# Patient Record
Sex: FEMALE | Race: WHITE | NOT HISPANIC OR LATINO | Employment: FULL TIME | URBAN - METROPOLITAN AREA
[De-identification: names, ages, dates, MRNs, and addresses within clinical notes are randomized per-mention and may not be internally consistent; named-entity substitution may affect disease eponyms.]

---

## 2017-01-12 ENCOUNTER — OFFICE VISIT (OUTPATIENT)
Dept: INTERNAL MEDICINE | Age: 72
End: 2017-01-12

## 2017-01-12 VITALS
WEIGHT: 164 LBS | DIASTOLIC BLOOD PRESSURE: 60 MMHG | RESPIRATION RATE: 12 BRPM | SYSTOLIC BLOOD PRESSURE: 120 MMHG | HEIGHT: 65 IN | BODY MASS INDEX: 27.32 KG/M2 | HEART RATE: 72 BPM

## 2017-01-12 DIAGNOSIS — E78.2 MIXED HYPERLIPIDEMIA: Primary | ICD-10-CM

## 2017-01-12 DIAGNOSIS — N63.20 BREAST MASS, LEFT: ICD-10-CM

## 2017-01-12 DIAGNOSIS — E03.9 ACQUIRED HYPOTHYROIDISM: ICD-10-CM

## 2017-01-12 LAB
ALBUMIN SERPL-MCNC: 4.4 G/DL (ref 3.5–5.2)
ALBUMIN/GLOB SERPL: 1.6 G/DL
ALP SERPL-CCNC: 117 U/L (ref 39–117)
ALT SERPL-CCNC: 31 U/L (ref 1–33)
AST SERPL-CCNC: 26 U/L (ref 1–32)
BILIRUB SERPL-MCNC: 0.3 MG/DL (ref 0.1–1.2)
BUN SERPL-MCNC: 18 MG/DL (ref 8–23)
BUN/CREAT SERPL: 19.1 (ref 7–25)
CALCIUM SERPL-MCNC: 9.9 MG/DL (ref 8.6–10.5)
CHLORIDE SERPL-SCNC: 103 MMOL/L (ref 98–107)
CHOLEST SERPL-MCNC: 181 MG/DL (ref 0–200)
CO2 SERPL-SCNC: 25.2 MMOL/L (ref 22–29)
CREAT SERPL-MCNC: 0.94 MG/DL (ref 0.57–1)
GLOBULIN SER CALC-MCNC: 2.7 GM/DL
GLUCOSE SERPL-MCNC: 107 MG/DL (ref 65–99)
HDLC SERPL-MCNC: 39 MG/DL (ref 40–60)
LDLC SERPL CALC-MCNC: 112 MG/DL (ref 0–100)
POTASSIUM SERPL-SCNC: 4.3 MMOL/L (ref 3.5–5.2)
PROT SERPL-MCNC: 7.1 G/DL (ref 6–8.5)
SODIUM SERPL-SCNC: 142 MMOL/L (ref 136–145)
T4 FREE SERPL-MCNC: 1.28 NG/DL (ref 0.93–1.7)
TRIGL SERPL-MCNC: 152 MG/DL (ref 0–150)
TSH SERPL DL<=0.005 MIU/L-ACNC: 1.54 MIU/ML (ref 0.27–4.2)
VLDLC SERPL CALC-MCNC: 30.4 MG/DL (ref 5–40)

## 2017-01-12 PROCEDURE — 99214 OFFICE O/P EST MOD 30 MIN: CPT | Performed by: INTERNAL MEDICINE

## 2017-01-12 NOTE — PROGRESS NOTES
Marci Kolb is a 71 y.o. female who presents with   Chief Complaint   Patient presents with   • Hyperlipidemia     Checkup; lab updates   • Hypothyroidism     As above   • Breast Mass     When seeing Dr. Francisco Gonzalez in 2008 she had a mammogram showing a 6 mm area of the posterior third upper inner quadrant of the left breast that was suspicious.  She says that she never had any follow-up on this and has never had any biopsy done   .    Hyperlipidemia   This is a chronic problem. The current episode started more than 1 year ago. The problem is controlled. Exacerbating diseases include hypothyroidism. Current antihyperlipidemic treatment includes statins. The current treatment provides moderate improvement of lipids. There are no compliance problems.    Hypothyroidism   This is a chronic problem. The current episode started more than 1 year ago. The problem has been unchanged. Treatments tried: Current treatment well-tolerated.   Breast Mass   This is a chronic problem. The current episode started more than 1 year ago. Progression since onset: In 2008 a mammogram by another physician showed a 6 mm area of suspicion and the posterior third upper inner quadrant of the left breast.  She says that she has not had any follow-up on this.  Also she does not have a GYN and has not had any recent exams         The following portions of the patient's history were reviewed and updated as appropriate: allergies, current medications, past medical history and problem list.    Review of Systems   Constitutional: Negative.    HENT: Negative.    Eyes: Negative.    Respiratory: Negative.    Cardiovascular: Negative.    Genitourinary: Negative.    Musculoskeletal: Negative.    Skin: Negative.    Neurological: Negative.    Psychiatric/Behavioral: Negative.        Objective   Physical Exam   Constitutional: She is oriented to person, place, and time. She appears well-developed and well-nourished. No distress.   HENT:   Head:  Normocephalic and atraumatic.   Eyes: Conjunctivae and EOM are normal. Pupils are equal, round, and reactive to light.   Neck: Normal range of motion. Neck supple. No thyromegaly present.   Neck exam negative.  Carotid auscultation normal-no bruits heard.   Cardiovascular: Normal rate, regular rhythm, normal heart sounds and intact distal pulses.  Exam reveals no gallop and no friction rub.    No murmur heard.  Pulmonary/Chest: Effort normal and breath sounds normal. No respiratory distress. She has no wheezes. She has no rales. She exhibits no tenderness.   Neurological: She is alert and oriented to person, place, and time.   Psychiatric: She has a normal mood and affect. Her behavior is normal. Judgment and thought content normal.   Nursing note and vitals reviewed.      Assessment/Plan   Marci was seen today for hyperlipidemia, hypothyroidism and breast mass.    Diagnoses and all orders for this visit:    Mixed hyperlipidemia  -     Comprehensive Metabolic Panel  -     Lipid Panel    Acquired hypothyroidism  -     TSH+Free T4    Breast mass, left  -     Mammo Diagnostic Bilateral With CAD; Future      Plan: Labs as above.  Continue current treatment.  Follow-up checkup/lab updates day 6 months.    Reviewing her old records revealed that in 2008 she had a mammogram through her previous physician-Dr. Francisco Gonzalez.  That mammogram revealed what appeared to be a 6 mm area of suspicion in the posterior third upper inner quadrant of the left breast.  Stereotactic biopsy was suggested.  The patient says that to the best of her recollection she never received this information and has not had any biopsy done and has had no mammography follow-up.  She also has not been to a gynecologist in many years and has not had a breast exam or a Pap smear in many years.    The patient has not had a recent screening colonoscopy although she has had one in the past which caused a great deal of pain and afterwards she had  difficulty controlling her bowels and she said she would never have one again.  The patient is aware of the risks of undiagnosed colon cancer including GI bleed, bowel obstruction, bowel perforation, metastatic colon cancer and death.  The patient voices an understanding of these risks but also voices a willingness to accept those risks based on the current decision to decline a colonoscopy.

## 2017-01-12 NOTE — MR AVS SNAPSHOT
Marci Kolb   1/12/2017 7:40 AM   Office Visit    Dept Phone:  [phone]   Encounter #:  [id number]    Provider:  Ward Penaloza MD   Department:  [facility] PRIMARY [facility]ARE                Your Full Care Plan              Today's Medication Changes          These changes are accurate as of: 1/12/17  8:01 AM.  If you have any questions, ask your nurse or doctor.               Stop taking medication(s)listed here:     azithromycin 250 MG tablet   Commonly known as:  ZITHROMAX Z-VICENTE   Stopped by:  Ward Penaloza MD           HYDROcodone-acetaminophen 5-325 MG per tablet   Commonly known as:  NORCO   Stopped by:  Ward Penaloza MD           ondansetron ODT 4 MG disintegrating tablet   Commonly known as:  ZOFRAN-ODT   Stopped by:  Ward Penaloza MD           terbinafine 250 MG tablet   Commonly known as:  lamiSIL   Stopped by:  Ward Penaloza MD                      Your Updated Medication List          This list is accurate as of: 1/12/17  8:01 AM.  Always use your most recent med list.                levothyroxine 100 MCG tablet   Commonly known as:  SYNTHROID, LEVOTHROID       simvastatin 40 MG tablet   Commonly known as:  ZOCOR   TAKE ONE TABLET BY MOUTH EVERY NIGHT AT BEDTIME               We Performed the Following     Comprehensive Metabolic Panel     Lipid Panel     TSH+Free T4       You Were Diagnosed With        Codes Comments    Mixed hyperlipidemia    -  Primary ICD-10-CM: E78.2  ICD-9-CM: 272.2     Acquired hypothyroidism     ICD-10-CM: E03.9  ICD-9-CM: 244.9     Breast mass, left     ICD-10-CM: N63  ICD-9-CM: 611.72       Instructions     None    Patient Instructions History      Upcoming Appointments     Visit Type Date Time Department    OFFICE VISIT 1/12/2017  7:40 AM [facility]    OFFICE VISIT 7/12/2017  7:40 AM [facility]      [id number] Signup     [facility] [employer] allows you to send messages to your doctor, view your test results, renew  "your prescriptions, schedule appointments, and more. To sign up, go to [employer] and click on the Sign Up Now link in the New User? box. Enter your MiNeeds Activation Code exactly as it appears below along with the last four digits of your Social Security Number and your Date of Birth () to complete the sign-up process. If you do not sign up before the expiration date, you must request a new code.    MiNeeds Activation Code: [email or web address]  Expires: 2017  7:55 AM    If you have questions, you can email [email or web address] or call [phone] to talk to our MiNeeds staff. Remember, MiNeeds is NOT to be used for urgent needs. For medical emergencies, dial 911.               Other Info from Your Visit           Your Appointments     2017  7:40 AM EDT   Office Visit with Ward Penaloza MD   [facility] PRIMARY [facility]ARE (--)    [address] [place] [id number] [phone]           Arrive 15 minutes prior to appointment.              Allergies     Penicillins      Streptomycin        Reason for Visit     Hyperlipidemia Checkup; lab updates    Hypothyroidism As above    Breast Mass When seeing Dr. Francisco Gonzalez in  she had a mammogram showing a 6 mm area of the posterior third upper inner quadrant of the left breast that was suspicious.  She says that she never had any follow-up on this and has never had any biopsy done      Vital Signs     Blood Pressure Pulse Respirations Height Weight Body Mass Index    120/60 72 12 65\" (165.1 cm) 164 lb (74.4 kg) 27.29 kg/m2    Smoking Status                   Unknown If Ever Smoked           Problems and Diagnoses Noted     High cholesterol or triglycerides    Underactive thyroid    Breast mass, left            "

## 2017-01-13 ENCOUNTER — TELEPHONE (OUTPATIENT)
Dept: INTERNAL MEDICINE | Age: 72
End: 2017-01-13

## 2017-01-13 RX ORDER — SIMVASTATIN 40 MG
40 TABLET ORAL NIGHTLY
Qty: 30 TABLET | Refills: 5 | Status: SHIPPED | OUTPATIENT
Start: 2017-01-13 | End: 2017-07-25 | Stop reason: SDDI

## 2017-01-13 RX ORDER — LEVOTHYROXINE SODIUM 0.1 MG/1
100 TABLET ORAL EVERY 12 HOURS
Qty: 60 TABLET | Refills: 5 | Status: SHIPPED | OUTPATIENT
Start: 2017-01-13 | End: 2018-01-09

## 2017-01-13 NOTE — TELEPHONE ENCOUNTER
Called patient's  1-13-17 @ 2:46 to let him know that she did not ask for any refills and that I will send them to [facility] for her.

## 2017-01-19 ENCOUNTER — TELEPHONE (OUTPATIENT)
Dept: INTERNAL MEDICINE | Age: 72
End: 2017-01-19

## 2017-01-19 ENCOUNTER — HOSPITAL ENCOUNTER (OUTPATIENT)
Dept: MAMMOGRAPHY | Facility: HOSPITAL | Age: 72
Discharge: HOME | End: 2017-01-19
Admitting: INTERNAL MEDICINE

## 2017-01-19 DIAGNOSIS — N63.20 BREAST MASS, LEFT: ICD-10-CM

## 2017-01-19 PROCEDURE — G0204 DX MAMMO INCL CAD BI: HCPCS

## 2017-01-19 NOTE — TELEPHONE ENCOUNTER
----- Message from Ward Penaloza MD sent at 1/19/2017  9:16 AM EST -----  Mammogram is normal.  Follow-up mammography in one year is advised.

## 2017-05-05 ENCOUNTER — OFFICE VISIT (OUTPATIENT)
Dept: ORTHOPEDIC SURGERY | Facility: CLINIC | Age: 72
End: 2017-05-05

## 2017-05-05 VITALS — TEMPERATURE: 98.6 F | HEIGHT: 65 IN | BODY MASS INDEX: 26.96 KG/M2 | WEIGHT: 161.8 LBS

## 2017-05-05 DIAGNOSIS — M16.0 PRIMARY OSTEOARTHRITIS OF BOTH HIPS: Primary | ICD-10-CM

## 2017-05-05 PROCEDURE — 99203 OFFICE O/P NEW LOW 30 MIN: CPT | Performed by: ORTHOPAEDIC SURGERY

## 2017-05-05 RX ORDER — MELOXICAM 15 MG/1
15 TABLET ORAL DAILY
COMMUNITY
Start: 2017-04-19 | End: 2018-01-27 | Stop reason: HOSPADM

## 2017-07-25 ENCOUNTER — OFFICE VISIT (OUTPATIENT)
Dept: INTERNAL MEDICINE | Age: 72
End: 2017-07-25

## 2017-07-25 VITALS
SYSTOLIC BLOOD PRESSURE: 110 MMHG | BODY MASS INDEX: 27.31 KG/M2 | HEIGHT: 64 IN | HEART RATE: 70 BPM | DIASTOLIC BLOOD PRESSURE: 60 MMHG | RESPIRATION RATE: 12 BRPM | WEIGHT: 160 LBS

## 2017-07-25 DIAGNOSIS — R73.9 HYPERGLYCEMIA: ICD-10-CM

## 2017-07-25 DIAGNOSIS — E03.9 ACQUIRED HYPOTHYROIDISM: ICD-10-CM

## 2017-07-25 DIAGNOSIS — E78.2 MIXED HYPERLIPIDEMIA: Primary | ICD-10-CM

## 2017-07-25 LAB
ALBUMIN SERPL-MCNC: 4.3 G/DL (ref 3.5–5.2)
ALBUMIN/GLOB SERPL: 1.5 G/DL
ALP SERPL-CCNC: 85 U/L (ref 39–117)
ALT SERPL-CCNC: 30 U/L (ref 1–33)
AST SERPL-CCNC: 25 U/L (ref 1–32)
BILIRUB SERPL-MCNC: 0.3 MG/DL (ref 0.1–1.2)
BUN SERPL-MCNC: 14 MG/DL (ref 8–23)
BUN/CREAT SERPL: 14 (ref 7–25)
CALCIUM SERPL-MCNC: 10 MG/DL (ref 8.6–10.5)
CHLORIDE SERPL-SCNC: 105 MMOL/L (ref 98–107)
CHOLEST SERPL-MCNC: 161 MG/DL (ref 0–200)
CO2 SERPL-SCNC: 27.1 MMOL/L (ref 22–29)
CREAT SERPL-MCNC: 1 MG/DL (ref 0.57–1)
GLOBULIN SER CALC-MCNC: 2.8 GM/DL
GLUCOSE SERPL-MCNC: 102 MG/DL (ref 65–99)
HBA1C MFR BLD: 5.78 % (ref 4.8–5.6)
HDLC SERPL-MCNC: 32 MG/DL (ref 40–60)
LDLC SERPL CALC-MCNC: 98 MG/DL (ref 0–100)
POTASSIUM SERPL-SCNC: 4.3 MMOL/L (ref 3.5–5.2)
PROT SERPL-MCNC: 7.1 G/DL (ref 6–8.5)
SODIUM SERPL-SCNC: 144 MMOL/L (ref 136–145)
T4 FREE SERPL-MCNC: 1.25 NG/DL (ref 0.93–1.7)
TRIGL SERPL-MCNC: 157 MG/DL (ref 0–150)
TSH SERPL DL<=0.005 MIU/L-ACNC: 3.76 MIU/ML (ref 0.27–4.2)
VLDLC SERPL CALC-MCNC: 31.4 MG/DL (ref 5–40)

## 2017-07-25 PROCEDURE — 99214 OFFICE O/P EST MOD 30 MIN: CPT | Performed by: INTERNAL MEDICINE

## 2017-07-25 NOTE — PROGRESS NOTES
Marci Kolb is a 72 y.o. female who presents with   Chief Complaint   Patient presents with   • Hyperlipidemia     Checkup; lab updates.  Patient tells me she is not taking simvastatin.   • Hypothyroidism     As above   • Blood Sugar Problem     As above.  Mild elevation of the blood sugar in the past.  Currently on no medications.   • Back Pain     Unrelated reason for today's visit: Complaints of low back pain and hip pain.  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  She does see Dr. Nash (orthopedics) for this she says.   .    Hyperlipidemia   This is a chronic problem. The current episode started more than 1 year ago. The problem is controlled. Recent lipid tests were reviewed and are normal. Exacerbating diseases include diabetes and hypothyroidism. She is currently on no antihyperlipidemic treatment (She had been on simvastatin in the past but apparently elected on her own to discontinue therapy.  No specific reasons why.).   Hypothyroidism   This is a chronic problem. The current episode started more than 1 year ago. The problem has been unchanged. Associated symptoms include arthralgias. Treatments tried: Current treatment well-tolerated.   Blood Sugar Problem   This is a chronic problem. The current episode started more than 1 year ago. The problem has been unchanged. Associated symptoms include arthralgias. She has tried nothing for the symptoms.   Back Pain   This is a chronic (History as noted above.  Current treatment and management per Dr. NashEtygefc-chcmuaiomax-dz her own history.  Previous x-rays compatible with degenerative osteoarthritis of the low back and hips.) problem.        The following portions of the patient's history were reviewed and updated as appropriate: allergies, current medications, past medical history and problem list.    Review of Systems   Constitutional: Negative.    HENT: Negative.    Eyes: Negative.    Respiratory:  Negative.    Cardiovascular: Negative.    Genitourinary: Negative.    Musculoskeletal: Positive for arthralgias and back pain.   Skin: Negative.    Neurological: Negative.    Psychiatric/Behavioral: Negative.        Objective   Physical Exam   Constitutional: She is oriented to person, place, and time. She appears well-developed and well-nourished. No distress.   HENT:   Head: Normocephalic and atraumatic.   Eyes: Conjunctivae and EOM are normal. Pupils are equal, round, and reactive to light.   Neck: Normal range of motion. Neck supple. No thyromegaly present.   Neck exam negative.  Carotid auscultation normal-no bruits heard.   Cardiovascular: Normal rate, regular rhythm, normal heart sounds and intact distal pulses.  Exam reveals no gallop and no friction rub.    No murmur heard.  Pulmonary/Chest: Effort normal and breath sounds normal. No respiratory distress. She has no wheezes. She has no rales. She exhibits no tenderness.   Musculoskeletal:   Musculoskeletal history as noted above.  Musculoskeletal system not assessed on today's visit.  She has an orthopedic physician who is treating and managing her back pain in her hip pain.  We will defer all of that to him.   Neurological: She is alert and oriented to person, place, and time.   Psychiatric: She has a normal mood and affect. Her behavior is normal. Judgment and thought content normal.   Nursing note and vitals reviewed.      Assessment/Plan   Marci was seen today for hyperlipidemia, hypothyroidism, blood sugar problem and back pain.    Diagnoses and all orders for this visit:    Mixed hyperlipidemia  -     Comprehensive Metabolic Panel  -     Lipid Panel    Acquired hypothyroidism  -     Comprehensive Metabolic Panel  -     TSH+Free T4    Hyperglycemia  -     Comprehensive Metabolic Panel  -     Hemoglobin A1c      Plan: Labs as above for the issues as outlined.  Continue current treatment.  Continue off of simvastatin for now.  Tentative plans for a  six-month checkup/lab update visit.    For her complaints of chronic back pain and hip pain in her known prior past history of degenerative osteoarthritis of the low back and both hips, she is currently seeing orthopedics and will continue to do so.  She is currently being managed by orthopedics and will continue to do so.

## 2017-07-28 ENCOUNTER — TELEPHONE (OUTPATIENT)
Dept: INTERNAL MEDICINE | Age: 72
End: 2017-07-28

## 2017-07-28 NOTE — TELEPHONE ENCOUNTER
FYI: Pls update pt's med list.    Pt does take simvastatin 40mg. P/pt made a mistake by telling  incorrectly on DOS 7/25/17.    Pt can be reached #[phone].

## 2017-07-31 RX ORDER — SIMVASTATIN 40 MG
40 TABLET ORAL NIGHTLY
COMMUNITY
Start: 2017-07-31 | End: 2018-09-06

## 2017-09-06 ENCOUNTER — OFFICE VISIT (OUTPATIENT)
Dept: INTERNAL MEDICINE | Age: 72
End: 2017-09-06

## 2017-09-06 VITALS
WEIGHT: 157 LBS | RESPIRATION RATE: 12 BRPM | SYSTOLIC BLOOD PRESSURE: 120 MMHG | DIASTOLIC BLOOD PRESSURE: 68 MMHG | HEIGHT: 64 IN | BODY MASS INDEX: 26.8 KG/M2 | HEART RATE: 70 BPM

## 2017-09-06 DIAGNOSIS — N28.1 RENAL CYST, RIGHT: Primary | ICD-10-CM

## 2017-09-06 PROCEDURE — 99213 OFFICE O/P EST LOW 20 MIN: CPT | Performed by: INTERNAL MEDICINE

## 2017-09-06 RX ORDER — HYDROCODONE BITARTRATE AND ACETAMINOPHEN 7.5; 325 MG/1; MG/1
1 TABLET ORAL EVERY 6 HOURS PRN
COMMUNITY
End: 2018-01-27 | Stop reason: HOSPADM

## 2017-09-06 NOTE — PROGRESS NOTES
Marci Kolb is a 72 y.o. female who presents with   Chief Complaint   Patient presents with   • Renal cyst     Patient is currently seeing Dr. Nash (orthopedics) for back pain/spinal stenosis.  Recent MRI showing L4-5 spinal stenosis.  Also at the time of that testing an abdominal ultrasound was performed showing a 1.3 cm complicated cyst of the upper pole of the right kidney.  Patient referred back here for further evaluation.   .    History of Present Illness -history as outlined above.    The following portions of the patient's history were reviewed and updated as appropriate: allergies, current medications, past medical history and problem list.    Review of Systems   Constitutional: Negative.    HENT: Negative.    Eyes: Negative.    Respiratory: Negative.    Cardiovascular: Negative.    Genitourinary: Negative.    Musculoskeletal: Positive for back pain.   Skin: Negative.    Neurological: Negative.    Psychiatric/Behavioral: Negative.        Objective   Physical Exam   Constitutional: She is oriented to person, place, and time. She appears well-developed and well-nourished. No distress.   HENT:   Head: Normocephalic and atraumatic.   Eyes: Conjunctivae and EOM are normal. Pupils are equal, round, and reactive to light.   Neck: Normal range of motion. Neck supple. No thyromegaly present.   Neck exam negative.  Carotid auscultation normal-no bruits heard.   Cardiovascular: Normal rate, regular rhythm, normal heart sounds and intact distal pulses.  Exam reveals no gallop and no friction rub.    No murmur heard.  Pulmonary/Chest: Effort normal and breath sounds normal. No respiratory distress. She has no wheezes. She has no rales. She exhibits no tenderness.   Musculoskeletal:   Back not examined.  Problems deferred to orthopedics who is managing the issue   Neurological: She is alert and oriented to person, place, and time.   Psychiatric: She has a normal mood and affect. Her behavior is normal. Judgment  and thought content normal.   Nursing note and vitals reviewed.      Assessment/Plan   Marci was seen today for renal cyst.    Diagnoses and all orders for this visit:    Renal cyst, right  -     Ambulatory Referral to Urology      Plan: Patient will be referred to urology-Dr. Cartwright for further evaluation.  She did not want to wait the 6 months for a follow-up ultrasound but preferred to do something sooner rather than later.

## 2017-11-06 RX ORDER — SIMVASTATIN 40 MG
TABLET ORAL
Qty: 30 TABLET | Refills: 4 | Status: SHIPPED | OUTPATIENT
Start: 2017-11-06 | End: 2018-01-09 | Stop reason: SDUPTHER

## 2017-11-16 ENCOUNTER — OFFICE VISIT (OUTPATIENT)
Dept: INTERNAL MEDICINE | Age: 72
End: 2017-11-16

## 2017-11-16 VITALS
HEART RATE: 81 BPM | DIASTOLIC BLOOD PRESSURE: 82 MMHG | WEIGHT: 156.2 LBS | SYSTOLIC BLOOD PRESSURE: 122 MMHG | TEMPERATURE: 98.2 F | HEIGHT: 64 IN | BODY MASS INDEX: 26.67 KG/M2 | OXYGEN SATURATION: 95 %

## 2017-11-16 DIAGNOSIS — J06.9 VIRAL URI WITH COUGH: Primary | ICD-10-CM

## 2017-11-16 PROCEDURE — 99213 OFFICE O/P EST LOW 20 MIN: CPT | Performed by: NURSE PRACTITIONER

## 2017-11-16 RX ORDER — BENZONATATE 100 MG/1
100 CAPSULE ORAL 3 TIMES DAILY PRN
Qty: 30 CAPSULE | Refills: 0 | Status: SHIPPED | OUTPATIENT
Start: 2017-11-16 | End: 2018-01-09

## 2017-11-16 NOTE — PATIENT INSTRUCTIONS
"Upper Respiratory Infection, Adult  Most upper respiratory infections (URIs) are a viral infection of the air passages leading to the lungs. A URI affects the nose, throat, and upper air passages. The most common type of URI is nasopharyngitis and is typically referred to as \"the common cold.\"  URIs run their course and usually go away on their own. Most of the time, a URI does not require medical attention, but sometimes a bacterial infection in the upper airways can follow a viral infection. This is called a secondary infection. Sinus and middle ear infections are common types of secondary upper respiratory infections.  Bacterial pneumonia can also complicate a URI. A URI can worsen asthma and chronic obstructive pulmonary disease (COPD). Sometimes, these complications can require emergency medical care and may be life threatening.   CAUSES  Almost all URIs are caused by viruses. A virus is a type of germ and can spread from one person to another.   RISKS FACTORS  You may be at risk for a URI if:   · You smoke.    · You have chronic heart or lung disease.  · You have a weakened defense (immune) system.    · You are very young or very old.    · You have nasal allergies or asthma.  · You work in crowded or poorly ventilated areas.  · You work in health care facilities or schools.  SIGNS AND SYMPTOMS   Symptoms typically develop 2-3 days after you come in contact with a cold virus. Most viral URIs last 7-10 days. However, viral URIs from the influenza virus (flu virus) can last 14-18 days and are typically more severe. Symptoms may include:   · Runny or stuffy (congested) nose.    · Sneezing.    · Cough.    · Sore throat.    · Headache.    · Fatigue.    · Fever.    · Loss of appetite.    · Pain in your forehead, behind your eyes, and over your cheekbones (sinus pain).  · Muscle aches.    DIAGNOSIS   Your health care provider may diagnose a URI by:  · Physical exam.  · Tests to check that your symptoms are not due to " another condition such as:  ¨ Strep throat.  ¨ Sinusitis.  ¨ Pneumonia.  ¨ Asthma.  TREATMENT   A URI goes away on its own with time. It cannot be cured with medicines, but medicines may be prescribed or recommended to relieve symptoms. Medicines may help:  · Reduce your fever.  · Reduce your cough.  · Relieve nasal congestion.  HOME CARE INSTRUCTIONS   · Take medicines only as directed by your health care provider.    · Gargle warm saltwater or take cough drops to comfort your throat as directed by your health care provider.  · Use a warm mist humidifier or inhale steam from a shower to increase air moisture. This may make it easier to breathe.  · Drink enough fluid to keep your urine clear or pale yellow.    · Eat soups and other clear broths and maintain good nutrition.    · Rest as needed.    · Return to work when your temperature has returned to normal or as your health care provider advises. You may need to stay home longer to avoid infecting others. You can also use a face mask and careful hand washing to prevent spread of the virus.  · Increase the usage of your inhaler if you have asthma.    · Do not use any tobacco products, including cigarettes, chewing tobacco, or electronic cigarettes. If you need help quitting, ask your health care provider.  PREVENTION   The best way to protect yourself from getting a cold is to practice good hygiene.   · Avoid oral or hand contact with people with cold symptoms.    · Wash your hands often if contact occurs.    There is no clear evidence that vitamin C, vitamin E, echinacea, or exercise reduces the chance of developing a cold. However, it is always recommended to get plenty of rest, exercise, and practice good nutrition.   SEEK MEDICAL CARE IF:   · You are getting worse rather than better.    · Your symptoms are not controlled by medicine.    · You have chills.  · You have worsening shortness of breath.  · You have brown or red mucus.  · You have yellow or brown nasal  discharge.  · You have pain in your face, especially when you bend forward.  · You have a fever.  · You have swollen neck glands.  · You have pain while swallowing.  · You have white areas in the back of your throat.  SEEK IMMEDIATE MEDICAL CARE IF:   · You have severe or persistent:    Headache.    Ear pain.    Sinus pain.    Chest pain.  · You have chronic lung disease and any of the following:    Wheezing.    Prolonged cough.    Coughing up blood.    A change in your usual mucus.  · You have a stiff neck.  · You have changes in your:    Vision.    Hearing.    Thinking.    Mood.  MAKE SURE YOU:   · Understand these instructions.  · Will watch your condition.  · Will get help right away if you are not doing well or get worse.     This information is not intended to replace advice given to you by your health care provider. Make sure you discuss any questions you have with your health care provider.     Document Released: 06/13/2002 Document Revised: 05/03/2016 Document Reviewed: 03/25/2015  [employer] Interactive Patient Education ©2017 [employer]

## 2017-11-16 NOTE — PROGRESS NOTES
"Subjective   Marci Kolb is a 72 y.o. female.     Sore Throat    This is a new problem. Episode onset: 2 days ago. The problem has been gradually worsening. Maximum temperature: subjective fever. Associated symptoms include congestion and coughing. Pertinent negatives include no diarrhea, ear pain, shortness of breath or vomiting. Associated symptoms comments: \"dry nasal passages\" . She has had no exposure to strep or mono. 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 She has tried nothing for the symptoms.   She reports she had flu shot last week.  Patient refuses to have flu test performed today.    The following portions of the patient's history were reviewed and updated as appropriate: allergies, current medications, past family history, past medical history, past social history, past surgical history and problem list.    Review of Systems   Constitutional: Positive for chills and fatigue.   HENT: Positive for congestion, rhinorrhea and sore throat. Negative for ear pain and postnasal drip.    Respiratory: Positive for cough. Negative for shortness of breath.    Cardiovascular: Negative for chest pain.   Gastrointestinal: Negative for constipation, diarrhea, nausea and vomiting.       Objective   Physical Exam   Constitutional: She is oriented to person, place, and time. Vital signs are normal. She appears well-developed and well-nourished. She is cooperative. She does not appear ill. No distress.   HENT:   Head: Normocephalic and atraumatic.   Right Ear: Hearing, tympanic membrane, external ear and ear canal normal. No drainage or swelling. Tympanic membrane is not injected and not erythematous. No middle ear effusion.   Left Ear: Hearing, tympanic membrane, external ear and ear canal normal. No drainage or swelling. Tympanic membrane is not injected and not erythematous.  No middle ear effusion.   Nose: Nose normal.   Mouth/Throat: Uvula is midline, oropharynx is clear and moist and " mucous membranes are normal. No tonsillar exudate.   Cardiovascular: Normal rate, regular rhythm and normal heart sounds.    No murmur heard.  Pulmonary/Chest: Effort normal and breath sounds normal. She has no decreased breath sounds. She has no wheezes. She has no rhonchi. She has no rales.   Lymphadenopathy:     She has no cervical adenopathy.        Right cervical: No superficial cervical, no deep cervical and no posterior cervical adenopathy present.       Left cervical: No superficial cervical, no deep cervical and no posterior cervical adenopathy present.   Neurological: She is alert and oriented to person, place, and time.   Skin: Skin is warm, dry and intact.   Psychiatric: She has a normal mood and affect. Her speech is normal and behavior is normal. Judgment and thought content normal. Cognition and memory are normal.   Nursing note and vitals reviewed.      Assessment/Plan   Problems Addressed this Visit     None      Visit Diagnoses     Viral URI with cough    -  Primary    Relevant Medications    benzonatate (TESSALON) 100 MG capsule        1. Viral URI with cough    Discussed appropriate conservative and symptomatic treatment with patient, including use of NSAIDs or Tylenol for fever or pain, increase by mouth fluids, and rest. Discussed when to follow up for recheck, including worsening symptoms such as fever, purulent nasal drainage, worsening cough, productive cough, etc.     - benzonatate (TESSALON) 100 MG capsule; Take 1 capsule by mouth 3 (Three) Times a Day As Needed for Cough.  Dispense: 30 capsule; Refill: 0

## 2018-01-09 ENCOUNTER — APPOINTMENT (OUTPATIENT)
Dept: PREADMISSION TESTING | Facility: HOSPITAL | Age: 73
End: 2018-01-09

## 2018-01-09 ENCOUNTER — HOSPITAL ENCOUNTER (OUTPATIENT)
Dept: GENERAL RADIOLOGY | Facility: HOSPITAL | Age: 73
Discharge: HOME | End: 2018-01-09
Admitting: ORTHOPAEDIC SURGERY

## 2018-01-09 VITALS
WEIGHT: 160 LBS | TEMPERATURE: 97.8 F | BODY MASS INDEX: 27.31 KG/M2 | HEIGHT: 64 IN | SYSTOLIC BLOOD PRESSURE: 107 MMHG | HEART RATE: 67 BPM | RESPIRATION RATE: 16 BRPM | DIASTOLIC BLOOD PRESSURE: 69 MMHG | OXYGEN SATURATION: 93 %

## 2018-01-09 LAB
ANION GAP SERPL CALCULATED.3IONS-SCNC: 10.2 MMOL/L
BACTERIA UR QL AUTO: ABNORMAL /HPF
BILIRUB UR QL STRIP: NEGATIVE
BUN BLD-MCNC: 11 MG/DL (ref 8–23)
BUN/CREAT SERPL: 13.8 (ref 7–25)
CALCIUM SPEC-SCNC: 9.2 MG/DL (ref 8.6–10.5)
CHLORIDE SERPL-SCNC: 105 MMOL/L (ref 98–107)
CLARITY UR: ABNORMAL
CO2 SERPL-SCNC: 28.8 MMOL/L (ref 22–29)
COD CRY URNS QL: ABNORMAL /HPF
COLOR UR: ABNORMAL
CREAT BLD-MCNC: 0.8 MG/DL (ref 0.57–1)
DEPRECATED RDW RBC AUTO: 52 FL (ref 37–54)
ERYTHROCYTE [DISTWIDTH] IN BLOOD BY AUTOMATED COUNT: 13.7 % (ref 11.7–13)
GFR SERPL CREATININE-BSD FRML MDRD: 70 ML/MIN/1.73
GLUCOSE BLD-MCNC: 85 MG/DL (ref 65–99)
GLUCOSE UR STRIP-MCNC: NEGATIVE MG/DL
HCT VFR BLD AUTO: 42.6 % (ref 35.6–45.5)
HGB BLD-MCNC: 13.5 G/DL (ref 11.9–15.5)
HGB UR QL STRIP.AUTO: ABNORMAL
HYALINE CASTS UR QL AUTO: ABNORMAL /LPF
INR PPP: 0.99 (ref 0.9–1.1)
KETONES UR QL STRIP: NEGATIVE
LEUKOCYTE ESTERASE UR QL STRIP.AUTO: ABNORMAL
MCH RBC QN AUTO: 33 PG (ref 26.9–32)
MCHC RBC AUTO-ENTMCNC: 31.7 G/DL (ref 32.4–36.3)
MCV RBC AUTO: 104.2 FL (ref 80.5–98.2)
MUCOUS THREADS URNS QL MICRO: ABNORMAL /HPF
NITRITE UR QL STRIP: NEGATIVE
PH UR STRIP.AUTO: 5.5 [PH] (ref 5–8)
PLATELET # BLD AUTO: 279 10*3/MM3 (ref 140–500)
PMV BLD AUTO: 10.1 FL (ref 6–12)
POTASSIUM BLD-SCNC: 3.5 MMOL/L (ref 3.5–5.2)
PROT UR QL STRIP: NEGATIVE
PROTHROMBIN TIME: 12.7 SECONDS (ref 11.7–14.2)
RBC # BLD AUTO: 4.09 10*6/MM3 (ref 3.9–5.2)
RBC # UR: ABNORMAL /HPF
REF LAB TEST METHOD: ABNORMAL
SODIUM BLD-SCNC: 144 MMOL/L (ref 136–145)
SP GR UR STRIP: 1.03 (ref 1–1.03)
SQUAMOUS #/AREA URNS HPF: ABNORMAL /HPF
UROBILINOGEN UR QL STRIP: ABNORMAL
WBC NRBC COR # BLD: 6.73 10*3/MM3 (ref 4.5–10.7)
WBC UR QL AUTO: ABNORMAL /HPF

## 2018-01-09 PROCEDURE — 36415 COLL VENOUS BLD VENIPUNCTURE: CPT

## 2018-01-09 PROCEDURE — 93010 ELECTROCARDIOGRAM REPORT: CPT | Performed by: INTERNAL MEDICINE

## 2018-01-09 PROCEDURE — 85027 COMPLETE CBC AUTOMATED: CPT | Performed by: ORTHOPAEDIC SURGERY

## 2018-01-09 PROCEDURE — 81001 URINALYSIS AUTO W/SCOPE: CPT | Performed by: ORTHOPAEDIC SURGERY

## 2018-01-09 PROCEDURE — 80048 BASIC METABOLIC PNL TOTAL CA: CPT | Performed by: ORTHOPAEDIC SURGERY

## 2018-01-09 PROCEDURE — 85610 PROTHROMBIN TIME: CPT | Performed by: ORTHOPAEDIC SURGERY

## 2018-01-09 PROCEDURE — 87186 SC STD MICRODIL/AGAR DIL: CPT | Performed by: ORTHOPAEDIC SURGERY

## 2018-01-09 PROCEDURE — 71046 X-RAY EXAM CHEST 2 VIEWS: CPT

## 2018-01-09 PROCEDURE — 87086 URINE CULTURE/COLONY COUNT: CPT | Performed by: ORTHOPAEDIC SURGERY

## 2018-01-09 PROCEDURE — 93005 ELECTROCARDIOGRAM TRACING: CPT

## 2018-01-09 RX ORDER — LEVOTHYROXINE SODIUM 0.1 MG/1
100 TABLET ORAL DAILY
COMMUNITY
End: 2018-04-24

## 2018-01-09 RX ORDER — CHLORHEXIDINE GLUCONATE 500 MG/1
1 CLOTH TOPICAL EVERY 12 HOURS
COMMUNITY
End: 2018-01-27 | Stop reason: HOSPADM

## 2018-01-09 ASSESSMENT — HOOS JR
HOOS JR SCORE: 17
HOOS JR SCORE: 36.363

## 2018-01-09 NOTE — PAT
Scheduled for Left hip arthroplasty Dr. Nash.  Has never had joint replacement and is attending total joint class today.  Denies CP.  Has some chronic SOA that has occurred over years.  Per pt not worsening.    Activity is limited due to joint pain.  No hx of CAD.    Cardiac:  RRR. [place] Clear to ausc.  Resp even unlabored.  Labs acceptable with urine culture still pending.  Chest x-ray acceptable.  Prelim EKG NSR.    1600  EKG read as no significant change from prior.  1/15/18 urine culture called to Saumya Nash's office.

## 2018-01-09 NOTE — DISCHARGE INSTRUCTIONS
Take the following medications the morning of surgery with a small sip of water: NONE        General Instructions:  • Do not eat solid food after midnight the night before surgery.  • You may drink clear liquids day of surgery but must stop at least one hour before your hospital arrival time.  • It is beneficial for you to have a clear drink that contains carbohydrates the day of surgery.  We suggest a 12 to 20 ounce bottle of Gatorade or Powerade for non-diabetic patients or a 12 to 20 ounce bottle of G2 or Powerade Zero for diabetic patients.     Clear liquids are liquids you can see through.  Nothing red in color.     Plain water                               Sports drinks  Sodas                                   Gelatin (Jell-O)  Fruit juices without pulp such as white grape juice and apple juice  Popsicles that contain no fruit or yogurt  Tea or coffee (no cream or milk added)  Gatorade / Powerade  G2 / Powerade Zero    • Bring any papers given to you in the doctor’s office.  • Wear clean comfortable clothes and socks.  • Do not wear contact lenses or make-up.  Bring a case for your glasses.   • Remove all piercings.  Leave jewelry and any other valuables at home.  • The Pre-Admission Testing nurse will instruct you to bring medications if unable to obtain an accurate list in Pre-Admission Testing.            Preventing a Surgical Site Infection:  • For 2 to 3 days before surgery, avoid shaving with a razor because the razor can irritate skin and make it easier to develop an infection.  • The night prior to surgery sleep in a clean bed with clean clothing.  Do not allow pets to sleep with you.  • Shower on the morning of surgery using a fresh bar of anti-bacterial soap (such as Dial) and clean washcloth.  Dry with a clean towel and dress in clean clothing.  • Ask your surgeon if you will be receiving antibiotics prior to surgery.  • Make sure you, your family, and all healthcare providers clean their hands with  soap and water or an alcohol based hand  before caring for you or your wound.    Day of surgery: 1/24/2018. MAIN OR. ARRIVAL TIME  8AM  Upon arrival, a Pre-op nurse and Anesthesiologist will review your health history, obtain vital signs, and answer questions you may have.  The only belongings needed at this time will be your home medications and if applicable your C-PAP/BI-PAP machine.  If you are staying overnight your family can leave the rest of your belongings in the car and bring them to your room later.  A Pre-op nurse will start an IV and you may receive medication in preparation for surgery, including something to help you relax.  Your family will be able to see you in the Pre-op area.  While you are in surgery your family should notify the waiting room  if they leave the waiting room area and provide a contact phone number.    Please be aware that surgery does come with discomfort.  We want to make every effort to control your discomfort so please discuss any uncontrolled symptoms with your nurse.   Your doctor will most likely have prescribed pain medications.          If you are staying overnight following surgery, you will be transported to your hospital room following the recovery period.  [facility] has all private rooms.    If you have any questions please call Pre-Admission Testing at [phone].  Deductibles and co-payments are collected on the day of service. Please be prepared to pay the required co-pay, deductible or deposit on the day of service as defined by your plan.          2% CHLORAHEXIDINE GLUCONATE* CLOTH  Preparing or “prepping” skin before surgery can reduce the risk of infection at the surgical site. To make the process easier, [facility] has chosen disposable cloths moistened with a rinse-free, 2% Chlorhexidine Gluconate (CHG) antiseptic solution. The steps below outline the prepping process and should be carefully followed.         Use the prep cloth on the area that is circled in the diagram             Directions Night before Surgery  1) Shower using a fresh bar of anti-bacterial soap (such as Dial) and clean washcloth.  Use a clean towel to completely dry your skin.  2) Do not use any lotions, oils or creams on your skin.  3) Open the package and remove 1 cloth, wipe your skin for 30 seconds in a circular motion.  Allow to dry for 3 minutes.  4) Repeat #3 with second cloth.  5) Do not touch your eyes, ears, or mouth with the prep cloth.  6) Allow the wet prep solution to air dry.  7) Discard the prep cloth and wash your hands with soap and water.   8) Dress in clean bed clothes and sleep on fresh clean bed sheets.   9) You may experience some temporary itching after the prep.    Directions Day of Surgery  1) Repeat steps 1,2,3,4,5,6,7, and 9.   2) Dress in clean clothes before coming to the hospital.    BACTROBAN NASAL OINTMENT  There are many germs normally in your nose. Bactroban is an ointment that will help reduce these germs. Please follow these instructions for Bactroban use:        __1__The day before surgery in the morning  Date_1/23_______    __2__The day before surgery in the evening              Date_1/23_______    __3__The day of surgery in the morning    Date_1/24_______    **Squirt ½ package of Bactroban Ointment onto a cotton applicator and apply to inside of 1st nostril.  Squirt the remaining Bactroban and apply to the inside of the other nostril.

## 2018-01-12 LAB
BACTERIA SPEC AEROBE CULT: ABNORMAL

## 2018-01-15 NOTE — PROGRESS NOTES
Pre-Operative Orthopedic Assessment      Patient: Marci Kolb    YOB: 1945      Age/Gender: 72 y.o. female  Medical Record Number: [id number]  Surgical Procedure Planned: RT TOTAL HIP ARTHROPLASTY     Surgeon: Rogelio Nash MD    Date of Surgery Planned: 01/24/2018    Question Value Score    Patient Score   1. What is your age group? 50-65 years  66-75 years  >75 years =2  =1  =0 1   2. Gender? Male  Female =2  =1 1   3. How far on average can you walk? (a block is 200 meters) Two blocks or more (+\- rest)  1-2 blocks (+\- rest)  Housebound (most of time) =2  =1  =0 0   4. Which gait aid to you use? (more often than not) None  Single-Point Stick  Crutches/Frame =2  =1  =0 2   5. Do you use community  supports? (home help, meals on wheels, district nursing) None or one per week  Two or more per week =1  =0 1   6. Will you live with someone who can care for you after your operation? Yes  No =3  =0 3      Your Score (out of 12)  8     Key Destination at Discharge from acute care predicted by score   Scores < 6  -- extended inpatient rehabilitation   Scores 6-9 -- additional intervention to discharge directly home (Rehabilitation in home)   Scores > 9 -- directly home         Discharge Disposition/Planning:     Patient Response   Discussed the Predicted discharge disposition needed based on RAPT Assessment with the patient.    Yes   Patient selected discharge disposition:   home   Out Patient Rehabilitation Facility of Choice:    None selected but will decide upon location prior to admit   Home Health Services Preferred:   Non   Has the patient completed or been scheduled to complete pre-operative testing? Yes   Post-Operative Care Giver Name and Phone Number:    Katarina Morales  [phone]     Subacute Inpatient Rehabilitation:  Complete this section only if planning inpatient services at a Subacute Facility     Patient Response   Subacute Facility Preferred (Please list 2  facilities:      Requires pre-certification for inpatient rehabilitation services?         Planned source of transportation to inpatient rehabilitation facility?       If choosing inpatient services at an Acute or Subacute Facility please list a subsequent back-up plan (in case patient fails to qualify for inpatient rehabilitation). Back-up plans should include caregiver (family member or friend) for first 24-48 post- -operatively.       Home Equipment Patient Response   Does patient have a walker for home use?    Thinks daughter has one   Does patient have a 3 in 1 commode for home use?    no   Does patient have a shower chair for home use?    no   Does patient have an elevated commode seat for home use? yes   Does patient have a cane for home use?    Thinks daughter has one   Is there any other medical equipment in the home? If so,  List in comment section below no   Pre-Operative Class Attendance Patient Response   Attended or scheduled to attend the pre-operative class within 1 year of total joint replacement? yes   Not planning to attend the pre-operative class (see comments below)    attended   Patient Education  Completed   Expected time of discharge discussed yes   Encouraged to attend Pre-Operative Class    attended   Education re: Back-up plan for patients planning discharge to subacute facilities n/a   Education re: Predicted Discharge Disposition based on RAPT score    yes   Patient receptive and voiced understanding of information given    yes                                                                                                            Comments:    Address verified.  Patient resides with spouse and daughter in a single story home with laundry in basement.  Patient has 8-10 steps to enter the home, handrail present.  Spouse and daughter both work but will plan to provide care for patient.  Patient has several dogs and prefers OP PT.  Patient will locate PT center and schedule appointment  prior to admit.                                       Signature: Cheyanne Hicks RN    Date:  1/15/2018

## 2018-01-23 RX ORDER — VANCOMYCIN HYDROCHLORIDE 1 G/200ML
1000 INJECTION, SOLUTION INTRAVENOUS ONCE
Status: CANCELLED | OUTPATIENT
Start: 2018-01-24

## 2018-01-23 RX ORDER — VANCOMYCIN HYDROCHLORIDE 1 G/200ML
1000 INJECTION, SOLUTION INTRAVENOUS ONCE
Status: DISCONTINUED | OUTPATIENT
Start: 2018-01-24 | End: 2018-01-23

## 2018-01-24 ENCOUNTER — ANESTHESIA EVENT (OUTPATIENT)
Dept: PERIOP | Facility: HOSPITAL | Age: 73
End: 2018-01-24

## 2018-01-24 ENCOUNTER — APPOINTMENT (OUTPATIENT)
Dept: GENERAL RADIOLOGY | Facility: HOSPITAL | Age: 73
End: 2018-01-24
Attending: ORTHOPAEDIC SURGERY

## 2018-01-24 ENCOUNTER — ANESTHESIA (OUTPATIENT)
Dept: PERIOP | Facility: HOSPITAL | Age: 73
End: 2018-01-24

## 2018-01-24 ENCOUNTER — HOSPITAL ENCOUNTER (INPATIENT)
Facility: HOSPITAL | Age: 73
LOS: 3 days | Discharge: HOME HEALTH | End: 2018-01-27
Attending: ORTHOPAEDIC SURGERY | Admitting: ORTHOPAEDIC SURGERY

## 2018-01-24 DIAGNOSIS — Z74.09 IMPAIRED FUNCTIONAL MOBILITY AND ACTIVITY TOLERANCE: Primary | ICD-10-CM

## 2018-01-24 PROBLEM — M16.11 OSTEOARTHRITIS OF RIGHT HIP: Status: ACTIVE | Noted: 2018-01-24

## 2018-01-24 LAB
ABO GROUP BLD: NORMAL
BLD GP AB SCN SERPL QL: NEGATIVE
HCT VFR BLD AUTO: 40 % (ref 35.6–45.5)
HGB BLD-MCNC: 12.2 G/DL (ref 11.9–15.5)
RH BLD: POSITIVE

## 2018-01-24 PROCEDURE — 86850 RBC ANTIBODY SCREEN: CPT | Performed by: ORTHOPAEDIC SURGERY

## 2018-01-24 PROCEDURE — 72170 X-RAY EXAM OF PELVIS: CPT

## 2018-01-24 PROCEDURE — 86900 BLOOD TYPING SEROLOGIC ABO: CPT | Performed by: ORTHOPAEDIC SURGERY

## 2018-01-24 PROCEDURE — 25010000002 EPINEPHRINE PER 0.1 MG: Performed by: ORTHOPAEDIC SURGERY

## 2018-01-24 PROCEDURE — 0SRB0JZ REPLACEMENT OF LEFT HIP JOINT WITH SYNTHETIC SUBSTITUTE, OPEN APPROACH: ICD-10-PCS | Performed by: ORTHOPAEDIC SURGERY

## 2018-01-24 PROCEDURE — 25010000002 CLONIDINE PER 1 MG: Performed by: ORTHOPAEDIC SURGERY

## 2018-01-24 PROCEDURE — 25010000002 ONDANSETRON PER 1 MG: Performed by: NURSE ANESTHETIST, CERTIFIED REGISTERED

## 2018-01-24 PROCEDURE — 25010000002 DIPHENHYDRAMINE PER 50 MG: Performed by: NURSE ANESTHETIST, CERTIFIED REGISTERED

## 2018-01-24 PROCEDURE — 25010000002 PROPOFOL 10 MG/ML EMULSION: Performed by: NURSE ANESTHETIST, CERTIFIED REGISTERED

## 2018-01-24 PROCEDURE — 85018 HEMOGLOBIN: CPT | Performed by: ORTHOPAEDIC SURGERY

## 2018-01-24 PROCEDURE — 86901 BLOOD TYPING SEROLOGIC RH(D): CPT | Performed by: ORTHOPAEDIC SURGERY

## 2018-01-24 PROCEDURE — 85014 HEMATOCRIT: CPT | Performed by: ORTHOPAEDIC SURGERY

## 2018-01-24 PROCEDURE — 25010000002 FENTANYL CITRATE (PF) 100 MCG/2ML SOLUTION: Performed by: NURSE ANESTHETIST, CERTIFIED REGISTERED

## 2018-01-24 PROCEDURE — C1776 JOINT DEVICE (IMPLANTABLE): HCPCS | Performed by: ORTHOPAEDIC SURGERY

## 2018-01-24 PROCEDURE — 25010000002 ROPIVACAINE PER 1 MG: Performed by: ORTHOPAEDIC SURGERY

## 2018-01-24 PROCEDURE — 25010000002 MIDAZOLAM PER 1 MG: Performed by: ANESTHESIOLOGY

## 2018-01-24 PROCEDURE — 25010000002 FENTANYL CITRATE (PF) 250 MCG/5ML SOLUTION

## 2018-01-24 PROCEDURE — 25010000002 VANCOMYCIN PER 500 MG: Performed by: ORTHOPAEDIC SURGERY

## 2018-01-24 PROCEDURE — 25010000002 HYDROMORPHONE PER 4 MG: Performed by: NURSE ANESTHETIST, CERTIFIED REGISTERED

## 2018-01-24 PROCEDURE — 25010000002 DEXAMETHASONE PER 1 MG: Performed by: NURSE ANESTHETIST, CERTIFIED REGISTERED

## 2018-01-24 PROCEDURE — 25010000002 KETOROLAC TROMETHAMINE PER 15 MG: Performed by: ORTHOPAEDIC SURGERY

## 2018-01-24 DEVICE — PINNACLE HIP SOLUTIONS ALTRX POLYETHYLENE ACETABULAR LINER NEUTRAL 36MM ID 52MM OD
Type: IMPLANTABLE DEVICE | Site: HIP | Status: FUNCTIONAL
Brand: PINNACLE ALTRX

## 2018-01-24 DEVICE — PINNACLE GRIPTION ACETABULAR SHELL SECTOR 52MM OD
Type: IMPLANTABLE DEVICE | Site: HIP | Status: FUNCTIONAL
Brand: PINNACLE GRIPTION

## 2018-01-24 DEVICE — TOTL HIP GRIPTION CUP DEPUY UPCHRG: Type: IMPLANTABLE DEVICE | Site: HIP | Status: FUNCTIONAL

## 2018-01-24 DEVICE — M-SPEC METAL FEMORAL HEAD 12/14 TAPER DIAMETER 36MM -2: Type: IMPLANTABLE DEVICE | Site: HIP | Status: FUNCTIONAL

## 2018-01-24 DEVICE — TOTL HIP MOA DEPUY 9641333: Type: IMPLANTABLE DEVICE | Site: HIP | Status: FUNCTIONAL

## 2018-01-24 DEVICE — SUMMIT FEMORAL STEM 12/14 TAPER TAPER ED W/POROCOAT SIZE 4 STD 140MM
Type: IMPLANTABLE DEVICE | Site: HIP | Status: FUNCTIONAL
Brand: SUMMIT POROCOAT

## 2018-01-24 RX ORDER — BISACODYL 5 MG/1
10 TABLET, DELAYED RELEASE ORAL DAILY PRN
Status: DISCONTINUED | OUTPATIENT
Start: 2018-01-24 | End: 2018-01-27 | Stop reason: HOSPADM

## 2018-01-24 RX ORDER — EPHEDRINE SULFATE 50 MG/ML
INJECTION, SOLUTION INTRAVENOUS AS NEEDED
Status: DISCONTINUED | OUTPATIENT
Start: 2018-01-24 | End: 2018-01-24 | Stop reason: SURG

## 2018-01-24 RX ORDER — PROMETHAZINE HYDROCHLORIDE 25 MG/ML
12.5 INJECTION, SOLUTION INTRAMUSCULAR; INTRAVENOUS ONCE AS NEEDED
Status: DISCONTINUED | OUTPATIENT
Start: 2018-01-24 | End: 2018-01-24 | Stop reason: HOSPADM

## 2018-01-24 RX ORDER — VANCOMYCIN HYDROCHLORIDE 1 G/200ML
15 INJECTION, SOLUTION INTRAVENOUS ONCE
Status: DISCONTINUED | OUTPATIENT
Start: 2018-01-24 | End: 2018-01-24 | Stop reason: SDUPTHER

## 2018-01-24 RX ORDER — NALOXONE HCL 0.4 MG/ML
0.1 VIAL (ML) INJECTION
Status: DISCONTINUED | OUTPATIENT
Start: 2018-01-24 | End: 2018-01-25

## 2018-01-24 RX ORDER — POVIDONE-IODINE 10 MG/G
OINTMENT TOPICAL AS NEEDED
Status: DISCONTINUED | OUTPATIENT
Start: 2018-01-24 | End: 2018-01-24 | Stop reason: HOSPADM

## 2018-01-24 RX ORDER — PROPOFOL 10 MG/ML
VIAL (ML) INTRAVENOUS AS NEEDED
Status: DISCONTINUED | OUTPATIENT
Start: 2018-01-24 | End: 2018-01-24 | Stop reason: SURG

## 2018-01-24 RX ORDER — FENTANYL CITRATE 50 UG/ML
INJECTION, SOLUTION INTRAMUSCULAR; INTRAVENOUS AS NEEDED
Status: DISCONTINUED | OUTPATIENT
Start: 2018-01-24 | End: 2018-01-24 | Stop reason: SURG

## 2018-01-24 RX ORDER — LIDOCAINE HYDROCHLORIDE 20 MG/ML
INJECTION, SOLUTION INFILTRATION; PERINEURAL AS NEEDED
Status: DISCONTINUED | OUTPATIENT
Start: 2018-01-24 | End: 2018-01-24 | Stop reason: SURG

## 2018-01-24 RX ORDER — MIDAZOLAM HYDROCHLORIDE 1 MG/ML
2 INJECTION INTRAMUSCULAR; INTRAVENOUS
Status: DISCONTINUED | OUTPATIENT
Start: 2018-01-24 | End: 2018-01-24 | Stop reason: HOSPADM

## 2018-01-24 RX ORDER — ASPIRIN 325 MG
325 TABLET, DELAYED RELEASE (ENTERIC COATED) ORAL ONCE
COMMUNITY
End: 2018-01-27 | Stop reason: HOSPADM

## 2018-01-24 RX ORDER — LEVOTHYROXINE SODIUM 0.1 MG/1
100 TABLET ORAL
Status: DISCONTINUED | OUTPATIENT
Start: 2018-01-25 | End: 2018-01-27 | Stop reason: HOSPADM

## 2018-01-24 RX ORDER — ALBUTEROL SULFATE 2.5 MG/3ML
2.5 SOLUTION RESPIRATORY (INHALATION) ONCE AS NEEDED
Status: DISCONTINUED | OUTPATIENT
Start: 2018-01-24 | End: 2018-01-24 | Stop reason: HOSPADM

## 2018-01-24 RX ORDER — SODIUM CHLORIDE, SODIUM LACTATE, POTASSIUM CHLORIDE, CALCIUM CHLORIDE 600; 310; 30; 20 MG/100ML; MG/100ML; MG/100ML; MG/100ML
100 INJECTION, SOLUTION INTRAVENOUS CONTINUOUS
Status: DISCONTINUED | OUTPATIENT
Start: 2018-01-24 | End: 2018-01-25

## 2018-01-24 RX ORDER — EPHEDRINE SULFATE 50 MG/ML
5 INJECTION, SOLUTION INTRAVENOUS ONCE AS NEEDED
Status: DISCONTINUED | OUTPATIENT
Start: 2018-01-24 | End: 2018-01-24 | Stop reason: HOSPADM

## 2018-01-24 RX ORDER — WARFARIN SODIUM 5 MG/1
5 TABLET ORAL
Status: COMPLETED | OUTPATIENT
Start: 2018-01-24 | End: 2018-01-24

## 2018-01-24 RX ORDER — PROMETHAZINE HYDROCHLORIDE 25 MG/1
12.5 TABLET ORAL ONCE AS NEEDED
Status: DISCONTINUED | OUTPATIENT
Start: 2018-01-24 | End: 2018-01-24 | Stop reason: HOSPADM

## 2018-01-24 RX ORDER — SODIUM CHLORIDE 0.9 % (FLUSH) 0.9 %
1-10 SYRINGE (ML) INJECTION AS NEEDED
Status: DISCONTINUED | OUTPATIENT
Start: 2018-01-24 | End: 2018-01-27 | Stop reason: HOSPADM

## 2018-01-24 RX ORDER — ROCURONIUM BROMIDE 10 MG/ML
INJECTION, SOLUTION INTRAVENOUS AS NEEDED
Status: DISCONTINUED | OUTPATIENT
Start: 2018-01-24 | End: 2018-01-24 | Stop reason: SURG

## 2018-01-24 RX ORDER — FENTANYL CITRATE 50 UG/ML
50 INJECTION, SOLUTION INTRAMUSCULAR; INTRAVENOUS
Status: DISCONTINUED | OUTPATIENT
Start: 2018-01-24 | End: 2018-01-24 | Stop reason: HOSPADM

## 2018-01-24 RX ORDER — DOCUSATE SODIUM 100 MG/1
100 CAPSULE, LIQUID FILLED ORAL 2 TIMES DAILY PRN
Status: DISCONTINUED | OUTPATIENT
Start: 2018-01-24 | End: 2018-01-27 | Stop reason: HOSPADM

## 2018-01-24 RX ORDER — FAMOTIDINE 10 MG/ML
20 INJECTION, SOLUTION INTRAVENOUS ONCE
Status: COMPLETED | OUTPATIENT
Start: 2018-01-24 | End: 2018-01-24

## 2018-01-24 RX ORDER — DIPHENHYDRAMINE HYDROCHLORIDE 50 MG/ML
INJECTION INTRAMUSCULAR; INTRAVENOUS AS NEEDED
Status: DISCONTINUED | OUTPATIENT
Start: 2018-01-24 | End: 2018-01-24 | Stop reason: SURG

## 2018-01-24 RX ORDER — VANCOMYCIN HYDROCHLORIDE 1 G/200ML
1000 INJECTION, SOLUTION INTRAVENOUS EVERY 12 HOURS
Status: COMPLETED | OUTPATIENT
Start: 2018-01-24 | End: 2018-01-25

## 2018-01-24 RX ORDER — NALOXONE HCL 0.4 MG/ML
0.2 VIAL (ML) INJECTION AS NEEDED
Status: DISCONTINUED | OUTPATIENT
Start: 2018-01-24 | End: 2018-01-24 | Stop reason: HOSPADM

## 2018-01-24 RX ORDER — LABETALOL HYDROCHLORIDE 5 MG/ML
5 INJECTION, SOLUTION INTRAVENOUS
Status: DISCONTINUED | OUTPATIENT
Start: 2018-01-24 | End: 2018-01-24 | Stop reason: HOSPADM

## 2018-01-24 RX ORDER — HYDROCODONE BITARTRATE AND ACETAMINOPHEN 7.5; 325 MG/1; MG/1
1 TABLET ORAL ONCE AS NEEDED
Status: DISCONTINUED | OUTPATIENT
Start: 2018-01-24 | End: 2018-01-24 | Stop reason: HOSPADM

## 2018-01-24 RX ORDER — SODIUM CHLORIDE 0.9 % (FLUSH) 0.9 %
1-10 SYRINGE (ML) INJECTION AS NEEDED
Status: DISCONTINUED | OUTPATIENT
Start: 2018-01-24 | End: 2018-01-24 | Stop reason: HOSPADM

## 2018-01-24 RX ORDER — HYDROMORPHONE HYDROCHLORIDE 1 MG/ML
0.5 INJECTION, SOLUTION INTRAMUSCULAR; INTRAVENOUS; SUBCUTANEOUS
Status: DISCONTINUED | OUTPATIENT
Start: 2018-01-24 | End: 2018-01-25

## 2018-01-24 RX ORDER — PROMETHAZINE HYDROCHLORIDE 25 MG/1
25 TABLET ORAL ONCE AS NEEDED
Status: DISCONTINUED | OUTPATIENT
Start: 2018-01-24 | End: 2018-01-24 | Stop reason: HOSPADM

## 2018-01-24 RX ORDER — PROMETHAZINE HYDROCHLORIDE 25 MG/1
25 SUPPOSITORY RECTAL ONCE AS NEEDED
Status: DISCONTINUED | OUTPATIENT
Start: 2018-01-24 | End: 2018-01-24 | Stop reason: HOSPADM

## 2018-01-24 RX ORDER — MEPERIDINE HYDROCHLORIDE 25 MG/ML
12.5 INJECTION INTRAMUSCULAR; INTRAVENOUS; SUBCUTANEOUS
Status: DISCONTINUED | OUTPATIENT
Start: 2018-01-24 | End: 2018-01-24 | Stop reason: HOSPADM

## 2018-01-24 RX ORDER — VANCOMYCIN HYDROCHLORIDE 1 G/200ML
1000 INJECTION, SOLUTION INTRAVENOUS ONCE
Status: DISCONTINUED | OUTPATIENT
Start: 2018-01-24 | End: 2018-01-24 | Stop reason: CLARIF

## 2018-01-24 RX ORDER — PROMETHAZINE HYDROCHLORIDE 25 MG/ML
5 INJECTION, SOLUTION INTRAMUSCULAR; INTRAVENOUS 3 TIMES DAILY PRN
Status: DISCONTINUED | OUTPATIENT
Start: 2018-01-24 | End: 2018-01-24 | Stop reason: HOSPADM

## 2018-01-24 RX ORDER — OXYCODONE AND ACETAMINOPHEN 7.5; 325 MG/1; MG/1
1 TABLET ORAL ONCE AS NEEDED
Status: DISCONTINUED | OUTPATIENT
Start: 2018-01-24 | End: 2018-01-24 | Stop reason: HOSPADM

## 2018-01-24 RX ORDER — HYDROMORPHONE HCL 110MG/55ML
0.5 PATIENT CONTROLLED ANALGESIA SYRINGE INTRAVENOUS
Status: DISCONTINUED | OUTPATIENT
Start: 2018-01-24 | End: 2018-01-24 | Stop reason: HOSPADM

## 2018-01-24 RX ORDER — ONDANSETRON 2 MG/ML
INJECTION INTRAMUSCULAR; INTRAVENOUS AS NEEDED
Status: DISCONTINUED | OUTPATIENT
Start: 2018-01-24 | End: 2018-01-24 | Stop reason: SURG

## 2018-01-24 RX ORDER — ACETAMINOPHEN 325 MG/1
325 TABLET ORAL EVERY 4 HOURS PRN
Status: DISCONTINUED | OUTPATIENT
Start: 2018-01-24 | End: 2018-01-27 | Stop reason: HOSPADM

## 2018-01-24 RX ORDER — DEXAMETHASONE SODIUM PHOSPHATE 10 MG/ML
INJECTION INTRAMUSCULAR; INTRAVENOUS AS NEEDED
Status: DISCONTINUED | OUTPATIENT
Start: 2018-01-24 | End: 2018-01-24 | Stop reason: SURG

## 2018-01-24 RX ORDER — FENTANYL CITRATE 50 UG/ML
INJECTION, SOLUTION INTRAMUSCULAR; INTRAVENOUS
Status: COMPLETED
Start: 2018-01-24 | End: 2018-01-24

## 2018-01-24 RX ORDER — HYDROCODONE BITARTRATE AND ACETAMINOPHEN 10; 325 MG/1; MG/1
1 TABLET ORAL EVERY 4 HOURS PRN
Status: DISCONTINUED | OUTPATIENT
Start: 2018-01-24 | End: 2018-01-27 | Stop reason: HOSPADM

## 2018-01-24 RX ORDER — LIDOCAINE HYDROCHLORIDE 10 MG/ML
0.5 INJECTION, SOLUTION EPIDURAL; INFILTRATION; INTRACAUDAL; PERINEURAL ONCE AS NEEDED
Status: DISCONTINUED | OUTPATIENT
Start: 2018-01-24 | End: 2018-01-24 | Stop reason: HOSPADM

## 2018-01-24 RX ORDER — HYDRALAZINE HYDROCHLORIDE 20 MG/ML
5 INJECTION INTRAMUSCULAR; INTRAVENOUS
Status: DISCONTINUED | OUTPATIENT
Start: 2018-01-24 | End: 2018-01-24 | Stop reason: HOSPADM

## 2018-01-24 RX ORDER — VANCOMYCIN HYDROCHLORIDE 1 G/200ML
1000 INJECTION, SOLUTION INTRAVENOUS ONCE
Status: COMPLETED | OUTPATIENT
Start: 2018-01-24 | End: 2018-01-24

## 2018-01-24 RX ORDER — LIDOCAINE HYDROCHLORIDE 40 MG/ML
SOLUTION TOPICAL AS NEEDED
Status: DISCONTINUED | OUTPATIENT
Start: 2018-01-24 | End: 2018-01-24 | Stop reason: SURG

## 2018-01-24 RX ORDER — DIPHENHYDRAMINE HYDROCHLORIDE 50 MG/ML
12.5 INJECTION INTRAMUSCULAR; INTRAVENOUS
Status: DISCONTINUED | OUTPATIENT
Start: 2018-01-24 | End: 2018-01-24 | Stop reason: HOSPADM

## 2018-01-24 RX ORDER — FERROUS SULFATE 325(65) MG
325 TABLET ORAL
Status: DISCONTINUED | OUTPATIENT
Start: 2018-01-25 | End: 2018-01-27 | Stop reason: HOSPADM

## 2018-01-24 RX ORDER — MIDAZOLAM HYDROCHLORIDE 1 MG/ML
1 INJECTION INTRAMUSCULAR; INTRAVENOUS
Status: DISCONTINUED | OUTPATIENT
Start: 2018-01-24 | End: 2018-01-24 | Stop reason: HOSPADM

## 2018-01-24 RX ORDER — ATORVASTATIN CALCIUM 20 MG/1
20 TABLET, FILM COATED ORAL DAILY
Status: DISCONTINUED | OUTPATIENT
Start: 2018-01-24 | End: 2018-01-27 | Stop reason: HOSPADM

## 2018-01-24 RX ORDER — SODIUM CHLORIDE, SODIUM LACTATE, POTASSIUM CHLORIDE, CALCIUM CHLORIDE 600; 310; 30; 20 MG/100ML; MG/100ML; MG/100ML; MG/100ML
9 INJECTION, SOLUTION INTRAVENOUS CONTINUOUS
Status: DISCONTINUED | OUTPATIENT
Start: 2018-01-24 | End: 2018-01-25

## 2018-01-24 RX ORDER — FLUMAZENIL 0.1 MG/ML
0.2 INJECTION INTRAVENOUS AS NEEDED
Status: DISCONTINUED | OUTPATIENT
Start: 2018-01-24 | End: 2018-01-24 | Stop reason: HOSPADM

## 2018-01-24 RX ORDER — ONDANSETRON 2 MG/ML
4 INJECTION INTRAMUSCULAR; INTRAVENOUS ONCE AS NEEDED
Status: DISCONTINUED | OUTPATIENT
Start: 2018-01-24 | End: 2018-01-24 | Stop reason: HOSPADM

## 2018-01-24 RX ORDER — SENNA AND DOCUSATE SODIUM 50; 8.6 MG/1; MG/1
2 TABLET, FILM COATED ORAL NIGHTLY
Status: DISCONTINUED | OUTPATIENT
Start: 2018-01-24 | End: 2018-01-27 | Stop reason: HOSPADM

## 2018-01-24 RX ADMIN — FENTANYL CITRATE 50 MCG: 50 INJECTION, SOLUTION INTRAMUSCULAR; INTRAVENOUS at 13:41

## 2018-01-24 RX ADMIN — LIDOCAINE HYDROCHLORIDE 1 EACH: 40 SPRAY LARYNGEAL; TRANSTRACHEAL at 12:19

## 2018-01-24 RX ADMIN — FENTANYL CITRATE 50 MCG: 50 INJECTION, SOLUTION INTRAMUSCULAR; INTRAVENOUS at 14:33

## 2018-01-24 RX ADMIN — DEXAMETHASONE SODIUM PHOSPHATE 8 MG: 10 INJECTION INTRAMUSCULAR; INTRAVENOUS at 12:23

## 2018-01-24 RX ADMIN — MUPIROCIN 1 APPLICATION: 20 OINTMENT TOPICAL at 20:27

## 2018-01-24 RX ADMIN — HYDROMORPHONE HYDROCHLORIDE 0.5 MG: 2 INJECTION INTRAMUSCULAR; INTRAVENOUS; SUBCUTANEOUS at 15:00

## 2018-01-24 RX ADMIN — DOCUSATE SODIUM -SENNOSIDES 2 TABLET: 50; 8.6 TABLET, COATED ORAL at 20:27

## 2018-01-24 RX ADMIN — EPHEDRINE SULFATE 10 MG: 50 INJECTION INTRAMUSCULAR; INTRAVENOUS; SUBCUTANEOUS at 12:32

## 2018-01-24 RX ADMIN — EPHEDRINE SULFATE 10 MG: 50 INJECTION INTRAMUSCULAR; INTRAVENOUS; SUBCUTANEOUS at 12:25

## 2018-01-24 RX ADMIN — LIDOCAINE HYDROCHLORIDE 40 MG: 20 INJECTION, SOLUTION INFILTRATION; PERINEURAL at 12:16

## 2018-01-24 RX ADMIN — SODIUM CHLORIDE, POTASSIUM CHLORIDE, SODIUM LACTATE AND CALCIUM CHLORIDE 9 ML/HR: 600; 310; 30; 20 INJECTION, SOLUTION INTRAVENOUS at 10:00

## 2018-01-24 RX ADMIN — FENTANYL CITRATE 50 MCG: 50 INJECTION, SOLUTION INTRAMUSCULAR; INTRAVENOUS at 14:16

## 2018-01-24 RX ADMIN — MIDAZOLAM 2 MG: 1 INJECTION INTRAMUSCULAR; INTRAVENOUS at 10:36

## 2018-01-24 RX ADMIN — FENTANYL CITRATE 25 MCG: 50 INJECTION, SOLUTION INTRAMUSCULAR; INTRAVENOUS at 13:20

## 2018-01-24 RX ADMIN — ATORVASTATIN CALCIUM 20 MG: 20 TABLET, FILM COATED ORAL at 20:27

## 2018-01-24 RX ADMIN — FAMOTIDINE 20 MG: 10 INJECTION, SOLUTION INTRAVENOUS at 10:37

## 2018-01-24 RX ADMIN — DIPHENHYDRAMINE HYDROCHLORIDE 10 MG: 50 INJECTION INTRAMUSCULAR; INTRAVENOUS at 12:03

## 2018-01-24 RX ADMIN — FENTANYL CITRATE 25 MCG: 50 INJECTION, SOLUTION INTRAMUSCULAR; INTRAVENOUS at 13:23

## 2018-01-24 RX ADMIN — ONDANSETRON 4 MG: 2 INJECTION INTRAMUSCULAR; INTRAVENOUS at 13:16

## 2018-01-24 RX ADMIN — FENTANYL CITRATE 50 MCG: 50 INJECTION, SOLUTION INTRAMUSCULAR; INTRAVENOUS at 12:40

## 2018-01-24 RX ADMIN — WARFARIN SODIUM 5 MG: 5 TABLET ORAL at 20:27

## 2018-01-24 RX ADMIN — PROPOFOL 100 MG: 10 INJECTION, EMULSION INTRAVENOUS at 12:16

## 2018-01-24 RX ADMIN — VANCOMYCIN HYDROCHLORIDE 1000 MG: 1 INJECTION, SOLUTION INTRAVENOUS at 23:36

## 2018-01-24 RX ADMIN — SODIUM CHLORIDE, POTASSIUM CHLORIDE, SODIUM LACTATE AND CALCIUM CHLORIDE: 600; 310; 30; 20 INJECTION, SOLUTION INTRAVENOUS at 12:30

## 2018-01-24 RX ADMIN — SODIUM CHLORIDE, POTASSIUM CHLORIDE, SODIUM LACTATE AND CALCIUM CHLORIDE 100 ML/HR: 600; 310; 30; 20 INJECTION, SOLUTION INTRAVENOUS at 20:30

## 2018-01-24 RX ADMIN — HYDROMORPHONE HYDROCHLORIDE 0.5 MG: 2 INJECTION INTRAMUSCULAR; INTRAVENOUS; SUBCUTANEOUS at 16:01

## 2018-01-24 RX ADMIN — FENTANYL CITRATE 25 MCG: 50 INJECTION, SOLUTION INTRAMUSCULAR; INTRAVENOUS at 13:30

## 2018-01-24 RX ADMIN — FENTANYL CITRATE 50 MCG: 50 INJECTION, SOLUTION INTRAMUSCULAR; INTRAVENOUS at 12:50

## 2018-01-24 RX ADMIN — FENTANYL CITRATE 50 MCG: 50 INJECTION, SOLUTION INTRAMUSCULAR; INTRAVENOUS at 12:13

## 2018-01-24 RX ADMIN — VANCOMYCIN HYDROCHLORIDE 1000 MG: 1 INJECTION, SOLUTION INTRAVENOUS at 10:30

## 2018-01-24 RX ADMIN — FENTANYL CITRATE 25 MCG: 50 INJECTION, SOLUTION INTRAMUSCULAR; INTRAVENOUS at 13:26

## 2018-01-24 RX ADMIN — ROCURONIUM BROMIDE 30 MG: 10 INJECTION INTRAVENOUS at 12:16

## 2018-01-24 RX ADMIN — HYDROMORPHONE HYDROCHLORIDE 0.5 MG: 10 INJECTION INTRAMUSCULAR; INTRAVENOUS; SUBCUTANEOUS at 19:07

## 2018-01-24 RX ADMIN — FENTANYL CITRATE 50 MCG: 50 INJECTION, SOLUTION INTRAMUSCULAR; INTRAVENOUS at 13:46

## 2018-01-24 RX ADMIN — SUGAMMADEX 144 MG: 100 INJECTION, SOLUTION INTRAVENOUS at 13:14

## 2018-01-24 NOTE — ANESTHESIA PREPROCEDURE EVALUATION
Anesthesia Evaluation            Airway   Mallampati: II  TM distance: >3 FB  Neck ROM: full  no difficulty expected  Dental    (+) edentulous    Pulmonary - normal exam   (+) a smoker Former,     ROS comment: Hx of spontaneous pneumothorax 40 years ago felt due to blebs  Cardiovascular - normal exam    (+) PVD, hyperlipidemia    ROS comment: Varicose veins    Neuro/Psych  (+) headaches, psychiatric history Depression,     GI/Hepatic/Renal/Endo    (+)  hypothyroidism,     Musculoskeletal     (+) back pain, neck pain,   Abdominal  - normal exam   Substance History      OB/GYN          Other   (+) arthritis                                             Anesthesia Plan    ASA 2     general     intravenous induction   Anesthetic plan and risks discussed with patient.

## 2018-01-24 NOTE — H&P
ORTHOPEDIC  HISTORY AND PHYSICAL      Patient: Marci Kolb  Date of Admission: 1/24/2018  9:00 AM  YOB: 1945  Medical Record Number: [id number]  Attending Physician: Rogelio Nash MD  Consulting Physician: Morro Peoples PA-C    CHIEF COMPLIANT: Left hip pain    HISTORY OF PRESENT ILLINESS: Patient is a 72 y.o. year old female presents to [facility] with above complaints.   Patient reports she does have left hip pain.  Most of her pains in her left groin area.  The patient rates her pain at a 10 on a scale of 1-10.  She has had pain for years in her left hip.  This is now affecting her daily activities.  She denies any numbness tingling fever or chills.         Allergies   Allergen Reactions   • Penicillins Itching   • Streptomycin Nausea Only         Prescriptions Prior to Admission   Medication Sig Dispense Refill Last Dose   • Chlorhexidine Gluconate Cloth 2 % pads Apply 1 application topically Every 12 (Twelve) Hours. USE PREOP AS DIRECTED PM PRIOR AND AM PRIOR TO OR    1/24/2018 at 0700   • HYDROcodone-acetaminophen (NORCO) 7.5-325 MG per tablet Take 1 tablet by mouth Every 6 (Six) Hours As Needed.   1/23/2018 at 0830   • levothyroxine (SYNTHROID, LEVOTHROID) 100 MCG tablet Take 100 mcg by mouth Daily.   1/23/2018 at 0830   • Multiple Vitamins-Minerals (CENTRUM SILVER 50+WOMEN) tablet Take 1 tablet by mouth Daily.   1/10/2018   • mupirocin (BACTROBAN) 2 % ointment Apply 1 application topically 2 (Two) Times a Day. TO USE AS DIRECTED PREOP  BID DAY PRIOR TO OR AND AM PRIOR TO SURGERY    1/24/2018 at 0700   • simvastatin (ZOCOR) 40 MG tablet Take 1 tablet by mouth Every Night.   1/23/2018 at 0830   • meloxicam (MOBIC) 15 MG tablet Take 15 mg by mouth Daily. HELD. HAS'NT BEEN TAKING   More than a month at Unknown time       Past Medical History:   Diagnosis Date   • Arthritis    • Cataract     START OF   • Collapsed lung     HISTORY OF X2 MANY YEARS AGO. ?LEFT. FROM BLOWN  "BLEBS.  40 years ago.   • Constipation    • Depression     RECENT LOSS OF DGHT   • Dry skin    • Elevated cholesterol    • History of diverticulosis    • Hypothyroidism    • Lumbar spine pain    • Varicose vein of leg     SINDI     Past Surgical History:   Procedure Laterality Date   • COLONOSCOPY  ?    \"WNL\"     Social History     Occupational History   • Not on file.     Social History Main Topics   • Smoking status: Former Smoker     Packs/day: 1.00     Years: 35.00     Quit date: 2000   • Smokeless tobacco: Never Used   • Alcohol use No   • Drug use: No   • Sexual activity: Defer      Social History     Social History Narrative     Family History   Problem Relation Age of Onset   • Malig Hyperthermia Neg Hx        REVIEW OF SYSTEMS:    HEENT: Patient denies any headaches, vision changes, change in hearing, or tinnitus, Patient denies epistaxis, sinus pain, hoarseness, or dysphagia   Pulmonary: Patient denies any cough, congestion, acute change in SOA or wheezing.   Cardiovascular: Patient denies any change in chest pain, dyspnea, palpitations, weakness, intolerance of exercise, varicosities, change in murmur   Gastrointestinal:  Patient denies change in appetite, melena, change in bowel habits.   Genital/Urinary: Patient denies dysuria, change in color of urine, change in frequency of urination, pain with urgency, change in incontinence, retention.   Musculoskeletal: Patient denies complaints of acute changes in symptoms of other joints not mentioned above.   Neurological: Patient denies changes in dizziness, tremor, ataxia, or difficulty in speaking or changes in memory.   Endocrine system: Patient denies acute changes in tremors, palpitations, polyuria, polydipsia, polyphagia, diaphoresis, exophthalmos, or goiter.   Psychological: Patient denies thoughts/plans or harming self or other; denies acute changes in depression,  insomnia, night terrors, manav, disorientation.   Skin: Patient denies any bruising, " "rashes, discoloration, pruritus,or wounds not mentioned in history of present illness or chief complaint above.   Hematopoietic: Patient denies current bleeding, epistaxis, hematuria, or melena.    PHYSICAL EXAM:   Vitals:  Vitals:    01/24/18 0935   BP: 124/69   BP Location: Left arm   Patient Position: Lying   Pulse: 69   Resp: 20   Temp: 97.7 °F (36.5 °C)   TempSrc: Oral   SpO2: 94%   Weight: 72.1 kg (159 lb)   Height: 165.1 cm (65\")         General:  72 y.o. female who appears about stated age.    Alert, cooperative, in no acute distress                       Head:    Normocephalic, without obvious abnormality, atraumatic   Eyes:            Lids and lashes normal, conjunctivae and sclerae normal, no         icterus, no pallor, corneas clear, PERRLA   Ears:    Ears appear intact with no abnormalities noted   Throat:   No oral lesions, no thrush, oral mucosa moist   Neck:   No adenopathy, supple, trachea midline, no JVD   Back:     Limited exam shows no severe kyphosis present,no visible           erythema, no excessive  tenderness to palpation.    Lungs:     Respirations regular, even and unlabored.     Heart:    Normal rate, Pulses palpable   Chest Wall:    No abnormalities observed.   Abdomen:     Normal bowel sounds, no masses, no organomegaly, soft              non-tender, non-distended, no guarding, no rebound                      tenderness   Rectal:     Deferred   Pulses:   Pulses palpable and equal bilaterally   Skin:   No bleeding, bruising or rash   Lymph nodes:   No palpable adenopathy   Extremities:     Examination of the patient's left hip revealed she does have slightly decreased range of motion.  Her quad and hamstring strength 4/5 there was no masses or effusions.  Patient did have 2+ pulses in her lower extremities.    DIAGNOSTIC TEST:  No visits with results within 2 Day(s) from this visit.  Latest known visit with results is:    Appointment on 01/09/2018   Component Date Value Ref Range Status "   • WBC 01/09/2018 6.73  4.50 - 10.70 10*3/mm3 Final   • RBC 01/09/2018 4.09  3.90 - 5.20 10*6/mm3 Final   • Hemoglobin 01/09/2018 13.5  11.9 - 15.5 g/dL Final   • Hematocrit 01/09/2018 42.6  35.6 - 45.5 % Final   • MCV 01/09/2018 104.2* 80.5 - 98.2 fL Final   • MCH 01/09/2018 33.0* 26.9 - 32.0 pg Final   • MCHC 01/09/2018 31.7* 32.4 - 36.3 g/dL Final   • RDW 01/09/2018 13.7* 11.7 - 13.0 % Final   • RDW-SD 01/09/2018 52.0  37.0 - 54.0 fl Final   • MPV 01/09/2018 10.1  6.0 - 12.0 fL Final   • Platelets 01/09/2018 279  140 - 500 10*3/mm3 Final   • Glucose 01/09/2018 85  65 - 99 mg/dL Final   • BUN 01/09/2018 11  8 - 23 mg/dL Final   • Creatinine 01/09/2018 0.80  0.57 - 1.00 mg/dL Final   • Sodium 01/09/2018 144  136 - 145 mmol/L Final   • Potassium 01/09/2018 3.5  3.5 - 5.2 mmol/L Final   • Chloride 01/09/2018 105  98 - 107 mmol/L Final   • CO2 01/09/2018 28.8  22.0 - 29.0 mmol/L Final   • Calcium 01/09/2018 9.2  8.6 - 10.5 mg/dL Final   • eGFR Non African Amer 01/09/2018 70  >60 mL/min/1.73 Final   • BUN/Creatinine Ratio 01/09/2018 13.8  7.0 - 25.0 Final   • Anion Gap 01/09/2018 10.2  mmol/L Final   • Protime 01/09/2018 12.7  11.7 - 14.2 Seconds Final   • INR 01/09/2018 0.99  0.90 - 1.10 Final   • Color, UA 01/09/2018 Dark Yellow* Yellow, Straw Final   • Appearance, UA 01/09/2018 Cloudy* Clear Final   • pH, UA 01/09/2018 5.5  5.0 - 8.0 Final   • Specific Gravity, UA 01/09/2018 1.028  1.005 - 1.030 Final   • Glucose, UA 01/09/2018 Negative  Negative Final   • Ketones, UA 01/09/2018 Negative  Negative Final   • Bilirubin, UA 01/09/2018 Negative  Negative Final   • Blood, UA 01/09/2018 Small (1+)* Negative Final   • Protein, UA 01/09/2018 Negative  Negative Final   • Leuk Esterase, UA 01/09/2018 Moderate (2+)* Negative Final   • Nitrite, UA 01/09/2018 Negative  Negative Final   • Urobilinogen, UA 01/09/2018 0.2 E.U./dL  0.2 - 1.0 E.U./dL Final   • RBC, UA 01/09/2018 3-5* None Seen, 0-2 /HPF Final   • WBC, UA 01/09/2018  13-20* None Seen, 0-2 /HPF Final   • Bacteria, UA 01/09/2018 2+* None Seen /HPF Final   • Squamous Epithelial Cells, UA 01/09/2018 7-12* None Seen, 0-2 /HPF Final   • Hyaline Casts, UA 01/09/2018 None Seen  None Seen /LPF Final   • Calcium Oxalate Crystals, UA 01/09/2018 Small/1+  None Seen /HPF Final   • Mucus, UA 01/09/2018 Moderate/2+* None Seen, Trace /HPF Final   • Methodology 01/09/2018 Manual Light Microscopy   Final   • Urine Culture 01/09/2018 25,000 CFU/mL Lactobacillus species*  Final   • Urine Culture 01/09/2018 25,000 CFU/mL Streptococcus gallolyticus ssp pasteurianus*  Final       Xr Chest Pa & Lateral    Result Date: 1/9/2018  Narrative: PA AND LATERAL CHEST  CLINICAL HISTORY: Preop chest x-ray.  Compared to the previous chest x-ray dated 02/18/2016.  There is minimal pleural and parenchymal scarring at both lung apices that is unchanged. The lungs are well-expanded and free of infiltrates. There are no pleural effusions. The heart is at the upper limits of normal in size. There is mild smooth levoscoliosis.  IMPRESSIONS: No evidence of active disease within the chest.  This report was finalized on 1/9/2018 8:17 AM by Dr. Andrew Cruz MD.          ASSESSMENT:  Left hip pain was severe bone-on-bone osteoarthritis  Patient Active Problem List   Diagnosis   • Diverticulosis of intestine   • Difficult or painful urination   • Cephalalgia   • Hyperlipidemia   • Hypothyroidism   • Gonalgia   • Knee swelling   • Low back pain   • Cervical pain   • Sciatica   • Arthralgia of hip   • Hyperglycemia       PLAN:    Left total hip    Risks and benefits of surgical intervention were discussed in detail with the patient.  Risks of infection, fracture, dislocation, extremity length discrepancy, neurovascular injury, persistent pain, medical risks, anesthetic risk, need for additional surgery, deep venous thrombosis, pulmonary embolism and death.      The above diagnosis and treatment plan was discussed with the  patient and/or family.  They were educated in both non-surgical and surgical treatment options for their condition.   They were given the opportunity to ask questions and were answered to their satisfaction.  They agreed to proceed with the above treatment plan.        Morro Peoples PA-C

## 2018-01-24 NOTE — PLAN OF CARE
Problem: Patient Care Overview (Adult)  Goal: Plan of Care Review  Outcome: Ongoing (interventions implemented as appropriate)   01/24/18 9213   Coping/Psychosocial Response Interventions   Plan Of Care Reviewed With patient   Patient Care Overview   Progress improving   Outcome Evaluation   Outcome Summary/Follow up Plan VSS. Pain controlled with medications. Awake and alert. Tolerating PO. No nausea. Family updated. Ok to transfer.

## 2018-01-24 NOTE — PLAN OF CARE
Problem: Patient Care Overview (Adult)  Goal: Plan of Care Review  Outcome: Ongoing (interventions implemented as appropriate)   18 1048   Coping/Psychosocial Response Interventions   Plan Of Care Reviewed With patient   Patient Care Overview   Progress progress toward functional goals as expected     Goal: Adult Individualization and Mutuality  Outcome: Ongoing (interventions implemented as appropriate)   18 1048   Individualization   Patient Specific Preferences pt goes by Zeeshan   Mutuality/Individual Preferences   What Information Would Help Us Give You More Personalized Care? patient's daughter recently suddenly  of cardiac arrest. She was an RN about to take the ARNP exam. The care of nurses reminds the patient of her daughter and makes her very tearful and emotional.     Goal: Discharge Needs Assessment  Outcome: Ongoing (interventions implemented as appropriate)   18 1017 18 1048   Discharge Needs Assessment   Concerns To Be Addressed --  denies needs/concerns at this time   Living Environment   Transportation Available car;family or friend will provide --    Self-Care   Equipment Currently Used at Home raised toilet --        Problem: Perioperative Period (Adult)  Goal: Signs and Symptoms of Listed Potential Problems Will be Absent or Manageable (Perioperative Period)  Outcome: Ongoing (interventions implemented as appropriate)   18 1048   Perioperative Period   Problems Assessed (Perioperative Period) pain;infection;perioperative injury   Problems Present (Perioperative Period) pain

## 2018-01-24 NOTE — PROGRESS NOTES
Pharmacy to dose Vancomycin    For post-op total hip prophylaxis X 2 doses.  Will dose patient at 1gm Q12h X 2 doses.  No levels will be needed unless dose continued beyond 24 hours.  Please contact pharmacy if further dosing is needed.    Lesia MagañaD, BCPS

## 2018-01-24 NOTE — ANESTHESIA PROCEDURE NOTES
Airway  Urgency: elective    Airway not difficult    General Information and Staff    Patient location during procedure: OR  Anesthesiologist: PAUL CINTRON  CRNA: MÓNICA GRACE    Indications and Patient Condition  Indications for airway management: airway protection    Preoxygenated: yes  MILS maintained throughout  Mask difficulty assessment: 2 - vent by mask + OA or adjuvant +/- NMBA    Final Airway Details  Final airway type: endotracheal airway      Successful airway: ETT  Cuffed: yes   Successful intubation technique: direct laryngoscopy  Facilitating devices/methods: intubating stylet  Endotracheal tube insertion site: oral  Blade: Shelbi  Blade size: #3  ETT size: 7.0 mm  Cormack-Lehane Classification: grade I - full view of glottis  Placement verified by: chest auscultation and capnometry   Cuff volume (mL): 3  Measured from: lips  ETT to lips (cm): 19  Number of attempts at approach: 1    Additional Comments  Atraumatic ET Tube placement.  Teeth as pre-op. BLEBS.  -ABD sounds.  +ET CO2.  Secured to face

## 2018-01-24 NOTE — PLAN OF CARE
Problem: Perioperative Period (Adult)  Goal: Signs and Symptoms of Listed Potential Problems Will be Absent or Manageable (Perioperative Period)  Outcome: Ongoing (interventions implemented as appropriate)   01/24/18 1031   Perioperative Period   Problems Assessed (Perioperative Period) pain;hypothermia;hypoxia/hypoxemia;hemorrhage;physiologic stress response   Problems Present (Perioperative Period) none

## 2018-01-24 NOTE — ANESTHESIA POSTPROCEDURE EVALUATION
Patient: Marci SINGH Kolb    Procedure Summary     Date Anesthesia Start Anesthesia Stop Room / Location    01/24/18 1202 [id number]  [facility] /  [facility]       Procedure Diagnosis Surgeon Provider    LT  TOTAL HIP ARTHROPLASTY (Left Hip) No diagnosis on file. MD Abdias Sotelo MD          Anesthesia Type: general  Last vitals  BP   121/72 (01/24/18 1500)   Temp   36.6 °C (97.9 °F) (01/24/18 1329)   Pulse   73 (01/24/18 1430)   Resp   16 (01/24/18 1500)     SpO2   98 % (01/24/18 1430)     Post Anesthesia Care and Evaluation    Patient location during evaluation: PACU  Patient participation: complete - patient participated  Level of consciousness: awake and alert  Pain management: adequate  Airway patency: patent  Anesthetic complications: No anesthetic complications    Cardiovascular status: acceptable  Respiratory status: acceptable  Hydration status: acceptable    Comments: --------------------            01/24/18               1500     --------------------   BP:       121/72     Pulse:               Resp:       16       Temp:                SpO2:               --------------------

## 2018-01-24 NOTE — PERIOPERATIVE NURSING NOTE
orders noted from Saad's office for right total hip. 2nd set of orders received for left total hip. Both in media section. pt states changed mind which hip to do first. Dr. Nash aware of both orders, all in agreement today left total hip to be done.

## 2018-01-25 LAB
HCT VFR BLD AUTO: 34.7 % (ref 35.6–45.5)
HGB BLD-MCNC: 10.7 G/DL (ref 11.9–15.5)

## 2018-01-25 PROCEDURE — 25010000002 VANCOMYCIN PER 500 MG: Performed by: ORTHOPAEDIC SURGERY

## 2018-01-25 PROCEDURE — 85018 HEMOGLOBIN: CPT | Performed by: ORTHOPAEDIC SURGERY

## 2018-01-25 PROCEDURE — 85014 HEMATOCRIT: CPT | Performed by: ORTHOPAEDIC SURGERY

## 2018-01-25 PROCEDURE — 25810000003 SODIUM CHLORIDE 0.9 % WITH KCL 20 MEQ 20-0.9 MEQ/L-% SOLUTION: Performed by: HOSPITALIST

## 2018-01-25 PROCEDURE — 97162 PT EVAL MOD COMPLEX 30 MIN: CPT

## 2018-01-25 PROCEDURE — 97110 THERAPEUTIC EXERCISES: CPT

## 2018-01-25 PROCEDURE — 97150 GROUP THERAPEUTIC PROCEDURES: CPT

## 2018-01-25 RX ORDER — HYDROCODONE BITARTRATE AND ACETAMINOPHEN 10; 325 MG/1; MG/1
1 TABLET ORAL EVERY 4 HOURS PRN
Qty: 56 TABLET | Refills: 0 | Status: SHIPPED | OUTPATIENT
Start: 2018-01-25 | End: 2018-02-03

## 2018-01-25 RX ORDER — SODIUM CHLORIDE AND POTASSIUM CHLORIDE 150; 900 MG/100ML; MG/100ML
125 INJECTION, SOLUTION INTRAVENOUS CONTINUOUS
Status: DISCONTINUED | OUTPATIENT
Start: 2018-01-25 | End: 2018-01-26

## 2018-01-25 RX ADMIN — HYDROCODONE BITARTRATE AND ACETAMINOPHEN 1 TABLET: 10; 325 TABLET ORAL at 01:47

## 2018-01-25 RX ADMIN — MUPIROCIN 1 APPLICATION: 20 OINTMENT TOPICAL at 21:49

## 2018-01-25 RX ADMIN — FERROUS SULFATE TAB 325 MG (65 MG ELEMENTAL FE) 325 MG: 325 (65 FE) TAB at 10:05

## 2018-01-25 RX ADMIN — HYDROCODONE BITARTRATE AND ACETAMINOPHEN 1 TABLET: 10; 325 TABLET ORAL at 10:05

## 2018-01-25 RX ADMIN — HYDROCODONE BITARTRATE AND ACETAMINOPHEN 1 TABLET: 10; 325 TABLET ORAL at 16:37

## 2018-01-25 RX ADMIN — ACETAMINOPHEN 325 MG: 325 TABLET ORAL at 16:37

## 2018-01-25 RX ADMIN — MUPIROCIN 1 APPLICATION: 20 OINTMENT TOPICAL at 10:05

## 2018-01-25 RX ADMIN — POTASSIUM CHLORIDE AND SODIUM CHLORIDE 125 ML/HR: 900; 150 INJECTION, SOLUTION INTRAVENOUS at 22:30

## 2018-01-25 RX ADMIN — POTASSIUM CHLORIDE AND SODIUM CHLORIDE 125 ML/HR: 900; 150 INJECTION, SOLUTION INTRAVENOUS at 15:04

## 2018-01-25 RX ADMIN — SODIUM CHLORIDE 500 ML: 9 INJECTION, SOLUTION INTRAVENOUS at 14:25

## 2018-01-25 RX ADMIN — VANCOMYCIN HYDROCHLORIDE 1000 MG: 1 INJECTION, SOLUTION INTRAVENOUS at 10:05

## 2018-01-25 RX ADMIN — LEVOTHYROXINE SODIUM 100 MCG: 100 TABLET ORAL at 05:14

## 2018-01-25 RX ADMIN — DOCUSATE SODIUM -SENNOSIDES 2 TABLET: 50; 8.6 TABLET, COATED ORAL at 21:49

## 2018-01-25 RX ADMIN — ATORVASTATIN CALCIUM 20 MG: 20 TABLET, FILM COATED ORAL at 10:05

## 2018-01-25 RX ADMIN — HYDROMORPHONE HYDROCHLORIDE 0.5 MG: 10 INJECTION INTRAMUSCULAR; INTRAVENOUS; SUBCUTANEOUS at 05:14

## 2018-01-25 NOTE — THERAPY EVALUATION
"Acute Care - Physical Therapy Initial Evaluation  [facility]     Patient Name: Marci Kolb  : 1945  MRN: [id number]  Today's Date: 2018   Onset of Illness/Injury or Date of Surgery Date: 18  Date of Referral to PT: 18  Referring Physician: Dr Nash      Admit Date: 2018     Visit Dx:    ICD-10-CM ICD-9-CM   1. Impaired functional mobility and activity tolerance Z74.09 V49.89     Patient Active Problem List   Diagnosis   • Diverticulosis of intestine   • Difficult or painful urination   • Cephalalgia   • Hyperlipidemia   • Hypothyroidism   • Gonalgia   • Knee swelling   • Low back pain   • Cervical pain   • Sciatica   • Arthralgia of hip   • Hyperglycemia   • Osteoarthritis of right hip     Past Medical History:   Diagnosis Date   • Arthritis    • Cataract     START OF   • Collapsed lung     HISTORY OF X2 MANY YEARS AGO. ?LEFT. FROM BLOWN BLEBS.  40 years ago.   • Constipation    • Depression     RECENT LOSS OF DGHT   • Dry skin    • Elevated cholesterol    • History of diverticulosis    • Hypothyroidism    • Lumbar spine pain    • Varicose vein of leg     SINDI     Past Surgical History:   Procedure Laterality Date   • COLONOSCOPY  ?    \"WNL\"   • TOTAL HIP ARTHROPLASTY Left 2018    Procedure: LT  TOTAL HIP ARTHROPLASTY;  Surgeon: Rogelio Nash MD;  Location: [facility] OR;  Service:           PT ASSESSMENT (last 72 hours)      PT Evaluation       18 0912 18 1017    Rehab Evaluation    Document Type evaluation  -PC     Subjective Information agree to therapy;complains of;pain  -PC     Patient Effort, Rehab Treatment adequate  -PC     Patient Effort, Rehab Treatment Comment c/o pain  -PC     General Information    Patient Profile Review yes  -PC     Onset of Illness/Injury or Date of Surgery Date 18  -PC     Referring Physician Dr Nash  -PC     General Observations pt is in bed, min distress with pain, on 3L o2  -PC     Pertinent History Of Current " Problem L THR  -PC     Precautions/Limitations fall precautions;hip precautions- left  -PC     Prior Level of Function independent:;all household mobility  -PC     Equipment Currently Used at Home  raised toilet  -JV    Plans/Goals Discussed With patient  -PC     Living Environment    Lives With  spouse  -JV    Living Arrangements  house  -JV    Home Accessibility  stairs to enter home;stairs within home;no concerns   pt states doesn't have to use steps  -JV    Transportation Available  car;family or friend will provide  -JV    Clinical Impression    Date of Referral to PT 01/25/18  -     Criteria for Skilled Therapeutic Interventions Met yes;treatment indicated  -PC     Rehab Potential fair, will monitor progress closely  -PC     Vital Signs    Pre SpO2 (%) 94  -PC     O2 Delivery Pre Treatment supplemental O2   3l  -PC     O2 Delivery Intra Treatment room air  -PC     Post SpO2 (%) 95  -PC     O2 Delivery Post Treatment supplemental O2  -PC     Pain Assessment    Pain Assessment 0-10  -PC     Pain Score 10  -PC     Pain Location Hip  -PC     Pain Orientation Left  -PC     Pain Intervention(s) Medication (See MAR);Repositioned;Ambulation/increased activity  -PC     Cognitive Assessment/Intervention    Current Cognitive/Communication Assessment functional  -PC     Orientation Status oriented x 4  -PC     Follows Commands/Answers Questions able to follow single-step instructions;100% of the time;needs cueing;needs increased time  -PC     Personal Safety mild impairment;decreased insight to deficits  -PC     Personal Safety Interventions fall prevention program maintained;gait belt  -PC     ROM (Range of Motion)    General ROM Detail WFL x L hip  -PC     MMT (Manual Muscle Testing)    General MMT Assessment Detail WFL x L hip  -PC     Mobility Assessment/Training    Extremity Weight-Bearing Status left lower extremity  -PC     Left Lower Extremity Weight-Bearing weight-bearing as tolerated  -PC     Bed Mobility,  Assessment/Treatment    Bed Mob, Supine to Sit, [place] moderate assist (50% patient effort)  -PC     Transfer Assessment/Treatment    Transfers, Sit-Stand [place] moderate assist (50% patient effort);verbal cues required;nonverbal cues required (demo/gesture)  -PC     Transfers, Stand-Sit [place] moderate assist (50% patient effort);verbal cues required;nonverbal cues required (demo/gesture)  -PC     Gait Assessment/Treatment    Gait, [place] Level minimum assist (75% patient effort);verbal cues required;nonverbal cues required (demo/gesture)  -PC     Gait, Assistive Device rolling walker  -PC     Gait, Distance (Feet) 5  -PC     Gait, Gait Deviations antalgic;left:;step length decreased  -PC     Gait, Safety Issues sequencing ability decreased;step length decreased  -PC     Gait, Impairments strength decreased;pain  -PC     Gait, Comment pt not able to pick LLE up to take a step, slides it along the floor, she also was unable to put weight through LLE in order to take a step with her right leg, required constant verbal cues for gait sequencing, pt crying with pain, nsg aware  -PC     Therapy Exercises    Exercise Protocols total hip  -PC     Total Hip Exercises 10 reps;ankle pumps/circles;quad set;heel slides  -PC     Positioning and Restraints    Pre-Treatment Position in bed  -PC     Post Treatment Position chair  -PC     In Chair reclined;call light within reach;encouraged to call for assist  -PC       User Key  (r) = Recorded By, (t) = Taken By, (c) = Cosigned By    Initials Name Provider Type    PC Katie Montero PT Physical Therapist    CRISTOPHER Ochoa, RN Registered Nurse          Physical Therapy Education     Title: PT OT SLP Therapies (Active)     Topic: Physical Therapy (Active)     Point: Mobility training (Active)    Learning Progress Summary    Learner Readiness Method Response Comment Documented by Status   Patient Acceptance E,D NR  PC 01/25/18 0934 Active                Point: Home exercise program (Active)    Learning Progress Summary    Learner Readiness Method Response Comment Documented by Status   Patient Acceptance E,D NR  PC 01/25/18 0934 Active               Point: Body mechanics (Active)    Learning Progress Summary    Learner Readiness Method Response Comment Documented by Status   Patient Acceptance E,D NR   01/25/18 0934 Active               Point: Precautions (Active)    Learning Progress Summary    Learner Readiness Method Response Comment Documented by Status   Patient Acceptance E,D NR   01/25/18 0934 Active                      User Key     Initials Effective Dates Name Provider Type Discipline     12/01/15 -  Katie Montero, PT Physical Therapist PT                PT Recommendation and Plan  Anticipated Discharge Disposition: home with home health  Planned Therapy Interventions: gait training, bed mobility training, strengthening, transfer training  PT Frequency: 2 times/day  Plan of Care Review  Plan Of Care Reviewed With: patient  Outcome Summary/Follow up Plan: pt presents s/p L THR with weakness, pain, dec ROM, and impaired functional mobility, she had a difficult time this morning and required moderate assist to get out of bed and stand, she was not able to walk very well, having a difficult time advancing LLE and a difficult time WB through LLE in order to take a step with RLE. She is not safe to d/c home after this morning session and rec she stay for another session if not stay until tomorrow          IP PT Goals       01/25/18 0934          Bed Mobility PT LTG    Bed Mobility PT LTG, Date Established 01/25/18  -PC      Bed Mobility PT LTG, Time to Achieve 1 wk  -PC      Bed Mobility PT LTG, Activity Type supine to sit/sit to supine  -PC      Bed Mobility PT LTG, [place] Level contact guard assist  -PC      Transfer Training 2 PT STG    Transfer Training PT 2 STG, Date Established 01/25/18  -PC      Transfer Training PT 2 STG, Time to  Achieve 1 wk  -PC      Transfer Training PT 2 STG, Activity Type sit to stand/stand to sit  -PC      Transfer Training PT 2 STG, [place] Level contact guard assist  -PC      Gait Training PT LTG    Gait Training Goal PT LTG, Date Established 01/25/18  -PC      Gait Training Goal PT LTG, Time to Achieve 1 wk  -PC      Gait Training Goal PT LTG, [place] Level contact guard assist  -PC      Gait Training Goal PT LTG, Assist Device walker, rolling  -PC      Gait Training Goal PT LTG, Distance to Achieve 50 ft  -PC      Stair Training PT LTG    Stair Training Goal PT LTG, Date Established 01/25/18  -PC      Stair Training Goal PT LTG, Time to Achieve 1 wk  -PC      Stair Training Goal PT LTG, Number of Steps 4  -PC      Stair Training Goal PT LTG, [place] Level minimum assist (75% patient effort)  -PC      Stair Training Goal PT LTG, Assist Device 1 handrail  -PC        User Key  (r) = Recorded By, (t) = Taken By, (c) = Cosigned By    Initials Name Provider Type    PC Katie Montero PT Physical Therapist                Outcome Measures       01/25/18 0900          How much help from another person do you currently need...    Turning from your back to your side while in flat bed without using bedrails? 2  -PC      Moving from lying on back to sitting on the side of a flat bed without bedrails? 2  -PC      Moving to and from a bed to a chair (including a wheelchair)? 2  -PC      Standing up from a chair using your arms (e.g., wheelchair, bedside chair)? 2  -PC      Climbing 3-5 steps with a railing? 1  -PC      To walk in hospital room? 3  -PC      AM-PAC 6 Clicks Score 12  -PC      Functional Assessment    Outcome Measure Options AM-PAC 6 Clicks Basic Mobility (PT)  -PC        User Key  (r) = Recorded By, (t) = Taken By, (c) = Cosigned By    Initials Name Provider Type    PC Katie Montero PT Physical Therapist           Time Calculation:         PT Charges       01/25/18 0939          Time Calculation     Start Time 0908  -PC      Stop Time 0930  -PC      Time Calculation (min) 22 min  -PC      PT Received On 01/25/18  -PC      PT - Next Appointment 01/25/18  -PC      PT Goal Re-Cert Due Date 02/01/18  -PC        User Key  (r) = Recorded By, (t) = Taken By, (c) = Cosigned By    Initials Name Provider Type    PC Katie Montero, PT Physical Therapist          Therapy Charges for Today     Code Description Service Date Service Provider Modifiers Qty    82360903987 HC PT EVAL MOD COMPLEXITY 2 1/25/2018 Katie Montero, PT GP 1    57535523896 HC PT THER PROC EA 15 MIN 1/25/2018 Katie Montero, PT GP 1    86691455899 HC PT THER SUPP EA 15 MIN 1/25/2018 Katie Montero, PT GP 1          PT G-Codes  Outcome Measure Options: AM-PAC 6 Clicks Basic Mobility (PT)      Katie Montero PT  1/25/2018

## 2018-01-25 NOTE — PLAN OF CARE
Problem: Patient Care Overview (Adult)  Goal: Plan of Care Review  Outcome: Ongoing (interventions implemented as appropriate)   01/25/18 0934   Coping/Psychosocial Response Interventions   Plan Of Care Reviewed With patient   Outcome Evaluation   Outcome Summary/Follow up Plan pt presents s/p L THR with weakness, pain, dec ROM, and impaired functional mobility, she had a difficult time this morning and required moderate assist to get out of bed and stand, she was not able to walk very well, having a difficult time advancing LLE and a difficult time WB through LLE in order to take a step with RLE. She is not safe to d/c home after this morning session and rec she stay for another session if not stay until tomorrow       Problem: Inpatient Physical Therapy  Goal: Bed Mobility Goal LTG- PT  Outcome: Ongoing (interventions implemented as appropriate)   01/25/18 0934   Bed Mobility PT LTG   Bed Mobility PT LTG, Date Established 01/25/18   Bed Mobility PT LTG, Time to Achieve 1 wk   Bed Mobility PT LTG, Activity Type supine to sit/sit to supine   Bed Mobility PT LTG, [place] Level contact guard assist     Goal: Transfer Training Goal 2 STG- PT  Outcome: Ongoing (interventions implemented as appropriate)   01/25/18 0934   Transfer Training 2 PT STG   Transfer Training PT 2 STG, Date Established 01/25/18   Transfer Training PT 2 STG, Time to Achieve 1 wk   Transfer Training PT 2 STG, Activity Type sit to stand/stand to sit   Transfer Training PT 2 STG, [place] Level contact guard assist     Goal: Gait Training Goal LTG- PT  Outcome: Ongoing (interventions implemented as appropriate)   01/25/18 0934   Gait Training PT LTG   Gait Training Goal PT LTG, Date Established 01/25/18   Gait Training Goal PT LTG, Time to Achieve 1 wk   Gait Training Goal PT LTG, [place] Level contact guard assist   Gait Training Goal PT LTG, Assist Device walker, rolling   Gait Training Goal PT LTG, Distance to Achieve 50 ft     Goal:  Stair Training Goal LTG- PT  Outcome: Ongoing (interventions implemented as appropriate)   01/25/18 0934   Stair Training PT LTG   Stair Training Goal PT LTG, Date Established 01/25/18   Stair Training Goal PT LTG, Time to Achieve 1 wk   Stair Training Goal PT LTG, Number of Steps 4   Stair Training Goal PT LTG, [place] Level minimum assist (75% patient effort)   Stair Training Goal PT LTG, Assist Device 1 handrail

## 2018-01-25 NOTE — PLAN OF CARE
Problem: Patient Care Overview (Adult)  Goal: Plan of Care Review  Outcome: Ongoing (interventions implemented as appropriate)   01/24/18 1830   Coping/Psychosocial Response Interventions   Plan Of Care Reviewed With patient   Patient Care Overview   Progress improving   Outcome Evaluation   Outcome Summary/Follow up Plan Patient able to transfer from bed to bsc and chair using walker and with x1 assist. Pain elevated upon arrival to unit. Vitals are stable and voiding function is intact. Patient educated on importance of IS and pulmonary toilet postop.      Goal: Adult Individualization and Mutuality  Outcome: Ongoing (interventions implemented as appropriate)   01/24/18 1048 01/24/18 1830   Individualization   Patient Specific Preferences pt goes by Zeeshan --    Patient Specific Goals --  pain control and better mobility   Patient Specific Interventions --  offer pain meds and administer in timely manner when requested. offer assistance with oob activities and encourage ambulation .     Goal: Discharge Needs Assessment  Outcome: Outcome(s) achieved Date Met: 01/24/18 01/24/18 1017 01/24/18 1830   Discharge Needs Assessment   Discharge Facility/Level Of Care Needs --  home with home health   Living Environment   Transportation Available car;family or friend will provide --        Problem: Perioperative Period (Adult)  Goal: Signs and Symptoms of Listed Potential Problems Will be Absent or Manageable (Perioperative Period)  Outcome: Ongoing (interventions implemented as appropriate)   01/24/18 1830   Perioperative Period   Problems Assessed (Perioperative Period) all   Problems Present (Perioperative Period) pain       Problem: Hip Replacement, Total (Adult)  Goal: Signs and Symptoms of Listed Potential Problems Will be Absent or Manageable (Hip Replacement, Total)  Outcome: Ongoing (interventions implemented as appropriate)   01/24/18 1830   Hip Replacement, Total   Problems Assessed (Total Hip Replacement) all    Problems Present (Total Hip Replacement) pain;functional decline/self care deficit;situational response       Problem: Fall Risk (Adult)  Goal: Identify Related Risk Factors and Signs and Symptoms  Outcome: Outcome(s) achieved Date Met: 01/24/18 01/24/18 1830   Fall Risk   Fall Risk: Related Risk Factors slipper/uneven surfaces;environment unfamiliar;polypharmacy;culprit medication(s);gait/mobility problems;fear of falling;fatigue/slow reaction;bladder function altered;neuro disease/injury   Fall Risk: Signs and Symptoms presence of risk factors     Goal: Absence of Falls  Outcome: Ongoing (interventions implemented as appropriate)   01/24/18 1830   Fall Risk (Adult)   Absence of Falls achieves outcome

## 2018-01-25 NOTE — PROGRESS NOTES
Discharge Planning Assessment  [facility]     Patient Name: Marci Kolb  MRN: [id number]  Today's Date: 1/25/2018    Admit Date: 1/24/2018          Discharge Needs Assessment       01/25/18 1606    Living Environment    Lives With spouse    Living Arrangements house    Transportation Available car;family or friend will provide    Discharge Needs Assessment    Concerns To Be Addressed basic needs concerns    Readmission Within The Last 30 Days no previous admission in last 30 days    Anticipated Changes Related to Illness none    Equipment Currently Used at Home raised toilet    Discharge Facility/Level Of Care Needs home with home health            Discharge Plan       01/25/18 1609    Case Management/Social Work Plan    Plan [facility]    Patient/Family In Agreement With Plan yes    Additional Comments Spoke with pt, verified correct information on facesheet and explained the role of CCP. Pt would like to d/c home with [facility], referral given to Rhina with [facility] who states they are able to accept. Plan will be to d/c home with HH and family support.    Final Note    Final Note Pt being d/c'ed home with [facility], notified Rhina with [facility] of d/c orders. Pt will transport home with family.        Discharge Placement     Facility/Agency Request Status Selected? Address Phone Number Fax Number    [facility] Accepted    Yes [address] [address] [phone]        Expected Discharge Date and Time     Expected Discharge Date Expected Discharge Time    Jan 25, 2018               Demographic Summary     None            Functional Status       01/25/18 1605    Functional Status Current    Ambulation 3-->assistive equipment and person    Transferring 3-->assistive equipment and person    Toileting 3-->assistive equipment and person    Bathing 2-->assistive person    Dressing 2-->assistive person    Eating 0-->independent    Communication 0-->understands/communicates  without difficulty    Swallowing (if score 2 or more for any item, consult Rehab Services) 0-->swallows foods/liquids without difficulty    Functional Status Prior    Ambulation 0-->independent    Transferring 0-->independent    Toileting 0-->independent    Bathing 0-->independent    Dressing 0-->independent    Eating 0-->independent    Communication 0-->understands/communicates without difficulty    Swallowing 0-->swallows foods/liquids without difficulty            Psychosocial     None            Abuse/Neglect     None            Legal     None            Substance Abuse     None            Patient Forms     None          Mariajose Walker RN

## 2018-01-25 NOTE — CONSULTS
"    [facility]  [phone]    CONSULT NOTE    INTERNAL MEDICINE   [facility]     Referring Provider: Dr Nash  Reason for Consultation: medical management    Chief complaint: Hip pain    Subjective .     History of present illness:  This is a 72 year female with a history of osteoarthritis.  She's had pain in her hip for quite some time and is failed conservative measures outside the hospital.  She presented for an elective total hip arthroplasty today.  She tolerated the procedure well postoperatively her only issues or pain.  We've been consulted to assist with medical management.  She's an underlying history of hypothyroidism and hyperlipidemia as well as, chronic pain syndrome.  She's been on the same dose of her thyroid medications for quite some time denies any symptoms of hyper or hypothyroidism.  Her cholesterol medicines and been stable for quite some time she is unsure when her cholesterol was last checked.  Presently her blood pressure stable she denies any fevers or chills she was feeling fine prior to surgery.    Review of Systems  The following systems were reviewed and negative;  constitution, eyes, ENT, respiratory, cardiovascular, gastrointestinal, genitourinary, integument, hematologic / lymphatic, musculoskeletal, neurological, behavioral/psych, endocrine and allergies / immunologic    Past Medical History:   Diagnosis Date   • Arthritis    • Cataract     START OF   • Collapsed lung     HISTORY OF X2 MANY YEARS AGO. ?LEFT. FROM BLOWN BLEBS.  40 years ago.   • Constipation    • Depression     RECENT LOSS OF DGHT   • Dry skin    • Elevated cholesterol    • History of diverticulosis    • Hypothyroidism    • Lumbar spine pain    • Varicose vein of leg     SINDI     Past Surgical History:   Procedure Laterality Date   • COLONOSCOPY  ?    \"WNL\"     Family History   Problem Relation Age of Onset   • Malig Hyperthermia Neg Hx      Social History   Substance " Use Topics   • Smoking status: Former Smoker     Packs/day: 1.00     Years: 35.00     Quit date: 2000   • Smokeless tobacco: Never Used   • Alcohol use No     Prescriptions Prior to Admission   Medication Sig Dispense Refill Last Dose   • aspirin  MG tablet Take 325 mg by mouth 1 (One) Time. Morning of surgery as directed by Dr. Nash   1/24/2018 at 0700   • Chlorhexidine Gluconate Cloth 2 % pads Apply 1 application topically Every 12 (Twelve) Hours. USE PREOP AS DIRECTED PM PRIOR AND AM PRIOR TO OR    1/24/2018 at 0700   • HYDROcodone-acetaminophen (NORCO) 7.5-325 MG per tablet Take 1 tablet by mouth Every 6 (Six) Hours As Needed.   1/23/2018 at 0830   • levothyroxine (SYNTHROID, LEVOTHROID) 100 MCG tablet Take 100 mcg by mouth Daily.   1/23/2018 at 0830   • Multiple Vitamins-Minerals (CENTRUM SILVER 50+WOMEN) tablet Take 1 tablet by mouth Daily.   1/10/2018   • mupirocin (BACTROBAN) 2 % ointment Apply 1 application topically 2 (Two) Times a Day. TO USE AS DIRECTED PREOP  BID DAY PRIOR TO OR AND AM PRIOR TO SURGERY    1/24/2018 at 0700   • simvastatin (ZOCOR) 40 MG tablet Take 1 tablet by mouth Every Night.   1/23/2018 at 0830   • meloxicam (MOBIC) 15 MG tablet Take 15 mg by mouth Daily. HELD. HAS'NT BEEN TAKING   More than a month at Unknown time       atorvastatin 20 mg Oral Daily   ferrous sulfate 325 mg Oral Daily With Breakfast   levothyroxine 100 mcg Oral Q AM   mupirocin 1 application Topical BID   sennosides-docusate sodium 2 tablet Oral Nightly   vancomycin 1,000 mg Intravenous Q12H     Allergies:   Penicillins and Streptomycin    Objective     Vital Signs   Temp:  [97.3 °F (36.3 °C)-99 °F (37.2 °C)] 99 °F (37.2 °C)  Heart Rate:  [63-91] 91  Resp:  [14-20] 16  BP: (107-144)/(48-84) 109/67    Intake/Output Summary (Last 24 hours) at 01/25/18 0305  Last data filed at 01/25/18 0147   Gross per 24 hour   Intake             2320 ml   Output              850 ml   Net             1470 ml     Flowsheet  "Rows         First Filed Value    Admission Height  165.1 cm (65\") Documented at 01/24/2018 0935    Admission Weight  72.1 kg (159 lb) Documented at 01/24/2018 0935          Physical Exam:     General Appearance:    Alert, cooperative, in no acute distress   Head:    Normocephalic, without obvious abnormality, atraumatic   Eyes:            Lids and lashes normal, conjunctivae and sclerae normal, no   icterus, no pallor, corneas clear, PERRLA   Ears:    Ears appear intact with no abnormalities noted   Throat:   No oral lesions, no thrush, oral mucosa moist   Neck:   No adenopathy, supple, trachea midline, no thyromegaly, no   carotid bruit, no JVD   Back:     No kyphosis present, no scoliosis present, no skin lesions,      erythema or scars, no tenderness to percussion or                   palpation,   range of motion normal   Lungs:     Clear to auscultation,respirations regular, even and                  unlabored    Heart:    Regular rhythm and normal rate, normal S1 and S2, no            murmur, no gallop, no rub, no click   Chest Wall:    No abnormalities observed   Abdomen:     Normal bowel sounds, no masses, no organomegaly, soft        non-tender, non-distended, no guarding, no rebound                tenderness   Rectal:     Deferred   Extremities:   Moves all extremities well, no edema, no cyanosis, no             redness   Pulses:   Pulses palpable and equal bilaterally   Skin:   No bleeding, bruising or rash   Lymph nodes:   No palpable adenopathy   Neurologic:   Cranial nerves 2 - 12 grossly intact, sensation intact, DTR       present and equal bilaterally       Results Review:   I reviewed the patient's new clinical results.  I reviewed the patient's new imaging results and agree with the interpretation.  I reviewed the patient's other test results and agree with the interpretation      Results from last 7 days  Lab Units 01/24/18  1841   HEMOGLOBIN g/dL 12.2   HEMATOCRIT % 40.0     Assessment/Plan "     Active Problems:    Hyperlipidemia    Hypothyroidism    Hyperglycemia    Osteoarthritis of right hip    At this point we'll follow along with you over the course for hospitalization.  We'll continue her medications for her thyroid and cholesterol.  We'll monitor blood pressure and labs in the postoperative setting and attempt to avoid any postoperative hypotension monitor for anemia.  We'll check renal function in the morning and make further assessment at that time.      I discussed the patients findings and my recommendations with patient, family and nursing staff    Thank you very much for allowing us to participate in your patient's care.    Vickey Olvera MD  01/24/18  2100  Time: 45 mins

## 2018-01-25 NOTE — PLAN OF CARE
Problem: Patient Care Overview (Adult)  Goal: Plan of Care Review  Outcome: Ongoing (interventions implemented as appropriate)   01/25/18 0621   Coping/Psychosocial Response Interventions   Plan Of Care Reviewed With patient   Patient Care Overview   Progress improving   Outcome Evaluation   Outcome Summary/Follow up Plan VSS, up with assist of 1 to BSC, pain increased during shift, Dilaudid given x's 1, encouraged IS use, no comorbidities to currently educate on, home with HH on DC      Goal: Adult Individualization and Mutuality  Outcome: Ongoing (interventions implemented as appropriate)    Goal: Discharge Needs Assessment  Outcome: Ongoing (interventions implemented as appropriate)      Problem: Perioperative Period (Adult)  Goal: Signs and Symptoms of Listed Potential Problems Will be Absent or Manageable (Perioperative Period)  Outcome: Ongoing (interventions implemented as appropriate)      Problem: Hip Replacement, Total (Adult)  Goal: Signs and Symptoms of Listed Potential Problems Will be Absent or Manageable (Hip Replacement, Total)  Outcome: Ongoing (interventions implemented as appropriate)      Problem: Fall Risk (Adult)  Goal: Absence of Falls  Outcome: Ongoing (interventions implemented as appropriate)

## 2018-01-25 NOTE — OP NOTE
PREOPERATIVE DIAGNOSIS: Primary localized osteoarthritis left hip []    POSTOPERATIVE DIAGNOSIS: Same[]    PROCEDURE PERFORMED: Left total hip []    ANESTHESIA: Gen.  []    SURGEON:  Rogelio Nash MD    ASSISTANT SURGEON: Morro Peoples PA-C []    BLOOD LOSS: 300 cc []    SPECIMEN: None []    This will also is a 72-year-old lady with severe pain in her left hip.  She's had pain for months it's getting progressively worse and limits her walking or standing.  She's tried injections and anti-inflammatories with no relief of her discomfort.  X-rays show joint space narrowing with subchondral sclerosis.  She's brought to the hospital today for left total hip.    Patient's brought to the holding room given appropriate IV antibiotics which will be continued postop.  She's and brought back to the operating room and given a general anesthetic.  Placed in decubitus position with the left side up.  Left hip was prepped and draped in a sterile fashion and a modified Aufranc incision was mapped out and made.  Subcutaneous dissected away and the fascia split longitudinally.  Short rotators were taken down with the cautery unit.  Posterior hip capsule was teed open and the hip was dislocated posteriorly.  The neck osteotomy was performed at the appropriate level.  The head fragment was removed and the femur was retracted anteriorly.  The labrum was debrided and then the acetabulum was progressively reamed with basket reamers up to a size 51.  The 52 mm Depuy [place] cup was inserted in 40° of abduction and 20° of forward flexion.  A neutral 36 liner was positioned and the cup.  Piriformis sinus was cleaned out the Cl knife starter reamer was passed down.  Rigid reamers were used up to a size 4.  Broaches were used for the Lipscomb stem up to a size 4.  Trial reduction was carried out with a standard offset neck and we found we needed a -236 head gave appropriate leg length and stability.  Patient then had the trials removed the  ropivacaine mixture was injected into the soft tissues.  The real liner was impacted into the cup.  The stem was impacted into 20° of anteversion.  After this was done the -236 head was found to be the appropriate length and this was opened and applied.  The hip was reduced taken through a range of motion and stability was appropriate.  We then irrigated the wound with bacitracin and Betadine.  The wound was closed using 0 Vicryl in the capsule.  The fascia was closed with running #1 strata fix suture and the subcutaneous was closed with 0 and 2-0 Vicryl.  Staples were used in the skin.  Sterile dressing applied abduction pillow positioned and the general anesthetic reversed.  Blood loss was 300 cc.[]

## 2018-01-25 NOTE — DISCHARGE SUMMARY
This is a discharge summary on Marci Kolb from 1/25/18 the admitting diagnosis is primary localized osteoarthritis of the left hip.  Discharge diagnosis is same.  Procedures in the hospital left total hip.  This patient has done well postoperatively.  She's been up ambulating short distances with physical therapy.  Her pain is controlled with her oral pain medicine.  She is afebrile and her H&H this morning is stable.  She is on aspirin 325 twice a day for DVT prophylaxis.  She is full weightbearing and is to follow hip precautions at home.  Staples are be removed 2 weeks postop.

## 2018-01-25 NOTE — PROGRESS NOTES
Orthopedic Total Progress Note        Patient: Marci Kolb    Date of Admission: 1/24/2018  9:00 AM    YOB: 1945    Medical Record Number: [id number]    Attending Physician: Rogelio Nash MD      POD # 1     Status post: RT TOTAL HIP ARTHROPLASTY      Systemic or Specific Complaints: No Complaints      Allergies   Allergen Reactions   • Penicillins Itching   • Streptomycin Nausea Only         Current Medications:  Scheduled Meds:  atorvastatin 20 mg Oral Daily   ferrous sulfate 325 mg Oral Daily With Breakfast   levothyroxine 100 mcg Oral Q AM   mupirocin 1 application Topical BID   sennosides-docusate sodium 2 tablet Oral Nightly   vancomycin 1,000 mg Intravenous Q12H     Continuous Infusions:  lactated ringers 9 mL/hr Last Rate: 9 mL/hr (01/24/18 1000)   lactated ringers 100 mL/hr Last Rate: 100 mL/hr (01/24/18 2030)   lactated ringers 100 mL/hr    Pharmacy to dose vancomycin       PRN Meds:.•  acetaminophen  •  bisacodyl  •  docusate sodium  •  HYDROcodone-acetaminophen  •  HYDROmorphone **AND** naloxone  •  magnesium hydroxide  •  Pharmacy to dose vancomycin  •  sodium chloride      Physical Exam: 72 y.o. female  General Appearance:    alert and oriented                Pain Relief: Patient reports some relief       Vitals:    01/24/18 1750 01/24/18 1900 01/24/18 2300 01/25/18 0300   BP: 127/79 117/71 109/67 127/78   BP Location: Left arm Right arm Right arm Right arm   Patient Position: Lying Lying Lying Lying   Pulse: 68 80 91 104   Resp: 16 16 16 18   Temp: 97.3 °F (36.3 °C) 97.4 °F (36.3 °C) 99 °F (37.2 °C) 98.9 °F (37.2 °C)   TempSrc: Oral Oral Oral Oral   SpO2: 98% 93% 95% 93%   Weight:       Height:               Extremities:   Operative extremity neurovascular status intact. ROM intact.    Incision intact w/out signs or  symptoms of infection. No           edema, no cyanosis, no calf tenderness     Pulses:     Pulses palpable and equal bilaterally     Skin:     Skin Warm/Dry w/out  ulceration, ecchymosis, rash, or   cyanosis     Activity: Mobilizing Per P.T.       Diagnostic Tests:   Admission on 01/24/2018   Component Date Value Ref Range Status   • ABO Type 01/24/2018 O   Final   • RH type 01/24/2018 Positive   Final   • Antibody Screen 01/24/2018 Negative   Final   • Hemoglobin 01/24/2018 12.2  11.9 - 15.5 g/dL Final   • Hematocrit 01/24/2018 40.0  35.6 - 45.5 % Final   • Hemoglobin 01/25/2018 10.7* 11.9 - 15.5 g/dL Final   • Hematocrit 01/25/2018 34.7* 35.6 - 45.5 % Final       Xr Pelvis 1 Or 2 View    Result Date: 1/24/2018  Narrative: PELVIS SINGLE VIEW  HISTORY: 72-year-old female postop left hip arthroplasty  COMPARISON: None available  FINDINGS: 1. Satisfactory single image of the pelvis including left hip post arthroplasty demonstrating no evidence of a complication.  This report was finalized on 1/24/2018 2:45 PM by Dr. Cecil Seay MD.      Xr Chest Pa & Lateral    Result Date: 1/9/2018  Narrative: PA AND LATERAL CHEST  CLINICAL HISTORY: Preop chest x-ray.  Compared to the previous chest x-ray dated 02/18/2016.  There is minimal pleural and parenchymal scarring at both lung apices that is unchanged. The lungs are well-expanded and free of infiltrates. There are no pleural effusions. The heart is at the upper limits of normal in size. There is mild smooth levoscoliosis.  IMPRESSIONS: No evidence of active disease within the chest.  This report was finalized on 1/9/2018 8:17 AM by Dr. Andrew Cruz MD.          Assessment:  Patient Active Problem List   Diagnosis   • Diverticulosis of intestine   • Difficult or painful urination   • Cephalalgia   • Hyperlipidemia   • Hypothyroidism   • Gonalgia   • Knee swelling   • Low back pain   • Cervical pain   • Sciatica   • Arthralgia of hip   • Hyperglycemia   • Osteoarthritis of right hip     Post-operative Pain  Immobility    Plan:    Continue Physical Therapy, increase mobility as tolerated.  Continue SCDs, Continue DVT  prophalaxis.  Continue Pain management efforts  Continue Incisional care      Discharge Plan: today to home and home health    Date: 1/25/2018   Time: 7:12 AM    Rogelio Nash MD

## 2018-01-25 NOTE — PROGRESS NOTES
"    DAILY PROGRESS NOTE  [facility]    Patient Identification:  Name: Marci Kolb  Age: 72 y.o.  Sex: female  :  1945  MRN: [id number]         Primary Care Physician: Ward Penaloza MD    Subjective:  Interval History: c/o pain and received IV dilaudid - also feels dizzy w/ exertion - no loc/lof/cp/sob    Objective:in gym     Scheduled Meds:  atorvastatin 20 mg Oral Daily   ferrous sulfate 325 mg Oral Daily With Breakfast   levothyroxine 100 mcg Oral Q AM   mupirocin 1 application Topical BID   sennosides-docusate sodium 2 tablet Oral Nightly   sodium chloride 500 mL Intravenous Once     Continuous Infusions:  lactated ringers 9 mL/hr Last Rate: 9 mL/hr (18 1000)   Pharmacy to dose vancomycin     sodium chloride 0.9 % with KCl 20 mEq 100 mL/hr        Vital signs in last 24 hours:  Temp:  [97.3 °F (36.3 °C)-100.3 °F (37.9 °C)] 100.3 °F (37.9 °C)  Heart Rate:  [] 76  Resp:  [15-18] 16  BP: ()/(48-79) 86/51    Intake/Output:    Intake/Output Summary (Last 24 hours) at 18 1342  Last data filed at 18 0954   Gross per 24 hour   Intake             1310 ml   Output             1500 ml   Net             -190 ml       Exam:  BP (!) 86/51 (BP Location: Left arm, Patient Position: Sitting)  Pulse 76  Temp 100.3 °F (37.9 °C) (Oral)   Resp 16  Ht 165.1 cm (65\")  Wt 72.1 kg (159 lb)  SpO2 95%  BMI 26.46 kg/m2    General Appearance:    Alert, cooperative, no distress, working w/ PT - seems groggy                           Head:    Normocephalic, without obvious abnormality, atraumatic                           Eyes:    PERRL, conjunctiva/corneas clear, EOM's intact, both eyes                         Throat:   Lips, tongue, gums normal; oral mucosa pink and moist                           Neck:   Supple, symmetrical, trachea midline, no JVD                         Lungs:    Clear to auscultation bilaterally, respirations unlabored                          Heart:    " Regular rate and rhythm, S1 and S2 normal                  Abdomen:     Soft, non-tender, bowel sounds active                 Extremities:   No cyanosis or edema                  Neurologic:   CNII-XII intact, moving all extremities      Data Review:  Labs in chart were reviewed.    Assessment:  Active Hospital Problems (** Indicates Principal Problem)    Diagnosis Date Noted   • Osteoarthritis of right hip [M16.11] 01/24/2018   • Hyperglycemia [R73.9] 07/25/2017   • Hyperlipidemia [E78.5] 02/12/2016   • Hypothyroidism [E03.9] 02/12/2016      Resolved Hospital Problems    Diagnosis Date Noted Date Resolved   No resolved problems to display.       Plan:  Hypotension secondary to IV Dilaudid which was dc'd    -IVF bolus then run at 125 cc/hr until SBp>100   -would suggest holding idea of DC today    Postop acute blood loss anemia - Hgb 12-10.7 - monitor     Coumadin for DVT prophylaxis     Samy Iyer MD  1/25/2018  1:42 PM

## 2018-01-25 NOTE — THERAPY TREATMENT NOTE
Acute Care - Physical Therapy Treatment Note  [facility]     Patient Name: Marci Kolb  : 1945  MRN: [id number]  Today's Date: 2018  Onset of Illness/Injury or Date of Surgery Date: 18  Date of Referral to PT: 18  Referring Physician: Dr Nash    Admit Date: 2018    Visit Dx:    ICD-10-CM ICD-9-CM   1. Impaired functional mobility and activity tolerance Z74.09 V49.89     Patient Active Problem List   Diagnosis   • Diverticulosis of intestine   • Difficult or painful urination   • Cephalalgia   • Hyperlipidemia   • Hypothyroidism   • Gonalgia   • Knee swelling   • Low back pain   • Cervical pain   • Sciatica   • Arthralgia of hip   • Hyperglycemia   • Osteoarthritis of right hip               Adult Rehabilitation Note       18 1400          Rehab Assessment/Intervention    Discipline physical therapy assistant  -CW      Document Type therapy note (daily note)  -CW      Subjective Information agree to therapy;complains of;weakness;pain  -CW      Patient Effort, Rehab Treatment good  -CW      Precautions/Limitations fall precautions;hip precautions- left  -CW      Recorded by [CW] Johnathan Singh PTA      Vital Signs    O2 Delivery Pre Treatment room air  -CW      Recorded by [CW] Johnathan Singh PTA      Pain Assessment    Pain Assessment 0-10  -CW      Pain Score 6  -CW      Post Pain Score 6  -CW      Pain Type Surgical pain  -CW      Pain Location Hip  -CW      Pain Orientation Left  -CW      Pain Intervention(s) Repositioned;Ambulation/increased activity  -CW      Response to Interventions kev  -CW      Recorded by [CW] Johnathan Singh PTA      Cognitive Assessment/Intervention    Current Cognitive/Communication Assessment functional  -CW      Orientation Status oriented x 4  -CW      Follows Commands/Answers Questions 100% of the time  -CW      Personal Safety mild impairment;decreased awareness, need for assist;decreased awareness, need for safety  -CW       Personal Safety Interventions fall prevention program maintained;gait belt;muscle strengthening facilitated;nonskid shoes/slippers when out of bed  -CW      Recorded by [CW] Johnathan Singh PTA      Bed Mobility, Assessment/Treatment    Bed Mob, Supine to Sit, [place] not tested  -CW      Bed Mobility, Comment in chair  -CW      Recorded by [CW] Johnathan Singh PTA      Transfer Assessment/Treatment    Transfers, Sit-Stand [place] contact guard assist  -CW      Transfers, Stand-Sit [place] contact guard assist  -CW      Transfers, Sit-Stand-Sit, Assist Device rolling walker  -CW      Recorded by [CW] Johnathan Singh PTA      Gait Assessment/Treatment    Gait, [place] Level contact guard assist  -CW      Gait, Assistive Device rolling walker  -CW      Gait, Distance (Feet) 30  -CW      Gait, Gait Deviations left:;antalgic;ariane decreased;step length decreased;stride length decreased  -CW      Recorded by [CW] Johnathan Singh PTA      Therapy Exercises    Exercise Protocols total hip  -CW      Total Hip Exercises left:;15 reps;completed protocol  -CW      Recorded by [CW] Johnathan Singh PTA      Positioning and Restraints    Pre-Treatment Position sitting in chair/recliner  -CW      Post Treatment Position chair  -CW      In Chair notified nsg;reclined;call light within reach;encouraged to call for assist  -CW      Recorded by [CW] Johnathan Singh PTA        User Key  (r) = Recorded By, (t) = Taken By, (c) = Cosigned By    Initials Name Effective Dates    CW Johnathan Singh PTA 12/13/16 -                 IP PT Goals       01/25/18 0934          Bed Mobility PT LTG    Bed Mobility PT LTG, Date Established 01/25/18  -PC      Bed Mobility PT LTG, Time to Achieve 1 wk  -PC      Bed Mobility PT LTG, Activity Type supine to sit/sit to supine  -PC      Bed Mobility PT LTG, [place] Level contact guard assist  -PC      Transfer Training 2 PT STG    Transfer Training PT 2  STG, Date Established 01/25/18  -PC      Transfer Training PT 2 STG, Time to Achieve 1 wk  -PC      Transfer Training PT 2 STG, Activity Type sit to stand/stand to sit  -PC      Transfer Training PT 2 STG, [place] Level contact guard assist  -PC      Gait Training PT LTG    Gait Training Goal PT LTG, Date Established 01/25/18  -PC      Gait Training Goal PT LTG, Time to Achieve 1 wk  -PC      Gait Training Goal PT LTG, [place] Level contact guard assist  -PC      Gait Training Goal PT LTG, Assist Device walker, rolling  -PC      Gait Training Goal PT LTG, Distance to Achieve 50 ft  -PC      Stair Training PT LTG    Stair Training Goal PT LTG, Date Established 01/25/18  -PC      Stair Training Goal PT LTG, Time to Achieve 1 wk  -PC      Stair Training Goal PT LTG, Number of Steps 4  -PC      Stair Training Goal PT LTG, [place] Level minimum assist (75% patient effort)  -PC      Stair Training Goal PT LTG, Assist Device 1 handrail  -PC        User Key  (r) = Recorded By, (t) = Taken By, (c) = Cosigned By    Initials Name Provider Type    MAURICIO Montero PT Physical Therapist          Physical Therapy Education     Title: PT OT SLP Therapies (Active)     Topic: Physical Therapy (Active)     Point: Mobility training (Active)    Learning Progress Summary    Learner Readiness Method Response Comment Documented by Status   Patient Acceptance E,D NR  PC 01/25/18 0934 Active               Point: Home exercise program (Active)    Learning Progress Summary    Learner Readiness Method Response Comment Documented by Status   Patient Acceptance E,D NR  PC 01/25/18 0934 Active               Point: Body mechanics (Active)    Learning Progress Summary    Learner Readiness Method Response Comment Documented by Status   Patient Acceptance E,D NR   01/25/18 0934 Active               Point: Precautions (Active)    Learning Progress Summary    Learner Readiness Method Response Comment Documented by Status   Patient  Acceptance E,D NR  PC 01/25/18 0934 Active                      User Key     Initials Effective Dates Name Provider Type Discipline    PC 12/01/15 -  Katie Montero, PT Physical Therapist PT                    PT Recommendation and Plan  Anticipated Discharge Disposition: home with home health  Planned Therapy Interventions: gait training, bed mobility training, strengthening, transfer training  PT Frequency: 2 times/day             Outcome Measures       01/25/18 0900          How much help from another person do you currently need...    Turning from your back to your side while in flat bed without using bedrails? 2  -PC      Moving from lying on back to sitting on the side of a flat bed without bedrails? 2  -PC      Moving to and from a bed to a chair (including a wheelchair)? 2  -PC      Standing up from a chair using your arms (e.g., wheelchair, bedside chair)? 2  -PC      Climbing 3-5 steps with a railing? 1  -PC      To walk in hospital room? 3  -PC      AM-PAC 6 Clicks Score 12  -PC      Functional Assessment    Outcome Measure Options AM-PAC 6 Clicks Basic Mobility (PT)  -PC        User Key  (r) = Recorded By, (t) = Taken By, (c) = Cosigned By    Initials Name Provider Type    PC Katie Montero PT Physical Therapist           Time Calculation:         PT Charges       01/25/18 1436 01/25/18 0939       Time Calculation    Start Time 1320  -CW 0908  -PC     Stop Time 1406  -CW 0930  -PC     Time Calculation (min) 46 min  -CW 22 min  -PC     PT Received On 01/25/18  -CW 01/25/18  -PC     PT - Next Appointment 01/26/18  -CW 01/25/18  -PC     PT Goal Re-Cert Due Date  02/01/18  -PC       User Key  (r) = Recorded By, (t) = Taken By, (c) = Cosigned By    Initials Name Provider Type    PC Katie Montero, PT Physical Therapist    GURU Singh, PTA Physical Therapy Assistant          Therapy Charges for Today     Code Description Service Date Service Provider Modifiers Qty    [id number]  PT THER PROC EA  15 MIN 1/25/2018 Johnathan Singh, PTA GP 1    [id number] HC PT THER PROC GROUP 1/25/2018 Johnathan Singh, ARLEEN GP 1          PT G-Codes  Outcome Measure Options: AM-PAC 6 Clicks Basic Mobility (PT)    Johnathan Singh PTA  1/25/2018

## 2018-01-26 LAB
ANION GAP SERPL CALCULATED.3IONS-SCNC: 8.7 MMOL/L
B PERT DNA SPEC QL NAA+PROBE: NOT DETECTED
BACTERIA UR QL AUTO: ABNORMAL /HPF
BILIRUB UR QL STRIP: NEGATIVE
BUN BLD-MCNC: 12 MG/DL (ref 8–23)
BUN/CREAT SERPL: 14.3 (ref 7–25)
C PNEUM DNA NPH QL NAA+NON-PROBE: NOT DETECTED
CALCIUM SPEC-SCNC: 8.3 MG/DL (ref 8.6–10.5)
CHLORIDE SERPL-SCNC: 107 MMOL/L (ref 98–107)
CLARITY UR: ABNORMAL
CO2 SERPL-SCNC: 22.3 MMOL/L (ref 22–29)
COLOR UR: YELLOW
CREAT BLD-MCNC: 0.84 MG/DL (ref 0.57–1)
DEPRECATED RDW RBC AUTO: 52.9 FL (ref 37–54)
ERYTHROCYTE [DISTWIDTH] IN BLOOD BY AUTOMATED COUNT: 13.9 % (ref 11.7–13)
FLUAV H1 2009 PAND RNA NPH QL NAA+PROBE: NOT DETECTED
FLUAV H1 HA GENE NPH QL NAA+PROBE: NOT DETECTED
FLUAV H3 RNA NPH QL NAA+PROBE: NOT DETECTED
FLUAV SUBTYP SPEC NAA+PROBE: NOT DETECTED
FLUBV RNA ISLT QL NAA+PROBE: NOT DETECTED
GFR SERPL CREATININE-BSD FRML MDRD: 66 ML/MIN/1.73
GLUCOSE BLD-MCNC: 145 MG/DL (ref 65–99)
GLUCOSE UR STRIP-MCNC: NEGATIVE MG/DL
HADV DNA SPEC NAA+PROBE: NOT DETECTED
HCOV 229E RNA SPEC QL NAA+PROBE: NOT DETECTED
HCOV HKU1 RNA SPEC QL NAA+PROBE: NOT DETECTED
HCOV NL63 RNA SPEC QL NAA+PROBE: NOT DETECTED
HCOV OC43 RNA SPEC QL NAA+PROBE: NOT DETECTED
HCT VFR BLD AUTO: 32.4 % (ref 35.6–45.5)
HGB BLD-MCNC: 10 G/DL (ref 11.9–15.5)
HGB UR QL STRIP.AUTO: ABNORMAL
HMPV RNA NPH QL NAA+NON-PROBE: NOT DETECTED
HPIV1 RNA SPEC QL NAA+PROBE: NOT DETECTED
HPIV2 RNA SPEC QL NAA+PROBE: NOT DETECTED
HPIV3 RNA NPH QL NAA+PROBE: NOT DETECTED
HPIV4 P GENE NPH QL NAA+PROBE: NOT DETECTED
HYALINE CASTS UR QL AUTO: ABNORMAL /LPF
KETONES UR QL STRIP: NEGATIVE
LEUKOCYTE ESTERASE UR QL STRIP.AUTO: ABNORMAL
M PNEUMO IGG SER IA-ACNC: NOT DETECTED
MCH RBC QN AUTO: 32.3 PG (ref 26.9–32)
MCHC RBC AUTO-ENTMCNC: 30.9 G/DL (ref 32.4–36.3)
MCV RBC AUTO: 104.5 FL (ref 80.5–98.2)
NITRITE UR QL STRIP: NEGATIVE
PH UR STRIP.AUTO: 7 [PH] (ref 5–8)
PLATELET # BLD AUTO: 160 10*3/MM3 (ref 140–500)
PMV BLD AUTO: 9.9 FL (ref 6–12)
POTASSIUM BLD-SCNC: 4.2 MMOL/L (ref 3.5–5.2)
PROT UR QL STRIP: ABNORMAL
RBC # BLD AUTO: 3.1 10*6/MM3 (ref 3.9–5.2)
RBC # UR: ABNORMAL /HPF
REF LAB TEST METHOD: ABNORMAL
RHINOVIRUS RNA SPEC NAA+PROBE: NOT DETECTED
RSV RNA NPH QL NAA+NON-PROBE: NOT DETECTED
SODIUM BLD-SCNC: 138 MMOL/L (ref 136–145)
SP GR UR STRIP: 1.02 (ref 1–1.03)
SQUAMOUS #/AREA URNS HPF: ABNORMAL /HPF
TRANS CELLS #/AREA URNS HPF: ABNORMAL /HPF
UROBILINOGEN UR QL STRIP: ABNORMAL
WBC NRBC COR # BLD: 8.36 10*3/MM3 (ref 4.5–10.7)
WBC UR QL AUTO: ABNORMAL /HPF
YEAST URNS QL MICRO: ABNORMAL /HPF

## 2018-01-26 PROCEDURE — 87798 DETECT AGENT NOS DNA AMP: CPT | Performed by: HOSPITALIST

## 2018-01-26 PROCEDURE — 85027 COMPLETE CBC AUTOMATED: CPT | Performed by: HOSPITALIST

## 2018-01-26 PROCEDURE — 87486 CHLMYD PNEUM DNA AMP PROBE: CPT | Performed by: HOSPITALIST

## 2018-01-26 PROCEDURE — 81001 URINALYSIS AUTO W/SCOPE: CPT | Performed by: HOSPITALIST

## 2018-01-26 PROCEDURE — 80048 BASIC METABOLIC PNL TOTAL CA: CPT | Performed by: HOSPITALIST

## 2018-01-26 PROCEDURE — 87086 URINE CULTURE/COLONY COUNT: CPT | Performed by: HOSPITALIST

## 2018-01-26 PROCEDURE — 97110 THERAPEUTIC EXERCISES: CPT

## 2018-01-26 PROCEDURE — 87633 RESP VIRUS 12-25 TARGETS: CPT | Performed by: HOSPITALIST

## 2018-01-26 PROCEDURE — 87581 M.PNEUMON DNA AMP PROBE: CPT | Performed by: HOSPITALIST

## 2018-01-26 PROCEDURE — 97150 GROUP THERAPEUTIC PROCEDURES: CPT

## 2018-01-26 RX ADMIN — ACETAMINOPHEN 325 MG: 325 TABLET ORAL at 02:24

## 2018-01-26 RX ADMIN — HYDROCODONE BITARTRATE AND ACETAMINOPHEN 1 TABLET: 10; 325 TABLET ORAL at 21:49

## 2018-01-26 RX ADMIN — HYDROCODONE BITARTRATE AND ACETAMINOPHEN 1 TABLET: 10; 325 TABLET ORAL at 06:27

## 2018-01-26 RX ADMIN — LEVOTHYROXINE SODIUM 100 MCG: 100 TABLET ORAL at 06:24

## 2018-01-26 RX ADMIN — MUPIROCIN 1 APPLICATION: 20 OINTMENT TOPICAL at 08:22

## 2018-01-26 RX ADMIN — HYDROCODONE BITARTRATE AND ACETAMINOPHEN 1 TABLET: 10; 325 TABLET ORAL at 02:24

## 2018-01-26 RX ADMIN — DOCUSATE SODIUM -SENNOSIDES 2 TABLET: 50; 8.6 TABLET, COATED ORAL at 21:16

## 2018-01-26 RX ADMIN — MUPIROCIN 1 APPLICATION: 20 OINTMENT TOPICAL at 21:16

## 2018-01-26 RX ADMIN — HYDROCODONE BITARTRATE AND ACETAMINOPHEN 1 TABLET: 10; 325 TABLET ORAL at 12:57

## 2018-01-26 RX ADMIN — ACETAMINOPHEN 325 MG: 325 TABLET ORAL at 06:27

## 2018-01-26 RX ADMIN — HYDROCODONE BITARTRATE AND ACETAMINOPHEN 1 TABLET: 10; 325 TABLET ORAL at 17:39

## 2018-01-26 RX ADMIN — ATORVASTATIN CALCIUM 20 MG: 20 TABLET, FILM COATED ORAL at 08:22

## 2018-01-26 RX ADMIN — FERROUS SULFATE TAB 325 MG (65 MG ELEMENTAL FE) 325 MG: 325 (65 FE) TAB at 08:22

## 2018-01-26 NOTE — PROGRESS NOTES
" LOS: 2 days   Primary Care Physician: Ward Penaloza MD     Subjective  Feels very fatigued and tired.  She's not been out of bed yet.  Denies any shortness of breath did have some nasal congestion and cough this morning.  No previous history of COPD or asthma.  She denies any history of infection prior to hospitalization.  She denies any urinary symptoms at the time she had a lactobacillus in the urine preop.  Some flatus, no urinary complaints    Vital Signs  Body mass index is 26.46 kg/(m^2).  Temp:  [99 °F (37.2 °C)-101.3 °F (38.5 °C)] 100.2 °F (37.9 °C)  Heart Rate:  [76-92] 92  Resp:  [16-18] 18  BP: ()/(51-67) 103/62      Objective:  Vital signs: (most recent): Blood pressure 90/66, pulse 80, temperature 98.3 °F (36.8 °C), temperature source Oral, resp. rate 18, height 165.1 cm (65\"), weight 72.1 kg (159 lb), SpO2 93 %.    HEENT: (Mild oral redness)    Lungs:  Normal effort.  She is not in respiratory distress.  There are decreased breath sounds (Bilateral).  No wheezes or rhonchi.  (On deep inspiration some coughing)  Heart: Normal rate.  Regular rhythm.  S1 normal and S2 normal.    Abdomen: Abdomen is soft and distended (Mild).  Bowel sounds are normal.   There is no abdominal tenderness.     Neurological: Patient is alert and oriented to person, place and time.    Skin:  Warm and dry.            Incentive spirometry: Less than 500    Results Review:    I reviewed the patient's new clinical results.      Results from last 7 days  Lab Units 01/26/18  0341 01/25/18  0448   WBC 10*3/mm3 8.36  --    HEMOGLOBIN g/dL 10.0* 10.7*   PLATELETS 10*3/mm3 160  --        Results from last 7 days  Lab Units 01/26/18  0341   SODIUM mmol/L 138   POTASSIUM mmol/L 4.2   CHLORIDE mmol/L 107   CO2 mmol/L 22.3   BUN mg/dL 12   CREATININE mg/dL 0.84   CALCIUM mg/dL 8.3*   GLUCOSE mg/dL 145*         Hemoglobin A1C:  Lab Results   Component Value Date    HGBA1C 5.78 (H) 07/25/2017       Glucose Range:No results found for: " POCGLU    Medication Review: Yes    Physical Therapy:    Assessment/Plan     Active Hospital Problems (** Indicates Principal Problem)    Diagnosis Date Noted   • Osteoarthritis of right hip [M16.11] 01/24/2018   • Hyperglycemia [R73.9] 07/25/2017   • Hyperlipidemia [E78.5] 02/12/2016   • Hypothyroidism [E03.9] 02/12/2016      Resolved Hospital Problems    Diagnosis Date Noted Date Resolved   No resolved problems to display.       Assessment & Plan  Hypotension: This was presumed secondary to IV pain medications which were as discontinued.  She's received IV fluids and blood pressure better now but having a temperature  No leukocytosis, stop IV fluids increase activity    Postop acute blood loss anemia - Hgb decreased to 10     Fever: Most likely explanation is atelectasis but with current environment of influenza epidemic we'll check viral panel, her surgery had some lactobacillus in her urine and will recheck UA also, still requiring some oxygen which is consistent with some atelectasis.  Encouraged to use the IS more frequently  Not ready to be discharged yet    Disposition:    Marietta Navarrete MD  01/26/18  7:13 AM

## 2018-01-26 NOTE — PROGRESS NOTES
Continued Stay Note  [facility]     Patient Name: Marci Kolb  MRN: [id number]  Today's Date: 1/26/2018    Admit Date: 1/24/2018          Discharge Plan       01/26/18 7240    Case Management/Social Work Plan    Additional Comments Plan remains to d/c home with [facility], spoke with pts  who is requesting a hospital bed, referral given to Lakshmi [facility]. Hospital bed will be private pay, pt agreeable.               Discharge Codes     None        Expected Discharge Date and Time     Expected Discharge Date Expected Discharge Time    Jan 25, 2018             Mariajose Walker RN

## 2018-01-26 NOTE — PLAN OF CARE
Problem: Patient Care Overview (Adult)  Goal: Plan of Care Review  Outcome: Ongoing (interventions implemented as appropriate)   01/26/18 0014   Coping/Psychosocial Response Interventions   Plan Of Care Reviewed With patient   Patient Care Overview   Progress no change   Outcome Evaluation   Outcome Summary/Follow up Plan BP continues to run low, IV fluids running at 125 cc/hr, needs much encouragement to move, nonverbal indicators of pain not present, yet patient continues to rate her pain >7, possible DC tomorrow, but unsure related to low BP and unsafe transfers, discussed depression triggers and treatment related to history     Goal: Adult Individualization and Mutuality  Outcome: Ongoing (interventions implemented as appropriate)   01/24/18 1830 01/26/18 0014   Individualization   Patient Specific Preferences --  none stated   Patient Specific Goals --  pain control and imporved mobility   Patient Specific Interventions offer pain meds and administer in timely manner when requested. offer assistance with oob activities and encourage ambulation . --      Goal: Discharge Needs Assessment  Outcome: Ongoing (interventions implemented as appropriate)   01/26/18 0014   Discharge Needs Assessment   Concerns To Be Addressed basic needs concerns   Readmission Within The Last 30 Days no previous admission in last 30 days   Equipment Needed After Discharge walker, standard;wound care supplies;raised toilet   Discharge Facility/Level Of Care Needs home with home health   Discharge Disposition still a patient   Current Health   Anticipated Changes Related to Illness inability to care for self   Self-Care   Equipment Currently Used at Home none   Living Environment   Transportation Available car;family or friend will provide       Problem: Hip Replacement, Total (Adult)  Goal: Signs and Symptoms of Listed Potential Problems Will be Absent or Manageable (Hip Replacement, Total)  Outcome: Ongoing (interventions implemented as  appropriate)   01/26/18 0014   Hip Replacement, Total   Problems Assessed (Total Hip Replacement) all   Problems Present (Total Hip Replacement) pain;functional decline/self care deficit;situational response       Problem: Fall Risk (Adult)  Goal: Absence of Falls  Outcome: Ongoing (interventions implemented as appropriate)   01/26/18 0014   Fall Risk (Adult)   Absence of Falls achieves outcome

## 2018-01-26 NOTE — PLAN OF CARE
Problem: Patient Care Overview (Adult)  Goal: Plan of Care Review  Outcome: Ongoing (interventions implemented as appropriate)   01/25/18 0934 01/25/18 2006   Coping/Psychosocial Response Interventions   Plan Of Care Reviewed With patient --    Patient Care Overview   Progress --  progress toward functional goals is gradual   Outcome Evaluation   Outcome Summary/Follow up Plan --  Pt pod 1 left LILLI. Pt is hypotensive today, received 500mL bolus and continuous IV fluids at 125mL/hr. Pt experiencing a lot of pain with movement and requiring moderate assist to stand, and is unsafe to d/c home today. Discharge delayed due to these reasons. Will continue to monitor BP.     Goal: Adult Individualization and Mutuality  Outcome: Ongoing (interventions implemented as appropriate)    Goal: Discharge Needs Assessment  Outcome: Ongoing (interventions implemented as appropriate)      Problem: Perioperative Period (Adult)  Goal: Signs and Symptoms of Listed Potential Problems Will be Absent or Manageable (Perioperative Period)  Outcome: Ongoing (interventions implemented as appropriate)      Problem: Hip Replacement, Total (Adult)  Goal: Signs and Symptoms of Listed Potential Problems Will be Absent or Manageable (Hip Replacement, Total)  Outcome: Ongoing (interventions implemented as appropriate)      Problem: Fall Risk (Adult)  Goal: Absence of Falls  Outcome: Ongoing (interventions implemented as appropriate)

## 2018-01-26 NOTE — PROGRESS NOTES
Orthopedic Total Progress Note        Patient: Marci Klob    Date of Admission: 1/24/2018  9:00 AM    YOB: 1945    Medical Record Number: [id number]    Attending Physician: Rogelio Nash MD      POD # 2     Status post: RT TOTAL HIP ARTHROPLASTY      Systemic or Specific Complaints: Pain Control      Allergies   Allergen Reactions   • Penicillins Itching   • Streptomycin Nausea Only         Current Medications:  Scheduled Meds:  atorvastatin 20 mg Oral Daily   ferrous sulfate 325 mg Oral Daily With Breakfast   levothyroxine 100 mcg Oral Q AM   mupirocin 1 application Topical BID   sennosides-docusate sodium 2 tablet Oral Nightly     Continuous Infusions:   PRN Meds:.•  acetaminophen  •  bisacodyl  •  docusate sodium  •  HYDROcodone-acetaminophen  •  magnesium hydroxide  •  sodium chloride      Physical Exam: 72 y.o. female  General Appearance:    alert and oriented                Pain Relief: Patient reports some relief       Vitals:    01/25/18 2326 01/26/18 0300 01/26/18 0332 01/26/18 0758   BP: 97/67 103/62  90/66   BP Location: Right arm Right arm  Right arm   Patient Position: Lying Lying  Lying   Pulse: 81 92  80   Resp: 16 18 18   Temp: 100.2 °F (37.9 °C) (!) 101.3 °F (38.5 °C) 100.2 °F (37.9 °C) 98.3 °F (36.8 °C)   TempSrc: Oral Oral  Oral   SpO2: 96% 91%  93%   Weight:       Height:               Extremities:   Operative extremity neurovascular status intact. ROM intact.    Incision intact w/out signs or  symptoms of infection. No           edema, no cyanosis, no calf tenderness     Pulses:     Pulses palpable and equal bilaterally     Skin:     Skin Warm/Dry w/out ulceration, ecchymosis, rash, or   cyanosis     Activity: Mobilizing Per P.T.       Diagnostic Tests:   Admission on 01/24/2018   Component Date Value Ref Range Status   • ABO Type 01/24/2018 O   Final   • RH type 01/24/2018 Positive   Final   • Antibody Screen 01/24/2018 Negative   Final   • Hemoglobin 01/24/2018 12.2   11.9 - 15.5 g/dL Final   • Hematocrit 01/24/2018 40.0  35.6 - 45.5 % Final   • Hemoglobin 01/25/2018 10.7* 11.9 - 15.5 g/dL Final   • Hematocrit 01/25/2018 34.7* 35.6 - 45.5 % Final   • WBC 01/26/2018 8.36  4.50 - 10.70 10*3/mm3 Final   • RBC 01/26/2018 3.10* 3.90 - 5.20 10*6/mm3 Final   • Hemoglobin 01/26/2018 10.0* 11.9 - 15.5 g/dL Final   • Hematocrit 01/26/2018 32.4* 35.6 - 45.5 % Final   • MCV 01/26/2018 104.5* 80.5 - 98.2 fL Final   • MCH 01/26/2018 32.3* 26.9 - 32.0 pg Final   • MCHC 01/26/2018 30.9* 32.4 - 36.3 g/dL Final   • RDW 01/26/2018 13.9* 11.7 - 13.0 % Final   • RDW-SD 01/26/2018 52.9  37.0 - 54.0 fl Final   • MPV 01/26/2018 9.9  6.0 - 12.0 fL Final   • Platelets 01/26/2018 160  140 - 500 10*3/mm3 Final   • Glucose 01/26/2018 145* 65 - 99 mg/dL Final   • BUN 01/26/2018 12  8 - 23 mg/dL Final   • Creatinine 01/26/2018 0.84  0.57 - 1.00 mg/dL Final   • Sodium 01/26/2018 138  136 - 145 mmol/L Final   • Potassium 01/26/2018 4.2  3.5 - 5.2 mmol/L Final   • Chloride 01/26/2018 107  98 - 107 mmol/L Final   • CO2 01/26/2018 22.3  22.0 - 29.0 mmol/L Final   • Calcium 01/26/2018 8.3* 8.6 - 10.5 mg/dL Final   • eGFR Non African Amer 01/26/2018 66  >60 mL/min/1.73 Final   • BUN/Creatinine Ratio 01/26/2018 14.3  7.0 - 25.0 Final   • Anion Gap 01/26/2018 8.7  mmol/L Final       Xr Pelvis 1 Or 2 View    Result Date: 1/24/2018  Narrative: PELVIS SINGLE VIEW  HISTORY: 72-year-old female postop left hip arthroplasty  COMPARISON: None available  FINDINGS: 1. Satisfactory single image of the pelvis including left hip post arthroplasty demonstrating no evidence of a complication.  This report was finalized on 1/24/2018 2:45 PM by Dr. Cecil Seay MD.      Xr Chest Pa & Lateral    Result Date: 1/9/2018  Narrative: PA AND LATERAL CHEST  CLINICAL HISTORY: Preop chest x-ray.  Compared to the previous chest x-ray dated 02/18/2016.  There is minimal pleural and parenchymal scarring at both lung apices that is unchanged. The  lungs are well-expanded and free of infiltrates. There are no pleural effusions. The heart is at the upper limits of normal in size. There is mild smooth levoscoliosis.  IMPRESSIONS: No evidence of active disease within the chest.  This report was finalized on 1/9/2018 8:17 AM by Dr. Andrew Cruz MD.          Assessment:  Patient Active Problem List   Diagnosis   • Diverticulosis of intestine   • Difficult or painful urination   • Cephalalgia   • Hyperlipidemia   • Hypothyroidism   • Gonalgia   • Knee swelling   • Low back pain   • Cervical pain   • Sciatica   • Arthralgia of hip   • Hyperglycemia   • Osteoarthritis of right hip     Post-operative Pain  Immobility    Plan:    Continue Physical Therapy, increase mobility as tolerated.  Continue SCDs, Continue DVT prophalaxis.  Continue Pain management efforts  Continue Incisional care      Discharge Plan: tomorrow to home and home health    Date: 1/26/2018   Time: 9:18 AM    Rogelio Nash MD

## 2018-01-27 VITALS
OXYGEN SATURATION: 98 % | DIASTOLIC BLOOD PRESSURE: 66 MMHG | HEART RATE: 73 BPM | WEIGHT: 159 LBS | HEIGHT: 65 IN | SYSTOLIC BLOOD PRESSURE: 102 MMHG | RESPIRATION RATE: 18 BRPM | BODY MASS INDEX: 26.49 KG/M2 | TEMPERATURE: 98.2 F

## 2018-01-27 PROBLEM — M16.11 OSTEOARTHRITIS OF RIGHT HIP: Status: RESOLVED | Noted: 2018-01-24 | Resolved: 2018-01-27

## 2018-01-27 PROBLEM — J98.11 ATELECTASIS: Status: ACTIVE | Noted: 2018-01-27

## 2018-01-27 PROCEDURE — 97110 THERAPEUTIC EXERCISES: CPT

## 2018-01-27 PROCEDURE — 97150 GROUP THERAPEUTIC PROCEDURES: CPT

## 2018-01-27 RX ORDER — METHOCARBAMOL 500 MG/1
500 TABLET, FILM COATED ORAL EVERY 6 HOURS PRN
Status: DISCONTINUED | OUTPATIENT
Start: 2018-01-27 | End: 2018-01-27 | Stop reason: HOSPADM

## 2018-01-27 RX ORDER — METHOCARBAMOL 500 MG/1
1000 TABLET, FILM COATED ORAL EVERY 6 HOURS PRN
Status: DISCONTINUED | OUTPATIENT
Start: 2018-01-27 | End: 2018-01-27 | Stop reason: HOSPADM

## 2018-01-27 RX ORDER — METHOCARBAMOL 500 MG/1
500 TABLET, FILM COATED ORAL EVERY 6 HOURS SCHEDULED
Status: DISCONTINUED | OUTPATIENT
Start: 2018-01-27 | End: 2018-01-27

## 2018-01-27 RX ORDER — ASPIRIN 325 MG
325 TABLET ORAL 2 TIMES DAILY
Start: 2018-01-27 | End: 2018-05-16

## 2018-01-27 RX ORDER — ASPIRIN 325 MG
325 TABLET ORAL 2 TIMES DAILY
Status: DISCONTINUED | OUTPATIENT
Start: 2018-01-27 | End: 2018-01-27 | Stop reason: HOSPADM

## 2018-01-27 RX ORDER — METHOCARBAMOL 500 MG/1
1000 TABLET, FILM COATED ORAL EVERY 6 HOURS SCHEDULED
Status: DISCONTINUED | OUTPATIENT
Start: 2018-01-27 | End: 2018-01-27

## 2018-01-27 RX ADMIN — ATORVASTATIN CALCIUM 20 MG: 20 TABLET, FILM COATED ORAL at 08:29

## 2018-01-27 RX ADMIN — ASPIRIN 325 MG: 325 TABLET ORAL at 08:29

## 2018-01-27 RX ADMIN — METHOCARBAMOL 500 MG: 500 TABLET ORAL at 14:35

## 2018-01-27 RX ADMIN — FERROUS SULFATE TAB 325 MG (65 MG ELEMENTAL FE) 325 MG: 325 (65 FE) TAB at 08:29

## 2018-01-27 RX ADMIN — HYDROCODONE BITARTRATE AND ACETAMINOPHEN 1 TABLET: 10; 325 TABLET ORAL at 14:24

## 2018-01-27 RX ADMIN — HYDROCODONE BITARTRATE AND ACETAMINOPHEN 1 TABLET: 10; 325 TABLET ORAL at 06:27

## 2018-01-27 RX ADMIN — HYDROCODONE BITARTRATE AND ACETAMINOPHEN 1 TABLET: 10; 325 TABLET ORAL at 10:19

## 2018-01-27 RX ADMIN — LEVOTHYROXINE SODIUM 100 MCG: 100 TABLET ORAL at 06:27

## 2018-01-27 NOTE — PLAN OF CARE
Problem: Patient Care Overview (Adult)  Goal: Plan of Care Review  Outcome: Outcome(s) achieved Date Met: 01/27/18 01/27/18 1618   Outcome Evaluation   Outcome Summary/Follow up Plan Pt ambulation has improved, still requires direction and reminders, complained of muscle spasms in groin that was relieved with po pain meds as well as muscle relaxer, patient is resting comfortably awaiting  to pick her up for dc.       01/27/18 1618   Coping/Psychosocial Response Interventions   Plan Of Care Reviewed With patient   Patient Care Overview   Progress improving   Outcome Evaluation   Outcome Summary/Follow up Plan Pt ambulation has improved, still requires direction and reminders, complained of muscle spasms in groin that was relieved with po pain meds as well as muscle relaxer, patient is resting comfortably awaiting  to pick her up for dc.

## 2018-01-27 NOTE — PROGRESS NOTES
" LOS: 3 days   Primary Care Physician: Ward Penaloza MD     Subjective  Feels a little bit better was able to sit up today and ambulate a little with PT yesterday.  No BM yet    Vital Signs  Body mass index is 26.46 kg/(m^2).  Temp:  [98.3 °F (36.8 °C)-100.5 °F (38.1 °C)] 99 °F (37.2 °C)  Heart Rate:  [73-85] 82  Resp:  [18] 18  BP: ()/(55-72) 95/63      Objective:  Vital signs: (most recent): Blood pressure 95/63, pulse 82, temperature 99 °F (37.2 °C), temperature source Oral, resp. rate 18, height 165.1 cm (65\"), weight 72.1 kg (159 lb), SpO2 95 %.    Lungs:  Normal effort.  She is not in respiratory distress.  There are decreased breath sounds (Bilateral).  No wheezes or rhonchi.    Heart: Normal rate.  Regular rhythm.  S1 normal and S2 normal.    Abdomen: Abdomen is soft.  Bowel sounds are normal.   There is no abdominal tenderness.     Neurological: Patient is alert and oriented to person, place and time.    Skin:  Warm and dry.          Incentive spirometry: 500    Results Review:    I reviewed the patient's new clinical results.      Results from last 7 days  Lab Units 01/26/18  0341 01/25/18  0448   WBC 10*3/mm3 8.36  --    HEMOGLOBIN g/dL 10.0* 10.7*   PLATELETS 10*3/mm3 160  --        Results from last 7 days  Lab Units 01/26/18  0341   SODIUM mmol/L 138   POTASSIUM mmol/L 4.2   CHLORIDE mmol/L 107   CO2 mmol/L 22.3   BUN mg/dL 12   CREATININE mg/dL 0.84   CALCIUM mg/dL 8.3*   GLUCOSE mg/dL 145*         Hemoglobin A1C:  Lab Results   Component Value Date    HGBA1C 5.78 (H) 07/25/2017       Glucose Range:No results found for: POCGLU    Medication Review: Yes    Physical Therapy:    Assessment/Plan     Active Hospital Problems (** Indicates Principal Problem)    Diagnosis Date Noted   • Atelectasis [J98.11] 01/27/2018   • Hyperglycemia [R73.9] 07/25/2017   • Hyperlipidemia [E78.5] 02/12/2016   • Hypothyroidism [E03.9] 02/12/2016      Resolved Hospital Problems    Diagnosis Date Noted Date Resolved "   • **Osteoarthritis of right hip [M16.11] 01/24/2018 01/27/2018       Assessment & Plan  Hypotension: Resolved with blood pressure in the mid 90s     Postop acute blood loss anemia - Hgb decreased to 10 1/26/18     Atelectasis and Fever: Temperature 100.4 at 2300 yesterday.  Respiratory viral panel was negative, UA showed small amount of leukocytes with 30-50 WBCs and nitrite negative.  Urine culture is negative.  This is consistent with atelectasis.  Encouraged to continue to use IS.     Right hip arthroplasty: Orthopedics to determine when to go home.      Disposition:    Marietta Navarrete MD  01/27/18  7:25 AM

## 2018-01-27 NOTE — PLAN OF CARE
Problem: Patient Care Overview (Adult)  Goal: Plan of Care Review  Outcome: Ongoing (interventions implemented as appropriate)   01/26/18 1013   Coping/Psychosocial Response Interventions   Plan Of Care Reviewed With patient;spouse   Patient Care Overview   Progress progress toward functional goals is gradual   Outcome Evaluation   Outcome Summary/Follow up Plan slow to incr amb dist, but improved educ of prec

## 2018-01-27 NOTE — THERAPY TREATMENT NOTE
Acute Care - Physical Therapy Treatment Note  [facility]     Patient Name: Marci Kolb  : 1945  MRN: [id number]  Today's Date: 2018  Onset of Illness/Injury or Date of Surgery Date: 18  Date of Referral to PT: 18  Referring Physician: Dr Nash    Admit Date: 2018    Visit Dx:    ICD-10-CM ICD-9-CM   1. Impaired functional mobility and activity tolerance Z74.09 V49.89     Patient Active Problem List   Diagnosis   • Diverticulosis of intestine   • Difficult or painful urination   • Cephalalgia   • Hyperlipidemia   • Hypothyroidism   • Gonalgia   • Knee swelling   • Low back pain   • Cervical pain   • Sciatica   • Arthralgia of hip   • Hyperglycemia   • Atelectasis               Adult Rehabilitation Note       18 0930 18 1536 18 1007    Rehab Assessment/Intervention    Discipline physical therapist  -MA physical therapy assistant  - physical therapy assistant  -    Document Type therapy note (daily note)  -MA therapy note (daily note)  - therapy note (daily note)  -    Subjective Information agree to therapy;complains of;weakness   groin pain reported  -MA agree to therapy;complains of;weakness;fatigue;pain  - agree to therapy;complains of;weakness;fatigue;pain;dizziness  -    Patient Effort, Rehab Treatment fair  -MA      Symptoms Noted During/After Treatment fatigue;increased pain  -MA  dizziness;fatigue;increased pain  -    Precautions/Limitations fall precautions;hip precautions- left  -MA fall precautions;hip precautions- left  - fall precautions;hip precautions- left  -    Recorded by [MA] Denisa Maria, PT [JM] Gracia Feldman PTA [JM] Gracia Feldman, PTA    Pain Assessment    Pain Assessment 0-10  -MA 0-10  -JM 0-10  -JM    Pain Score 8  -MA 6  -JM 6  -JM    Post Pain Score  7  -JM 8  -JM    Pain Type Surgical pain  -MA Surgical pain  -JM Surgical pain  -JM    Pain Location Hip  -MA Hip  -JM Hip  -JM    Pain Orientation Left  -MA  Left  -JM Left  -JM    Pain Intervention(s) Medication (See MAR);Repositioned;Ambulation/increased activity;Rest  -MA Medication (See MAR);Repositioned;Cold applied  -JM Medication (See MAR);Repositioned  -JM    Response to Interventions tolerated  -MA kev  -JM unchanged, incr rt hip pn also  -JM    Recorded by [MA] Denisa Maria, PT [JM] Gracia Feldman PTA [] Gracia Feldman PTA    Cognitive Assessment/Intervention    Current Cognitive/Communication Assessment functional  -MA functional  -JM functional  -JM    Orientation Status oriented x 4  -MA oriented x 4  -JM oriented x 4  -JM    Follows Commands/Answers Questions 100% of the time;able to follow multi-step instructions  -MA      Personal Safety mild impairment  -MA      Personal Safety Interventions fall prevention program maintained;gait belt;nonskid shoes/slippers when out of bed  -MA      Recorded by [MA] Denisa Maria, PT [JM] Gracia Feldman PTA [] Gracia Feldman PTA    Mobility Assessment/Training    Extremity Weight-Bearing Status left lower extremity  -MA      Left Lower Extremity Weight-Bearing weight-bearing as tolerated  -MA      Recorded by [MA] Denisa Maria PT      Bed Mobility, Assessment/Treatment    Bed Mob, Supine to Sit, [place]   minimum assist (75% patient effort);verbal cues required  -    Bed Mob, Sit to Supine, [place]   moderate assist (50% patient effort);verbal cues required;nonverbal cues required (demo/gesture)  -    Bed Mobility, Comment [facility]  -MA  fam present for educ  -JM    Recorded by [MA] Denisa Maria, PT  [JM] Gracia Feldman PTA    Transfer Assessment/Treatment    Transfers, Sit-Stand [place] stand by assist;verbal cues required  -MA minimum assist (75% patient effort);verbal cues required;nonverbal cues required (demo/gesture)  - minimum assist (75% patient effort);verbal cues required;nonverbal cues required (demo/gesture)  -    Transfers, Stand-Sit [place] stand  by assist;verbal cues required  -MA contact guard assist  - contact guard assist  -JM    Transfers, Sit-Stand-Sit, Assist Device rolling walker  -MA rolling walker  -JM rolling walker  -JM    Transfer, Safety Issues weight-shifting ability decreased  -MA      Transfer, Impairments pain  -MA      Recorded by [MA] Denisa Maria, PT [JM] Gracia Feldman PTA [JM] Gracia Feldman PTA    Gait Assessment/Treatment    Gait, [place] Level stand by assist  -MA contact guard assist  -JM contact guard assist  -JM    Gait, Assistive Device rolling walker  -MA rolling walker  -JM rolling walker  -JM    Gait, Distance (Feet) 35  -MA 35  -JM 20  -JM    Gait, Gait Deviations antalgic;ariane decreased;step length decreased;stride length decreased  -MA antalgic;ariane decreased;forward flexed posture  -JM antalgic;ariane decreased;forward flexed posture;limb motion velocity decreased  -    Gait, Safety Issues balance decreased during turns;weight-shifting ability decreased  -MA  balance decreased during turns;sequencing ability decreased;steps too close front assistive device  -JM    Gait, Impairments strength decreased;pain  -MA decreased flexibility;strength decreased;impaired balance;pain  -JM decreased flexibility;strength decreased;impaired balance;pain  -JM    Gait, Comment  improved foot placement in wx, incr dist  -JM lowered wx trying to allow elbow ext to assist w/offloading WB to advance le's   pt reports improved w/wx lowered, dizziness limiting dist  -JM    Recorded by [MA] Denisa Maria, PT [JM] Gracia Feldman, PTA [JM] Gracia Feldman PTA    Stairs Assessment/Treatment    Number of Stairs 4  -MA 4  -JM     Stairs, Handrail Location left side (ascending)  -MA left side (ascending)  -     Stairs, [place] Level contact guard assist;verbal cues required  -MA minimum assist (75% patient effort);contact guard assist;verbal cues required  -     Stairs, Assistive Device straight cane  -MA  straight cane  -     Stairs, Technique Used step to step (ascending);step to step (descending)  -MA step to step (ascending);step to step (descending)  -     Stairs, Safety Issues weight-shifting ability decreased  -MA      Stairs, Impairments pain  -MA      Stairs, Comment Reviewed stair again with spouse present  -MA fam present for educ  - too dizzy-try in pm  -JM    Recorded by [MA] Denisa Maria, PT [JM] Gracia Feldman PTA [JM] Gracia Feldman PTA    Therapy Exercises    Exercise Protocols total hip  -MA total hip  -JM total hip  -JM    Total Hip Exercises left:;25 reps;completed protocol   assist required for hip ABD  -MA left:;completed protocol;with assist;hip abduction;25 reps  - left:;completed protocol;20 reps;with assist;hip abduction  -    Recorded by [MA] Denisa Maria, PT [JM] Gracia Feldman PTA [JM] Gracia Feldman PTA    Positioning and Restraints    Pre-Treatment Position sitting in chair/recliner  -MA sitting in chair/recliner  - sitting in chair/recliner  -    Post Treatment Position chair  -MA      In Chair notified nsg;sitting;call light within reach;encouraged to call for assist;with family/caregiver;legs elevated  -MA reclined;with family/caregiver;encouraged to call for assist;notified nsg   nsg to meet in room and check SATS, had been low pm  - reclined;call light within reach;encouraged to call for assist;with family/caregiver  -    Recorded by [MA] Denisa Maria, PT [JM] Gracia Feldman PTA [JM] Gracia Feldman PTA      01/25/18 1400          Rehab Assessment/Intervention    Discipline physical therapy assistant  -      Document Type therapy note (daily note)  -CW      Subjective Information agree to therapy;complains of;weakness;pain  -CW      Patient Effort, Rehab Treatment good  -CW      Precautions/Limitations fall precautions;hip precautions- left  -      Recorded by [CW] Johnathan Singh PTA      Vital Signs    O2 Delivery Pre  Treatment room air  -CW      Recorded by [CW] Johnathan Singh PTA      Pain Assessment    Pain Assessment 0-10  -CW      Pain Score 6  -CW      Post Pain Score 6  -CW      Pain Type Surgical pain  -CW      Pain Location Hip  -CW      Pain Orientation Left  -CW      Pain Intervention(s) Repositioned;Ambulation/increased activity  -CW      Response to Interventions kev  -CW      Recorded by [CW] Johnathan Singh PTA      Cognitive Assessment/Intervention    Current Cognitive/Communication Assessment functional  -CW      Orientation Status oriented x 4  -CW      Follows Commands/Answers Questions 100% of the time  -CW      Personal Safety mild impairment;decreased awareness, need for assist;decreased awareness, need for safety  -CW      Personal Safety Interventions fall prevention program maintained;gait belt;muscle strengthening facilitated;nonskid shoes/slippers when out of bed  -CW      Recorded by [CW] Johnathan Singh PTA      Bed Mobility, Assessment/Treatment    Bed Mob, Supine to Sit, [place] not tested  -CW      Bed Mobility, Comment in chair  -CW      Recorded by [CW] Johnathan Singh PTA      Transfer Assessment/Treatment    Transfers, Sit-Stand [place] contact guard assist  -CW      Transfers, Stand-Sit [place] contact guard assist  -CW      Transfers, Sit-Stand-Sit, Assist Device rolling walker  -CW      Recorded by [CW] Johnathan Singh PTA      Gait Assessment/Treatment    Gait, [place] Level contact guard assist  -CW      Gait, Assistive Device rolling walker  -CW      Gait, Distance (Feet) 30  -CW      Gait, Gait Deviations left:;antalgic;ariane decreased;step length decreased;stride length decreased  -CW      Recorded by [CW] Johnathan Singh PTA      Therapy Exercises    Exercise Protocols total hip  -CW      Total Hip Exercises left:;15 reps;completed protocol  -CW      Recorded by [CW] Johnathan Singh PTA      Positioning and Restraints    Pre-Treatment  Position sitting in chair/recliner  -CW      Post Treatment Position chair  -CW      In Chair notified nsg;reclined;call light within reach;encouraged to call for assist  -CW      Recorded by [CW] Johnathan Singh, PTA        User Key  (r) = Recorded By, (t) = Taken By, (c) = Cosigned By    Initials Name Effective Dates    JM Gracia Gaston, PTA 02/18/16 -     MA Denisa Maria, PT 12/13/16 -     CW Johnathan Singh, PTA 12/13/16 -                 IP PT Goals       01/25/18 0934          Bed Mobility PT LTG    Bed Mobility PT LTG, Date Established 01/25/18  -PC      Bed Mobility PT LTG, Time to Achieve 1 wk  -PC      Bed Mobility PT LTG, Activity Type supine to sit/sit to supine  -PC      Bed Mobility PT LTG, [place] Level contact guard assist  -PC      Transfer Training 2 PT STG    Transfer Training PT 2 STG, Date Established 01/25/18  -PC      Transfer Training PT 2 STG, Time to Achieve 1 wk  -PC      Transfer Training PT 2 STG, Activity Type sit to stand/stand to sit  -PC      Transfer Training PT 2 STG, [place] Level contact guard assist  -PC      Gait Training PT LTG    Gait Training Goal PT LTG, Date Established 01/25/18  -PC      Gait Training Goal PT LTG, Time to Achieve 1 wk  -PC      Gait Training Goal PT LTG, [place] Level contact guard assist  -PC      Gait Training Goal PT LTG, Assist Device walker, rolling  -PC      Gait Training Goal PT LTG, Distance to Achieve 50 ft  -PC      Stair Training PT LTG    Stair Training Goal PT LTG, Date Established 01/25/18  -PC      Stair Training Goal PT LTG, Time to Achieve 1 wk  -PC      Stair Training Goal PT LTG, Number of Steps 4  -PC      Stair Training Goal PT LTG, [place] minimum assist (75% patient effort)  -PC      Stair Training Goal PT LTG, Assist Device 1 handrail  -PC        User Key  (r) = Recorded By, (t) = Taken By, (c) = Cosigned By    Initials Name Provider Type    PC Katie Montero, PT Physical Therapist           Physical Therapy Education     Title: PT OT SLP Therapies (Done)     Topic: Physical Therapy (Done)     Point: Mobility training (Done)    Learning Progress Summary    Learner Readiness Method Response Comment Documented by Status   Patient Acceptance E VU  MA 01/27/18 1143 Done    Acceptance E,TB,D VU,NR   01/26/18 1005 Done    Acceptance E,D NR   01/25/18 0934 Active   Family Acceptance E,TB,D VU,NR   01/26/18 1005 Done               Point: Home exercise program (Done)    Learning Progress Summary    Learner Readiness Method Response Comment Documented by Status   Patient Acceptance E VU  MA 01/27/18 1143 Done    Acceptance E,TB,D VU,NR   01/26/18 1005 Done    Acceptance E,D NR   01/25/18 0934 Active   Family Acceptance E,TB,D VU,NR   01/26/18 1005 Done               Point: Body mechanics (Done)    Learning Progress Summary    Learner Readiness Method Response Comment Documented by Status   Patient Acceptance E VU  MA 01/27/18 1143 Done    Acceptance E,TB,D VU,NR   01/26/18 1005 Done    Acceptance E,D NR   01/25/18 0934 Active   Family Acceptance E,TB,D VU,NR   01/26/18 1005 Done               Point: Precautions (Done)    Learning Progress Summary    Learner Readiness Method Response Comment Documented by Status   Patient Acceptance E VU  MA 01/27/18 1143 Done    Acceptance E,TB,D VU,NR   01/26/18 1005 Done    Acceptance E,D NR   01/25/18 0934 Active   Family Acceptance E,TB,D VU,NR   01/26/18 1005 Done                      User Key     Initials Effective Dates Name Provider Type Discipline     12/01/15 -  Katie Montero, PT Physical Therapist PT     02/18/16 -  Gracia Feldman, PTA Physical Therapy Assistant PT    MA 12/13/16 -  Denisa Maria PT Physical Therapist PT                    PT Recommendation and Plan  Anticipated Discharge Disposition: home with home health  Planned Therapy Interventions: gait training, bed mobility training, strengthening, transfer training  PT  Frequency: 2 times/day  Plan of Care Review  Plan Of Care Reviewed With: patient  Progress: improving  Outcome Summary/Follow up Plan: Improved tolerance to activity through level of independence with transfers and increased ambulation distance of 35' w RW'. 8/10 pain to report-noted groin pain. Reviewed stair training again-spouse present. Patient to [facility] today with  PT to follow up.          Outcome Measures       01/27/18 1100 01/26/18 1000 01/25/18 0900    How much help from another person do you currently need...    Turning from your back to your side while in flat bed without using bedrails? 3  -MA 2  -JM 2  -PC    Moving from lying on back to sitting on the side of a flat bed without bedrails? 3  -MA 2  -JM 2  -PC    Moving to and from a bed to a chair (including a wheelchair)? 3  -MA 3  -JM 2  -PC    Standing up from a chair using your arms (e.g., wheelchair, bedside chair)? 3  -MA 3  -JM 2  -PC    Climbing 3-5 steps with a railing? 3  -MA 2  -JM 1  -PC    To walk in hospital room? 3  -MA 3  -JM 3  -PC    AM-PAC 6 Clicks Score 18  -MA 15  -JM 12  -PC    Functional Assessment    Outcome Measure Options   AM-PAC 6 Clicks Basic Mobility (PT)  -PC      User Key  (r) = Recorded By, (t) = Taken By, (c) = Cosigned By    Initials Name Provider Type    PC Katie Montero PT Physical Therapist    ROSE Feldman, PTA Physical Therapy Assistant    DOM Maria PT Physical Therapist           Time Calculation:         PT Charges       01/27/18 1139          Time Calculation    Start Time 0930  -MA      Stop Time 1015  -MA      Time Calculation (min) 45 min  -MA      PT Received On 01/27/18  -MA        User Key  (r) = Recorded By, (t) = Taken By, (c) = Cosigned By    Initials Name Provider Type    DOM Maria PT Physical Therapist          Therapy Charges for Today     Code Description Service Date Service Provider Modifiers Qty    [id number]  PT THER PROC GROUP 1/27/2018 Denisa KANG  Crow, PT GP 1    [id number]  PT THER PROC EA 15 MIN 1/27/2018 Denisa Maria, PT GP 1          PT G-Codes  Outcome Measure Options: AM-PAC 6 Clicks Basic Mobility (PT)    Denisa Maria, PT  1/27/2018

## 2018-01-27 NOTE — PROGRESS NOTES
Continued Stay Note  [facility]     Patient Name: Marci Kolb  MRN: [id number]  Today's Date: 1/27/2018    Admit Date: 1/24/2018          Discharge Plan       01/27/18 0958    Case Management/Social Work Plan    Additional Comments Received call from nurseDejah ( [phone]).  Spouse had requested hosptial bed and has not heard anything further about it.  Per [facility] note, [facility] was following and patient understood bed would be private pay.   Call placed to [facility] ( [phone]) and spoke with Kathrin.  She will reach out to patient  or patient's spouse to make arrangements for bed delivery.  Attempted to call into patient room to update, but no answer.  Nurse, Dejah, updated. Nithya Rangel RN              Discharge Codes     None        Expected Discharge Date and Time     Expected Discharge Date Expected Discharge Time    Jan 25, 2018             Nithya Rangel RN

## 2018-01-27 NOTE — THERAPY TREATMENT NOTE
Acute Care - Physical Therapy Treatment Note  [facility]     Patient Name: Marci Kolb  : 1945  MRN: [id number]  Today's Date: 2018  Onset of Illness/Injury or Date of Surgery Date: 18  Date of Referral to PT: 18  Referring Physician: Dr Nash    Admit Date: 2018    Visit Dx:    ICD-10-CM ICD-9-CM   1. Impaired functional mobility and activity tolerance Z74.09 V49.89     Patient Active Problem List   Diagnosis   • Diverticulosis of intestine   • Difficult or painful urination   • Cephalalgia   • Hyperlipidemia   • Hypothyroidism   • Gonalgia   • Knee swelling   • Low back pain   • Cervical pain   • Sciatica   • Arthralgia of hip   • Hyperglycemia   • Osteoarthritis of right hip               Adult Rehabilitation Note       18 1007 18 1400       Rehab Assessment/Intervention    Discipline physical therapy assistant  - physical therapy assistant  -     Document Type therapy note (daily note)  - therapy note (daily note)  -     Subjective Information agree to therapy;complains of;weakness;fatigue;pain;dizziness  - agree to therapy;complains of;weakness;pain  -CW     Patient Effort, Rehab Treatment  good  -CW     Symptoms Noted During/After Treatment dizziness;fatigue;increased pain  -      Precautions/Limitations fall precautions;hip precautions- left  - fall precautions;hip precautions- left  -     Recorded by [JM] Gracia Feldman PTA [CW] Johnathan Singh PTA     Vital Signs    O2 Delivery Pre Treatment  room air  -CW     Recorded by  [CW] Johnathan Singh PTA     Pain Assessment    Pain Assessment 0-10  - 0-10  -CW     Pain Score 6  - 6  -CW     Post Pain Score 8  - 6  -CW     Pain Type Surgical pain  - Surgical pain  -CW     Pain Location Hip  - Hip  -     Pain Orientation Left  - Left  -     Pain Intervention(s) Medication (See MAR);Repositioned  -JM Repositioned;Ambulation/increased activity  -CW     Response to Interventions  unchanged, incr rt hip pn also  - kev  -CW     Recorded by [JM] Gracia Feldman PTA [CW] Johnathan Singh PTA     Cognitive Assessment/Intervention    Current Cognitive/Communication Assessment functional  - functional  -CW     Orientation Status oriented x 4  -JM oriented x 4  -CW     Follows Commands/Answers Questions  100% of the time  -CW     Personal Safety  mild impairment;decreased awareness, need for assist;decreased awareness, need for safety  -CW     Personal Safety Interventions  fall prevention program maintained;gait belt;muscle strengthening facilitated;nonskid shoes/slippers when out of bed  -CW     Recorded by [JM] Gracia Feldman PTA [CW] Johnathan Singh PTA     Bed Mobility, Assessment/Treatment    Bed Mob, Supine to Sit, [place] minimum assist (75% patient effort);verbal cues required  - not tested  -     Bed Mob, Sit to Supine, [place] moderate assist (50% patient effort);verbal cues required;nonverbal cues required (demo/gesture)  -      Bed Mobility, Comment fam present for educ  - in chair  -CW     Recorded by [JM] Gracia Feldman PTA [CW] Johnathan Singh PTA     Transfer Assessment/Treatment    Transfers, Sit-Stand [place] contact guard assist  - contact guard assist  -     Transfers, Stand-Sit [place] contact guard assist  - contact guard assist  -     Transfers, Sit-Stand-Sit, Assist Device rolling walker  - rolling walker  -CW     Recorded by [JM] Gracia Feldman PTA [CW] Johnathan Singh PTA     Gait Assessment/Treatment    Gait, [place] Level contact guard assist  - contact guard assist  -     Gait, Assistive Device rolling walker  - rolling walker  -     Gait, Distance (Feet) 20  -JM 30  -CW     Gait, Gait Deviations antalgic;ariane decreased;forward flexed posture;limb motion velocity decreased  - left:;antalgic;ariane decreased;step length decreased;stride length decreased  -     Gait, Safety Issues balance  decreased during turns;sequencing ability decreased;steps too close front assistive device  -      Gait, Impairments decreased flexibility;strength decreased;impaired balance;pain  -      Gait, Comment lowered wx trying to allow elbow ext to assist w/offloading WB to advance le's   pt reports improved w/wx lowered, dizziness limiting dist  -      Recorded by [] Gracia Feldman PTA [CW] Johnathan Singh PTA     Stairs Assessment/Treatment    Stairs, Comment too dizzy-try in pm  -JM      Recorded by [ROSE] Gracia Feldman PTA      Therapy Exercises    Exercise Protocols total hip  -JM total hip  -CW     Total Hip Exercises left:;completed protocol;20 reps;with assist;hip abduction  - left:;15 reps;completed protocol  -CW     Recorded by [ROSE] Gracia Feldman PTA [CW] Johnathan Singh PTA     Positioning and Restraints    Pre-Treatment Position sitting in chair/recliner  - sitting in chair/recliner  -CW     Post Treatment Position  chair  -CW     In Chair reclined;call light within reach;encouraged to call for assist;with family/caregiver  - notified nsg;reclined;call light within reach;encouraged to call for assist  -CW     Recorded by [ROSE] Gracia Feldman PTA [CW] Johnathan Singh PTA       User Key  (r) = Recorded By, (t) = Taken By, (c) = Cosigned By    Initials Name Effective Dates    ROSE Feldman PTA 02/18/16 -     CW Johnathan Singh PTA 12/13/16 -                 IP PT Goals       01/25/18 0934          Bed Mobility PT LTG    Bed Mobility PT LTG, Date Established 01/25/18  -PC      Bed Mobility PT LTG, Time to Achieve 1 wk  -PC      Bed Mobility PT LTG, Activity Type supine to sit/sit to supine  -PC      Bed Mobility PT LTG, [place] Level contact guard assist  -PC      Transfer Training 2 PT STG    Transfer Training PT 2 STG, Date Established 01/25/18  -PC      Transfer Training PT 2 STG, Time to Achieve 1 wk  -PC      Transfer Training PT 2 STG, Activity Type sit to  stand/stand to sit  -PC      Transfer Training PT 2 STG, [place] Level contact guard assist  -PC      Gait Training PT LTG    Gait Training Goal PT LTG, Date Established 01/25/18  -PC      Gait Training Goal PT LTG, Time to Achieve 1 wk  -PC      Gait Training Goal PT LTG, [place] Level contact guard assist  -PC      Gait Training Goal PT LTG, Assist Device walker, rolling  -PC      Gait Training Goal PT LTG, Distance to Achieve 50 ft  -PC      Stair Training PT LTG    Stair Training Goal PT LTG, Date Established 01/25/18  -PC      Stair Training Goal PT LTG, Time to Achieve 1 wk  -PC      Stair Training Goal PT LTG, Number of Steps 4  -PC      Stair Training Goal PT LTG, [place] Level minimum assist (75% patient effort)  -PC      Stair Training Goal PT LTG, Assist Device 1 handrail  -PC        User Key  (r) = Recorded By, (t) = Taken By, (c) = Cosigned By    Initials Name Provider Type    MAURICIO Montero, PT Physical Therapist          Physical Therapy Education     Title: PT OT SLP Therapies (Done)     Topic: Physical Therapy (Done)     Point: Mobility training (Done)    Learning Progress Summary    Learner Readiness Method Response Comment Documented by Status   Patient Acceptance E,TB,D VU,NR   01/26/18 1005 Done    Acceptance E,D NR   01/25/18 0934 Active   Family Acceptance ETBHANNAH VU,NR   01/26/18 1005 Done               Point: Home exercise program (Done)    Learning Progress Summary    Learner Readiness Method Response Comment Documented by Status   Patient Acceptance E,TBD VU,NR   01/26/18 1005 Done    Acceptance E,D NR   01/25/18 0934 Active   Family Acceptance E,TBHANNAH VU,NR   01/26/18 1005 Done               Point: Body mechanics (Done)    Learning Progress Summary    Learner Readiness Method Response Comment Documented by Status   Patient Acceptance E,TBD VU,NR   01/26/18 1005 Done    Acceptance E,D NR   01/25/18 0934 Active   Family Acceptance E,TBHANNAH VU,NR   01/26/18  1005 Done               Point: Precautions (Done)    Learning Progress Summary    Learner Readiness Method Response Comment Documented by Status   Patient Acceptance E,TB,HANNAH BIGGS,NR   01/26/18 1005 Done    Acceptance E,D NR   01/25/18 0934 Active   Family Acceptance E,TB,D DIAN,NR   01/26/18 1005 Done                      User Key     Initials Effective Dates Name Provider Type Discipline     12/01/15 -  Katie Montero, PT Physical Therapist PT     02/18/16 -  Gracia Feldman PTA Physical Therapy Assistant PT                    PT Recommendation and Plan  Anticipated Discharge Disposition: home with home health  Planned Therapy Interventions: gait training, bed mobility training, strengthening, transfer training  PT Frequency: 2 times/day  Plan of Care Review  Plan Of Care Reviewed With: patient, spouse  Progress: progress toward functional goals is gradual  Outcome Summary/Follow up Plan: slow to incr amb dist, but improved educ of prec          Outcome Measures       01/26/18 1000 01/25/18 0900       How much help from another person do you currently need...    Turning from your back to your side while in flat bed without using bedrails? 2  - 2  -PC     Moving from lying on back to sitting on the side of a flat bed without bedrails? 2  - 2  -PC     Moving to and from a bed to a chair (including a wheelchair)? 3  - 2  -PC     Standing up from a chair using your arms (e.g., wheelchair, bedside chair)? 3  - 2  -PC     Climbing 3-5 steps with a railing? 2  - 1  -PC     To walk in hospital room? 3  - 3  -PC     AM-PAC 6 Clicks Score 15  - 12  -PC     Functional Assessment    Outcome Measure Options  AM-PAC 6 Clicks Basic Mobility (PT)  -PC       User Key  (r) = Recorded By, (t) = Taken By, (c) = Cosigned By    Initials Name Provider Type    PC Katie Montero, PT Physical Therapist    ROSE Feldman, ARLEEN Physical Therapy Assistant           Time Calculation:         PT Charges       01/26/18 7473  01/26/18 1037       Time Calculation    Start Time 1422  - 0840  -     Stop Time 1530  -ROSE 0949  -     Time Calculation (min) 68 min  -ROSE 69 min  -ROSE     PT Received On 01/26/18  -ROSE 01/26/18  -ROSE     PT - Next Appointment 01/27/18  -ROSE 01/26/18  -ROSE       User Key  (r) = Recorded By, (t) = Taken By, (c) = Cosigned By    Initials Name Provider Type    ROSE Feldman PTA Physical Therapy Assistant          Therapy Charges for Today     Code Description Service Date Service Provider Modifiers Qty    57889198497 HC PT THER PROC EA 15 MIN 1/26/2018 Gracia Feldman PTA GP 3    09342287280 HC PT THER PROC GROUP 1/26/2018 Gracia Feldman PTA GP 1    76248428654 HC PT THER PROC EA 15 MIN 1/26/2018 Gracia Feldman PTA GP 3    03423500488 HC PT THER PROC GROUP 1/26/2018 Gracia Feldman, ARLEEN GP 1          PT G-Codes  Outcome Measure Options: AM-PAC 6 Clicks Basic Mobility (PT)    Gracia Feldman PTA  1/26/2018

## 2018-01-27 NOTE — PLAN OF CARE
Problem: Hip Replacement, Total (Adult)  Goal: Signs and Symptoms of Listed Potential Problems Will be Absent or Manageable (Hip Replacement, Total)  Outcome: Outcome(s) achieved Date Met: 01/27/18      Problem: Fall Risk (Adult)  Goal: Absence of Falls  Outcome: Outcome(s) achieved Date Met: 01/27/18

## 2018-01-27 NOTE — PROGRESS NOTES
Orthopedic Progress Note    Subjective :   Patient reports she is actually doing okay today.    Objective :    Vital signs in last 24 hours:  Temp:  [98.3 °F (36.8 °C)-100.5 °F (38.1 °C)] 99 °F (37.2 °C)  Heart Rate:  [73-85] 82  Resp:  [18] 18  BP: ()/(55-72) 95/63  Vitals:    01/26/18 1559 01/26/18 1846 01/26/18 2300 01/27/18 0300   BP: 95/55 110/72 97/64 95/63   BP Location: Right arm Right arm Left arm Left arm   Patient Position: Sitting Lying Lying Lying   Pulse: 82 85 81 82   Resp: 18 18 18 18   Temp: 100.5 °F (38.1 °C) 99 °F (37.2 °C) 100.4 °F (38 °C) 99 °F (37.2 °C)   TempSrc: Oral Oral Oral Oral   SpO2: 95% 91%  95%   Weight:       Height:           PHYSICAL EXAM:  Patient is calm, in no acute distress, awake and oriented x 3.  Dressing clean, dry and intact.  No signs of infection.  Swelling in appropriate in amount.  Ecchymosis is appropriate in amount.  Homans test is negative.  Patient is neurovascularly intact distally.      LABS:    Results from last 7 days  Lab Units 01/26/18  0341   WBC 10*3/mm3 8.36   HEMOGLOBIN g/dL 10.0*   HEMATOCRIT % 32.4*   PLATELETS 10*3/mm3 160       Results from last 7 days  Lab Units 01/26/18  0341   SODIUM mmol/L 138   POTASSIUM mmol/L 4.2   CHLORIDE mmol/L 107   CO2 mmol/L 22.3   BUN mg/dL 12   CREATININE mg/dL 0.84   GLUCOSE mg/dL 145*   CALCIUM mg/dL 8.3*           ASSESSMENT:  Status post left total hip    Plan:  Continue Physical Therapy.  Continue SCDs, Continue aspirin 325 mg twice a day for DVT prophalaxis.  Dispo planning for home with home health when medically stable.    Morro Peoples PA-C    Date: 1/27/2018  Time: 7:00 AM

## 2018-01-27 NOTE — PLAN OF CARE
Problem: Patient Care Overview (Adult)  Goal: Plan of Care Review  Outcome: Ongoing (interventions implemented as appropriate)   01/27/18 0507   Coping/Psychosocial Response Interventions   Plan Of Care Reviewed With patient   Patient Care Overview   Progress improving   Outcome Evaluation   Outcome Summary/Follow up Plan POST-op day #2 total hip, dressing changed on dayshift, tape blisters noted near incision site, O2 continues at 2LNC, BP better than on previous night shift, voiding without difficulty, ambulating at increased distances, possible DC this afternoon, home with home health, discussed depression triggers and treatment related to history     Goal: Adult Individualization and Mutuality  Outcome: Ongoing (interventions implemented as appropriate)   01/24/18 1830 01/26/18 0014   Individualization   Patient Specific Preferences --  none stated   Patient Specific Goals --  pain control and imporved mobility   Patient Specific Interventions offer pain meds and administer in timely manner when requested. offer assistance with oob activities and encourage ambulation . --      Goal: Discharge Needs Assessment  Outcome: Ongoing (interventions implemented as appropriate)   01/26/18 0014   Discharge Needs Assessment   Concerns To Be Addressed basic needs concerns   Readmission Within The Last 30 Days no previous admission in last 30 days   Equipment Needed After Discharge walker, standard;wound care supplies;raised toilet   Discharge Facility/Level Of Care Needs home with home health   Discharge Disposition still a patient   Current Health   Anticipated Changes Related to Illness inability to care for self   Self-Care   Equipment Currently Used at Home none   Living Environment   Transportation Available car;family or friend will provide       Problem: Hip Replacement, Total (Adult)  Goal: Signs and Symptoms of Listed Potential Problems Will be Absent or Manageable (Hip Replacement, Total)  Outcome: Ongoing  (interventions implemented as appropriate)   01/27/18 0507   Hip Replacement, Total   Problems Assessed (Total Hip Replacement) all   Problems Present (Total Hip Replacement) pain;functional decline/self care deficit;situational response       Problem: Fall Risk (Adult)  Goal: Absence of Falls  Outcome: Ongoing (interventions implemented as appropriate)   01/27/18 0507   Fall Risk (Adult)   Absence of Falls achieves outcome

## 2018-01-27 NOTE — DISCHARGE INSTRUCTIONS
Discharge and Follow up Instructions:   P.T. To evaluate and treat for mobility, strengthening, and balance daily.  Weight Bearing: WBAT  May progress for outpatient P.T.when stable. Call for Outpatient P.T. Orders and asssure that the patient has outpatient appointment before patient is discharge.  Begin with walker/crutches and progress to a cane as tolerated.       Follow up:  In office in 6 weeks with Surgeon.  The patient will be on aspirin 325 mg twice a day.  Will have home health to see the patient for wound care and physical therapy.ACTIVITIES:  1. Exercises:  ? Complete exercise program as taught by the hospital physical therapist 2 times per day  ? Exercise program will be advanced by the physical therapist  ? Complete the ankle pump exercises at least 10 times per hour (while awake)  ? Use ice 20-30 minutes as needed for pain/ sweeling in your operative extremity for the first week post-operatively.    2. Activities of Daily Living:  ? No tub baths, hot tubs, or swimming pools for 4 weeks  ? May shower and let water run over the incision the day after the staples are removed.      II. Restrictions  ? Continue hip precautions as taught at the hospital  ? May not drive until released by physician to do so  ? Weight bearing is {Weight bearing restriction:50819}  ? First week stay inside on even terrain. May go up and down stairs one stair at a time utilizing the hand rail once cleared by physical therapy to do so.    III. Precautions:  ? No elective dental, genital-urinary, or colon procedures or surgical procedures for 12 weeks after surgery unless absolutely necessary.  ?  If dental work or surgical procedure is deemed absolutely necessary, you will need to contact your surgeon as you will need to take antibiotics 1 hour prior to any dental work (including teeth cleanings).  ? Stockings are to be placed on in the morning and removed at night until see by the physician.    IV. INCISION CARE:  ? Wash your  hands prior to dressing changes  ? Change the dressing daily with dry dressing   ? No creams or ointments to the incision  ? May remove dressing once the incision is free of drainage  ? Do not touch or pick at the incision    ? Check incision every day and notify surgeon immediately if any of the following signs or symptoms are noted:  o Increase in redness  o Increase in swelling around the incision and of the entire extremity  o Increase in pain  o Excessive drainage  o Pulling apart of the edges of the incision  o Increase in overall body temperature (greater than 100.5 degrees)  ? The inpatient rehab nurse or the home health nurse will remove your staples 2 weeks after surgery    V. Medications:   1. Anticoagulants: You will be discharged on an anticoagulant. This is a prophylactic medication that helps prevent blood clots during your post-operative period. The type and length of dosage varies based on your individual needs, procedure performed, and surgeon’s preference.  ? While taking the anticoagulant, you should avoid taking any additional aspirin, ibuprofen (Advil or Motrin), Aleve (Naprosyn) or other non-steroidal anti-inflammatory medications.   ? Notify surgeon immediately if any anabelle bleeding is noted in the urine, stool, emesis, or from the nose or the incision. Blood in the stool will often appear as black rather than red. Blood in urine may appear as pink. Blood in emesis may appear as brown/black like coffee grounds.  ? You will need to apply pressure for longer periods of time to any cuts or abrasions to stop bleeding  ? If on coumadin avoid green leafy vegetables    2. Stool Softeners: You will be at greater risk of constipation after surgery due to being less mobile and the pain medications.   ? Take stool softeners as instructed by your surgeon while on pain medications. Over the counter Colace 100 mg 1-2 capsules twice daily.   ? If stools become too loose or too frequent, please decreases the  dosage or stop the stool softener.  ? If constipation occurs despite use of stool softeners, you are to continue the stool softeners and add a laxative (Milk of Magnesia 1 ounce daily as needed)  ? Drink plenty of fluids, and eat fruits and vegetables during your recovery time    3. Pain Medications utilized after surgery are narcotics and the law requires that the following information be given to all patients that are prescribed narcotics:  ? CLASSIFICATION: Pain medications are called Opioids and are narcotics  ? LEGALITIES: It is illegal to share narcotics with others and to drive within 24 hours of taking narcotics  ? POTENTIAL SIDE EFFECTS: Potential side effects of opioids include: nausea, vomiting, itching, dizziness, drowsiness, dry mouth, constipation, and difficulty urinating.  ? POTENTIAL ADVERSE EFFECTS:   o Opioid tolerance can develop with use of pain medications and this simply means that it requires more and more of the medication to control pain; however, this is seen more in patients that use opioids for longer periods of time.  o Opioid dependence can develop with use of Opioids and this simply means that to stop the medication can cause withdrawal symptoms; however, this is seen with patients that use Opioids for longer periods of time.  o Opioid addiction can develop with use of Opioids and the incidence of this is very unlikely in patients who take the medications as ordered and stop the medications as instructed.  o Opioid overdose can be dangerous, but is unlikely when the medication is taken as ordered and stopped when ordered. It is important not to mix opioids with alcohol or with and type of sedative such as Benadryl as this can lead to over sedation and respiratory difficulty.  ? DOSAGE:   o Pain medications will need to be taken consistently for the first week to decrease pain and promote adequate pain relief and participation in physical therapy.  o After the initial surgical pain  begins to resolve, you may begin to decrease the pain medication.   o Refills will not be given by the office during evening hours, or on weekends  o To seek refills on pain medications post-operatively, you must call the office 48 hours in advance to request the refill. The office will then notify you when to  the prescription. DO NOT wait until you are out of the medication to request a refill.    V. FOLLOW-UP VISITS:  ? You will need to follow up in the office with your surgeon in 6 weeks. Please call this number [phone]  to schedule this appointment.  ? If you have any concerns or suspected complications prior to your follow up visit, please call your surgeons office. Do not wait until your appointment time if you suspect complications. These will need to be addressed in the office promptly.

## 2018-01-27 NOTE — THERAPY TREATMENT NOTE
Acute Care - Physical Therapy Treatment Note  [facility]     Patient Name: Marci Kolb  : 1945  MRN: [id number]  Today's Date: 2018  Onset of Illness/Injury or Date of Surgery Date: 18  Date of Referral to PT: 18  Referring Physician: Dr Nash    Admit Date: 2018    Visit Dx:    ICD-10-CM ICD-9-CM   1. Impaired functional mobility and activity tolerance Z74.09 V49.89     Patient Active Problem List   Diagnosis   • Diverticulosis of intestine   • Difficult or painful urination   • Cephalalgia   • Hyperlipidemia   • Hypothyroidism   • Gonalgia   • Knee swelling   • Low back pain   • Cervical pain   • Sciatica   • Arthralgia of hip   • Hyperglycemia   • Osteoarthritis of right hip               Adult Rehabilitation Note       18 1536 18 1007 18 1400    Rehab Assessment/Intervention    Discipline physical therapy assistant  - physical therapy assistant  - physical therapy assistant  -    Document Type therapy note (daily note)  - therapy note (daily note)  - therapy note (daily note)  -    Subjective Information agree to therapy;complains of;weakness;fatigue;pain  - agree to therapy;complains of;weakness;fatigue;pain;dizziness  - agree to therapy;complains of;weakness;pain  -CW    Patient Effort, Rehab Treatment   good  -CW    Symptoms Noted During/After Treatment  dizziness;fatigue;increased pain  -     Precautions/Limitations fall precautions;hip precautions- left  - fall precautions;hip precautions- left  - fall precautions;hip precautions- left  -    Recorded by [JM] Gracia Feldman PTA [JM] Gracia Feldman PTA [CW] Johnathan Singh PTA    Vital Signs    O2 Delivery Pre Treatment   room air  -CW    Recorded by   [CW] Johnathan Singh PTA    Pain Assessment    Pain Assessment 0-10  -JM 0-10  - 0-10  -CW    Pain Score 6  -JM 6  -JM 6  -CW    Post Pain Score 7  -JM 8  -JM 6  -CW    Pain Type Surgical pain  - Surgical pain  -  Surgical pain  -CW    Pain Location Hip  -JM Hip  -JM Hip  -CW    Pain Orientation Left  -JM Left  -JM Left  -CW    Pain Intervention(s) Medication (See MAR);Repositioned;Cold applied  -JM Medication (See MAR);Repositioned  -JM Repositioned;Ambulation/increased activity  -CW    Response to Interventions kev  -JM unchanged, incr rt hip pn also  -JM kev  -CW    Recorded by [JM] Gracia Feldman PTA [JM] Gracia Feldman PTA [CW] Johnathan Singh PTA    Cognitive Assessment/Intervention    Current Cognitive/Communication Assessment functional  -JM functional  -JM functional  -CW    Orientation Status oriented x 4  -JM oriented x 4  -JM oriented x 4  -CW    Follows Commands/Answers Questions   100% of the time  -CW    Personal Safety   mild impairment;decreased awareness, need for assist;decreased awareness, need for safety  -CW    Personal Safety Interventions   fall prevention program maintained;gait belt;muscle strengthening facilitated;nonskid shoes/slippers when out of bed  -CW    Recorded by [JM] Gracia Feldman PTA [JM] Gracia Feldman PTA [CW] Johnathan Singh PTA    Bed Mobility, Assessment/Treatment    Bed Mob, Supine to Sit, [place]  minimum assist (75% patient effort);verbal cues required  -JM not tested  -CW    Bed Mob, Sit to Supine, [place]  moderate assist (50% patient effort);verbal cues required;nonverbal cues required (demo/gesture)  -     Bed Mobility, Comment  fam present for educ  -JM in chair  -CW    Recorded by  [ROSE] Gracia Feldman PTA [CW] Johnathan Singh PTA    Transfer Assessment/Treatment    Transfers, Sit-Stand [place] minimum assist (75% patient effort);verbal cues required;nonverbal cues required (demo/gesture)  - minimum assist (75% patient effort);verbal cues required;nonverbal cues required (demo/gesture)  - contact guard assist  -CW    Transfers, Stand-Sit [place] contact guard assist  - contact guard assist  - contact guard assist  -CW     Transfers, Sit-Stand-Sit, Assist Device rolling walker  -JM rolling walker  -JM rolling walker  -CW    Recorded by [JM] Gracia Feldman PTA [JM] Gracia Feldman PTA [] Johnathan Singh PTA    Gait Assessment/Treatment    Gait, [place] Level contact guard assist  - contact guard assist  - contact guard assist  -CW    Gait, Assistive Device rolling walker  -JM rolling walker  -JM rolling walker  -CW    Gait, Distance (Feet) 35  -JM 20  -JM 30  -CW    Gait, Gait Deviations antalgic;ariane decreased;forward flexed posture  - antalgic;ariane decreased;forward flexed posture;limb motion velocity decreased  - left:;antalgic;ariane decreased;step length decreased;stride length decreased  -    Gait, Safety Issues  balance decreased during turns;sequencing ability decreased;steps too close front assistive device  -     Gait, Impairments decreased flexibility;strength decreased;impaired balance;pain  - decreased flexibility;strength decreased;impaired balance;pain  -     Gait, Comment improved foot placement in wx, incr dist  -JM lowered wx trying to allow elbow ext to assist w/offloading WB to advance le's   pt reports improved w/wx lowered, dizziness limiting dist  -     Recorded by [JM] Gracia Feldman PTA [JM] Gracia Feldman PTA [CW] Johnathan Singh PTA    Stairs Assessment/Treatment    Number of Stairs 4  -      Stairs, Handrail Location left side (ascending)  -      Stairs, [place] Level minimum assist (75% patient effort);contact guard assist;verbal cues required  -      Stairs, Assistive Device straight cane  -      Stairs, Technique Used step to step (ascending);step to step (descending)  -      Stairs, Comment fam present for educ  - too dizzy-try in pm  -JM     Recorded by [JM] Gracia Feldman PTA [JM] Gracia Feldman PTA     Therapy Exercises    Exercise Protocols total hip  -JM total hip  -JM total hip  -CW    Total Hip Exercises left:;completed  protocol;with assist;hip abduction;25 reps  - left:;completed protocol;20 reps;with assist;hip abduction  - left:;15 reps;completed protocol  -CW    Recorded by [JM] Gracia Feldman PTA [JM] Gracia Feldman PTA [CW] Johnathan Singh PTA    Positioning and Restraints    Pre-Treatment Position sitting in chair/recliner  -JM sitting in chair/recliner  -JM sitting in chair/recliner  -CW    Post Treatment Position   chair  -CW    In Chair reclined;with family/caregiver;encouraged to call for assist;notified nsg   nsg to meet in room and check SATS, had been low pm  - reclined;call light within reach;encouraged to call for assist;with family/caregiver  - notified nsg;reclined;call light within reach;encouraged to call for assist  -CW    Recorded by [ROSE] Gracia Feldman PTA [JM] Gracia Feldman PTA [CW] Johnathan Singh PTA      User Key  (r) = Recorded By, (t) = Taken By, (c) = Cosigned By    Initials Name Effective Dates     Gracia Feldman PTA 02/18/16 -     CW Johnathan Singh PTA 12/13/16 -                 IP PT Goals       01/25/18 0934          Bed Mobility PT LTG    Bed Mobility PT LTG, Date Established 01/25/18  -PC      Bed Mobility PT LTG, Time to Achieve 1 wk  -PC      Bed Mobility PT LTG, Activity Type supine to sit/sit to supine  -PC      Bed Mobility PT LTG, [place] Level contact guard assist  -PC      Transfer Training 2 PT STG    Transfer Training PT 2 STG, Date Established 01/25/18  -PC      Transfer Training PT 2 STG, Time to Achieve 1 wk  -PC      Transfer Training PT 2 STG, Activity Type sit to stand/stand to sit  -PC      Transfer Training PT 2 STG, [place] Level contact guard assist  -PC      Gait Training PT LTG    Gait Training Goal PT LTG, Date Established 01/25/18  -PC      Gait Training Goal PT LTG, Time to Achieve 1 wk  -PC      Gait Training Goal PT LTG, [place] Level contact guard assist  -PC      Gait Training Goal PT LTG, Assist Device walker, rolling   -PC      Gait Training Goal PT LTG, Distance to Achieve 50 ft  -PC      Stair Training PT LTG    Stair Training Goal PT LTG, Date Established 01/25/18  -PC      Stair Training Goal PT LTG, Time to Achieve 1 wk  -PC      Stair Training Goal PT LTG, Number of Steps 4  -PC      Stair Training Goal PT LTG, [place] Level minimum assist (75% patient effort)  -PC      Stair Training Goal PT LTG, Assist Device 1 handrail  -PC        User Key  (r) = Recorded By, (t) = Taken By, (c) = Cosigned By    Initials Name Provider Type    MAURICIO Montero, PT Physical Therapist          Physical Therapy Education     Title: PT OT SLP Therapies (Done)     Topic: Physical Therapy (Done)     Point: Mobility training (Done)    Learning Progress Summary    Learner Readiness Method Response Comment Documented by Status   Patient Acceptance E,TB,D VU,NR   01/26/18 1005 Done    Acceptance E,D NR  PC 01/25/18 0934 Active   Family Acceptance E,TB,D VU,NR   01/26/18 1005 Done               Point: Home exercise program (Done)    Learning Progress Summary    Learner Readiness Method Response Comment Documented by Status   Patient Acceptance E,TB,D VU,NR   01/26/18 1005 Done    Acceptance E,D NR  PC 01/25/18 0934 Active   Family Acceptance E,TB,D VU,NR   01/26/18 1005 Done               Point: Body mechanics (Done)    Learning Progress Summary    Learner Readiness Method Response Comment Documented by Status   Patient Acceptance E,TB,D VU,NR   01/26/18 1005 Done    Acceptance E,D NR  PC 01/25/18 0934 Active   Family Acceptance E,TB,D VU,NR   01/26/18 1005 Done               Point: Precautions (Done)    Learning Progress Summary    Learner Readiness Method Response Comment Documented by Status   Patient Acceptance E,TB,D VU,NR   01/26/18 1005 Done    Acceptance E,D NR  PC 01/25/18 0934 Active   Family Acceptance E,TB,D VU,NR   01/26/18 1005 Done                      User Key     Initials Effective Dates Name Provider Type  Discipline    PC 12/01/15 -  Katie Montero, PT Physical Therapist PT     02/18/16 -  Gracia Feldman PTA Physical Therapy Assistant PT                    PT Recommendation and Plan  Anticipated Discharge Disposition: home with home health  Planned Therapy Interventions: gait training, bed mobility training, strengthening, transfer training  PT Frequency: 2 times/day  Plan of Care Review  Plan Of Care Reviewed With: patient, spouse  Progress: progress toward functional goals is gradual  Outcome Summary/Follow up Plan: slow to incr amb dist, but improved educ of prec          Outcome Measures       01/26/18 1000 01/25/18 0900       How much help from another person do you currently need...    Turning from your back to your side while in flat bed without using bedrails? 2  - 2  -PC     Moving from lying on back to sitting on the side of a flat bed without bedrails? 2  - 2  -PC     Moving to and from a bed to a chair (including a wheelchair)? 3  - 2  -PC     Standing up from a chair using your arms (e.g., wheelchair, bedside chair)? 3  - 2  -PC     Climbing 3-5 steps with a railing? 2  - 1  -PC     To walk in hospital room? 3  - 3  -PC     AM-PAC 6 Clicks Score 15  - 12  -PC     Functional Assessment    Outcome Measure Options  AM-PAC 6 Clicks Basic Mobility (PT)  -PC       User Key  (r) = Recorded By, (t) = Taken By, (c) = Cosigned By    Initials Name Provider Type    PC Katie Montero, PT Physical Therapist    ROSE Feldman, ARLEEN Physical Therapy Assistant           Time Calculation:         PT Charges       01/26/18 1639 01/26/18 1037       Time Calculation    Start Time 1422  - 0840  -     Stop Time 1530  - 0949  -     Time Calculation (min) 68 min  - 69 min  -     PT Received On 01/26/18  -ROSE 01/26/18  -ROSE     PT - Next Appointment 01/27/18  - 01/26/18  -       User Key  (r) = Recorded By, (t) = Taken By, (c) = Cosigned By    Initials Name Provider Type    ROSE Feldman  PTA Physical Therapy Assistant          Therapy Charges for Today     Code Description Service Date Service Provider Modifiers Qty    94956755225 HC PT THER PROC EA 15 MIN 1/26/2018 Gracia Feldman, PTA GP 3    95027821612 HC PT THER PROC GROUP 1/26/2018 Gracia Feldman, PTA GP 1    45696114719 HC PT THER PROC EA 15 MIN 1/26/2018 Gracia Feldman, ARLEEN GP 3    74811080123 HC PT THER PROC GROUP 1/26/2018 Gracia Feldman, PTA GP 1          PT G-Codes  Outcome Measure Options: AM-PAC 6 Clicks Basic Mobility (PT)    Gracia Feldman PTA  1/26/2018

## 2018-01-27 NOTE — PLAN OF CARE
Problem: Patient Care Overview (Adult)  Goal: Plan of Care Review  Outcome: Ongoing (interventions implemented as appropriate)   01/26/18 1854   Coping/Psychosocial Response Interventions   Plan Of Care Reviewed With patient   Patient Care Overview   Progress no change   Outcome Evaluation   Outcome Summary/Follow up Plan Patient is ambulating with x1 assist and walker, needs much encouragement to move and ambulate more. Patient now ambulating to BRP. Patient's blood pressure has stablized, no longer on IVF. Patient had temp max of 100.5, still requiring O2. IS encouraged, patient only able to reach 750ml. Flu swab negative. Serous drainage from left hip noted when dressing changed. Possible d/c tomorrow pending medical. Educated on importance of IS and increased mobility r/t atelectasis.      Goal: Adult Individualization and Mutuality  Outcome: Ongoing (interventions implemented as appropriate)   01/24/18 1830 01/26/18 0014   Individualization   Patient Specific Goals --  pain control and imporved mobility   Patient Specific Interventions offer pain meds and administer in timely manner when requested. offer assistance with oob activities and encourage ambulation . --      Goal: Discharge Needs Assessment  Outcome: Ongoing (interventions implemented as appropriate)   01/26/18 0014   Discharge Needs Assessment   Discharge Facility/Level Of Care Needs home with home health   Living Environment   Transportation Available car;family or friend will provide       Problem: Hip Replacement, Total (Adult)  Goal: Signs and Symptoms of Listed Potential Problems Will be Absent or Manageable (Hip Replacement, Total)  Outcome: Ongoing (interventions implemented as appropriate)   01/26/18 1854   Hip Replacement, Total   Problems Assessed (Total Hip Replacement) all   Problems Present (Total Hip Replacement) pain;functional decline/self care deficit;situational response       Problem: Fall Risk (Adult)  Goal: Absence of  Falls  Outcome: Ongoing (interventions implemented as appropriate)   01/26/18 2561   Fall Risk (Adult)   Absence of Falls achieves outcome

## 2018-01-27 NOTE — PLAN OF CARE
Problem: Patient Care Overview (Adult)  Goal: Plan of Care Review   01/27/18 1142   Coping/Psychosocial Response Interventions   Plan Of Care Reviewed With patient   Patient Care Overview   Progress improving   Outcome Evaluation   Outcome Summary/Follow up Plan Improved tolerance to activity through level of independence with transfers and increased ambulation distance of 35' w RW'. 8/10 pain to report-noted groin pain. Reviewed stair training again-spouse present. Patient to [place] home today with  PT to follow up.

## 2018-01-27 NOTE — DISCHARGE SUMMARY
Orthopedic Discharge Summary      Patient: Marci Kolb      YOB: 1945    Medical Record Number: [id number]    Attending Physician: Rogelio Nash MD  Consulting Physician(s):   Date of Admission: 1/24/2018  9:00 AM  Date of Discharge:       Patient Active Problem List   Diagnosis   • Diverticulosis of intestine   • Difficult or painful urination   • Cephalalgia   • Hyperlipidemia   • Hypothyroidism   • Gonalgia   • Knee swelling   • Low back pain   • Cervical pain   • Sciatica   • Arthralgia of hip   • Hyperglycemia   • Osteoarthritis of right hip     Status Post: RT TOTAL HIP ARTHROPLASTY      Allergies   Allergen Reactions   • Penicillins Itching   • Streptomycin Nausea Only       Current Medications:   Marci Kolb   Home Medication Instructions ARELY:[id number]    Printed on:01/27/18 0707   Medication Information                      HYDROcodone-acetaminophen (NORCO)  MG per tablet  Take 1 tablet by mouth Every 4 (Four) Hours As Needed for Moderate Pain  for up to 9 days.             levothyroxine (SYNTHROID, LEVOTHROID) 100 MCG tablet  Take 100 mcg by mouth Daily.             Multiple Vitamins-Minerals (CENTRUM SILVER 50+WOMEN) tablet  Take 1 tablet by mouth Daily.             simvastatin (ZOCOR) 40 MG tablet  Take 1 tablet by mouth Every Night.                   Vitals:    01/26/18 1559 01/26/18 1846 01/26/18 2300 01/27/18 0300   BP: 95/55 110/72 97/64 95/63   BP Location: Right arm Right arm Left arm Left arm   Patient Position: Sitting Lying Lying Lying   Pulse: 82 85 81 82   Resp: 18 18 18 18   Temp: 100.5 °F (38.1 °C) 99 °F (37.2 °C) 100.4 °F (38 °C) 99 °F (37.2 °C)   TempSrc: Oral Oral Oral Oral   SpO2: 95% 91%  95%   Weight:       Height:             Hospital Course:  72 y.o. female [facility] [place] [facility] services of Rogelio Nash MD with Osteoarthritis of right hip [M16.11] on 1/24/2018 and underwent RT TOTAL HIP ARTHROPLASTY  Per Rogelio aNsh MD.  Antibiotic and VTE prophylaxis were per SCIP protocols. Post-operatively the patient transferred to the post-operative floor where the patient underwent mobilization therapy that included active as well as passive ROM exercises. Pain medications were titrated to achieve appropriate pain management to allow for participation in mobilization exercises. Vital signs are now stable. The incision is intact without signs or symptoms of infection. Operative extremity neurovascular status remains intact.   Appropriate education re: incision care, activity levels, medications, and follow up visits was completed and all questions were answered. The patient is now deemed stable for discharge to Home.      DIAGNOSTIC TESTS:     Admission on 01/24/2018   Component Date Value Ref Range Status   • ABO Type 01/24/2018 O   Final   • RH type 01/24/2018 Positive   Final   • Antibody Screen 01/24/2018 Negative   Final   • Hemoglobin 01/24/2018 12.2  11.9 - 15.5 g/dL Final   • Hematocrit 01/24/2018 40.0  35.6 - 45.5 % Final   • Hemoglobin 01/25/2018 10.7* 11.9 - 15.5 g/dL Final   • Hematocrit 01/25/2018 34.7* 35.6 - 45.5 % Final   • WBC 01/26/2018 8.36  4.50 - 10.70 10*3/mm3 Final   • RBC 01/26/2018 3.10* 3.90 - 5.20 10*6/mm3 Final   • Hemoglobin 01/26/2018 10.0* 11.9 - 15.5 g/dL Final   • Hematocrit 01/26/2018 32.4* 35.6 - 45.5 % Final   • MCV 01/26/2018 104.5* 80.5 - 98.2 fL Final   • MCH 01/26/2018 32.3* 26.9 - 32.0 pg Final   • MCHC 01/26/2018 30.9* 32.4 - 36.3 g/dL Final   • RDW 01/26/2018 13.9* 11.7 - 13.0 % Final   • RDW-SD 01/26/2018 52.9  37.0 - 54.0 fl Final   • MPV 01/26/2018 9.9  6.0 - 12.0 fL Final   • Platelets 01/26/2018 160  140 - 500 10*3/mm3 Final   • Glucose 01/26/2018 145* 65 - 99 mg/dL Final   • BUN 01/26/2018 12  8 - 23 mg/dL Final   • Creatinine 01/26/2018 0.84  0.57 - 1.00 mg/dL Final   • Sodium 01/26/2018 138  136 - 145 mmol/L Final   • Potassium 01/26/2018 4.2  3.5 - 5.2 mmol/L Final   • Chloride 01/26/2018 107   98 - 107 mmol/L Final   • CO2 01/26/2018 22.3  22.0 - 29.0 mmol/L Final   • Calcium 01/26/2018 8.3* 8.6 - 10.5 mg/dL Final   • eGFR Non African Amer 01/26/2018 66  >60 mL/min/1.73 Final   • BUN/Creatinine Ratio 01/26/2018 14.3  7.0 - 25.0 Final   • Anion Gap 01/26/2018 8.7  mmol/L Final   • ADENOVIRUS, PCR 01/26/2018 Not Detected  Not Detected Final   • Coronavirus 229E 01/26/2018 Not Detected  Not Detected Final   • Coronavirus HKU1 01/26/2018 Not Detected  Not Detected Final   • Coronavirus NL63 01/26/2018 Not Detected  Not Detected Final   • Coronavirus OC43 01/26/2018 Not Detected  Not Detected Final   • Human Metapneumovirus 01/26/2018 Not Detected  Not Detected Final   • Human Rhinovirus/Enterovirus 01/26/2018 Not Detected  Not Detected Final   • Influenza B PCR 01/26/2018 Not Detected  Not Detected Final   • Parainfluenza Virus 1 01/26/2018 Not Detected  Not Detected Final   • Parainfluenza Virus 2 01/26/2018 Not Detected  Not Detected Final   • Parainfluenza Virus 3 01/26/2018 Not Detected  Not Detected Final   • Parainfluenza Virus 4 01/26/2018 Not Detected  Not Detected Final   • Bordetella pertussis pcr 01/26/2018 Not Detected  Not Detected Final   • Influenza A H1N1 2009 PCR 01/26/2018 Not Detected  Not Detected Final   • Chlamydophila pneumoniae PCR 01/26/2018 Not Detected  Not Detected Final   • Mycoplasma pneumo by PCR 01/26/2018 Not Detected  Not Detected Final   • Influenza A PCR 01/26/2018 Not Detected  Not Detected Final   • Influenza A H3 01/26/2018 Not Detected  Not Detected Final   • Influenza A H1 01/26/2018 Not Detected  Not Detected Final   • RSV, PCR 01/26/2018 Not Detected  Not Detected Final   • Urine Culture 01/26/2018 No growth at 24 hours   Preliminary   • Color, UA 01/26/2018 Yellow  Yellow, Straw Final   • Appearance, UA 01/26/2018 Cloudy* Clear Final   • pH, UA 01/26/2018 7.0  5.0 - 8.0 Final   • Specific Gravity, UA 01/26/2018 1.017  1.005 - 1.030 Final   • Glucose, UA 01/26/2018  Negative  Negative Final   • Ketones, UA 01/26/2018 Negative  Negative Final   • Bilirubin, UA 01/26/2018 Negative  Negative Final   • Blood, UA 01/26/2018 Trace* Negative Final   • Protein, UA 01/26/2018 Trace* Negative Final   • Leuk Esterase, UA 01/26/2018 Small (1+)* Negative Final   • Nitrite, UA 01/26/2018 Negative  Negative Final   • Urobilinogen, UA 01/26/2018 1.0 E.U./dL  0.2 - 1.0 E.U./dL Final   • RBC, UA 01/26/2018 6-12* None Seen, 0-2 /HPF Final   • WBC, UA 01/26/2018 31-50* None Seen, 0-2 /HPF Final   • Bacteria, UA 01/26/2018 None Seen  None Seen /HPF Final   • Squamous Epithelial Cells, UA 01/26/2018 7-12* None Seen, 0-2 /HPF Final   • Transitional Epithelial Cells, UA 01/26/2018 0-2  0 - 2 /HPF Final   • Yeast, UA 01/26/2018 Small/1+ Budding Yeast w/Hyphae  None Seen /HPF Final   • Hyaline Casts, UA 01/26/2018 0-2  None Seen /LPF Final   • Methodology 01/26/2018 Manual Light Microscopy   Final         Discharge and Follow up Instructions:   P.T. To evaluate and treat for mobility, strengthening, and balance daily.  Weight Bearing: WBAT  May progress for outpatient P.T.when stable. Call for Outpatient P.T. Orders and asssure that the patient has outpatient appointment before patient is discharge.  Begin with walker/crutches and progress to a cane as tolerated.      Follow up:  In office in 6 weeks with Surgeon.  The patient will be on aspirin 325 mg twice a day.  Will have home health to see the patient for wound care and physical therapy.      Date: 1/27/2018    Morro Peoples PA-C

## 2018-01-28 LAB — BACTERIA SPEC AEROBE CULT: NO GROWTH

## 2018-04-06 RX ORDER — LEVOTHYROXINE SODIUM 0.1 MG/1
TABLET ORAL
Qty: 60 TABLET | Refills: 4 | Status: SHIPPED | OUTPATIENT
Start: 2018-04-06 | End: 2019-04-11 | Stop reason: SDUPTHER

## 2018-04-23 ENCOUNTER — TELEPHONE (OUTPATIENT)
Dept: INTERNAL MEDICINE | Age: 73
End: 2018-04-23

## 2018-04-23 NOTE — TELEPHONE ENCOUNTER
Since I am booked up today I could not see her anyway therefore if her problem is acute and severe enough to be seen today than an ER visit is advised.  Otherwise I could probably see her tomorrow if she chooses.

## 2018-04-23 NOTE — TELEPHONE ENCOUNTER
Total hip replacement in January  And last week her back started hurting. She thought it was from the physical therapy but now when she urinates she is in so much pain it brings her to tears. Does the patient need to go to ER or have an office visit?

## 2018-04-24 ENCOUNTER — OFFICE VISIT (OUTPATIENT)
Dept: INTERNAL MEDICINE | Age: 73
End: 2018-04-24

## 2018-04-24 VITALS
HEIGHT: 65 IN | WEIGHT: 158 LBS | SYSTOLIC BLOOD PRESSURE: 110 MMHG | TEMPERATURE: 98.1 F | OXYGEN SATURATION: 95 % | RESPIRATION RATE: 12 BRPM | DIASTOLIC BLOOD PRESSURE: 60 MMHG | BODY MASS INDEX: 26.33 KG/M2 | HEART RATE: 80 BPM

## 2018-04-24 DIAGNOSIS — M54.50 ACUTE RIGHT-SIDED LOW BACK PAIN WITHOUT SCIATICA: Primary | ICD-10-CM

## 2018-04-24 DIAGNOSIS — R73.9 HYPERGLYCEMIA: ICD-10-CM

## 2018-04-24 DIAGNOSIS — R30.0 DYSURIA: ICD-10-CM

## 2018-04-24 DIAGNOSIS — E78.2 MIXED HYPERLIPIDEMIA: ICD-10-CM

## 2018-04-24 DIAGNOSIS — E03.9 ACQUIRED HYPOTHYROIDISM: ICD-10-CM

## 2018-04-24 PROCEDURE — 99214 OFFICE O/P EST MOD 30 MIN: CPT | Performed by: INTERNAL MEDICINE

## 2018-04-24 RX ORDER — SULFAMETHOXAZOLE AND TRIMETHOPRIM 800; 160 MG/1; MG/1
1 TABLET ORAL 2 TIMES DAILY
Qty: 14 TABLET | Refills: 0 | Status: SHIPPED | OUTPATIENT
Start: 2018-04-24 | End: 2018-04-28

## 2018-04-24 RX ORDER — HYDROCODONE BITARTRATE AND ACETAMINOPHEN 10; 325 MG/1; MG/1
TABLET ORAL
COMMUNITY
Start: 2018-02-19 | End: 2018-04-24

## 2018-04-24 RX ORDER — CYCLOBENZAPRINE HCL 10 MG
TABLET ORAL
COMMUNITY
Start: 2018-01-29 | End: 2018-04-24

## 2018-04-24 RX ORDER — MELOXICAM 15 MG/1
TABLET ORAL
COMMUNITY
Start: 2018-04-19 | End: 2018-09-06

## 2018-04-24 NOTE — PROGRESS NOTES
Marci Kolb is a 73 y.o. female who presents with   Chief Complaint   Patient presents with   • Back Pain   • Hyperlipidemia   • Hypothyroidism   • Blood Sugar Problem   .    73-year-old female presents with main complaints of right low back pain and painful urination at the end of her urinary stream.  She has had no fever or chills.  She has had nocturia.  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  She denies any radicular pain in her right leg.  While she was here she needs lab updates since none have been done since July 2015.  She is one hour postprandial but would like to do her labs anyway so as not to have to come back at a later time.      Back Pain   This is a new problem. The current episode started 1 to 4 weeks ago. The problem occurs constantly. The problem has been waxing and waning since onset. The pain is present in the lumbar spine. The pain is mild. The symptoms are aggravated by bending. Associated symptoms include dysuria and pelvic pain. Pertinent negatives include no fever, leg pain, numbness, paresis, paresthesias, tingling or weakness. She has tried NSAIDs for the symptoms. The treatment provided mild relief.   Hyperlipidemia   This is a chronic problem. Exacerbating diseases include diabetes and hypothyroidism. Pertinent negatives include no leg pain. Current antihyperlipidemic treatment includes statins. The current treatment provides moderate improvement of lipids. There are no compliance problems.    Hypothyroidism   This is a chronic problem. The current episode started more than 1 year ago. The problem has been unchanged. Pertinent negatives include no fever, numbness or weakness.   Blood Sugar Problem   This is a chronic problem. The current episode started more than 1 month ago. The problem has been unchanged. Pertinent negatives include no fever, numbness or weakness. She has tried nothing for the symptoms.        The following  portions of the patient's history were reviewed and updated as appropriate: allergies, current medications, past medical history and problem list.    Review of Systems   Constitutional: Negative.  Negative for fever.   HENT: Negative.    Eyes: Negative.    Respiratory: Negative.    Cardiovascular: Negative.    Genitourinary: Positive for dysuria, frequency, pelvic pain and urgency.   Musculoskeletal: Positive for back pain.   Skin: Negative.    Neurological: Negative.  Negative for tingling, weakness, numbness and paresthesias.   Psychiatric/Behavioral: Negative.        Objective   Physical Exam   Constitutional: She is oriented to person, place, and time. She appears well-developed and well-nourished. No distress.   HENT:   Head: Normocephalic and atraumatic.   Eyes: Conjunctivae and EOM are normal. Pupils are equal, round, and reactive to light.   Neck: Normal range of motion. Neck supple. No thyromegaly present.   Neck exam negative.  Carotid auscultation normal-no bruits heard.   Cardiovascular: Normal rate, regular rhythm, normal heart sounds and intact distal pulses.  Exam reveals no gallop and no friction rub.    No murmur heard.  Pulmonary/Chest: Effort normal and breath sounds normal. No respiratory distress. She has no wheezes. She has no rales. She exhibits no tenderness.   Musculoskeletal:   She is palpably tender in the right sacroiliac area but there is no loss of sensation or motor function in either of her lower extremities.  Distal pulsations appear intact.  Weight bearing and ambulation appear normal.   Neurological: She is alert and oriented to person, place, and time.   Psychiatric: She has a normal mood and affect. Her behavior is normal. Judgment and thought content normal.   Nursing note and vitals reviewed.      Assessment/Plan   Marci was seen today for back pain, hyperlipidemia, hypothyroidism and blood sugar problem.    Diagnoses and all orders for this visit:    Acute right-sided low back  pain without sciatica  -     Urinalysis With / Culture If Indicated - Urine, Clean Catch    Dysuria  -     sulfamethoxazole-trimethoprim (BACTRIM DS,SEPTRA DS) 800-160 MG per tablet; Take 1 tablet by mouth 2 (Two) Times a Day.  -     Urinalysis With / Culture If Indicated - Urine, Clean Catch    Mixed hyperlipidemia  -     Comprehensive Metabolic Panel  -     Lipid Panel    Acquired hypothyroidism  -     Comprehensive Metabolic Panel  -     TSH+Free T4    Hyperglycemia  -     Comprehensive Metabolic Panel  -     Hemoglobin A1c      Plan: Empirically we'll start Bactrim DS twice a day for her urinary symptoms pending results of urinalysis and culture if needed.    Nonfasting labs as above.  Continue current treatment as prescribed with meloxicam 15 mg daily for her back and her hip discomfort.  X-rays of her low back done in October 2015 showing L4-5 degenerative joint disease.  I suspect that the physical therapy she has been going through possibly could have exacerbated this problem.  She will also use cold compresses to her lumbosacral area.    Assuming today's labs are acceptable we will tentatively plan on a six-month checkup/lab update visit.  Continue all other medications as prescribed.

## 2018-04-27 LAB
ALBUMIN SERPL-MCNC: 4.3 G/DL (ref 3.5–5.2)
ALBUMIN/GLOB SERPL: 1.4 G/DL
ALP SERPL-CCNC: 101 U/L (ref 39–117)
ALT SERPL-CCNC: 21 U/L (ref 1–33)
APPEARANCE UR: ABNORMAL
AST SERPL-CCNC: 24 U/L (ref 1–32)
BACTERIA #/AREA URNS HPF: ABNORMAL /HPF
BACTERIA UR CULT: ABNORMAL
BACTERIA UR CULT: ABNORMAL
BILIRUB SERPL-MCNC: 0.3 MG/DL (ref 0.1–1.2)
BILIRUB UR QL STRIP: NEGATIVE
BUN SERPL-MCNC: 21 MG/DL (ref 8–23)
BUN/CREAT SERPL: 23.6 (ref 7–25)
CALCIUM SERPL-MCNC: 9.5 MG/DL (ref 8.6–10.5)
CHLORIDE SERPL-SCNC: 105 MMOL/L (ref 98–107)
CHOLEST SERPL-MCNC: 177 MG/DL (ref 0–200)
CO2 SERPL-SCNC: 25.3 MMOL/L (ref 22–29)
COLOR UR: YELLOW
CREAT SERPL-MCNC: 0.89 MG/DL (ref 0.57–1)
EPI CELLS #/AREA URNS HPF: ABNORMAL /HPF
GFR SERPLBLD CREATININE-BSD FMLA CKD-EPI: 62 ML/MIN/1.73
GFR SERPLBLD CREATININE-BSD FMLA CKD-EPI: 75 ML/MIN/1.73
GLOBULIN SER CALC-MCNC: 3 GM/DL
GLUCOSE SERPL-MCNC: 115 MG/DL (ref 65–99)
GLUCOSE UR QL: NEGATIVE
HBA1C MFR BLD: 6.06 % (ref 4.8–5.6)
HDLC SERPL-MCNC: 40 MG/DL (ref 40–60)
HGB UR QL STRIP: ABNORMAL
KETONES UR QL STRIP: NEGATIVE
LDLC SERPL CALC-MCNC: 100 MG/DL (ref 0–100)
LEUKOCYTE ESTERASE UR QL STRIP: ABNORMAL
MICRO URNS: ABNORMAL
MUCOUS THREADS URNS QL MICRO: PRESENT /HPF
NITRITE UR QL STRIP: POSITIVE
OTHER ANTIBIOTIC SUSC ISLT: ABNORMAL
PH UR STRIP: 6 [PH] (ref 5–7.5)
POTASSIUM SERPL-SCNC: 4.1 MMOL/L (ref 3.5–5.2)
PROT SERPL-MCNC: 7.3 G/DL (ref 6–8.5)
PROT UR QL STRIP: ABNORMAL
RBC #/AREA URNS HPF: ABNORMAL /HPF
SODIUM SERPL-SCNC: 144 MMOL/L (ref 136–145)
SP GR UR: 1.02 (ref 1–1.03)
T4 FREE SERPL-MCNC: 0.95 NG/DL (ref 0.93–1.7)
TRIGL SERPL-MCNC: 187 MG/DL (ref 0–150)
TSH SERPL DL<=0.005 MIU/L-ACNC: 10.33 MIU/ML (ref 0.27–4.2)
URINALYSIS REFLEX: ABNORMAL
UROBILINOGEN UR STRIP-MCNC: 0.2 MG/DL (ref 0.2–1)
VLDLC SERPL CALC-MCNC: 37.4 MG/DL (ref 5–40)
WBC #/AREA URNS HPF: >30 /HPF

## 2018-04-28 DIAGNOSIS — N30.90 CYSTITIS: Primary | ICD-10-CM

## 2018-04-28 RX ORDER — NITROFURANTOIN MACROCRYSTALS 100 MG/1
100 CAPSULE ORAL 2 TIMES DAILY
Qty: 14 CAPSULE | Refills: 0 | Status: SHIPPED | OUTPATIENT
Start: 2018-04-28 | End: 2018-05-16

## 2018-04-30 RX ORDER — NITROFURANTOIN 25; 75 MG/1; MG/1
100 CAPSULE ORAL EVERY 12 HOURS SCHEDULED
Qty: 14 CAPSULE | Refills: 0 | Status: SHIPPED | OUTPATIENT
Start: 2018-04-30 | End: 2018-05-16

## 2018-04-30 NOTE — PROGRESS NOTES
Here are  the results of your recent laboratory evaluation. All results are acceptable with the exception of the bladder infection.The original anabiotic that was prescribed is not appropriate for the organism that is growing.Please switch to Nitrofurantoin  as we discussed earlier today and let us know if that is not helpful.

## 2018-05-16 ENCOUNTER — OFFICE VISIT (OUTPATIENT)
Dept: INTERNAL MEDICINE | Age: 73
End: 2018-05-16

## 2018-05-16 VITALS
TEMPERATURE: 97.9 F | WEIGHT: 154 LBS | DIASTOLIC BLOOD PRESSURE: 60 MMHG | SYSTOLIC BLOOD PRESSURE: 120 MMHG | OXYGEN SATURATION: 98 % | RESPIRATION RATE: 12 BRPM | BODY MASS INDEX: 25.66 KG/M2 | HEIGHT: 65 IN | HEART RATE: 74 BPM

## 2018-05-16 DIAGNOSIS — R10.11 RIGHT UPPER QUADRANT ABDOMINAL PAIN: Primary | ICD-10-CM

## 2018-05-16 PROCEDURE — 99214 OFFICE O/P EST MOD 30 MIN: CPT | Performed by: INTERNAL MEDICINE

## 2018-05-16 NOTE — PROGRESS NOTES
"  Marci Kolb is a 73 y.o. female who presents with   Chief Complaint   Patient presents with   • Abdominal Pain   .    73-year-old female has been on [employer] weight loss program since around April 20 but independently of that she says she has been \"sick since [place]\".  She has been having upper abdominal/right upper quadrant discomfort since then and even though she has an appetite she says whatever she eats makes her feel bloated.      Abdominal Pain   This is a new problem. The current episode started 1 to 4 weeks ago. The onset quality is gradual. The problem occurs constantly. The problem has been unchanged. The pain is located in the RUQ. The pain is at a severity of 4/10. The pain is moderate. The quality of the pain is aching. The abdominal pain does not radiate. Pertinent negatives include no diarrhea, nausea or vomiting. Exacerbated by: Eating. The pain is relieved by nothing.        The following portions of the patient's history were reviewed and updated as appropriate: allergies, current medications, past medical history and problem list.    Review of Systems   Constitutional: Negative.    HENT: Negative.    Eyes: Negative.    Respiratory: Negative.    Cardiovascular: Negative.    Gastrointestinal: Positive for abdominal pain. Negative for diarrhea, nausea and vomiting.   Genitourinary: Negative.    Musculoskeletal: Negative.    Skin: Negative.    Neurological: Negative.    Psychiatric/Behavioral: Negative.        Objective   Physical Exam   Constitutional: She is oriented to person, place, and time. She appears well-developed and well-nourished. No distress.   HENT:   Head: Normocephalic and atraumatic.   Eyes: Conjunctivae and EOM are normal. Pupils are equal, round, and reactive to light.   Neck: Normal range of motion. Neck supple. No thyromegaly present.   Neck exam negative.  Carotid auscultation normal-no bruits heard.   Cardiovascular: Normal rate, regular rhythm, normal heart sounds and " intact distal pulses.  Exam reveals no gallop and no friction rub.    No murmur heard.  Pulmonary/Chest: Effort normal and breath sounds normal. No respiratory distress. She has no wheezes. She has no rales. She exhibits no tenderness.   Abdominal: She exhibits distension. Bowel sounds are decreased. There is no hepatosplenomegaly. There is tenderness in the right upper quadrant. There is no rigidity, no rebound and no guarding.       In the right upper quadrant as outlined by the red [place] the patient appears to have maximum tenderness.  I do not feel any hepatosplenomegaly.  Her abdomen is somewhat distended but it is not rigid.  Bowel sounds are hypoactive.  On today's visit she does not seem to have any clinical symptoms that would be classified as an emergency.   Neurological: She is alert and oriented to person, place, and time.   Psychiatric: She has a normal mood and affect. Her behavior is normal. Judgment and thought content normal.   Nursing note and vitals reviewed.      Assessment/Plan   Marci was seen today for abdominal pain.    Diagnoses and all orders for this visit:    Right upper quadrant abdominal pain  -     CT Abdomen Pelvis With Contrast; Future      Plan: She has been advised clear liquid diet until we can get her CT scan of her abdomen and pelvis performed.  Patient advised an ER visit if needed if symptoms intensify before her CT scan can be done.

## 2018-05-19 ENCOUNTER — HOSPITAL ENCOUNTER (OUTPATIENT)
Dept: CT IMAGING | Facility: HOSPITAL | Age: 73
Discharge: HOME | End: 2018-05-19
Admitting: INTERNAL MEDICINE

## 2018-05-19 DIAGNOSIS — R10.11 RIGHT UPPER QUADRANT ABDOMINAL PAIN: ICD-10-CM

## 2018-05-19 PROCEDURE — 82565 ASSAY OF CREATININE: CPT

## 2018-05-19 PROCEDURE — 25010000002 IOPAMIDOL 61 % SOLUTION: Performed by: INTERNAL MEDICINE

## 2018-05-19 PROCEDURE — 74177 CT ABD & PELVIS W/CONTRAST: CPT

## 2018-05-19 RX ADMIN — IOPAMIDOL 85 ML: 612 INJECTION, SOLUTION INTRAVENOUS at 08:27

## 2018-05-20 LAB — CREAT BLDA-MCNC: 0.7 MG/DL (ref 0.6–1.3)

## 2018-05-21 ENCOUNTER — TELEPHONE (OUTPATIENT)
Dept: INTERNAL MEDICINE | Age: 73
End: 2018-05-21

## 2018-05-21 RX ORDER — SULFAMETHOXAZOLE AND TRIMETHOPRIM 800; 160 MG/1; MG/1
1 TABLET ORAL 2 TIMES DAILY
Qty: 20 TABLET | Refills: 0 | Status: SHIPPED | OUTPATIENT
Start: 2018-05-21 | End: 2018-06-04 | Stop reason: HOSPADM

## 2018-05-21 NOTE — TELEPHONE ENCOUNTER
Pt stated her  tried to relay the message regarding her CT of abd/pelvis results but he couldn't understand it all. Pt asking to please call her at # [phone] and she'll be waiting to answer.  Thanks SP

## 2018-06-01 ENCOUNTER — TELEPHONE (OUTPATIENT)
Dept: INTERNAL MEDICINE | Age: 73
End: 2018-06-01

## 2018-06-01 NOTE — TELEPHONE ENCOUNTER
She was seen on May 16, 2018 with abdominal pain.  A CT scan of the abdomen and pelvis showed pyelonephritis (kidney infection) involving the right kidney.  An antibiotic was prescribed and she is correct in that when she has finished taking the prescribed antibiotic I will need to be seeing her again.

## 2018-06-01 NOTE — TELEPHONE ENCOUNTER
Pt states she recd an antibiotic for a kidney inf on 5/21/18 from her [facility] from a prev scan that was performed on her stomach at [facility] on approx 5/19/18.    I do not see the scan or documentation pt is speaking of; however pt states she was told by  when she completes the antibiotic to call & make an appt ASAP to be rechecked.    Pls advise.    Pt can be reached #[phone].

## 2018-06-04 ENCOUNTER — OFFICE VISIT (OUTPATIENT)
Dept: INTERNAL MEDICINE | Age: 73
End: 2018-06-04

## 2018-06-04 VITALS
WEIGHT: 149 LBS | HEIGHT: 65 IN | RESPIRATION RATE: 13 BRPM | DIASTOLIC BLOOD PRESSURE: 60 MMHG | HEART RATE: 70 BPM | OXYGEN SATURATION: 98 % | SYSTOLIC BLOOD PRESSURE: 120 MMHG | BODY MASS INDEX: 24.83 KG/M2 | TEMPERATURE: 97 F

## 2018-06-04 DIAGNOSIS — N12 PYELONEPHRITIS: Primary | ICD-10-CM

## 2018-06-04 PROCEDURE — 99214 OFFICE O/P EST MOD 30 MIN: CPT | Performed by: INTERNAL MEDICINE

## 2018-06-04 RX ORDER — DICLOFENAC SODIUM 75 MG/1
TABLET, DELAYED RELEASE ORAL
COMMUNITY
Start: 2018-06-01 | End: 2018-09-06

## 2018-06-04 NOTE — PROGRESS NOTES
"  Marci Kolb is a 73 y.o. female who presents with   Chief Complaint   Patient presents with   • Follow-up right pyelonephritis   .    73-year-old female was seen on May 19, 2018 with right upper quadrant and right lower quadrant abdominal pain.  A CT scan of the abdomen showed what appeared to be pyelonephritis of the right kidney-lower pole.  She was treated with Bactrim DS for 10 days and ran out of that medication on Thursday, May 31, 2018.  She presents today for a follow-up visit and says that she feels \"90% better\" than on her last visit.  She is still \"a little tender\" in the right lower quadrant of her abdomen but her appetite is good, she has had no fever or chills and generally she feels much better she says.         The following portions of the patient's history were reviewed and updated as appropriate: allergies, current medications, past medical history and problem list.    Review of Systems   Constitutional: Negative.    HENT: Negative.    Eyes: Negative.    Respiratory: Negative.    Cardiovascular: Negative.    Genitourinary: Negative.    Musculoskeletal: Negative.    Skin: Negative.    Neurological: Negative.    Psychiatric/Behavioral: Negative.        Objective   Physical Exam   Constitutional: She is oriented to person, place, and time. She appears well-developed and well-nourished. No distress.   HENT:   Head: Normocephalic and atraumatic.   Eyes: Conjunctivae and EOM are normal. Pupils are equal, round, and reactive to light.   Neck: Normal range of motion. Neck supple. No thyromegaly present.   Neck exam negative.  Carotid auscultation normal-no bruits heard.   Cardiovascular: Normal rate, regular rhythm, normal heart sounds and intact distal pulses.  Exam reveals no gallop and no friction rub.    No murmur heard.  Pulmonary/Chest: Effort normal and breath sounds normal. No respiratory distress. She has no wheezes. She has no rales. She exhibits no tenderness.   Abdominal: Soft. Bowel " sounds are normal. She exhibits no distension and no mass. There is tenderness. There is no rebound and no guarding.   Mild palpable tenderness in the right lower quadrant of the abdomen is noted but there is no distention, guarding, or referred pain.  Bowel sounds appear normal in all 4 quadrants.   Neurological: She is alert and oriented to person, place, and time.   Psychiatric: She has a normal mood and affect. Her behavior is normal. Judgment and thought content normal.   Nursing note and vitals reviewed.      Assessment/Plan   Marci was seen today for follow-up right pyelonephritis.    Diagnoses and all orders for this visit:    Pyelonephritis  -     Urinalysis With / Culture If Indicated - Urine, Clean Catch      Plan: Follow-up urinalysis with culture if indicated.  Oral liquids to tolerance.  Assuming the urinalysis is negative, follow-up for today's issue will be as needed.    We will plan on her six-month checkup/lab update visit follow-up for sometime in October.

## 2018-06-06 RX ORDER — SIMVASTATIN 40 MG
TABLET ORAL
Qty: 30 TABLET | Refills: 3 | Status: SHIPPED | OUTPATIENT
Start: 2018-06-06 | End: 2018-10-28 | Stop reason: SDUPTHER

## 2018-06-12 ENCOUNTER — TELEPHONE (OUTPATIENT)
Dept: INTERNAL MEDICINE | Age: 73
End: 2018-06-12

## 2018-06-12 LAB
APPEARANCE UR: CLEAR
BACTERIA #/AREA URNS HPF: ABNORMAL /HPF
BACTERIA UR CULT: ABNORMAL
BILIRUB UR QL STRIP: NEGATIVE
COLOR UR: YELLOW
EPI CELLS #/AREA URNS HPF: >10 /HPF
GLUCOSE UR QL: NEGATIVE
HGB UR QL STRIP: ABNORMAL
KETONES UR QL STRIP: NEGATIVE
LEUKOCYTE ESTERASE UR QL STRIP: ABNORMAL
MICRO URNS: ABNORMAL
MUCOUS THREADS URNS QL MICRO: PRESENT /HPF
NITRITE UR QL STRIP: NEGATIVE
PH UR STRIP: 5.5 [PH] (ref 5–7.5)
PROT UR QL STRIP: NEGATIVE
RBC #/AREA URNS HPF: ABNORMAL /HPF
REQUEST PROBLEM: NORMAL
SP GR UR: 1.01 (ref 1–1.03)
URINALYSIS REFLEX: ABNORMAL
UROBILINOGEN UR STRIP-MCNC: 0.2 MG/DL (ref 0.2–1)
WBC #/AREA URNS HPF: ABNORMAL /HPF

## 2018-06-12 NOTE — TELEPHONE ENCOUNTER
Pt is requesting the urine results from the urine test that was performed on Monday, 6/4/18.    Pls advise.    Pt can be reached #[phone].

## 2018-06-12 NOTE — TELEPHONE ENCOUNTER
Spoke with Alana oconnell she called up to the lab and was told they are back logged and will send us report as soon as they have it. Informed pt per Dr Penaloza to take advil,tylenol and Azo until we get report back.

## 2018-06-13 ENCOUNTER — TELEPHONE (OUTPATIENT)
Dept: INTERNAL MEDICINE | Age: 73
End: 2018-06-13

## 2018-06-13 RX ORDER — SULFAMETHOXAZOLE AND TRIMETHOPRIM 800; 160 MG/1; MG/1
1 TABLET ORAL 2 TIMES DAILY
Qty: 20 TABLET | Refills: 0 | Status: SHIPPED | OUTPATIENT
Start: 2018-06-13 | End: 2018-09-06

## 2018-06-13 NOTE — TELEPHONE ENCOUNTER
"----- Message from Ward Penaloza MD sent at 6/12/2018  6:27 PM EDT -----  Call the patient with this report.    Urinalysis shows presence of mucous and \"few\" bacteria.  Culture of urine ordered but \" Not performed--deterioration occurred during handling\" according to [facility]. If any burning, frequency of urination will need refill of Bactrim DS # 20- take one BID. Send Rx in if still symptomatic and see me when Rx runs out for followup visit.  "

## 2018-07-12 ENCOUNTER — TELEPHONE (OUTPATIENT)
Dept: INTERNAL MEDICINE | Age: 73
End: 2018-07-12

## 2018-07-12 NOTE — TELEPHONE ENCOUNTER
----- Message from Dio Ojeda MD sent at 7/12/2018 12:19 PM EDT -----  Regarding: FW: Prescription Question  Contact: [phone]  I would suggest she drop off a urine today for urinalysis and culture.  JS  ----- Message -----  From: Lacey Nixon MA  Sent: 7/12/2018  12:09 PM  To: Dio Ojeda MD  Subject: FW: Prescription Question                            ----- Message -----  From: Marci Kolb  Sent: 7/12/2018  11:41 AM  To: [id number]  Subject: Prescription Question                            I finished the medicine for my kidneys. It has been 1 1/2 weeks urine is so strong it is gualding me. Do I need to come in or do I need another prescription?

## 2018-07-13 DIAGNOSIS — N39.0 URINARY TRACT INFECTION WITHOUT HEMATURIA, SITE UNSPECIFIED: Primary | ICD-10-CM

## 2018-07-18 LAB
APPEARANCE UR: ABNORMAL
BACTERIA #/AREA URNS HPF: ABNORMAL /HPF
BACTERIA UR CULT: ABNORMAL
BACTERIA UR CULT: ABNORMAL
BILIRUB UR QL STRIP: NEGATIVE
COLOR UR: YELLOW
CRYSTALS URNS MICRO: ABNORMAL
EPI CELLS #/AREA URNS HPF: >10 /HPF
GLUCOSE UR QL: NEGATIVE
HGB UR QL STRIP: ABNORMAL
KETONES UR QL STRIP: NEGATIVE
LEUKOCYTE ESTERASE UR QL STRIP: ABNORMAL
MICRO URNS: ABNORMAL
MUCOUS THREADS URNS QL MICRO: PRESENT /HPF
NITRITE UR QL STRIP: NEGATIVE
PH UR STRIP: 5.5 [PH] (ref 5–7.5)
PROT UR QL STRIP: NEGATIVE
RBC #/AREA URNS HPF: ABNORMAL /HPF
SP GR UR: 1.02 (ref 1–1.03)
UNIDENT CRYS URNS QL MICRO: PRESENT /LPF
URINALYSIS REFLEX: ABNORMAL
UROBILINOGEN UR STRIP-MCNC: 0.2 MG/DL (ref 0.2–1)
WBC #/AREA URNS HPF: >30 /HPF

## 2018-07-19 NOTE — PROGRESS NOTES
Per Dr Penaloza    Urinalysis shows presence of yeast but no evidence for a urinary infection.Plan: Labs as above.Today's exam unremarkable for any new problems or events.  Continue all current treatment as prescribed.  A follow-up checkup/lab update visit is advised for October as we discussed.

## 2018-09-06 ENCOUNTER — OFFICE VISIT (OUTPATIENT)
Dept: INTERNAL MEDICINE | Age: 73
End: 2018-09-06

## 2018-09-06 ENCOUNTER — HOSPITAL ENCOUNTER (OUTPATIENT)
Dept: GENERAL RADIOLOGY | Facility: HOSPITAL | Age: 73
Discharge: HOME | End: 2018-09-06
Admitting: NURSE PRACTITIONER

## 2018-09-06 VITALS
HEART RATE: 79 BPM | WEIGHT: 143.6 LBS | HEIGHT: 65 IN | SYSTOLIC BLOOD PRESSURE: 98 MMHG | OXYGEN SATURATION: 95 % | TEMPERATURE: 98.2 F | DIASTOLIC BLOOD PRESSURE: 62 MMHG | BODY MASS INDEX: 23.93 KG/M2

## 2018-09-06 DIAGNOSIS — J06.9 VIRAL URI: Primary | ICD-10-CM

## 2018-09-06 DIAGNOSIS — J02.9 PHARYNGITIS, UNSPECIFIED ETIOLOGY: ICD-10-CM

## 2018-09-06 DIAGNOSIS — R05.9 COUGH: ICD-10-CM

## 2018-09-06 PROCEDURE — 99213 OFFICE O/P EST LOW 20 MIN: CPT | Performed by: NURSE PRACTITIONER

## 2018-09-06 PROCEDURE — 71046 X-RAY EXAM CHEST 2 VIEWS: CPT

## 2018-09-06 RX ORDER — BROMPHENIRAMINE MALEATE, PSEUDOEPHEDRINE HYDROCHLORIDE, AND DEXTROMETHORPHAN HYDROBROMIDE 2; 30; 10 MG/5ML; MG/5ML; MG/5ML
5 SYRUP ORAL 4 TIMES DAILY PRN
Qty: 118 ML | Refills: 0 | Status: SHIPPED | OUTPATIENT
Start: 2018-09-06 | End: 2018-10-08

## 2018-09-06 NOTE — PROGRESS NOTES
Subjective   Marci Kolb is a 73 y.o. female.     URI    This is a new problem. The current episode started yesterday. The problem has been gradually worsening. There has been no fever. Associated symptoms include chest pain (with deep cough), congestion, coughing, headaches, joint pain, rhinorrhea, a sore throat and swollen glands. Pertinent negatives include no diarrhea, ear pain, nausea, vomiting or wheezing. Associated symptoms comments: Chills. She has tried nothing for the symptoms.    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     The following portions of the patient's history were reviewed and updated as appropriate: allergies, current medications, past family history, past medical history, past social history, past surgical history and problem list.    Review of Systems   Constitutional: Positive for chills and fatigue. Negative for fever.   HENT: Positive for congestion, rhinorrhea and sore throat. Negative for ear pain.    Respiratory: Positive for cough and shortness of breath (with deep cough). Negative for wheezing.    Cardiovascular: Positive for chest pain (with deep cough).   Gastrointestinal: Negative for diarrhea, nausea and vomiting.   Musculoskeletal: Positive for joint pain.       Objective   Physical Exam   Constitutional: She is oriented to person, place, and time. Vital signs are normal. She appears well-developed and well-nourished. She is active and cooperative. She does not appear ill. No distress.   HENT:   Head: Normocephalic and atraumatic.   Right Ear: Hearing, tympanic membrane, external ear and ear canal normal.   Left Ear: Hearing, tympanic membrane, external ear and ear canal normal.   Nose: Nose normal. Right sinus exhibits no maxillary sinus tenderness and no frontal sinus tenderness. Left sinus exhibits no maxillary sinus tenderness and no frontal sinus tenderness.   Mouth/Throat: Uvula is midline, oropharynx is clear and moist and  mucous membranes are normal. No tonsillar exudate.   Cardiovascular: Normal rate, regular rhythm and normal heart sounds.    No murmur heard.  Pulmonary/Chest: Effort normal and breath sounds normal. No respiratory distress. She has no wheezes. She has no rales.   Lymphadenopathy:        Head (right side): No submental, no submandibular, no tonsillar, no preauricular, no posterior auricular and no occipital adenopathy present.        Head (left side): No submental, no submandibular, no tonsillar, no preauricular, no posterior auricular and no occipital adenopathy present.     She has no cervical adenopathy.   Neurological: She is alert and oriented to person, place, and time.   Skin: Skin is warm, dry and intact.   Psychiatric: She has a normal mood and affect. Her speech is normal and behavior is normal. Thought content normal.   Nursing note and vitals reviewed.        Assessment/Plan   Problems Addressed this Visit     None      Visit Diagnoses     Viral URI    -  Primary    Relevant Medications    brompheniramine-pseudoephedrine-DM 30-2-10 MG/5ML syrup    Pharyngitis, unspecified etiology        Relevant Medications    brompheniramine-pseudoephedrine-DM 30-2-10 MG/5ML syrup    Cough        Relevant Orders    XR Chest PA & Lateral        1. Viral URI  Discussed with patient likely viral URI, could also consider concomitant allergy symptoms as well.  We'll obtain chest x-ray today related to patient's complaint of chest pain and shortness of breath with deep coughing considering her previous history of pneumothorax. Will treat with Bromfed as needed for cough or congestion, continue ibuprofen as needed for pharyngitis.  Follow-up if no better or worsening symptoms in one week.    - brompheniramine-pseudoephedrine-DM 30-2-10 MG/5ML syrup; Take 5 mL by mouth 4 (Four) Times a Day As Needed for Cough or Allergies.  Dispense: 118 mL; Refill: 0    2. Pharyngitis, unspecified etiology    -  brompheniramine-pseudoephedrine-DM 30-2-10 MG/5ML syrup; Take 5 mL by mouth 4 (Four) Times a Day As Needed for Cough or Allergies.  Dispense: 118 mL; Refill: 0    3. Cough    - XR Chest PA & Lateral

## 2018-09-06 NOTE — PATIENT INSTRUCTIONS
"Upper Respiratory Infection, Adult    Most upper respiratory infections (URIs) are a viral infection of the air passages leading to the lungs. A URI affects the nose, throat, and upper air passages. The most common type of URI is nasopharyngitis and is typically referred to as \"the common cold.\"  URIs run their course and usually go away on their own. Most of the time, a URI does not require medical attention, but sometimes a bacterial infection in the upper airways can follow a viral infection. This is called a secondary infection. Sinus and middle ear infections are common types of secondary upper respiratory infections.  Bacterial pneumonia can also complicate a URI. A URI can worsen asthma and chronic obstructive pulmonary disease (COPD). Sometimes, these complications can require emergency medical care and may be life threatening.  What are the causes?  Almost all URIs are caused by viruses. A virus is a type of germ and can spread from one person to another.  What increases the risk?  You may be at risk for a URI if:  · You smoke.  · You have chronic heart or lung disease.  · You have a weakened defense (immune) system.  · You are very young or very old.  · You have nasal allergies or asthma.  · You work in crowded or poorly ventilated areas.  · You work in health care facilities or schools.    What are the signs or symptoms?  Symptoms typically develop 2-3 days after you come in contact with a cold virus. Most viral URIs last 7-10 days. However, viral URIs from the influenza virus (flu virus) can last 14-18 days and are typically more severe. Symptoms may include:  · Runny or stuffy (congested) nose.  · Sneezing.  · Cough.  · Sore throat.  · Headache.  · Fatigue.  · Fever.  · Loss of appetite.  · Pain in your forehead, behind your eyes, and over your cheekbones (sinus pain).  · Muscle aches.    How is this diagnosed?  Your health care provider may diagnose a URI by:  · Physical exam.  · Tests to check that " your symptoms are not due to another condition such as:  ? Strep throat.  ? Sinusitis.  ? Pneumonia.  ? Asthma.    How is this treated?  A URI goes away on its own with time. It cannot be cured with medicines, but medicines may be prescribed or recommended to relieve symptoms. Medicines may help:  · Reduce your fever.  · Reduce your cough.  · Relieve nasal congestion.    Follow these instructions at home:  · Take medicines only as directed by your health care provider.  · Gargle warm saltwater or take cough drops to comfort your throat as directed by your health care provider.  · Use a warm mist humidifier or inhale steam from a shower to increase air moisture. This may make it easier to breathe.  · Drink enough fluid to keep your urine clear or pale yellow.  · Eat soups and other clear broths and maintain good nutrition.  · Rest as needed.  · Return to work when your temperature has returned to normal or as your health care provider advises. You may need to stay home longer to avoid infecting others. You can also use a face mask and careful hand washing to prevent spread of the virus.  · Increase the usage of your inhaler if you have asthma.  · Do not use any tobacco products, including cigarettes, chewing tobacco, or electronic cigarettes. If you need help quitting, ask your health care provider.  How is this prevented?  The best way to protect yourself from getting a cold is to practice good hygiene.  · Avoid oral or hand contact with people with cold symptoms.  · Wash your hands often if contact occurs.    There is no clear evidence that vitamin C, vitamin E, echinacea, or exercise reduces the chance of developing a cold. However, it is always recommended to get plenty of rest, exercise, and practice good nutrition.  Contact a health care provider if:  · You are getting worse rather than better.  · Your symptoms are not controlled by medicine.  · You have chills.  · You have worsening shortness of breath.  · You  have brown or red mucus.  · You have yellow or brown nasal discharge.  · You have pain in your face, especially when you bend forward.  · You have a fever.  · You have swollen neck glands.  · You have pain while swallowing.  · You have white areas in the back of your throat.  Get help right away if:  · You have severe or persistent:  ? Headache.  ? Ear pain.  ? Sinus pain.  ? Chest pain.  · You have chronic lung disease and any of the following:  ? Wheezing.  ? Prolonged cough.  ? Coughing up blood.  ? A change in your usual mucus.  · You have a stiff neck.  · You have changes in your:  ? Vision.  ? Hearing.  ? Thinking.  ? Mood.  This information is not intended to replace advice given to you by your health care provider. Make sure you discuss any questions you have with your health care provider.  Document Released: 06/13/2002 Document Revised: 08/20/2017 Document Reviewed: 03/25/2015  [employer] Patient Education © 2018 [employer]

## 2018-10-08 ENCOUNTER — OFFICE VISIT (OUTPATIENT)
Dept: INTERNAL MEDICINE | Age: 73
End: 2018-10-08

## 2018-10-08 VITALS
RESPIRATION RATE: 12 BRPM | WEIGHT: 144 LBS | BODY MASS INDEX: 23.99 KG/M2 | DIASTOLIC BLOOD PRESSURE: 60 MMHG | TEMPERATURE: 97.1 F | HEART RATE: 75 BPM | SYSTOLIC BLOOD PRESSURE: 110 MMHG | OXYGEN SATURATION: 99 % | HEIGHT: 65 IN

## 2018-10-08 DIAGNOSIS — E78.2 MIXED HYPERLIPIDEMIA: Primary | ICD-10-CM

## 2018-10-08 DIAGNOSIS — R73.9 HYPERGLYCEMIA: ICD-10-CM

## 2018-10-08 DIAGNOSIS — Z03.89 ENCOUNTER FOR OBSERVATION FOR OTHER SUSPECTED DISEASES AND CONDITIONS RULED OUT: ICD-10-CM

## 2018-10-08 DIAGNOSIS — Z00.00 HEALTHCARE MAINTENANCE: ICD-10-CM

## 2018-10-08 DIAGNOSIS — E03.9 ACQUIRED HYPOTHYROIDISM: ICD-10-CM

## 2018-10-08 LAB
ALBUMIN SERPL-MCNC: 4.4 G/DL (ref 3.5–5.2)
ALBUMIN/GLOB SERPL: 1.7 G/DL
ALP SERPL-CCNC: 87 U/L (ref 39–117)
ALT SERPL-CCNC: 16 U/L (ref 1–33)
AST SERPL-CCNC: 20 U/L (ref 1–32)
BILIRUB SERPL-MCNC: 0.4 MG/DL (ref 0.1–1.2)
BUN SERPL-MCNC: 17 MG/DL (ref 8–23)
BUN/CREAT SERPL: 18.9 (ref 7–25)
CALCIUM SERPL-MCNC: 9.9 MG/DL (ref 8.6–10.5)
CHLORIDE SERPL-SCNC: 107 MMOL/L (ref 98–107)
CHOLEST SERPL-MCNC: 132 MG/DL (ref 0–200)
CO2 SERPL-SCNC: 28.4 MMOL/L (ref 22–29)
CREAT SERPL-MCNC: 0.9 MG/DL (ref 0.57–1)
GLOBULIN SER CALC-MCNC: 2.6 GM/DL
GLUCOSE SERPL-MCNC: 94 MG/DL (ref 65–99)
HBA1C MFR BLD: 5.4 % (ref 4.8–5.6)
HDLC SERPL-MCNC: 42 MG/DL (ref 40–60)
LDLC SERPL CALC-MCNC: 75 MG/DL (ref 0–100)
POTASSIUM SERPL-SCNC: 4.4 MMOL/L (ref 3.5–5.2)
PROT SERPL-MCNC: 7 G/DL (ref 6–8.5)
SODIUM SERPL-SCNC: 145 MMOL/L (ref 136–145)
T4 FREE SERPL-MCNC: 1.3 NG/DL (ref 0.93–1.7)
TRIGL SERPL-MCNC: 73 MG/DL (ref 0–150)
TSH SERPL DL<=0.005 MIU/L-ACNC: 1.17 MIU/ML (ref 0.27–4.2)
VLDLC SERPL CALC-MCNC: 14.6 MG/DL (ref 5–40)

## 2018-10-08 PROCEDURE — 99214 OFFICE O/P EST MOD 30 MIN: CPT | Performed by: INTERNAL MEDICINE

## 2018-10-08 NOTE — PROGRESS NOTES
Marci Kolb is a 73 y.o. female who presents with   Chief Complaint   Patient presents with   • Hypertension   • Hyperlipidemia   • Hypothyroidism   • Blood Sugar Problem   • Mammography follow-up.  Asymptomatic   .    73-year-old female.  Six-month checkup/lab update visit.  No complaints or problems.      Hypertension   This is a chronic problem. The current episode started more than 1 year ago. The problem has been resolved since onset. The problem is controlled. Current antihypertension treatment includes nothing.   Hyperlipidemia   This is a chronic problem. The current episode started more than 1 year ago. The problem is controlled. Recent lipid tests were reviewed and are normal. Exacerbating diseases include diabetes and hypothyroidism. Current antihyperlipidemic treatment includes statins. The current treatment provides moderate improvement of lipids. There are no compliance problems.    Hypothyroidism   This is a chronic problem. The current episode started more than 1 year ago. The problem has been waxing and waning. Treatments tried: Current levothyroxin doses well-tolerated.   Blood Sugar Problem   This is a chronic problem. The current episode started more than 1 year ago. The problem has been waxing and waning. She has tried nothing for the symptoms.        The following portions of the patient's history were reviewed and updated as appropriate: allergies, current medications, past medical history and problem list.    Review of Systems   Constitutional: Negative.    HENT: Negative.    Eyes: Negative.    Respiratory: Negative.    Cardiovascular: Negative.    Genitourinary: Negative.    Musculoskeletal: Negative.    Skin: Negative.    Neurological: Negative.    Psychiatric/Behavioral: Negative.        Objective   Physical Exam   Constitutional: She is oriented to person, place, and time. She appears well-developed and well-nourished. No distress.   HENT:   Head: Normocephalic and atraumatic.  "  Eyes: Pupils are equal, round, and reactive to light. Conjunctivae and EOM are normal.   Neck: Normal range of motion. Neck supple. No thyromegaly present.   Neck exam negative.  Carotid auscultation normal-no bruits heard.   Cardiovascular: Normal rate, regular rhythm, normal heart sounds and intact distal pulses.  Exam reveals no gallop and no friction rub.    No murmur heard.  Pulmonary/Chest: Effort normal and breath sounds normal. No respiratory distress. She has no wheezes. She has no rales. She exhibits no tenderness.   Neurological: She is alert and oriented to person, place, and time.   Psychiatric: She has a normal mood and affect. Her behavior is normal. Judgment and thought content normal.   Nursing note and vitals reviewed.      Assessment/Plan   Marci was seen today for hypertension, hyperlipidemia, hypothyroidism, blood sugar problem and mammography follow-up.  asymptomatic.    Diagnoses and all orders for this visit:    Mixed hyperlipidemia  -     Comprehensive Metabolic Panel  -     Lipid Panel    Acquired hypothyroidism  -     Comprehensive Metabolic Panel  -     TSH+Free T4    Hyperglycemia  -     Comprehensive Metabolic Panel  -     Hemoglobin A1c    Healthcare maintenance  -     Mammo Diagnostic Bilateral With CAD; Future    Encounter for observation for other suspected diseases and conditions ruled out   -     Mammo Diagnostic Bilateral With CAD; Future      Plan: Labs as above.Today's exam unremarkable for any new problems or events.  Continue all current treatment as prescribed.  A follow-up checkup/lab update visit is advised for 6 months assuming today's labs are all acceptable.    The patient has not had a recent screening colonoscopy.  The patient refuses to be referred to gastroenterology and to get a colonoscopy scheduled for the near future.  She said she had a \"bad result\" with the one she had in 1993 and has vowed to \"never get one again\".  A COLOGUARD test was also declined.  The " patient is aware of the risks of undiagnosed colon cancer including GI bleed, bowel obstruction, bowel perforation, metastatic colon cancer and death.  The patient voices an understanding of these risks but also voices a willingness to accept those risks based on the current decision to decline a colonoscopy.

## 2018-10-16 ENCOUNTER — HOSPITAL ENCOUNTER (OUTPATIENT)
Dept: MAMMOGRAPHY | Facility: HOSPITAL | Age: 73
Discharge: HOME | End: 2018-10-16
Admitting: INTERNAL MEDICINE

## 2018-10-16 DIAGNOSIS — Z03.89 ENCOUNTER FOR OBSERVATION FOR OTHER SUSPECTED DISEASES AND CONDITIONS RULED OUT: ICD-10-CM

## 2018-10-16 DIAGNOSIS — Z00.00 HEALTHCARE MAINTENANCE: ICD-10-CM

## 2018-10-16 PROCEDURE — 77066 DX MAMMO INCL CAD BI: CPT

## 2018-10-16 NOTE — PROGRESS NOTES
Mammogram is normal.  No evidence of any cancer or any other problems.  Follow-up mammography in one year is advised.

## 2018-10-29 RX ORDER — SIMVASTATIN 40 MG
TABLET ORAL
Qty: 30 TABLET | Refills: 3 | Status: SHIPPED | OUTPATIENT
Start: 2018-10-29 | End: 2019-04-11 | Stop reason: SDUPTHER

## 2018-11-20 ENCOUNTER — HOSPITAL ENCOUNTER (OUTPATIENT)
Dept: GENERAL RADIOLOGY | Facility: HOSPITAL | Age: 73
Discharge: HOME | End: 2018-11-20
Admitting: INTERNAL MEDICINE

## 2018-11-20 ENCOUNTER — OFFICE VISIT (OUTPATIENT)
Dept: INTERNAL MEDICINE | Age: 73
End: 2018-11-20

## 2018-11-20 VITALS
HEART RATE: 80 BPM | RESPIRATION RATE: 12 BRPM | DIASTOLIC BLOOD PRESSURE: 60 MMHG | WEIGHT: 139.4 LBS | BODY MASS INDEX: 23.22 KG/M2 | TEMPERATURE: 97.6 F | SYSTOLIC BLOOD PRESSURE: 120 MMHG | OXYGEN SATURATION: 94 % | HEIGHT: 65 IN

## 2018-11-20 DIAGNOSIS — J40 BRONCHITIS: Primary | ICD-10-CM

## 2018-11-20 PROCEDURE — 99214 OFFICE O/P EST MOD 30 MIN: CPT | Performed by: INTERNAL MEDICINE

## 2018-11-20 PROCEDURE — 71046 X-RAY EXAM CHEST 2 VIEWS: CPT

## 2018-11-20 RX ORDER — LEVOFLOXACIN 500 MG/1
500 TABLET, FILM COATED ORAL DAILY
Qty: 10 TABLET | Refills: 0 | Status: SHIPPED | OUTPATIENT
Start: 2018-11-20 | End: 2019-05-15

## 2018-11-20 NOTE — PROGRESS NOTES
Marci Kolb is a 73 y.o. female who presents with   Chief Complaint   Patient presents with   • Bronchitis     Persisting cough, weakness, off and on temperature, yellow phlegm production all for the past week.  She denies any shortness of breath or wheezing.  Oxygen saturation 94% on room air.   .    73-year-old female.  History as outlined above.      Bronchitis   This is a new problem. The current episode started in the past 7 days. The problem occurs constantly. The problem has been unchanged. Associated symptoms include chills, congestion, coughing, fatigue and a fever. Pertinent negatives include no diaphoresis or sore throat. Nothing aggravates the symptoms. She has tried nothing for the symptoms.        The following portions of the patient's history were reviewed and updated as appropriate: allergies, current medications, past medical history and problem list.    Review of Systems   Constitutional: Positive for chills, fatigue and fever. Negative for diaphoresis.   HENT: Positive for congestion. Negative for postnasal drip, rhinorrhea, sinus pressure, sinus pain and sore throat.    Eyes: Negative.    Respiratory: Positive for cough. Negative for shortness of breath and wheezing.    Cardiovascular: Negative.    Genitourinary: Negative.    Musculoskeletal: Negative.    Skin: Negative.    Neurological: Negative.    Psychiatric/Behavioral: Negative.        Objective   Physical Exam   Constitutional: She is oriented to person, place, and time. She appears well-developed and well-nourished. No distress.   HENT:   Head: Normocephalic and atraumatic.   Right Ear: External ear normal.   Left Ear: External ear normal.   Nose: Nose normal.   Mouth/Throat: Oropharynx is clear and moist.   Eyes: Conjunctivae and EOM are normal. Pupils are equal, round, and reactive to light.   Neck: Normal range of motion. Neck supple. No thyromegaly present.   Neck exam negative.  Carotid auscultation normal-no bruits heard.    Cardiovascular: Normal rate, regular rhythm, normal heart sounds and intact distal pulses. Exam reveals no gallop and no friction rub.   No murmur heard.  Pulmonary/Chest: Effort normal. No respiratory distress. She has no wheezes. She has rales. She exhibits no tenderness.   Bibasilar rales/rhonchi heard.  Findings appear latter at the right base than the left base.   Neurological: She is alert and oriented to person, place, and time.   Psychiatric: She has a normal mood and affect. Her behavior is normal. Judgment and thought content normal.   Nursing note and vitals reviewed.      Assessment/Plan   Marci was seen today for bronchitis.    Diagnoses and all orders for this visit:    Bronchitis  -     XR Chest PA & Lateral    Other orders  -     levoFLOXacin (LEVAQUIN) 500 MG tablet; Take 1 tablet by mouth Daily.      Plan: Chest x-ray; Levaquin as above.  Ten-day follow-up advised if pneumonia present.  PRN follow-up advised if no pneumonia.  OTC Delsym for cough as directed on the label.

## 2019-04-09 ENCOUNTER — OFFICE VISIT (OUTPATIENT)
Dept: INTERNAL MEDICINE | Age: 74
End: 2019-04-09

## 2019-04-09 VITALS
TEMPERATURE: 97.3 F | SYSTOLIC BLOOD PRESSURE: 118 MMHG | WEIGHT: 147 LBS | DIASTOLIC BLOOD PRESSURE: 60 MMHG | HEART RATE: 68 BPM | OXYGEN SATURATION: 98 % | RESPIRATION RATE: 13 BRPM | HEIGHT: 65 IN | BODY MASS INDEX: 24.49 KG/M2

## 2019-04-09 DIAGNOSIS — R73.9 HYPERGLYCEMIA: ICD-10-CM

## 2019-04-09 DIAGNOSIS — E78.2 MIXED HYPERLIPIDEMIA: Primary | ICD-10-CM

## 2019-04-09 DIAGNOSIS — E03.9 ACQUIRED HYPOTHYROIDISM: ICD-10-CM

## 2019-04-09 LAB
ALBUMIN SERPL-MCNC: 4.3 G/DL (ref 3.5–5.2)
ALBUMIN/GLOB SERPL: 1.7 G/DL
ALP SERPL-CCNC: 95 U/L (ref 39–117)
ALT SERPL-CCNC: 15 U/L (ref 1–33)
AST SERPL-CCNC: 13 U/L (ref 1–32)
BILIRUB SERPL-MCNC: 0.2 MG/DL (ref 0.2–1.2)
BUN SERPL-MCNC: 13 MG/DL (ref 8–23)
BUN/CREAT SERPL: 16 (ref 7–25)
CALCIUM SERPL-MCNC: 9.4 MG/DL (ref 8.6–10.5)
CHLORIDE SERPL-SCNC: 105 MMOL/L (ref 98–107)
CHOLEST SERPL-MCNC: 131 MG/DL (ref 0–200)
CO2 SERPL-SCNC: 27.7 MMOL/L (ref 22–29)
CREAT SERPL-MCNC: 0.81 MG/DL (ref 0.57–1)
GLOBULIN SER CALC-MCNC: 2.6 GM/DL
GLUCOSE SERPL-MCNC: 133 MG/DL (ref 65–99)
HBA1C MFR BLD: 5.81 % (ref 4.8–5.6)
HDLC SERPL-MCNC: 44 MG/DL (ref 40–60)
LDLC SERPL CALC-MCNC: 66 MG/DL (ref 0–100)
POTASSIUM SERPL-SCNC: 3.8 MMOL/L (ref 3.5–5.2)
PROT SERPL-MCNC: 6.9 G/DL (ref 6–8.5)
SODIUM SERPL-SCNC: 144 MMOL/L (ref 136–145)
T4 FREE SERPL-MCNC: 1.12 NG/DL (ref 0.93–1.7)
TRIGL SERPL-MCNC: 105 MG/DL (ref 0–150)
TSH SERPL DL<=0.005 MIU/L-ACNC: 3.21 MIU/ML (ref 0.27–4.2)
VLDLC SERPL CALC-MCNC: 21 MG/DL (ref 5–40)

## 2019-04-09 PROCEDURE — 99214 OFFICE O/P EST MOD 30 MIN: CPT | Performed by: INTERNAL MEDICINE

## 2019-04-09 NOTE — PROGRESS NOTES
"  Marci Kolb is a 74 y.o. female who presents with   Chief Complaint   Patient presents with   • Hyperlipidemia   • Hypothyroidism   • Blood Sugar Problem   .    74-year-old female presents for her 6-month checkup/lab update visit.  Her only 2 complaints on today's visit are as follows:    #1 she is having some pain in her right hip and right leg.  She has been diagnosed by her orthopedist as having \"bursitis/sciatica.  She has had left total hip arthroplasty she says.  She is currently getting physical therapy through a local [facility] facility.      #2  She has been having \"uncontrollable urinating\".  She is currently [facility] and is currently wearing a pessary she says but anticipates the need for a total hysterectomy in the near future.      Hyperlipidemia   This is a chronic problem. The current episode started more than 1 year ago. The problem is controlled. Exacerbating diseases include diabetes and hypothyroidism. Current antihyperlipidemic treatment includes statins. There are no compliance problems.    Hypothyroidism   This is a chronic problem. The current episode started more than 1 year ago. The problem has been unchanged. Associated symptoms include arthralgias. Treatments tried: Current levothyroxine is well-tolerated.   Blood Sugar Problem   This is a chronic problem. The current episode started more than 1 year ago. The problem has been waxing and waning. Associated symptoms include arthralgias. She has tried nothing for the symptoms.        The following portions of the patient's history were reviewed and updated as appropriate: allergies, current medications, past medical history and problem list.    Review of Systems   Constitutional: Negative.    HENT: Negative.    Eyes: Negative.    Respiratory: Negative.    Cardiovascular: Negative.    Genitourinary: Negative.         Urinary problems as outlined above   Musculoskeletal: Positive for arthralgias.        Right " hip/right leg sciatic symptoms as outlined above   Skin: Negative.    Neurological: Negative.    Psychiatric/Behavioral: Negative.        Objective   Physical Exam   Constitutional: She is oriented to person, place, and time. She appears well-developed and well-nourished. No distress.   HENT:   Head: Normocephalic and atraumatic.   Eyes: Conjunctivae and EOM are normal. Pupils are equal, round, and reactive to light.   Neck: Normal range of motion. Neck supple. No thyromegaly present.   Neck exam negative.  Carotid auscultation normal-no bruits heard.   Cardiovascular: Normal rate, regular rhythm, normal heart sounds and intact distal pulses. Exam reveals no gallop and no friction rub.   No murmur heard.  Pulmonary/Chest: Effort normal and breath sounds normal. No respiratory distress. She has no wheezes. She has no rales. She exhibits no tenderness.   Neurological: She is alert and oriented to person, place, and time.   Psychiatric: She has a normal mood and affect. Her behavior is normal. Judgment and thought content normal.   Nursing note and vitals reviewed.      Assessment/Plan   Marci was seen today for hyperlipidemia, hypothyroidism and blood sugar problem.    Diagnoses and all orders for this visit:    Mixed hyperlipidemia  -     Comprehensive Metabolic Panel  -     Lipid Panel    Acquired hypothyroidism  -     Comprehensive Metabolic Panel  -     TSH+Free T4    Hyperglycemia  -     Comprehensive Metabolic Panel  -     Hemoglobin A1c        Plan: Labs as above for the issues as outlined.  From my standpoint, continue all medications as currently prescribed.  Tentatively we will plan on a 6-month checkup/lab update visit assuming today's labs are acceptable.    Continue GYN care for problems as outlined above.    Continue orthopedic care for problems as outlined above

## 2019-04-10 NOTE — PROGRESS NOTES
"Mild elevation of the blood sugar is noted at 133 with a slight elevation of the hemoglobin A1c at 5.8.  These levels are compatible with a diagnosis of \"prediabetes\" as defined by the American diabetes Association.  Certainly reductions of sweets, carbohydrates, fried foods and foods high in fat content would certainly be advised at this point but prescription medication would not be necessary quite yet.  All of the labs are within ranges of acceptability.  For now, continue all treatment as prescribed.  A follow-up checkup/lab update visit in 6 months is advised as discussed."

## 2019-04-11 RX ORDER — LEVOTHYROXINE SODIUM 0.1 MG/1
TABLET ORAL
Qty: 60 TABLET | Refills: 3 | Status: SHIPPED | OUTPATIENT
Start: 2019-04-11 | End: 2019-07-29

## 2019-04-11 RX ORDER — SIMVASTATIN 40 MG
TABLET ORAL
Qty: 30 TABLET | Refills: 2 | Status: SHIPPED | OUTPATIENT
Start: 2019-04-11 | End: 2019-07-29

## 2019-05-15 ENCOUNTER — OFFICE VISIT (OUTPATIENT)
Dept: INTERNAL MEDICINE | Age: 74
End: 2019-05-15

## 2019-05-15 VITALS
DIASTOLIC BLOOD PRESSURE: 62 MMHG | SYSTOLIC BLOOD PRESSURE: 110 MMHG | HEIGHT: 65 IN | BODY MASS INDEX: 24.12 KG/M2 | TEMPERATURE: 97.6 F | HEART RATE: 61 BPM | WEIGHT: 144.8 LBS | OXYGEN SATURATION: 99 %

## 2019-05-15 DIAGNOSIS — N39.0 URINARY TRACT INFECTION WITHOUT HEMATURIA, SITE UNSPECIFIED: Primary | ICD-10-CM

## 2019-05-15 LAB
BILIRUB BLD-MCNC: NEGATIVE MG/DL
CLARITY, POC: ABNORMAL
COLOR UR: ABNORMAL
GLUCOSE UR STRIP-MCNC: NEGATIVE MG/DL
KETONES UR QL: NEGATIVE
LEUKOCYTE EST, POC: ABNORMAL
NITRITE UR-MCNC: NEGATIVE MG/ML
PH UR: 5.5 [PH] (ref 5–8)
PROT UR STRIP-MCNC: NEGATIVE MG/DL
RBC # UR STRIP: ABNORMAL /UL
SP GR UR: 1.03 (ref 1–1.03)
UROBILINOGEN UR QL: NORMAL

## 2019-05-15 PROCEDURE — 81003 URINALYSIS AUTO W/O SCOPE: CPT | Performed by: NURSE PRACTITIONER

## 2019-05-15 PROCEDURE — 99212 OFFICE O/P EST SF 10 MIN: CPT | Performed by: NURSE PRACTITIONER

## 2019-05-15 RX ORDER — NITROFURANTOIN 25; 75 MG/1; MG/1
100 CAPSULE ORAL 2 TIMES DAILY
Qty: 10 CAPSULE | Refills: 0 | Status: SHIPPED | OUTPATIENT
Start: 2019-05-15 | End: 2019-05-20

## 2019-07-24 ENCOUNTER — TRANSCRIBE ORDERS (OUTPATIENT)
Dept: ADMINISTRATIVE | Facility: HOSPITAL | Age: 74
End: 2019-07-24

## 2019-07-24 DIAGNOSIS — M48.061 SPINAL STENOSIS, LUMBAR REGION, WITHOUT NEUROGENIC CLAUDICATION: Primary | ICD-10-CM

## 2019-07-28 NOTE — PROGRESS NOTES
"Marci Kolb / 74 y.o. / female  Encounter Date: 05/15/2019    ASSESSMENT & PLAN:    Problem List Items Addressed This Visit     None      Visit Diagnoses     Urinary tract infection without hematuria, site unspecified    -  Primary    Relevant Medications    nitrofurantoin, macrocrystal-monohydrate, (MACROBID) 100 MG capsule    Other Relevant Orders    POC Urinalysis Dipstick, Automated (Completed)        Orders Placed This Encounter   Procedures   • POC Urinalysis Dipstick, Automated     New Medications Ordered This Visit   Medications   • nitrofurantoin, macrocrystal-monohydrate, (MACROBID) 100 MG capsule     Sig: Take 1 capsule by mouth 2 (Two) Times a Day for 5 days.     Dispense:  10 capsule     Refill:  0       Summary/Discussion:  1. Start Macrobid today. Take full course of antibiotic as prescribed. Discussed with patient to monitor for worsening or new symptoms, such as fever, severe flank pain, vomiting and to notify office immediately should these symptoms arise.    Return if symptoms worsen or fail to improve, for Next scheduled follow up.  ________________________________________________________________    VITALS:    Visit Vitals  /62   Pulse 61   Temp 97.6 °F (36.4 °C) (Temporal)   Ht 165.1 cm (65\")   Wt 65.7 kg (144 lb 12.8 oz)   SpO2 99%   BMI 24.10 kg/m²       BP Readings from Last 3 Encounters:   05/15/19 110/62   04/09/19 118/60   11/20/18 120/60     Wt Readings from Last 3 Encounters:   05/15/19 65.7 kg (144 lb 12.8 oz)   04/09/19 66.7 kg (147 lb)   11/20/18 63.2 kg (139 lb 6.4 oz)      Body mass index is 24.1 kg/m².    CC: Main reason(s) for today's visit: Urinary Tract Infection      HPI    Patient is a 74 y.o. female who is here for complaint of bladder infection x 7 days ago when she felt it coming on. She is a new patient to me.  She complains of dysuria and frequency. She is scheduled to have hysterectomy within the next couple months with her OB/GYN ([facility]) and states she is " "patient was not responsive" under a lot of stress regarding this plan.       Patient Care Team:  Ward Penaloza MD as PCP - General  Ward Penaloza MD as PCP - Family Medicine  Ward Penaloza MD as PCP - Claims Attributed  ____________________________________________________________________    REVIEW OF SYSTEMS    Review of Systems   Constitutional: Negative for chills and fever.   Respiratory: Negative.    Cardiovascular: Negative.    Genitourinary: Positive for dyspareunia, frequency and urgency. Negative for decreased urine volume, difficulty urinating, flank pain, hematuria and pelvic pain.   Musculoskeletal: Negative.    Neurological: Negative.    Psychiatric/Behavioral: The patient is nervous/anxious.        PHYSICAL EXAMINATION    Physical Exam   Constitutional: She is oriented to person, place, and time.   Pulmonary/Chest: Effort normal.   Genitourinary: No vaginal discharge found.   Neurological: She is alert and oriented to person, place, and time.   Skin: Skin is warm and dry.   Psychiatric: She has a normal mood and affect.   Vitals reviewed.    REVIEWED DATA:    Labs:   Lab Results   Component Value Date     04/09/2019    K 3.8 04/09/2019    AST 13 04/09/2019    ALT 15 04/09/2019    BUN 13 04/09/2019    CREATININE 0.81 04/09/2019    CREATININE 0.90 10/08/2018    CREATININE 0.70 05/19/2018    EGFRIFNONA 69 04/09/2019    EGFRIFAFRI 83 04/09/2019       Lab Results   Component Value Date    GLUCOSE 145 (H) 01/26/2018    GLUCOSE 85 01/09/2018    GLUCOSE 113 (H) 10/09/2015    HGBA1C 5.81 (H) 04/09/2019    HGBA1C 5.40 10/08/2018    HGBA1C 6.06 (H) 04/24/2018       Lab Results   Component Value Date    LDL 66 04/09/2019    LDL 75 10/08/2018     04/24/2018    HDL 44 04/09/2019    TRIG 105 04/09/2019       Lab Results   Component Value Date    TSH 3.210 04/09/2019    FREET4 1.12 04/09/2019          Lab Results   Component Value Date    WBC 8.36 01/26/2018    HGB 10.0 (L) 01/26/2018    HGB 10.7 (L) 01/25/2018    HGB 12.2  01/24/2018     01/26/2018         Imaging:      Medical Tests:      Summary of old records / correspondence / consultant report:      Request outside records:   ______________________________________________________________________    ALLERGIES  Allergies   Allergen Reactions   • Streptomycin Nausea Only   • Penicillins Itching        MEDICATIONS  Current Outpatient Medications on File Prior to Visit   Medication Sig   • levothyroxine (SYNTHROID, LEVOTHROID) 100 MCG tablet TAKE ONE TABLET BY MOUTH EVERY 12 HOURS   • Multiple Vitamins-Minerals (CENTRUM SILVER 50+WOMEN) tablet Take 1 tablet by mouth Daily.   • simvastatin (ZOCOR) 40 MG tablet TAKE ONE TABLET BY MOUTH ONCE NIGHTLY   • [DISCONTINUED] levoFLOXacin (LEVAQUIN) 500 MG tablet Take 1 tablet by mouth Daily.     Current Facility-Administered Medications on File Prior to Visit   Medication   • mupirocin (BACTROBAN) 2 % nasal ointment       PFSH:     The following portions of the patient's history were reviewed and updated as appropriate: Allergies / Current Medications / Past Medical History / Surgical History / Social History / Family History    PROBLEM LIST   Patient Active Problem List   Diagnosis   • Diverticulosis of intestine   • Difficult or painful urination   • Cephalalgia   • Hyperlipidemia   • Hypothyroidism   • Gonalgia   • Knee swelling   • Low back pain   • Cervical pain   • Sciatica   • Arthralgia of hip   • Hyperglycemia   • Atelectasis   • Cystitis       PAST MEDICAL HISTORY  Past Medical History:   Diagnosis Date   • Arthritis    • Cataract     START OF   • Collapsed lung     HISTORY OF X2 MANY YEARS AGO. ?LEFT. FROM BLOWN BLEBS.  40 years ago.   • Constipation    • Depression     RECENT LOSS OF DGHT   • Dry skin    • Elevated cholesterol    • History of diverticulosis    • Hypothyroidism    • Lumbar spine pain    • Varicose vein of leg     SINDI       SURGICAL HISTORY  Past Surgical History:   Procedure Laterality Date   • COLONOSCOPY   "    \"WNL\"   • JOINT REPLACEMENT      Left hip arthroplasty   • TOTAL HIP ARTHROPLASTY Left 2018    Procedure: LT  TOTAL HIP ARTHROPLASTY;  Surgeon: Rogelio Nash MD;  Location: [facility];  Service:        SOCIAL HISTORY  Social History     Socioeconomic History   • Marital status:      Spouse name: Not on file   • Number of children: Not on file   • Years of education: Not on file   • Highest education level: Not on file   Tobacco Use   • Smoking status: Former Smoker     Packs/day: 1.00     Years: 35.00     Pack years: 35.00     Last attempt to quit: 2000     Years since quittin.3   • Smokeless tobacco: Never Used   Substance and Sexual Activity   • Alcohol use: No   • Drug use: No   • Sexual activity: Defer       FAMILY HISTORY  Family History   Problem Relation Age of Onset   • Breast cancer Maternal Aunt    • Malig Hyperthermia Neg Hx        "

## 2019-07-29 ENCOUNTER — APPOINTMENT (OUTPATIENT)
Dept: PREADMISSION TESTING | Facility: HOSPITAL | Age: 74
End: 2019-07-29

## 2019-07-29 VITALS
WEIGHT: 150.8 LBS | DIASTOLIC BLOOD PRESSURE: 80 MMHG | HEIGHT: 65 IN | HEART RATE: 64 BPM | TEMPERATURE: 97.9 F | OXYGEN SATURATION: 97 % | BODY MASS INDEX: 25.12 KG/M2 | RESPIRATION RATE: 16 BRPM | SYSTOLIC BLOOD PRESSURE: 147 MMHG

## 2019-07-29 LAB
ANION GAP SERPL CALCULATED.3IONS-SCNC: 12.4 MMOL/L (ref 5–15)
BASOPHILS # BLD AUTO: 0.06 10*3/MM3 (ref 0–0.2)
BASOPHILS NFR BLD AUTO: 1 % (ref 0–1.5)
BUN BLD-MCNC: 13 MG/DL (ref 8–23)
BUN/CREAT SERPL: 16.3 (ref 7–25)
CALCIUM SPEC-SCNC: 9.4 MG/DL (ref 8.6–10.5)
CHLORIDE SERPL-SCNC: 103 MMOL/L (ref 98–107)
CO2 SERPL-SCNC: 24.6 MMOL/L (ref 22–29)
CREAT BLD-MCNC: 0.8 MG/DL (ref 0.57–1)
DEPRECATED RDW RBC AUTO: 49.1 FL (ref 37–54)
EOSINOPHIL # BLD AUTO: 0.1 10*3/MM3 (ref 0–0.4)
EOSINOPHIL NFR BLD AUTO: 1.6 % (ref 0.3–6.2)
ERYTHROCYTE [DISTWIDTH] IN BLOOD BY AUTOMATED COUNT: 13.2 % (ref 12.3–15.4)
GFR SERPL CREATININE-BSD FRML MDRD: 70 ML/MIN/1.73
GLUCOSE BLD-MCNC: 112 MG/DL (ref 65–99)
HCT VFR BLD AUTO: 39.8 % (ref 34–46.6)
HGB BLD-MCNC: 12.7 G/DL (ref 12–15.9)
IMM GRANULOCYTES # BLD AUTO: 0.02 10*3/MM3 (ref 0–0.05)
IMM GRANULOCYTES NFR BLD AUTO: 0.3 % (ref 0–0.5)
LYMPHOCYTES # BLD AUTO: 1.88 10*3/MM3 (ref 0.7–3.1)
LYMPHOCYTES NFR BLD AUTO: 30.3 % (ref 19.6–45.3)
MCH RBC QN AUTO: 32.2 PG (ref 26.6–33)
MCHC RBC AUTO-ENTMCNC: 31.9 G/DL (ref 31.5–35.7)
MCV RBC AUTO: 100.8 FL (ref 79–97)
MONOCYTES # BLD AUTO: 0.4 10*3/MM3 (ref 0.1–0.9)
MONOCYTES NFR BLD AUTO: 6.5 % (ref 5–12)
NEUTROPHILS # BLD AUTO: 3.74 10*3/MM3 (ref 1.7–7)
NEUTROPHILS NFR BLD AUTO: 60.3 % (ref 42.7–76)
NRBC BLD AUTO-RTO: 0 /100 WBC (ref 0–0.2)
PLATELET # BLD AUTO: 218 10*3/MM3 (ref 140–450)
PMV BLD AUTO: 9.4 FL (ref 6–12)
POTASSIUM BLD-SCNC: 3.4 MMOL/L (ref 3.5–5.2)
RBC # BLD AUTO: 3.95 10*6/MM3 (ref 3.77–5.28)
SODIUM BLD-SCNC: 140 MMOL/L (ref 136–145)
WBC NRBC COR # BLD: 6.2 10*3/MM3 (ref 3.4–10.8)

## 2019-07-29 PROCEDURE — 93005 ELECTROCARDIOGRAM TRACING: CPT

## 2019-07-29 PROCEDURE — 85025 COMPLETE CBC W/AUTO DIFF WBC: CPT | Performed by: OBSTETRICS & GYNECOLOGY

## 2019-07-29 PROCEDURE — 93010 ELECTROCARDIOGRAM REPORT: CPT | Performed by: INTERNAL MEDICINE

## 2019-07-29 PROCEDURE — 36415 COLL VENOUS BLD VENIPUNCTURE: CPT

## 2019-07-29 PROCEDURE — 80048 BASIC METABOLIC PNL TOTAL CA: CPT | Performed by: OBSTETRICS & GYNECOLOGY

## 2019-07-29 RX ORDER — SIMVASTATIN 40 MG
40 TABLET ORAL DAILY
COMMUNITY
End: 2019-08-16 | Stop reason: SDUPTHER

## 2019-07-29 RX ORDER — LEVOTHYROXINE SODIUM 0.1 MG/1
100 TABLET ORAL DAILY
COMMUNITY
End: 2019-12-10 | Stop reason: SDUPTHER

## 2019-07-29 RX ORDER — MELOXICAM 15 MG/1
15 TABLET ORAL DAILY
COMMUNITY
Start: 2019-07-24 | End: 2019-08-07 | Stop reason: HOSPADM

## 2019-07-29 RX ORDER — METHOCARBAMOL 500 MG/1
500 TABLET, FILM COATED ORAL EVERY 4 HOURS PRN
COMMUNITY
Start: 2019-07-24 | End: 2019-12-30

## 2019-07-29 NOTE — DISCHARGE INSTRUCTIONS
Take the following medications the morning of surgery with a small sip of water:  NONE    Arrive to hospital on your day of surgery at 9:30 AM.        General Instructions:  • Do not eat solid food after midnight the night before surgery.  • You may drink clear liquids day of surgery but must stop at least one hour before your hospital arrival time.  • It is beneficial for you to have a clear drink that contains carbohydrates the day of surgery.  We suggest a 12 to 20 ounce bottle of Gatorade or Powerade for non-diabetic patients or a 12 to 20 ounce bottle of G2 or Powerade Zero for diabetic patients. (Pediatric patients, are not advised to drink a 12 to 20 ounce carbohydrate drink)    Clear liquids are liquids you can see through.  Nothing red in color.     Plain water                               Sports drinks  Sodas                                   Gelatin (Jell-O)  Fruit juices without pulp such as white grape juice and apple juice  Popsicles that contain no fruit or yogurt  Tea or coffee (no cream or milk added)  Gatorade / Powerade  G2 / Powerade Zero    • Infants may have breast milk up to four hours before surgery.  • Infants drinking formula may drink formula up to six hours before surgery.   • Patients who avoid smoking, chewing tobacco and alcohol for 4 weeks prior to surgery have a reduced risk of post-operative complications.  Quit smoking as many days before surgery as you can.  • Do not smoke, use chewing tobacco or drink alcohol the day of surgery.   • If applicable bring your C-PAP/ BI-PAP machine.  • Bring any papers given to you in the doctor’s office.  • Wear clean comfortable clothes and socks.  • Do not wear contact lenses, false eyelashes or make-up.  Bring a case for your glasses.   • Bring crutches or walker if applicable.  • Remove all piercings.  Leave jewelry and any other valuables at home.  • Hair extensions with metal clips must be removed prior to surgery.  • The Pre-Admission  Testing nurse will instruct you to bring medications if unable to obtain an accurate list in Pre-Admission Testing.        If you were given a blood bank ID arm band remember to bring it with you the day of surgery.    Preventing a Surgical Site Infection:  • For 2 to 3 days before surgery, avoid shaving with a razor because the razor can irritate skin and make it easier to develop an infection.    • Any areas of open skin can increase the risk of a post-operative wound infection by allowing bacteria to enter and travel throughout the body.  Notify your surgeon if you have any skin wounds / rashes even if it is not near the expected surgical site.  The area will need assessed to determine if surgery should be delayed until it is healed.  • The night prior to surgery sleep in a clean bed with clean clothing.  Do not allow pets to sleep with you.  • Shower on the morning of surgery using a fresh bar of anti-bacterial soap (such as Dial) and clean washcloth.  Dry with a clean towel and dress in clean clothing.  • Ask your surgeon if you will be receiving antibiotics prior to surgery.  • Make sure you, your family, and all healthcare providers clean their hands with soap and water or an alcohol based hand  before caring for you or your wound.    Day of surgery:  Upon arrival, a Pre-op nurse and Anesthesiologist will review your health history, obtain vital signs, and answer questions you may have.  The only belongings needed at this time will be a list of your home medications and if applicable your C-PAP/BI-PAP machine.  If you are staying overnight your family can leave the rest of your belongings in the car and bring them to your room later.  A Pre-op nurse will start an IV and you may receive medication in preparation for surgery, including something to help you relax.  Your family will be able to see you in the Pre-op area.  While you are in surgery your family should notify the waiting room   if they leave the waiting room area and provide a contact phone number.    Please be aware that surgery does come with discomfort.  We want to make every effort to control your discomfort so please discuss any uncontrolled symptoms with your nurse.   Your doctor will most likely have prescribed pain medications.      If you are going home after surgery you will receive individualized written care instructions before being discharged.  A responsible adult must drive you to and from the hospital on the day of your surgery and stay with you for 24 hours.    If you are staying overnight following surgery, you will be transported to your hospital room following the recovery period.  [facility] has all private rooms.    You have received a list of surgical assistants for your reference.  If you have any questions please call Pre-Admission Testing at [phone].  Deductibles and co-payments are collected on the day of service. Please be prepared to pay the required co-pay, deductible or deposit on the day of service as defined by your plan.

## 2019-08-06 ENCOUNTER — HOSPITAL ENCOUNTER (OUTPATIENT)
Facility: HOSPITAL | Age: 74
Discharge: HOME | End: 2019-08-07
Attending: OBSTETRICS & GYNECOLOGY | Admitting: OBSTETRICS & GYNECOLOGY

## 2019-08-06 ENCOUNTER — ANESTHESIA (OUTPATIENT)
Dept: PERIOP | Facility: HOSPITAL | Age: 74
End: 2019-08-06

## 2019-08-06 ENCOUNTER — ANESTHESIA EVENT (OUTPATIENT)
Dept: PERIOP | Facility: HOSPITAL | Age: 74
End: 2019-08-06

## 2019-08-06 DIAGNOSIS — N81.4 UTERINE PROLAPSE: ICD-10-CM

## 2019-08-06 PROBLEM — Z90.710 STATUS POST HYSTERECTOMY: Status: ACTIVE | Noted: 2019-08-06

## 2019-08-06 PROCEDURE — C1771 REP DEV, URINARY, W/SLING: HCPCS | Performed by: OBSTETRICS & GYNECOLOGY

## 2019-08-06 PROCEDURE — 25010000002 KETOROLAC TROMETHAMINE PER 15 MG: Performed by: ANESTHESIOLOGY

## 2019-08-06 PROCEDURE — 25010000002 MORPHINE PER 10 MG: Performed by: ANESTHESIOLOGY

## 2019-08-06 PROCEDURE — 25010000002 NEOSTIGMINE PER 0.5 MG: Performed by: ANESTHESIOLOGY

## 2019-08-06 PROCEDURE — 25010000002 ONDANSETRON PER 1 MG: Performed by: ANESTHESIOLOGY

## 2019-08-06 PROCEDURE — 25010000002 PROPOFOL 10 MG/ML EMULSION: Performed by: ANESTHESIOLOGY

## 2019-08-06 PROCEDURE — 88305 TISSUE EXAM BY PATHOLOGIST: CPT | Performed by: OBSTETRICS & GYNECOLOGY

## 2019-08-06 PROCEDURE — 25010000002 GENTAMICIN PER 80 MG: Performed by: OBSTETRICS & GYNECOLOGY

## 2019-08-06 PROCEDURE — 63710000001 PROMETHAZINE PER 12.5 MG: Performed by: OBSTETRICS & GYNECOLOGY

## 2019-08-06 PROCEDURE — 25010000002 DEXAMETHASONE PER 1 MG: Performed by: ANESTHESIOLOGY

## 2019-08-06 DEVICE — CONTINENCE SYSTEM
Type: IMPLANTABLE DEVICE | Site: VAGINA | Status: FUNCTIONAL
Brand: GYNECARE TVT EXACT

## 2019-08-06 RX ORDER — NALOXONE HCL 0.4 MG/ML
0.4 VIAL (ML) INJECTION AS NEEDED
Status: DISCONTINUED | OUTPATIENT
Start: 2019-08-06 | End: 2019-08-06 | Stop reason: HOSPADM

## 2019-08-06 RX ORDER — GLYCOPYRROLATE 0.2 MG/ML
INJECTION INTRAMUSCULAR; INTRAVENOUS AS NEEDED
Status: DISCONTINUED | OUTPATIENT
Start: 2019-08-06 | End: 2019-08-06 | Stop reason: SURG

## 2019-08-06 RX ORDER — ONDANSETRON 2 MG/ML
4 INJECTION INTRAMUSCULAR; INTRAVENOUS ONCE AS NEEDED
Status: COMPLETED | OUTPATIENT
Start: 2019-08-06 | End: 2019-08-06

## 2019-08-06 RX ORDER — MIDAZOLAM HYDROCHLORIDE 1 MG/ML
1 INJECTION INTRAMUSCULAR; INTRAVENOUS
Status: DISCONTINUED | OUTPATIENT
Start: 2019-08-06 | End: 2019-08-06 | Stop reason: HOSPADM

## 2019-08-06 RX ORDER — BUPIVACAINE HYDROCHLORIDE AND EPINEPHRINE 5; 5 MG/ML; UG/ML
INJECTION, SOLUTION PERINEURAL AS NEEDED
Status: DISCONTINUED | OUTPATIENT
Start: 2019-08-06 | End: 2019-08-06 | Stop reason: HOSPADM

## 2019-08-06 RX ORDER — SODIUM CHLORIDE 9 MG/ML
INJECTION, SOLUTION INTRAVENOUS AS NEEDED
Status: DISCONTINUED | OUTPATIENT
Start: 2019-08-06 | End: 2019-08-06 | Stop reason: HOSPADM

## 2019-08-06 RX ORDER — NALOXONE HCL 0.4 MG/ML
0.1 VIAL (ML) INJECTION
Status: DISCONTINUED | OUTPATIENT
Start: 2019-08-06 | End: 2019-08-07 | Stop reason: HOSPADM

## 2019-08-06 RX ORDER — IBUPROFEN 600 MG/1
600 TABLET ORAL EVERY 6 HOURS PRN
Status: DISCONTINUED | OUTPATIENT
Start: 2019-08-06 | End: 2019-08-07 | Stop reason: HOSPADM

## 2019-08-06 RX ORDER — FENTANYL CITRATE 50 UG/ML
25 INJECTION, SOLUTION INTRAMUSCULAR; INTRAVENOUS
Status: DISCONTINUED | OUTPATIENT
Start: 2019-08-06 | End: 2019-08-06 | Stop reason: HOSPADM

## 2019-08-06 RX ORDER — MEPERIDINE HYDROCHLORIDE 25 MG/ML
12.5 INJECTION INTRAMUSCULAR; INTRAVENOUS; SUBCUTANEOUS ONCE
Status: DISCONTINUED | OUTPATIENT
Start: 2019-08-06 | End: 2019-08-06 | Stop reason: HOSPADM

## 2019-08-06 RX ORDER — MORPHINE SULFATE 2 MG/ML
2 INJECTION, SOLUTION INTRAMUSCULAR; INTRAVENOUS
Status: DISCONTINUED | OUTPATIENT
Start: 2019-08-06 | End: 2019-08-06 | Stop reason: HOSPADM

## 2019-08-06 RX ORDER — PROMETHAZINE HYDROCHLORIDE 25 MG/1
12.5 SUPPOSITORY RECTAL EVERY 6 HOURS
Status: DISCONTINUED | OUTPATIENT
Start: 2019-08-06 | End: 2019-08-07 | Stop reason: HOSPADM

## 2019-08-06 RX ORDER — SODIUM CHLORIDE 0.9 % (FLUSH) 0.9 %
1-10 SYRINGE (ML) INJECTION AS NEEDED
Status: DISCONTINUED | OUTPATIENT
Start: 2019-08-06 | End: 2019-08-06 | Stop reason: HOSPADM

## 2019-08-06 RX ORDER — HYDROMORPHONE HYDROCHLORIDE 1 MG/ML
0.5 INJECTION, SOLUTION INTRAMUSCULAR; INTRAVENOUS; SUBCUTANEOUS
Status: DISCONTINUED | OUTPATIENT
Start: 2019-08-06 | End: 2019-08-07 | Stop reason: HOSPADM

## 2019-08-06 RX ORDER — PROMETHAZINE HYDROCHLORIDE 12.5 MG/1
12.5 TABLET ORAL EVERY 6 HOURS
Status: DISCONTINUED | OUTPATIENT
Start: 2019-08-06 | End: 2019-08-07 | Stop reason: HOSPADM

## 2019-08-06 RX ORDER — MIDAZOLAM HYDROCHLORIDE 1 MG/ML
2 INJECTION INTRAMUSCULAR; INTRAVENOUS
Status: DISCONTINUED | OUTPATIENT
Start: 2019-08-06 | End: 2019-08-06 | Stop reason: HOSPADM

## 2019-08-06 RX ORDER — MORPHINE SULFATE 1 MG/ML
INJECTION, SOLUTION EPIDURAL; INTRATHECAL; INTRAVENOUS AS NEEDED
Status: DISCONTINUED | OUTPATIENT
Start: 2019-08-06 | End: 2019-08-06 | Stop reason: SURG

## 2019-08-06 RX ORDER — PROMETHAZINE HYDROCHLORIDE 25 MG/ML
6.25 INJECTION, SOLUTION INTRAMUSCULAR; INTRAVENOUS EVERY 6 HOURS
Status: DISCONTINUED | OUTPATIENT
Start: 2019-08-06 | End: 2019-08-07 | Stop reason: HOSPADM

## 2019-08-06 RX ORDER — DOCUSATE SODIUM 100 MG/1
100 CAPSULE, LIQUID FILLED ORAL 2 TIMES DAILY PRN
Status: DISCONTINUED | OUTPATIENT
Start: 2019-08-06 | End: 2019-08-07 | Stop reason: HOSPADM

## 2019-08-06 RX ORDER — EPHEDRINE SULFATE 50 MG/ML
5 INJECTION, SOLUTION INTRAVENOUS ONCE AS NEEDED
Status: DISCONTINUED | OUTPATIENT
Start: 2019-08-06 | End: 2019-08-06 | Stop reason: HOSPADM

## 2019-08-06 RX ORDER — OXYCODONE HYDROCHLORIDE AND ACETAMINOPHEN 5; 325 MG/1; MG/1
1 TABLET ORAL EVERY 4 HOURS PRN
Status: DISCONTINUED | OUTPATIENT
Start: 2019-08-06 | End: 2019-08-07 | Stop reason: HOSPADM

## 2019-08-06 RX ORDER — KETOROLAC TROMETHAMINE 30 MG/ML
INJECTION, SOLUTION INTRAMUSCULAR; INTRAVENOUS AS NEEDED
Status: DISCONTINUED | OUTPATIENT
Start: 2019-08-06 | End: 2019-08-06 | Stop reason: SURG

## 2019-08-06 RX ORDER — PHENAZOPYRIDINE HYDROCHLORIDE 200 MG/1
200 TABLET, FILM COATED ORAL ONCE
Status: COMPLETED | OUTPATIENT
Start: 2019-08-06 | End: 2019-08-06

## 2019-08-06 RX ORDER — SODIUM CHLORIDE, SODIUM LACTATE, POTASSIUM CHLORIDE, CALCIUM CHLORIDE 600; 310; 30; 20 MG/100ML; MG/100ML; MG/100ML; MG/100ML
9 INJECTION, SOLUTION INTRAVENOUS CONTINUOUS
Status: DISCONTINUED | OUTPATIENT
Start: 2019-08-06 | End: 2019-08-06 | Stop reason: HOSPADM

## 2019-08-06 RX ORDER — SODIUM CHLORIDE, SODIUM LACTATE, POTASSIUM CHLORIDE, CALCIUM CHLORIDE 600; 310; 30; 20 MG/100ML; MG/100ML; MG/100ML; MG/100ML
100 INJECTION, SOLUTION INTRAVENOUS CONTINUOUS
Status: DISCONTINUED | OUTPATIENT
Start: 2019-08-06 | End: 2019-08-07 | Stop reason: HOSPADM

## 2019-08-06 RX ORDER — LIDOCAINE HYDROCHLORIDE 10 MG/ML
0.5 INJECTION, SOLUTION EPIDURAL; INFILTRATION; INTRACAUDAL; PERINEURAL ONCE AS NEEDED
Status: DISCONTINUED | OUTPATIENT
Start: 2019-08-06 | End: 2019-08-06 | Stop reason: HOSPADM

## 2019-08-06 RX ORDER — CLINDAMYCIN PHOSPHATE 600 MG/50ML
600 INJECTION INTRAVENOUS ONCE
Status: COMPLETED | OUTPATIENT
Start: 2019-08-06 | End: 2019-08-06

## 2019-08-06 RX ORDER — LABETALOL HYDROCHLORIDE 5 MG/ML
5 INJECTION, SOLUTION INTRAVENOUS
Status: DISCONTINUED | OUTPATIENT
Start: 2019-08-06 | End: 2019-08-06 | Stop reason: HOSPADM

## 2019-08-06 RX ORDER — ONDANSETRON 2 MG/ML
INJECTION INTRAMUSCULAR; INTRAVENOUS AS NEEDED
Status: DISCONTINUED | OUTPATIENT
Start: 2019-08-06 | End: 2019-08-06 | Stop reason: SURG

## 2019-08-06 RX ORDER — LEVOTHYROXINE SODIUM 0.1 MG/1
100 TABLET ORAL
Status: DISCONTINUED | OUTPATIENT
Start: 2019-08-07 | End: 2019-08-07 | Stop reason: HOSPADM

## 2019-08-06 RX ORDER — PROPOFOL 10 MG/ML
VIAL (ML) INTRAVENOUS AS NEEDED
Status: DISCONTINUED | OUTPATIENT
Start: 2019-08-06 | End: 2019-08-06 | Stop reason: SURG

## 2019-08-06 RX ORDER — FAMOTIDINE 10 MG/ML
20 INJECTION, SOLUTION INTRAVENOUS ONCE
Status: COMPLETED | OUTPATIENT
Start: 2019-08-06 | End: 2019-08-06

## 2019-08-06 RX ORDER — PROMETHAZINE HYDROCHLORIDE 25 MG/ML
12.5 INJECTION, SOLUTION INTRAMUSCULAR; INTRAVENOUS EVERY 6 HOURS
Status: DISCONTINUED | OUTPATIENT
Start: 2019-08-06 | End: 2019-08-07 | Stop reason: HOSPADM

## 2019-08-06 RX ORDER — ROCURONIUM BROMIDE 10 MG/ML
INJECTION, SOLUTION INTRAVENOUS AS NEEDED
Status: DISCONTINUED | OUTPATIENT
Start: 2019-08-06 | End: 2019-08-06 | Stop reason: SURG

## 2019-08-06 RX ORDER — DEXAMETHASONE SODIUM PHOSPHATE 10 MG/ML
INJECTION INTRAMUSCULAR; INTRAVENOUS AS NEEDED
Status: DISCONTINUED | OUTPATIENT
Start: 2019-08-06 | End: 2019-08-06 | Stop reason: SURG

## 2019-08-06 RX ADMIN — ROCURONIUM BROMIDE 40 MG: 10 INJECTION INTRAVENOUS at 12:01

## 2019-08-06 RX ADMIN — IBUPROFEN 600 MG: 600 TABLET ORAL at 19:53

## 2019-08-06 RX ADMIN — ALFENTANIL HYDROCHLORIDE 500 MCG: 500 INJECTION INTRAVENOUS at 12:15

## 2019-08-06 RX ADMIN — MORPHINE SULFATE 2 MG: 1 INJECTION EPIDURAL; INTRATHECAL; INTRAVENOUS at 12:46

## 2019-08-06 RX ADMIN — ONDANSETRON HYDROCHLORIDE 4 MG: 2 SOLUTION INTRAMUSCULAR; INTRAVENOUS at 15:45

## 2019-08-06 RX ADMIN — GLYCOPYRROLATE 0.4 MG: 0.2 INJECTION INTRAMUSCULAR; INTRAVENOUS at 14:19

## 2019-08-06 RX ADMIN — DEXAMETHASONE SODIUM PHOSPHATE 8 MG: 10 INJECTION INTRAMUSCULAR; INTRAVENOUS at 12:15

## 2019-08-06 RX ADMIN — OXYCODONE HYDROCHLORIDE AND ACETAMINOPHEN 1 TABLET: 5; 325 TABLET ORAL at 22:45

## 2019-08-06 RX ADMIN — CLINDAMYCIN PHOSPHATE 600 MG: 12 INJECTION, SOLUTION INTRAVENOUS at 12:09

## 2019-08-06 RX ADMIN — NEOSTIGMINE METHYLSULFATE 4 MG: 1 INJECTION INTRAMUSCULAR; INTRAVENOUS; SUBCUTANEOUS at 14:19

## 2019-08-06 RX ADMIN — SODIUM CHLORIDE, POTASSIUM CHLORIDE, SODIUM LACTATE AND CALCIUM CHLORIDE: 600; 310; 30; 20 INJECTION, SOLUTION INTRAVENOUS at 13:17

## 2019-08-06 RX ADMIN — KETOROLAC TROMETHAMINE 30 MG: 30 INJECTION, SOLUTION INTRAMUSCULAR; INTRAVENOUS at 13:38

## 2019-08-06 RX ADMIN — MORPHINE SULFATE 2 MG: 1 INJECTION EPIDURAL; INTRATHECAL; INTRAVENOUS at 14:40

## 2019-08-06 RX ADMIN — SODIUM CHLORIDE, POTASSIUM CHLORIDE, SODIUM LACTATE AND CALCIUM CHLORIDE 100 ML/HR: 600; 310; 30; 20 INJECTION, SOLUTION INTRAVENOUS at 17:14

## 2019-08-06 RX ADMIN — MORPHINE SULFATE 1 MG: 1 INJECTION EPIDURAL; INTRATHECAL; INTRAVENOUS at 13:20

## 2019-08-06 RX ADMIN — PROMETHAZINE HYDROCHLORIDE 12.5 MG: 12.5 TABLET ORAL at 22:45

## 2019-08-06 RX ADMIN — FAMOTIDINE 20 MG: 10 INJECTION INTRAVENOUS at 11:09

## 2019-08-06 RX ADMIN — SODIUM CHLORIDE, POTASSIUM CHLORIDE, SODIUM LACTATE AND CALCIUM CHLORIDE 9 ML/HR: 600; 310; 30; 20 INJECTION, SOLUTION INTRAVENOUS at 09:35

## 2019-08-06 RX ADMIN — MORPHINE SULFATE 1 MG: 1 INJECTION EPIDURAL; INTRATHECAL; INTRAVENOUS at 13:46

## 2019-08-06 RX ADMIN — ALFENTANIL HYDROCHLORIDE 500 MCG: 500 INJECTION INTRAVENOUS at 11:57

## 2019-08-06 RX ADMIN — PHENAZOPYRIDINE 200 MG: 200 TABLET ORAL at 11:15

## 2019-08-06 RX ADMIN — PROPOFOL 100 MG: 10 INJECTION, EMULSION INTRAVENOUS at 11:45

## 2019-08-06 RX ADMIN — ONDANSETRON 4 MG: 2 INJECTION INTRAMUSCULAR; INTRAVENOUS at 13:20

## 2019-08-06 RX ADMIN — DOCUSATE SODIUM 100 MG: 100 CAPSULE, LIQUID FILLED ORAL at 19:53

## 2019-08-06 RX ADMIN — GENTAMICIN SULFATE 90 MG: 40 INJECTION, SOLUTION INTRAMUSCULAR; INTRAVENOUS at 11:24

## 2019-08-06 NOTE — ANESTHESIA POSTPROCEDURE EVALUATION
Patient: Marci SINGH Kolb    Procedure Summary     Date:  08/06/19 Room / Location:  [facility] OR [address] [place] [address] MAIN OR    Anesthesia Start:  1155 Anesthesia Stop:  1451    Procedures:       TOTAL LAPAROSCOPIC HYSTERECTOMY BILATERAL SALPINGO OOPHORECTOMY TENSION FREE VAGINAL TAPING (N/A Abdomen)      POSSIBLE ANTERIOR AND POSTERIOR VAGINAL REPAIR UTEROSACRAL SUSPENSION (N/A Vagina) Diagnosis:      Surgeon:  Kristine Baker MD Provider:  Annamaria Judge MD    Anesthesia Type:  general ASA Status:  3          Anesthesia Type: general  Last vitals  BP   147/85 (08/06/19 1600)   Temp   36.6 °C (97.9 °F) (08/06/19 1448)   Pulse   57 (08/06/19 1600)   Resp   16 (08/06/19 1600)     SpO2   96 % (08/06/19 1600)     Post Anesthesia Care and Evaluation    Patient location during evaluation: PACU  Patient participation: complete - patient participated  Level of consciousness: awake and alert  Pain management: adequate  Airway patency: patent  Anesthetic complications: No anesthetic complications    Cardiovascular status: acceptable  Respiratory status: acceptable  Hydration status: acceptable    Comments: --------------------            08/06/19               1600     --------------------   BP:       147/85     Pulse:      57       Resp:       16       Temp:                SpO2:      96%      --------------------

## 2019-08-06 NOTE — PLAN OF CARE
Problem: Patient Care Overview  Goal: Plan of Care Review  Outcome: Ongoing (interventions implemented as appropriate)   08/06/19 8345   Coping/Psychosocial   Plan of Care Reviewed With patient;spouse   Plan of Care Review   Progress improving   OTHER   Outcome Summary Umbilical site draining upon arrival from PACU, covered with 2x2 gauze, no further drainage. Vaginal packing in place. Reynoso catheter draining orange urine, to be removed in AM. C/o chronic back pain, no incisional pain, resting comfortably between care. C/o nausea when awakened, but received Zofran in PACU. VSS. IVF infusing.      Goal: Individualization and Mutuality  Outcome: Ongoing (interventions implemented as appropriate)    Goal: Discharge Needs Assessment  Outcome: Ongoing (interventions implemented as appropriate)    Goal: Interprofessional Rounds/Family Conf  Outcome: Ongoing (interventions implemented as appropriate)      Problem: Hysterectomy (Adult)  Goal: Signs and Symptoms of Listed Potential Problems Will be Absent, Minimized or Managed (Hysterectomy)  Outcome: Ongoing (interventions implemented as appropriate)    Goal: Anesthesia/Sedation Recovery  Outcome: Outcome(s) achieved Date Met: 08/06/19

## 2019-08-06 NOTE — OP NOTE
GYN OPERATIVE NOTE     Date of surgery:8/6/2019   Patient ID: Marci Kolb    YOB: 1945   MRN: [id number]      Pre-op Dx:    1. pelvic organ prolapse  2. KARL     Post-op Dx:  same     Procedure:   1) Total laparoscopic hysterectomy  2) Bilateral salpingo-oophorectomy  3) uterosacral ligament suspension  4) Cystoscopy  5) Anterior repair  6) Posterior repair  7) TVT - exact     Surgeon: Dr. Baker  Asst: Ellie Ware CSA  Fellow: Dr. Kirsten Hayward  Anes: GETA  Antibiotics: Ancef    IVF: 1000cc  EBL: 100cc  UOP: 250cc  Findings: small, mobile and anteverted. Normal fallopian tubes and ovaries. Significant diverticulosis throughout sigmoid colon. On cystoscopy bladder intact and ureters patent bilaterally, no evidence of sling material in bladder.    Specimen: Uterus, Cervix, bilateral fallopian tubes and ovaries  Complications: none  Status: Stable to PACU     PROCEDURE IN DETAIL     Patient was taken to the operating room where anesthesia was induced without difficulty.  Patient was then placed in dorsal lithotomy positioning using tmi stirrups. Prepped and draped in the normal fashion. The hernández was placed at the beginning of the case without any difficulty. A hulka manipulator was placed without difficulty for uterine manipulation.      Attention was then turned to the abomen. An incision was made at the base of the umbilicus and the Veress needle was used for entry. Correct positioning was verified by the pressure noted on the system. The abdomen was insufflated to a pressure of 15mmHg. A 5mm trocar was placed through the umbilicus. Next, under direct visualization a 5mm port was placed in the right lower quadrant and a 10mm port was placed in the left lower quadrant under direct visualization. A survey of the pelvis revealed findings as above. Both ureters were observed prior to starting the case.      Attention was turned to the cul de sac where the uterosacral ligaments were identified and  noted to be well away from the ureters bilaterally. Hemo-clips were placed on the uterosacral ligaments to map their course.    Attention was turned to the right side where the IP was transected with Harmonic scalpel.  A similar technique was used to desiccate and transect the round ligament which was carried down to the anterior leaf of the broad ligament and subsequently was then  intimate with the scarring from the bladder to the anterior portion of the uterus. These adhesions were carefully taken down using the harmonic scalpel and the bladder flap was developed. The posterior leaf of the broad ligament was taken down to the level of the uterine vessels. The uterine vessels were skeletonized and coagulated and transected using the harmonic scalpel. These vessels were carefully lateralized and hemostasis was noted.        The procedure was repeated on the left side exactly as it was performed on the right side. Attention was then turned to the bladder flap. Dissection of the bladder was performed until visualization of the pubocervical fascia was clearly noted. At this time we placed the sponge stick anterior to the cervix to delineate the level of the vagina and the anterior colpotomy was performed. The sponge stick was then placed in the posterior cul-de-sac and the posterior culdotomy was made. The colpotomy was then completed by connecting both the anterior and posterior incisions.      The specimen was then removed from the pelvis and sent for permanent pathology.    The vaginal cuff was closed with 0 vicryl suture x4 noting to incorporate the demarcated uterosacral ligaments with each stitch. Next a single suture of 0-Gortex was placed on the anterior pubocervical fascia, through the posterior fascia and the uterosacral ligament on the right. This was repeated on the left. At this point we confirmed that we maintained hemostasis from above. The umbilical port and left lower quadrant port were closed using  the Rafael Musa device under direct visualization. All laparoscopic instruments were removed from the field and counts were correct x 2.     Cystoscopy was performed. The hernádnez catheter was removed and cystoscope was inserted. Complete evaluation of the bladder mucosa was performed. On inspection the bladder mucosa was noted to be intact. Both ureteral orifices were identified. Prompt excretion of orange-tinged urine from both ureteral orifices was noted. Cystoscope was withdrawn and Hernández catheter was replaced.     Attention was then turned to the posterior area for posterior repair with rectal plication. Allis clamps were placed on the posterior vagina. Hydrodissection was used in the posterior vaginal midline.  Incision was made with the scalpel in the perineal body in a gwen-like fashion and the scalpel was then used to incise the posterior vaginal midline to perform submucosal dissection up toward the vaginal cuff. Allis clamps were placed on the vaginal mucosa. Sharp and blunt dissection was used between the underlying fascia and the vaginal mucosa. Rectal plication was performed with one finger in the rectum to ensure that the suture did not penetrate this structure. The overlying fascia was re-approximated using interrupted sutures. Excess vaginal tissue was then trimmed and the vaginal mucosa was closed using a running vicryl suture. We then reapproximated the perineal body using with 3-0 vicryl. The perineal skin was then reapproximated using a 3-0 Vicryl in a running subcuticular fashion.     Next, attention was turned to the TVT. The anterior vaginal mucosa beneath the midurethra was infiltrated with 0.5% Marcaine with epinephrine. A vertical midline incision was made beneath the midurethra. Careful submucosal dissection was performed to identify the endopelvic fascia. TVT trocar was then placed to the right of the urethra, into the space of Retzius, across the back of the pubis to exit through  the skin, approximately 2.5 cm to the right of midline. Identical process was performed to the left of the urethra without difficulty. Both trocars were left in place. Reynoso catheter was removed and cystoscope was inserted. Complete evaluation of the bladder mucosa was performed. On inspection there were no lacerations, dimpling, tears, bleeding or trocar perforation of the mucosa or muscular layers bilaterally. Both ureteral orifices were identified. Prompt excretion of orange-tinged urine from both ureteral orifices was noted. Cystoscope was withdrawn. Both trocars were probed through the abdominal skin to place the vaginal tape beneath the midurethra. Careful tensioning of the tape was performed using Phelps scissors placed vertically beneath the urethra to allow proper tensioning. Sheaths were removed from both mesh tapes which were then trimmed to the level of the skin. Next the vaginal mucosa was trimmed then closed using a running 2-0 vicryl suture. Reynoso catheter was then reinserted    Next, attention was turned to the anterior repair.  The existing vertical midline incision was made beneath the midurethra and extended toward the cervix. Next Allis clamps were placed on the vaginal mucosa.  Sharp and blunt dissection was used between the underlying fascia and the vaginal mucosa. Plication was performed taking care to ensure that the suture did not penetrate the urethra or vaginal mucosa. The overlying fascia was re-approximated using interrupted sutures. The excess vaginal mucosa was trimmed then closed using a running 2-0 vicryl suture. The vaginal mucosa was closed using 2-0 vicryl suture.    Hemostasis was observed and vaginal packing placed.    All sponge and needle counts were correct x 2     Dr. Baker was scrubbed and present throughout the entire procedure     Kirsten Hayward MD-[facility] Fellow

## 2019-08-06 NOTE — INTERVAL H&P NOTE
Interval H&P    I have reviewed the H&P from 7/29/2019 and there are no interval changes noted.  Pt. scheduled for TLH, BSO, USLS, AP repair, altis..  Place SCDs, IV, and administer gent and clinda  30-60 min prior to scheduled procedure.  Consent form reviewed, signed, and placed in chart.  Proceed to OR.     Kirsten Hayward MD- [facility] Fellow  [phone]

## 2019-08-06 NOTE — ANESTHESIA PREPROCEDURE EVALUATION
Anesthesia Evaluation     Patient summary reviewed and Nursing notes reviewed   NPO Solid Status: > 8 hours  NPO Liquid Status: > 2 hours           Airway   Mallampati: II  TM distance: >3 FB  Neck ROM: full  no difficulty expected  Dental - normal exam     Pulmonary - normal exam   (+) a smoker Former,   Cardiovascular - normal exam    (+) PVD, hyperlipidemia,       Neuro/Psych  (+) headaches, numbness, psychiatric history Depression,     GI/Hepatic/Renal/Endo    (+)   hypothyroidism,     Musculoskeletal     (+) neck pain,   Abdominal  - normal exam   Substance History      OB/GYN          Other   (+) arthritis                     Anesthesia Plan    ASA 3     general     intravenous induction   Anesthetic plan, all risks, benefits, and alternatives have been provided, discussed and informed consent has been obtained with: patient and spouse/significant other.

## 2019-08-07 VITALS
RESPIRATION RATE: 18 BRPM | OXYGEN SATURATION: 94 % | BODY MASS INDEX: 24.74 KG/M2 | WEIGHT: 148.5 LBS | SYSTOLIC BLOOD PRESSURE: 95 MMHG | TEMPERATURE: 98.8 F | DIASTOLIC BLOOD PRESSURE: 58 MMHG | HEART RATE: 69 BPM | HEIGHT: 65 IN

## 2019-08-07 PROBLEM — N30.90 CYSTITIS: Status: RESOLVED | Noted: 2018-04-28 | Resolved: 2019-08-07

## 2019-08-07 PROBLEM — J98.11 ATELECTASIS: Status: RESOLVED | Noted: 2018-01-27 | Resolved: 2019-08-07

## 2019-08-07 LAB
ANION GAP SERPL CALCULATED.3IONS-SCNC: 12.1 MMOL/L (ref 5–15)
BUN BLD-MCNC: 11 MG/DL (ref 8–23)
BUN/CREAT SERPL: 14.3 (ref 7–25)
CALCIUM SPEC-SCNC: 9.1 MG/DL (ref 8.6–10.5)
CHLORIDE SERPL-SCNC: 106 MMOL/L (ref 98–107)
CO2 SERPL-SCNC: 22.9 MMOL/L (ref 22–29)
CREAT BLD-MCNC: 0.77 MG/DL (ref 0.57–1)
CYTO UR: NORMAL
DEPRECATED RDW RBC AUTO: 49.1 FL (ref 37–54)
ERYTHROCYTE [DISTWIDTH] IN BLOOD BY AUTOMATED COUNT: 13.1 % (ref 12.3–15.4)
GFR SERPL CREATININE-BSD FRML MDRD: 73 ML/MIN/1.73
GLUCOSE BLD-MCNC: 116 MG/DL (ref 65–99)
HCT VFR BLD AUTO: 38.3 % (ref 34–46.6)
HGB BLD-MCNC: 12 G/DL (ref 12–15.9)
LAB AP CASE REPORT: NORMAL
MCH RBC QN AUTO: 31.7 PG (ref 26.6–33)
MCHC RBC AUTO-ENTMCNC: 31.3 G/DL (ref 31.5–35.7)
MCV RBC AUTO: 101.3 FL (ref 79–97)
PATH REPORT.FINAL DX SPEC: NORMAL
PATH REPORT.GROSS SPEC: NORMAL
PLATELET # BLD AUTO: 231 10*3/MM3 (ref 140–450)
PMV BLD AUTO: 10.2 FL (ref 6–12)
POTASSIUM BLD-SCNC: 4.4 MMOL/L (ref 3.5–5.2)
RBC # BLD AUTO: 3.78 10*6/MM3 (ref 3.77–5.28)
SODIUM BLD-SCNC: 141 MMOL/L (ref 136–145)
WBC NRBC COR # BLD: 11.56 10*3/MM3 (ref 3.4–10.8)

## 2019-08-07 PROCEDURE — 80048 BASIC METABOLIC PNL TOTAL CA: CPT | Performed by: OBSTETRICS & GYNECOLOGY

## 2019-08-07 PROCEDURE — 85027 COMPLETE CBC AUTOMATED: CPT | Performed by: OBSTETRICS & GYNECOLOGY

## 2019-08-07 RX ORDER — NITROFURANTOIN 25; 75 MG/1; MG/1
100 CAPSULE ORAL 2 TIMES DAILY
Qty: 6 CAPSULE | Refills: 0 | Status: SHIPPED | OUTPATIENT
Start: 2019-08-07 | End: 2019-08-10

## 2019-08-07 RX ORDER — DOCUSATE SODIUM 100 MG/1
100 CAPSULE, LIQUID FILLED ORAL DAILY PRN
Qty: 60 CAPSULE | Refills: 0 | Status: SHIPPED | OUTPATIENT
Start: 2019-08-07 | End: 2019-12-30

## 2019-08-07 RX ORDER — ACETAMINOPHEN 500 MG
1000 TABLET ORAL EVERY 6 HOURS PRN
Qty: 60 TABLET | Refills: 0 | Status: SHIPPED | OUTPATIENT
Start: 2019-08-07 | End: 2019-12-30

## 2019-08-07 RX ORDER — OXYCODONE HYDROCHLORIDE 5 MG/1
5 TABLET ORAL EVERY 6 HOURS PRN
Qty: 20 TABLET | Refills: 0 | Status: SHIPPED | OUTPATIENT
Start: 2019-08-07 | End: 2019-12-30

## 2019-08-07 RX ORDER — IBUPROFEN 600 MG/1
600 TABLET ORAL EVERY 6 HOURS PRN
Qty: 60 TABLET | Refills: 1 | Status: SHIPPED | OUTPATIENT
Start: 2019-08-07 | End: 2019-12-30

## 2019-08-07 RX ADMIN — SODIUM CHLORIDE, POTASSIUM CHLORIDE, SODIUM LACTATE AND CALCIUM CHLORIDE 100 ML/HR: 600; 310; 30; 20 INJECTION, SOLUTION INTRAVENOUS at 03:22

## 2019-08-07 RX ADMIN — OXYCODONE HYDROCHLORIDE AND ACETAMINOPHEN 1 TABLET: 5; 325 TABLET ORAL at 14:49

## 2019-08-07 RX ADMIN — IBUPROFEN 600 MG: 600 TABLET ORAL at 07:38

## 2019-08-07 RX ADMIN — LEVOTHYROXINE SODIUM 100 MCG: 100 TABLET ORAL at 07:38

## 2019-08-07 RX ADMIN — OXYCODONE HYDROCHLORIDE AND ACETAMINOPHEN 1 TABLET: 5; 325 TABLET ORAL at 08:41

## 2019-08-07 NOTE — PROGRESS NOTES
Discharge Planning Assessment  [facility]     Patient Name: Marci Kolb  MRN: [id number]  Today's Date: 8/7/2019    Admit Date: 8/6/2019      Discharge Plan     Row Name 08/07/19 1041       Plan    Plan Comments  Discharge order. No discharge needs identified.    Final Discharge Disposition Code  01 - home or self-care          Expected Discharge Date and Time     Expected Discharge Date Expected Discharge Time    Aug 7, 2019      Inge Arevalo RN

## 2019-08-07 NOTE — PROGRESS NOTES
"GYN Postop Progress Note   ID: Marci Kolb is a 74 y.o.  now POD#1 s/p TLH, BSO, USLS, A/P repait, TVT with exact.      S: Doing well this AM. Pain well controlled on PO meds. Tolerating regular diet without N/V. Denies CP, SOB, fevers and chills. Has ambulated and passed flatus. Reynoso catheter removed this AM and she has not yet voided.     O:  Vitals range:  Temp:  [96.9 °F (36.1 °C)-98 °F (36.7 °C)] 98 °F (36.7 °C)  Heart Rate:  [49-65] 56  Resp:  [12-18] 18  BP: (100-158)/(59-87) 106/60     Current Vitals:  /60 (BP Location: Right arm, Patient Position: Lying)   Pulse 56   Temp 98 °F (36.7 °C) (Oral)   Resp 18   Ht 165.1 cm (65\")   Wt 67.4 kg (148 lb 8 oz)   SpO2 96%   BMI 24.71 kg/m²      Scheduled Meds:  levothyroxine 100 mcg Oral Q AM   promethazine 6.25 mg Intravenous Q6H   Or      promethazine 12.5 mg Intramuscular Q6H   Or      promethazine 12.5 mg Rectal Q6H   Or      promethazine 12.5 mg Oral Q6H     Continuous Infusions:  lactated ringers 100 mL/hr    lactated ringers 100 mL/hr Last Rate: 100 mL/hr (08/07/19 0322)     PRN Meds:.•  docusate sodium  •  HYDROmorphone **AND** naloxone  •  ibuprofen  •  oxyCODONE-acetaminophen    Exam:  Gen: Well appearing. NAD  Lungs: even, unlabored respirations  Abd: Soft, non-tender, non-distended. Appropriately tender to palpation. Small incisions are c/d/i  Pelvic: No vaginal bleeding. Vaginal packing in   Extremities: no c/c/e with SCDs in place     Labs:  CBC wnl and BMP pending     A/P:  Recovering well postoperatively  Meeting all postoperative milestones, however awaiting voiding trial and BMP.   Plan for discharge home to self care today.      Kirsten Hayward MD  8/7/2019  7:23 AM             "

## 2019-08-07 NOTE — DISCHARGE SUMMARY
"Inpatient Discharge Summary     BRIEF OVERVIEW  Admitting Provider: Kristine Baker MD  Discharge Provider: Kristine Baker MD     Admission Date:8/6/2019  Discharge Date: 8/7/2019      Primary Discharge Diagnosis  Pelvic organ prolapse  KARL     Secondary Discharge Diagnosis  Same     Discharge Disposition  Home to self care  Code Status at Discharge: Full code     Active Issues Requiring Follow-up  None     Outpatient Follow-Up  F/u with Dr. Kristine Baker in 2 weeks postop     Test Results Pending at Discharge  CBC stable, BMP and void trial pending    DETAILS OF HOSPITAL STAY     Presenting Problem/History of Present Illness  Patient Active Problem List   Diagnosis   • Diverticulosis of intestine   • Cephalalgia   • Hyperlipidemia   • Hypothyroidism   • Knee swelling   • Low back pain   • Cervical pain   • Sciatica   • Arthralgia of hip   • Hyperglycemia   • Status post hysterectomy       Hospital Course  Patient was transferred to her room from PACU in stable condition. Her pain was well controlled on PO medications. She returned quickly to ambulating and was able to tolerate a regular diet without N/V. She has passed flatus. Her VSS remained stable and WNL. Reynoso catheter was removed and void trial pending.   She was deemed stable for discharge home to self care on POD#1 as she is meeting all postoperative milestones with no evidence of hemodynamic instability or infection.       Operative Procedures Performed  Procedure(s): TLH, BSO, USLS, A/P repait, TVT with exact.     Treatments: surgery: as above  Consults: none  Procedures: none  Pertinent Test Results: CBC stable and BMP pending     Physical Exam at Discharge  /60 (BP Location: Right arm, Patient Position: Lying)   Pulse 56   Temp 98 °F (36.7 °C) (Oral)   Resp 18   Ht 165.1 cm (65\")   Wt 67.4 kg (148 lb 8 oz)   SpO2 96%   BMI 24.71 kg/m²    Gen: well appearing. NAD  Lungs: even, unlabored respirations  Abd: soft, non-tender, non-distended. No " rebound or guarding.  Incision: C/D/I  Pelvic: no vaginal bleeding. Vaginal packing in   Extremities: WWP    Discharge Condition: good        Your medication list      START taking these medications      Instructions Last Dose Given Next Dose Due   acetaminophen 500 MG tablet  Commonly known as:  TYLENOL      Take 2 tablets by mouth Every 6 (Six) Hours As Needed for Moderate Pain .       docusate sodium 100 MG capsule  Commonly known as:  COLACE      Take 1 capsule by mouth Daily As Needed for Constipation.       ibuprofen 600 MG tablet  Commonly known as:  ADVIL,MOTRIN      Take 1 tablet by mouth Every 6 (Six) Hours As Needed for Moderate Pain .       nitrofurantoin (macrocrystal-monohydrate) 100 MG capsule  Commonly known as:  MACROBID      Take 1 capsule by mouth 2 (Two) Times a Day for 3 days.       oxyCODONE 5 MG immediate release tablet  Commonly known as:  ROXICODONE      Take 1 tablet by mouth Every 6 (Six) Hours As Needed for Severe Pain .          CONTINUE taking these medications      Instructions Last Dose Given Next Dose Due   BIOFREEZE EX      Apply 1 application topically As Needed.       levothyroxine 100 MCG tablet  Commonly known as:  SYNTHROID, LEVOTHROID      Take 100 mcg by mouth Daily. PT STATES WAS PRESCRIBED BID, BUT TOLD DR MENDOSA SHE ONLY TAKES DAILY       methocarbamol 500 MG tablet  Commonly known as:  ROBAXIN      Take 500 mg by mouth Every 4 (Four) Hours As Needed for Muscle Spasms.       NON FORMULARY      Take 1 tablet by mouth Daily. TRANQUILLIS       NON FORMULARY      Take 1 tablet by mouth 2 (Two) Times a Day With Meals. VIVISCAL       NON FORMULARY      Take 1 tablet by mouth Daily. VIENEASE       NON FORMULARY      Take 1 tablet by mouth Daily. SECENEX       simvastatin 40 MG tablet  Commonly known as:  ZOCOR      Take 40 mg by mouth Daily.          STOP taking these medications    meloxicam 15 MG tablet  Commonly known as:  MOBIC              Where to Get Your Medications       You can get these medications from any pharmacy    Bring a paper prescription for each of these medications  · acetaminophen 500 MG tablet  · docusate sodium 100 MG capsule  · ibuprofen 600 MG tablet  · nitrofurantoin (macrocrystal-monohydrate) 100 MG capsule  · oxyCODONE 5 MG immediate release tablet         Kirsten Hayward MD  8/7/2019  7:26 AM

## 2019-08-07 NOTE — PLAN OF CARE
Problem: Patient Care Overview  Goal: Plan of Care Review  Outcome: Ongoing (interventions implemented as appropriate)   08/07/19 0332   Coping/Psychosocial   Plan of Care Reviewed With patient   Plan of Care Review   Progress improving   OTHER   Outcome Summary VSS. Pain well controlled.No active bleeding at the umbilical area lap site. Vaginal packing in place with minimal bleeding. Reynoso catheter  draining to adequate clear yellow urine. Ambulated in the hallway. Regular diet tolerated. Slept on and off.     Goal: Individualization and Mutuality  Outcome: Ongoing (interventions implemented as appropriate)    Goal: Discharge Needs Assessment  Outcome: Ongoing (interventions implemented as appropriate)    Goal: Interprofessional Rounds/Family Conf  Outcome: Ongoing (interventions implemented as appropriate)      Problem: Hysterectomy (Adult)  Goal: Signs and Symptoms of Listed Potential Problems Will be Absent, Minimized or Managed (Hysterectomy)  Outcome: Ongoing (interventions implemented as appropriate)      Problem: Pain, Chronic (Adult)  Goal: Identify Related Risk Factors and Signs and Symptoms  Outcome: Ongoing (interventions implemented as appropriate)    Goal: Acceptable Pain/Comfort Level and Functional Ability  Outcome: Ongoing (interventions implemented as appropriate)

## 2019-08-19 RX ORDER — SIMVASTATIN 40 MG
TABLET ORAL
Qty: 30 TABLET | Refills: 1 | Status: SHIPPED | OUTPATIENT
Start: 2019-08-19 | End: 2019-10-28 | Stop reason: SDUPTHER

## 2019-10-28 RX ORDER — SIMVASTATIN 40 MG
TABLET ORAL
Qty: 30 TABLET | Refills: 0 | Status: SHIPPED | OUTPATIENT
Start: 2019-10-28 | End: 2019-12-10 | Stop reason: SDUPTHER

## 2019-12-02 RX ORDER — SIMVASTATIN 40 MG
TABLET ORAL
Qty: 30 TABLET | Refills: 0 | OUTPATIENT
Start: 2019-12-02

## 2019-12-10 ENCOUNTER — TELEPHONE (OUTPATIENT)
Dept: INTERNAL MEDICINE | Age: 74
End: 2019-12-10

## 2019-12-10 RX ORDER — LEVOTHYROXINE SODIUM 0.1 MG/1
100 TABLET ORAL DAILY
Qty: 30 TABLET | Refills: 0 | Status: SHIPPED | OUTPATIENT
Start: 2019-12-10 | End: 2019-12-30 | Stop reason: SDUPTHER

## 2019-12-10 RX ORDER — SIMVASTATIN 40 MG
40 TABLET ORAL NIGHTLY
Qty: 30 TABLET | Refills: 0 | Status: SHIPPED | OUTPATIENT
Start: 2019-12-10 | End: 2019-12-30 | Stop reason: SDUPTHER

## 2019-12-10 NOTE — TELEPHONE ENCOUNTER
Pt called stating she needs refills on simvastatin 40mg,levothyroxine sent to [phone] and if for some reason it gets denied she wants someone to call her tell her why.    Pls advise,    PT#[phone]

## 2019-12-10 NOTE — TELEPHONE ENCOUNTER
LOV r/t med 4/9/19  NOV  12/30/19    Pt advised she will only get #30 until she is seen in office for compliance. Pt verbalized understanding. MM

## 2019-12-30 ENCOUNTER — TELEPHONE (OUTPATIENT)
Dept: INTERNAL MEDICINE | Age: 74
End: 2019-12-30

## 2019-12-30 ENCOUNTER — OFFICE VISIT (OUTPATIENT)
Dept: INTERNAL MEDICINE | Age: 74
End: 2019-12-30

## 2019-12-30 VITALS
TEMPERATURE: 97.8 F | HEART RATE: 63 BPM | HEIGHT: 65 IN | BODY MASS INDEX: 25.83 KG/M2 | OXYGEN SATURATION: 98 % | WEIGHT: 155 LBS

## 2019-12-30 DIAGNOSIS — E03.9 ACQUIRED HYPOTHYROIDISM: ICD-10-CM

## 2019-12-30 DIAGNOSIS — R73.9 HYPERGLYCEMIA: ICD-10-CM

## 2019-12-30 DIAGNOSIS — Z12.31 ENCOUNTER FOR SCREENING MAMMOGRAM FOR MALIGNANT NEOPLASM OF BREAST: ICD-10-CM

## 2019-12-30 DIAGNOSIS — E78.2 MIXED HYPERLIPIDEMIA: Primary | ICD-10-CM

## 2019-12-30 LAB
ALBUMIN SERPL-MCNC: 4.4 G/DL (ref 3.5–5.2)
ALBUMIN/GLOB SERPL: 1.6 G/DL
ALP SERPL-CCNC: 83 U/L (ref 39–117)
ALT SERPL-CCNC: 24 U/L (ref 1–33)
AST SERPL-CCNC: 27 U/L (ref 1–32)
BILIRUB SERPL-MCNC: 0.5 MG/DL (ref 0.2–1.2)
BUN SERPL-MCNC: 11 MG/DL (ref 8–23)
BUN/CREAT SERPL: 11.7 (ref 7–25)
CALCIUM SERPL-MCNC: 9.8 MG/DL (ref 8.6–10.5)
CHLORIDE SERPL-SCNC: 101 MMOL/L (ref 98–107)
CHOLEST SERPL-MCNC: 137 MG/DL (ref 0–200)
CO2 SERPL-SCNC: 25.9 MMOL/L (ref 22–29)
CREAT SERPL-MCNC: 0.94 MG/DL (ref 0.57–1)
GLOBULIN SER CALC-MCNC: 2.8 GM/DL
GLUCOSE SERPL-MCNC: 100 MG/DL (ref 65–99)
HBA1C MFR BLD: 6 % (ref 4.8–5.6)
HDLC SERPL-MCNC: 48 MG/DL (ref 40–60)
LDLC SERPL CALC-MCNC: 72 MG/DL (ref 0–100)
POTASSIUM SERPL-SCNC: 3.9 MMOL/L (ref 3.5–5.2)
PROT SERPL-MCNC: 7.2 G/DL (ref 6–8.5)
SODIUM SERPL-SCNC: 140 MMOL/L (ref 136–145)
T4 FREE SERPL-MCNC: 1.18 NG/DL (ref 0.93–1.7)
TRIGL SERPL-MCNC: 86 MG/DL (ref 0–150)
TSH SERPL DL<=0.005 MIU/L-ACNC: 4.45 UIU/ML (ref 0.27–4.2)
VLDLC SERPL CALC-MCNC: 17.2 MG/DL

## 2019-12-30 PROCEDURE — 99214 OFFICE O/P EST MOD 30 MIN: CPT | Performed by: INTERNAL MEDICINE

## 2019-12-30 RX ORDER — SIMVASTATIN 40 MG
40 TABLET ORAL NIGHTLY
Qty: 90 TABLET | Refills: 1 | Status: SHIPPED | OUTPATIENT
Start: 2019-12-30 | End: 2020-10-27

## 2019-12-30 RX ORDER — LEVOTHYROXINE SODIUM 0.1 MG/1
100 TABLET ORAL DAILY
Qty: 90 TABLET | Refills: 1 | Status: SHIPPED | OUTPATIENT
Start: 2019-12-30 | End: 2020-07-27

## 2019-12-30 NOTE — PROGRESS NOTES
"Marci Kolb is a 74 y.o. female who presents with   Chief Complaint   Patient presents with   • Hyperlipidemia     Simvastatin 40 mg   • Hypothyroidism     Levothyroxine 100 mcg   • Blood Sugar Problem     Mild elevation in the past.  No prescription medication.   • Patient requests follow-up screening mammogram     Last mammogram October 2018.  Prior history of breast biopsies-benign.  No current problems or symptoms according to the patient.   .    74-year-old female.  6-month checkup/lab update visit.  No problems or complaints on today's visit.    Hyperlipidemia   This is a chronic problem. The current episode started more than 1 year ago. The problem is controlled. Recent lipid tests were reviewed and are normal. Exacerbating diseases include diabetes and hypothyroidism. Current antihyperlipidemic treatment includes statins. The current treatment provides moderate improvement of lipids. There are no compliance problems.    Hypothyroidism   This is a chronic problem. The current episode started more than 1 year ago. The problem has been unchanged. Treatments tried: As above.  Tolerated.  Efficacious.   Blood Sugar Problem   This is a chronic problem. The current episode started more than 1 year ago. The problem has been waxing and waning. She has tried nothing for the symptoms.        Pulse 63   Temp 97.8 °F (36.6 °C)   Ht 165.1 cm (65\")   Wt 70.3 kg (155 lb)   SpO2 98%   BMI 25.79 kg/m²     The following portions of the patient's history were reviewed and updated as appropriate: allergies, current medications, past medical history and problem list.    Review of Systems   Constitutional: Negative.    HENT: Negative.    Eyes: Negative.    Respiratory: Negative.    Cardiovascular: Negative.    Genitourinary: Negative.    Musculoskeletal: Negative.    Skin: Negative.    Neurological: Negative.    Psychiatric/Behavioral: Negative.        Objective   Physical Exam   Constitutional: She is oriented to " person, place, and time. She appears well-developed and well-nourished. No distress.   HENT:   Head: Normocephalic and atraumatic.   Eyes: Pupils are equal, round, and reactive to light. Conjunctivae and EOM are normal.   Neck: Normal range of motion. Neck supple. No thyromegaly present.   Neck exam negative.  Carotid auscultation normal-no bruits heard.   Cardiovascular: Normal rate, regular rhythm, normal heart sounds and intact distal pulses. Exam reveals no gallop and no friction rub.   No murmur heard.  Pulmonary/Chest: Effort normal and breath sounds normal. No respiratory distress. She has no wheezes. She has no rales. She exhibits no tenderness.   Neurological: She is alert and oriented to person, place, and time.   Psychiatric: She has a normal mood and affect. Her behavior is normal. Judgment and thought content normal.   Nursing note and vitals reviewed.      Assessment/Plan   Marci was seen today for hyperlipidemia, hypothyroidism, blood sugar problem and patient requests follow-up screening mammogram.    Diagnoses and all orders for this visit:    Mixed hyperlipidemia  -     simvastatin (ZOCOR) 40 MG tablet; Take 1 tablet by mouth Every Night.  -     Comprehensive Metabolic Panel  -     Lipid Panel    Acquired hypothyroidism  -     levothyroxine (SYNTHROID, LEVOTHROID) 100 MCG tablet; Take 1 tablet by mouth Daily. PT STATES WAS PRESCRIBED BID, BUT TOLD DR MENDOSA SHE ONLY TAKES DAILY  -     Comprehensive Metabolic Panel  -     TSH+Free T4    Hyperglycemia  -     Comprehensive Metabolic Panel  -     Hemoglobin A1c    Encounter for screening mammogram for malignant neoplasm of breast   -     Mammo Screening Bilateral With CAD; Future        Plan: Labs as above for the issues as outlined.  Continue current treatment.  Follow-up checkup/lab update visit-6 months.    Flu shot advised-declined.     Medicare wellness visit advised-declined.    The patient has not had a recent screening colonoscopy.  The patient  refuses to be referred to gastroenterology and to get a colonoscopy scheduled for the near future.  A COLOGUARD test was also declined as was the newer blood test- Epi pro Colon Septin 9 gene blood test.  The patient is aware of the risks of undiagnosed colon cancer including GI bleed, bowel obstruction, bowel perforation, metastatic colon cancer and death.  The patient voices an understanding of these risks but also voices a willingness to accept those risks based on the current decision to decline a colonoscopy.

## 2019-12-30 NOTE — TELEPHONE ENCOUNTER
Court w/[facility] Scheduling looked back in pt's chart & noticed that pt's past mammograms have all been diagnostic, not screening mammograms.    Pls advise if  wants to change order to a diagnostic mammogram.    Court can be reached #[phone].

## 2019-12-31 ENCOUNTER — TRANSCRIBE ORDERS (OUTPATIENT)
Dept: ADMINISTRATIVE | Facility: HOSPITAL | Age: 74
End: 2019-12-31

## 2019-12-31 DIAGNOSIS — Z12.31 SCREENING MAMMOGRAM, ENCOUNTER FOR: Primary | ICD-10-CM

## 2020-02-10 ENCOUNTER — APPOINTMENT (OUTPATIENT)
Dept: MAMMOGRAPHY | Facility: HOSPITAL | Age: 75
End: 2020-02-10

## 2020-03-06 ENCOUNTER — TELEPHONE (OUTPATIENT)
Dept: INTERNAL MEDICINE | Age: 75
End: 2020-03-06

## 2020-03-06 NOTE — TELEPHONE ENCOUNTER
Loan was contacted and advised that most recent imagining results was from 2018. Loan stated that she would be seen r/t hip pain. CT of Abd/Pelvis was then faxed to fax number provided after confirmation to send was obtained. GARETH

## 2020-03-06 NOTE — TELEPHONE ENCOUNTER
KAYLIE WITH [facility] IN [facility] CALLED REQUESTING PTS LAST MRI/X-RAY IMAGING RESULTS FAXED OVER TO THEM, HOWEVER PT WAS UNSURE OF IF IT WAS AN MRI OR X-RAY THAT WAS COMPLETED. FAX # [phone] CALL BACK [phone]. PLEASE ADVISE.

## 2020-03-18 ENCOUNTER — HOSPITAL ENCOUNTER (OUTPATIENT)
Dept: MAMMOGRAPHY | Facility: HOSPITAL | Age: 75
Discharge: HOME | End: 2020-03-18
Admitting: INTERNAL MEDICINE

## 2020-03-18 DIAGNOSIS — Z12.31 SCREENING MAMMOGRAM, ENCOUNTER FOR: ICD-10-CM

## 2020-03-18 PROCEDURE — 77067 SCR MAMMO BI INCL CAD: CPT

## 2020-03-18 PROCEDURE — 77063 BREAST TOMOSYNTHESIS BI: CPT

## 2020-04-02 ENCOUNTER — APPOINTMENT (OUTPATIENT)
Dept: LAB | Facility: HOSPITAL | Age: 75
End: 2020-04-02

## 2020-04-02 ENCOUNTER — OFFICE VISIT (OUTPATIENT)
Dept: INTERNAL MEDICINE | Age: 75
End: 2020-04-02

## 2020-04-02 VITALS
HEIGHT: 65 IN | TEMPERATURE: 97.5 F | BODY MASS INDEX: 26.26 KG/M2 | RESPIRATION RATE: 13 BRPM | OXYGEN SATURATION: 98 % | SYSTOLIC BLOOD PRESSURE: 120 MMHG | WEIGHT: 157.6 LBS | HEART RATE: 67 BPM | DIASTOLIC BLOOD PRESSURE: 78 MMHG

## 2020-04-02 DIAGNOSIS — N30.90 CYSTITIS: Primary | ICD-10-CM

## 2020-04-02 LAB
BACTERIA UR QL AUTO: ABNORMAL /HPF
BILIRUB UR QL STRIP: NEGATIVE
CLARITY UR: CLEAR
COLOR UR: YELLOW
GLUCOSE UR STRIP-MCNC: NEGATIVE MG/DL
HGB UR QL STRIP.AUTO: ABNORMAL
HYALINE CASTS UR QL AUTO: ABNORMAL /LPF
KETONES UR QL STRIP: NEGATIVE
LEUKOCYTE ESTERASE UR QL STRIP.AUTO: ABNORMAL
NITRITE UR QL STRIP: NEGATIVE
PH UR STRIP.AUTO: <=5 [PH] (ref 5–8)
PROT UR QL STRIP: NEGATIVE
RBC # UR: ABNORMAL /HPF
REF LAB TEST METHOD: ABNORMAL
SP GR UR STRIP: 1.02 (ref 1–1.03)
SQUAMOUS #/AREA URNS HPF: ABNORMAL /HPF
UROBILINOGEN UR QL STRIP: ABNORMAL
WBC UR QL AUTO: ABNORMAL /HPF

## 2020-04-02 PROCEDURE — 87086 URINE CULTURE/COLONY COUNT: CPT | Performed by: INTERNAL MEDICINE

## 2020-04-02 PROCEDURE — 81001 URINALYSIS AUTO W/SCOPE: CPT | Performed by: INTERNAL MEDICINE

## 2020-04-02 PROCEDURE — 99213 OFFICE O/P EST LOW 20 MIN: CPT | Performed by: INTERNAL MEDICINE

## 2020-04-02 RX ORDER — SULFAMETHOXAZOLE AND TRIMETHOPRIM 800; 160 MG/1; MG/1
1 TABLET ORAL 2 TIMES DAILY
Qty: 20 TABLET | Refills: 0 | OUTPATIENT
Start: 2020-04-02 | End: 2020-06-12

## 2020-04-02 NOTE — PROGRESS NOTES
Preliminary report on urinalysis shows the presence of mucus and microscopic blood.  Culture report pending.  Continue treatment as prescribed pending final culture report

## 2020-04-02 NOTE — PROGRESS NOTES
"  Marci Kolb is a 75 y.o. female who presents with   Chief Complaint   Patient presents with   • \"Aching after urination\"     7 days-no fever, chills, visible blood or burning with urination   .    75-year-old female presents with above symptoms and suspicion for possible UTI.       /78   Pulse 67   Temp 97.5 °F (36.4 °C)   Resp 13   Ht 165.1 cm (65\")   Wt 71.5 kg (157 lb 9.6 oz)   SpO2 98%   BMI 26.23 kg/m²     The following portions of the patient's history were reviewed and updated as appropriate: allergies, current medications and problem list.    Review of Systems   Constitutional: Negative.    Respiratory: Negative.    Cardiovascular: Negative.    Genitourinary:        History as noted above   Neurological: Negative.    Psychiatric/Behavioral: Negative.        Objective   Physical Exam   Constitutional: She is oriented to person, place, and time. She appears well-developed and well-nourished.   HENT:   Head: Normocephalic and atraumatic.   Eyes: Pupils are equal, round, and reactive to light. EOM are normal.   Cardiovascular: Normal rate.   Pulmonary/Chest: Effort normal and breath sounds normal.   Neurological: She is alert and oriented to person, place, and time.   Psychiatric: She has a normal mood and affect. Her behavior is normal. Judgment and thought content normal.   Nursing note and vitals reviewed.      Assessment/Plan   Marci was seen today for \"aching after urination\".    Diagnoses and all orders for this visit:    Cystitis  -     sulfamethoxazole-trimethoprim (BACTRIM DS,SEPTRA DS) 800-160 MG per tablet; Take 1 tablet by mouth 2 (Two) Times a Day.  -     Urinalysis With Culture If Indicated -      Plan: Urinalysis and culture if indicated.  Empiric therapy with Bactrim DS twice daily pending culture report.         "

## 2020-04-03 LAB — BACTERIA SPEC AEROBE CULT: NORMAL

## 2020-04-03 NOTE — PROGRESS NOTES
Urine culture shows the presence of a small amount of bacteria so there appears to be a low-grade urinary tract infection present.  I would continue current antibiotic as prescribed until it is all gone.  Also would encourage drinking clear liquids as much as possible.  Cranberry juice if you like it may also be of some benefit as well.  Bladder irritants such as coffee (and any other caffeine-containing beverages) and alcohol all should be avoided as well.  If problems persist a urology referral may be necessary for further evaluation.

## 2020-06-08 ENCOUNTER — OFFICE VISIT (OUTPATIENT)
Dept: INTERNAL MEDICINE | Age: 75
End: 2020-06-08

## 2020-06-08 ENCOUNTER — HOSPITAL ENCOUNTER (OUTPATIENT)
Dept: GENERAL RADIOLOGY | Facility: HOSPITAL | Age: 75
Discharge: HOME | End: 2020-06-08
Admitting: INTERNAL MEDICINE

## 2020-06-08 VITALS
OXYGEN SATURATION: 98 % | TEMPERATURE: 98.3 F | WEIGHT: 160 LBS | HEIGHT: 65 IN | HEART RATE: 93 BPM | BODY MASS INDEX: 26.66 KG/M2 | DIASTOLIC BLOOD PRESSURE: 78 MMHG | SYSTOLIC BLOOD PRESSURE: 140 MMHG | RESPIRATION RATE: 13 BRPM

## 2020-06-08 DIAGNOSIS — R14.0 ABDOMINAL BLOATING: ICD-10-CM

## 2020-06-08 DIAGNOSIS — M54.2 CERVICALGIA: Primary | ICD-10-CM

## 2020-06-08 PROCEDURE — 99214 OFFICE O/P EST MOD 30 MIN: CPT | Performed by: INTERNAL MEDICINE

## 2020-06-08 PROCEDURE — 72040 X-RAY EXAM NECK SPINE 2-3 VW: CPT

## 2020-06-08 RX ORDER — TRAMADOL HYDROCHLORIDE 50 MG/1
50 TABLET ORAL EVERY 6 HOURS PRN
Qty: 20 TABLET | Refills: 0 | Status: SHIPPED | OUTPATIENT
Start: 2020-06-08 | End: 2020-12-28

## 2020-06-08 NOTE — PROGRESS NOTES
"Marci Kolb is a 75 y.o. female who presents with   Chief Complaint   Patient presents with   • Neck pain/spasms     Off/on-2 weeks.  No radicular pain into the left arm   • Bloated     Constipation/gas/hyperactive bowel sounds   .    75-year-old female complaining of neck pain and abdominal bloating.       /78   Pulse 93   Temp 98.3 °F (36.8 °C)   Resp 13   Ht 165.1 cm (65\")   Wt 72.6 kg (160 lb)   SpO2 98%   BMI 26.63 kg/m²     The following portions of the patient's history were reviewed and updated as appropriate: allergies, current medications, past medical history and problem list.    Review of Systems   Constitutional: Negative.    HENT: Negative.    Eyes: Negative.    Respiratory: Negative.    Cardiovascular: Negative.    Genitourinary: Negative.    Musculoskeletal: Positive for arthralgias, myalgias, neck pain and neck stiffness.   Skin: Negative.    Neurological: Negative.  Negative for numbness.   Psychiatric/Behavioral: Negative.        Objective   Physical Exam   Constitutional: She is oriented to person, place, and time. She appears well-developed and well-nourished. No distress.   HENT:   Head: Normocephalic and atraumatic.   Eyes: Pupils are equal, round, and reactive to light. Conjunctivae and EOM are normal.   Neck: Normal range of motion. No thyromegaly present.   Neck exam shows tightness and tenseness of the left sternomastoid muscle.  Carotid auscultation normal-no bruits heard.   Cardiovascular: Normal rate, regular rhythm, normal heart sounds and intact distal pulses. Exam reveals no gallop and no friction rub.   No murmur heard.  Pulmonary/Chest: Effort normal and breath sounds normal. No respiratory distress. She has no wheezes. She has no rales. She exhibits no tenderness.   Neurological: She is alert and oriented to person, place, and time.   Psychiatric: She has a normal mood and affect. Her behavior is normal. Judgment and thought content normal.   Nursing note and " vitals reviewed.      Assessment/Plan   Marci was seen today for neck pain/spasms and bloated.    Diagnoses and all orders for this visit:    Cervicalgia  -     XR Spine Cervical 2 or 3 View  -     traMADol (ULTRAM) 50 MG tablet; Take 1 tablet by mouth Every 6 (Six) Hours As Needed for Moderate Pain .    Abdominal bloating        Plan: X-rays of the neck; tramadol for pain as above.  Cold compresses locally.  Follow-up as needed.    Try OTC MiraLAX for bloating/constipation.  CT scan of the abdomen May 2018 showing extensive diverticulosis

## 2020-06-12 ENCOUNTER — TELEPHONE (OUTPATIENT)
Dept: INTERNAL MEDICINE | Age: 75
End: 2020-06-12

## 2020-06-12 ENCOUNTER — HOSPITAL ENCOUNTER (EMERGENCY)
Facility: HOSPITAL | Age: 75
Discharge: HOME | End: 2020-06-12
Attending: EMERGENCY MEDICINE | Admitting: EMERGENCY MEDICINE

## 2020-06-12 ENCOUNTER — APPOINTMENT (OUTPATIENT)
Dept: CARDIOLOGY | Facility: HOSPITAL | Age: 75
End: 2020-06-12

## 2020-06-12 VITALS
SYSTOLIC BLOOD PRESSURE: 132 MMHG | RESPIRATION RATE: 16 BRPM | HEART RATE: 60 BPM | DIASTOLIC BLOOD PRESSURE: 70 MMHG | TEMPERATURE: 99.3 F | OXYGEN SATURATION: 96 %

## 2020-06-12 DIAGNOSIS — L03.116 LEFT LEG CELLULITIS: Primary | ICD-10-CM

## 2020-06-12 LAB
ALBUMIN SERPL-MCNC: 3.8 G/DL (ref 3.5–5.2)
ALBUMIN/GLOB SERPL: 1.3 G/DL
ALP SERPL-CCNC: 89 U/L (ref 39–117)
ALT SERPL W P-5'-P-CCNC: 15 U/L (ref 1–33)
ANION GAP SERPL CALCULATED.3IONS-SCNC: 8 MMOL/L (ref 5–15)
AST SERPL-CCNC: 20 U/L (ref 1–32)
BASOPHILS # BLD AUTO: 0.07 10*3/MM3 (ref 0–0.2)
BASOPHILS NFR BLD AUTO: 1 % (ref 0–1.5)
BH CV LOWER VASCULAR LEFT COMMON FEMORAL AUGMENT: NORMAL
BH CV LOWER VASCULAR LEFT COMMON FEMORAL COMPETENT: NORMAL
BH CV LOWER VASCULAR LEFT COMMON FEMORAL COMPRESS: NORMAL
BH CV LOWER VASCULAR LEFT COMMON FEMORAL PHASIC: NORMAL
BH CV LOWER VASCULAR LEFT COMMON FEMORAL SPONT: NORMAL
BH CV LOWER VASCULAR LEFT DISTAL FEMORAL COMPRESS: NORMAL
BH CV LOWER VASCULAR LEFT GASTRONEMIUS COMPRESS: NORMAL
BH CV LOWER VASCULAR LEFT GREATER SAPH AK COMPRESS: NORMAL
BH CV LOWER VASCULAR LEFT GREATER SAPH BK COMPRESS: NORMAL
BH CV LOWER VASCULAR LEFT MID FEMORAL AUGMENT: NORMAL
BH CV LOWER VASCULAR LEFT MID FEMORAL COMPETENT: NORMAL
BH CV LOWER VASCULAR LEFT MID FEMORAL COMPRESS: NORMAL
BH CV LOWER VASCULAR LEFT MID FEMORAL PHASIC: NORMAL
BH CV LOWER VASCULAR LEFT MID FEMORAL SPONT: NORMAL
BH CV LOWER VASCULAR LEFT PERONEAL COMPRESS: NORMAL
BH CV LOWER VASCULAR LEFT POPLITEAL AUGMENT: NORMAL
BH CV LOWER VASCULAR LEFT POPLITEAL COMPETENT: NORMAL
BH CV LOWER VASCULAR LEFT POPLITEAL COMPRESS: NORMAL
BH CV LOWER VASCULAR LEFT POPLITEAL PHASIC: NORMAL
BH CV LOWER VASCULAR LEFT POPLITEAL SPONT: NORMAL
BH CV LOWER VASCULAR LEFT POSTERIOR TIBIAL COMPRESS: NORMAL
BH CV LOWER VASCULAR LEFT PROFUNDA FEMORAL COMPRESS: NORMAL
BH CV LOWER VASCULAR LEFT PROXIMAL FEMORAL COMPRESS: NORMAL
BH CV LOWER VASCULAR LEFT SAPHENOFEMORAL JUNCTION COMPRESS: NORMAL
BH CV LOWER VASCULAR RIGHT COMMON FEMORAL AUGMENT: NORMAL
BH CV LOWER VASCULAR RIGHT COMMON FEMORAL COMPETENT: NORMAL
BH CV LOWER VASCULAR RIGHT COMMON FEMORAL COMPRESS: NORMAL
BH CV LOWER VASCULAR RIGHT COMMON FEMORAL PHASIC: NORMAL
BH CV LOWER VASCULAR RIGHT COMMON FEMORAL SPONT: NORMAL
BILIRUB SERPL-MCNC: 0.4 MG/DL (ref 0.2–1.2)
BUN BLD-MCNC: 14 MG/DL (ref 8–23)
BUN/CREAT SERPL: 20.9 (ref 7–25)
CALCIUM SPEC-SCNC: 9.5 MG/DL (ref 8.6–10.5)
CHLORIDE SERPL-SCNC: 107 MMOL/L (ref 98–107)
CO2 SERPL-SCNC: 25 MMOL/L (ref 22–29)
CREAT BLD-MCNC: 0.67 MG/DL (ref 0.57–1)
DEPRECATED RDW RBC AUTO: 43.4 FL (ref 37–54)
EOSINOPHIL # BLD AUTO: 0.17 10*3/MM3 (ref 0–0.4)
EOSINOPHIL NFR BLD AUTO: 2.4 % (ref 0.3–6.2)
ERYTHROCYTE [DISTWIDTH] IN BLOOD BY AUTOMATED COUNT: 12.6 % (ref 12.3–15.4)
GFR SERPL CREATININE-BSD FRML MDRD: 85 ML/MIN/1.73
GLOBULIN UR ELPH-MCNC: 2.9 GM/DL
GLUCOSE BLD-MCNC: 87 MG/DL (ref 65–99)
HCT VFR BLD AUTO: 40.1 % (ref 34–46.6)
HGB BLD-MCNC: 13.6 G/DL (ref 12–15.9)
IMM GRANULOCYTES # BLD AUTO: 0.03 10*3/MM3 (ref 0–0.05)
IMM GRANULOCYTES NFR BLD AUTO: 0.4 % (ref 0–0.5)
LYMPHOCYTES # BLD AUTO: 2.76 10*3/MM3 (ref 0.7–3.1)
LYMPHOCYTES NFR BLD AUTO: 38.3 % (ref 19.6–45.3)
MCH RBC QN AUTO: 32.2 PG (ref 26.6–33)
MCHC RBC AUTO-ENTMCNC: 33.9 G/DL (ref 31.5–35.7)
MCV RBC AUTO: 95 FL (ref 79–97)
MONOCYTES # BLD AUTO: 0.65 10*3/MM3 (ref 0.1–0.9)
MONOCYTES NFR BLD AUTO: 9 % (ref 5–12)
NEUTROPHILS # BLD AUTO: 3.53 10*3/MM3 (ref 1.7–7)
NEUTROPHILS NFR BLD AUTO: 48.9 % (ref 42.7–76)
NRBC BLD AUTO-RTO: 0 /100 WBC (ref 0–0.2)
NT-PROBNP SERPL-MCNC: 47.5 PG/ML (ref 5–1800)
PLATELET # BLD AUTO: 257 10*3/MM3 (ref 140–450)
PMV BLD AUTO: 10.2 FL (ref 6–12)
POTASSIUM BLD-SCNC: 3.9 MMOL/L (ref 3.5–5.2)
PROT SERPL-MCNC: 6.7 G/DL (ref 6–8.5)
RBC # BLD AUTO: 4.22 10*6/MM3 (ref 3.77–5.28)
SODIUM BLD-SCNC: 140 MMOL/L (ref 136–145)
WBC NRBC COR # BLD: 7.21 10*3/MM3 (ref 3.4–10.8)

## 2020-06-12 PROCEDURE — 93971 EXTREMITY STUDY: CPT

## 2020-06-12 PROCEDURE — 83880 ASSAY OF NATRIURETIC PEPTIDE: CPT | Performed by: EMERGENCY MEDICINE

## 2020-06-12 PROCEDURE — 85025 COMPLETE CBC W/AUTO DIFF WBC: CPT | Performed by: EMERGENCY MEDICINE

## 2020-06-12 PROCEDURE — 80053 COMPREHEN METABOLIC PANEL: CPT | Performed by: EMERGENCY MEDICINE

## 2020-06-12 PROCEDURE — 99283 EMERGENCY DEPT VISIT LOW MDM: CPT

## 2020-06-12 RX ORDER — CEPHALEXIN 500 MG/1
500 CAPSULE ORAL 3 TIMES DAILY
Qty: 21 CAPSULE | Refills: 0 | Status: SHIPPED | OUTPATIENT
Start: 2020-06-12 | End: 2020-07-09

## 2020-06-12 NOTE — ED NOTES
Pt complains of L foot swelling that started four days ago. Pt denies any fall or injury at this time. Pt ambulated independently into triage, is A&XO4, and in a mask at this time. Pt denies CP/SOA or pain in the leg, states it is only painful in the ankle and swollen from there down.      Teresa Asencio, RN  06/12/20 1814

## 2020-06-12 NOTE — ED PROVIDER NOTES
EMERGENCY DEPARTMENT ENCOUNTER    Room Number:  25/25  PCP: Ward Penaloza MD  Historian: Patient  History Limited By: Nothing      HPI  Chief Complaint: Left leg swelling  Context: Marci Kolb is a 75 y.o. female who presents to the ED c/o left leg swelling.  Patient states she has had no trauma to her leg.  Dates she has noted some mild swelling for a couple weeks that is gotten worse over the last 4 to 5 days.  Patient has noted some mild redness.  Patient has never had a history of blood clots.  No chest pain or shortness of breath.  No fevers.  No vomiting or diarrhea.  Patient called her doctor and was sent in for evaluation      Location: Left lower extremity  Radiation: None  Character: Tightness  Duration: 2 weeks  Severity: Moderate  Progression: Worsening  Aggravating Factors: Walking  Alleviating Factors: Remaining still        MEDICAL RECORD REVIEW    No prior history of DVT in epic.  Patient has had multiple visits for low back pain          PAST MEDICAL HISTORY  Active Ambulatory Problems     Diagnosis Date Noted   • Diverticulosis of intestine 02/12/2016   • Cephalalgia 02/12/2016   • Hyperlipidemia 02/12/2016   • Hypothyroidism 02/12/2016   • Knee swelling 02/12/2016   • Low back pain 02/12/2016   • Cervical pain 02/12/2016   • Sciatica 02/12/2016   • Arthralgia of hip 02/12/2016   • Hyperglycemia 07/25/2017   • Status post hysterectomy 08/06/2019     Resolved Ambulatory Problems     Diagnosis Date Noted   • Difficult or painful urination 02/12/2016   • Gonalgia 02/12/2016   • Osteoarthritis of right hip 01/24/2018   • Atelectasis 01/27/2018   • Cystitis 04/28/2018     Past Medical History:   Diagnosis Date   • Arthritis    • Cataract    • Collapsed lung    • Constipation    • Degenerative disc disease, lumbar    • Depression    • Dry skin    • Elevated cholesterol    • Female cystocele    • History of diverticulosis    • History of migraine    • Lumbar spine pain    • Presence of pessary  "2019   • Right leg pain    • Ringing in right ear    • Urinary incontinence    • Uterine prolapse    • Varicose vein of leg          PAST SURGICAL HISTORY  Past Surgical History:   Procedure Laterality Date   • ANTERIOR AND POSTERIOR VAGINAL REPAIR N/A 2019    Procedure: POSSIBLE ANTERIOR AND POSTERIOR VAGINAL REPAIR UTEROSACRAL SUSPENSION;  Surgeon: Kristine Baker MD;  Location: [facility];  Service: Gynecology   • COLONOSCOPY      \"WNL\"   • JOINT REPLACEMENT      Left hip arthroplasty   • TOTAL HIP ARTHROPLASTY Left 2018    Procedure: LT  TOTAL HIP ARTHROPLASTY;  Surgeon: Rogelio Nash MD;  Location: [facility];  Service:    • TOTAL LAPAROSCOPIC HYSTERECTOMY UTEROSACRAL SUSPENSION WITH TRANSVAGINAL TAPING N/A 2019    Procedure: TOTAL LAPAROSCOPIC HYSTERECTOMY BILATERAL SALPINGO OOPHORECTOMY TENSION FREE VAGINAL TAPING;  Surgeon: Kristine Baker MD;  Location: [facility];  Service: Gynecology         FAMILY HISTORY  Family History   Problem Relation Age of Onset   • Breast cancer Maternal Aunt    • Malig Hyperthermia Neg Hx          SOCIAL HISTORY  Social History     Socioeconomic History   • Marital status:      Spouse name: Not on file   • Number of children: Not on file   • Years of education: Not on file   • Highest education level: Not on file   Tobacco Use   • Smoking status: Former Smoker     Packs/day: 1.00     Years: 35.00     Pack years: 35.00     Types: Cigarettes     Last attempt to quit: 2000     Years since quittin.4   • Smokeless tobacco: Never Used   Substance and Sexual Activity   • Alcohol use: No   • Drug use: No   • Sexual activity: Defer         ALLERGIES  Streptomycin and Penicillins        REVIEW OF SYSTEMS  Review of Systems   Constitutional: Negative for fever.   HENT: Negative for sore throat.    Eyes: Negative.    Respiratory: Negative for cough and shortness of breath.    Cardiovascular: Negative for chest pain.   Gastrointestinal: " Negative for abdominal pain, diarrhea and vomiting.   Genitourinary: Negative for dysuria.   Musculoskeletal: Positive for joint swelling. Negative for neck pain.   Skin: Positive for color change. Negative for rash.   Allergic/Immunologic: Negative.    Neurological: Negative for weakness, numbness and headaches.   Hematological: Negative.    Psychiatric/Behavioral: Negative.    All other systems reviewed and are negative.           PHYSICAL EXAM  ED Triage Vitals   Temp Heart Rate Resp BP SpO2   06/12/20 1347 06/12/20 1347 06/12/20 1347 06/12/20 1357 06/12/20 1347   99.3 °F (37.4 °C) 91 18 128/72 95 %      Temp src Heart Rate Source Patient Position BP Location FiO2 (%)   06/12/20 1347 06/12/20 1347 06/12/20 1357 06/12/20 1357 --   Tympanic Monitor Lying Right arm        Physical Exam   Constitutional: She is oriented to person, place, and time. No distress.   HENT:   Head: Normocephalic and atraumatic.   Eyes: Pupils are equal, round, and reactive to light. EOM are normal.   Neck: Normal range of motion. Neck supple.   Cardiovascular: Normal rate, regular rhythm and normal heart sounds.   Pulmonary/Chest: Effort normal and breath sounds normal. No respiratory distress.   Abdominal: Soft. There is no tenderness. There is no rebound and no guarding.   Musculoskeletal: Normal range of motion. She exhibits edema.   Mild tenderness to left calf   Neurological: She is alert and oriented to person, place, and time. She has normal sensation and normal strength.   Skin: Skin is warm and dry. No rash noted. There is erythema.   Mild erythema from mid calf down.   Psychiatric: Mood and affect normal.   Nursing note and vitals reviewed.          LAB RESULTS  Recent Results (from the past 24 hour(s))   Comprehensive Metabolic Panel    Collection Time: 06/12/20  2:33 PM   Result Value Ref Range    Glucose 87 65 - 99 mg/dL    BUN 14 8 - 23 mg/dL    Creatinine 0.67 0.57 - 1.00 mg/dL    Sodium 140 136 - 145 mmol/L    Potassium 3.9  3.5 - 5.2 mmol/L    Chloride 107 98 - 107 mmol/L    CO2 25.0 22.0 - 29.0 mmol/L    Calcium 9.5 8.6 - 10.5 mg/dL    Total Protein 6.7 6.0 - 8.5 g/dL    Albumin 3.80 3.50 - 5.20 g/dL    ALT (SGPT) 15 1 - 33 U/L    AST (SGOT) 20 1 - 32 U/L    Alkaline Phosphatase 89 39 - 117 U/L    Total Bilirubin 0.4 0.2 - 1.2 mg/dL    eGFR Non African Amer 85 >60 mL/min/1.73    Globulin 2.9 gm/dL    A/G Ratio 1.3 g/dL    BUN/Creatinine Ratio 20.9 7.0 - 25.0    Anion Gap 8.0 5.0 - 15.0 mmol/L   BNP    Collection Time: 06/12/20  2:33 PM   Result Value Ref Range    proBNP 47.5 5.0-1,800.0 pg/mL   CBC Auto Differential    Collection Time: 06/12/20  2:33 PM   Result Value Ref Range    WBC 7.21 3.40 - 10.80 10*3/mm3    RBC 4.22 3.77 - 5.28 10*6/mm3    Hemoglobin 13.6 12.0 - 15.9 g/dL    Hematocrit 40.1 34.0 - 46.6 %    MCV 95.0 79.0 - 97.0 fL    MCH 32.2 26.6 - 33.0 pg    MCHC 33.9 31.5 - 35.7 g/dL    RDW 12.6 12.3 - 15.4 %    RDW-SD 43.4 37.0 - 54.0 fl    MPV 10.2 6.0 - 12.0 fL    Platelets 257 140 - 450 10*3/mm3    Neutrophil % 48.9 42.7 - 76.0 %    Lymphocyte % 38.3 19.6 - 45.3 %    Monocyte % 9.0 5.0 - 12.0 %    Eosinophil % 2.4 0.3 - 6.2 %    Basophil % 1.0 0.0 - 1.5 %    Immature Grans % 0.4 0.0 - 0.5 %    Neutrophils, Absolute 3.53 1.70 - 7.00 10*3/mm3    Lymphocytes, Absolute 2.76 0.70 - 3.10 10*3/mm3    Monocytes, Absolute 0.65 0.10 - 0.90 10*3/mm3    Eosinophils, Absolute 0.17 0.00 - 0.40 10*3/mm3    Basophils, Absolute 0.07 0.00 - 0.20 10*3/mm3    Immature Grans, Absolute 0.03 0.00 - 0.05 10*3/mm3    nRBC 0.0 0.0 - 0.2 /100 WBC   Duplex Venous Lower Extremity - Left    Collection Time: 06/12/20  4:18 PM   Result Value Ref Range    Right Common Femoral Spont Y     Right Common Femoral Phasic Y     Right Common Femoral Augment Y     Right Common Femoral Competent Y     Right Common Femoral Compress C     Left Common Femoral Spont Y     Left Common Femoral Phasic Y     Left Common Femoral Augment Y     Left Common Femoral Competent  Y     Left Common Femoral Compress C     Left Saphenofemoral Junction Compress C     Left Profunda Femoral Compress C     Left Proximal Femoral Compress C     Left Mid Femoral Spont Y     Left Mid Femoral Phasic Y     Left Mid Femoral Augment Y     Left Mid Femoral Competent Y     Left Mid Femoral Compress C     Left Distal Femoral Compress C     Left Popliteal Spont Y     Left Popliteal Phasic Y     Left Popliteal Augment Y     Left Popliteal Competent Y     Left Popliteal Compress C     Left Posterior Tibial Compress C     Left Peroneal Compress C     Left GastronemiusSoleal Compress C     Left Greater Saph AK Compress C     Left Greater Saph BK Compress C        Ordered the above labs and reviewed the results.        RADIOLOGY  No orders to display        Ordered the above noted radiological studies. Reviewed by me in PACS.          PROCEDURES  Procedures        MEDICATIONS GIVEN IN ER  Medications - No data to display          PROGRESS AND CONSULTS  ED Course as of Jun 12 1742 Fri Jun 12, 2020   1701 17:01  Patient does have some mild redness and swelling of the left foot.  No evidence of DVT on ultrasound.  Patient denies any trauma.  Blood work is normal.  Will be given antibiotics to cover for cellulitis.  Patient's BNP is normal.  We will follow-up with primary doctor.    [SL]      ED Course User Index  [SL] Jose Alejandro Garibay MD           MEDICAL DECISION MAKING      MDM  Number of Diagnoses or Management Options  Left leg cellulitis:      Amount and/or Complexity of Data Reviewed  Clinical lab tests: reviewed and ordered (Normal WBC)  Tests in the radiology section of CPT®: ordered and reviewed (Doppler negative)               DIAGNOSIS  Final diagnoses:   Left leg cellulitis           DISPOSITION  admit        Latest Documented Vital Signs:  As of 17:42  BP- 132/70 HR- 60 Temp- 99.3 °F (37.4 °C) (Tympanic) O2 sat- 96%        -                 Jose Alejandro Garibay MD  06/12/20 5838

## 2020-06-12 NOTE — TELEPHONE ENCOUNTER
Patient is calling for an appointment.  There are no appointments available.  Patient states her left foot has been swelling for a couple weeks, but today it is swelling up up into her leg now and is really red, and hot to touch.  Transferred to .    Patient call back [phone]

## 2020-06-26 DIAGNOSIS — R60.9 PERIPHERAL EDEMA: Primary | ICD-10-CM

## 2020-06-26 RX ORDER — FUROSEMIDE 20 MG/1
20 TABLET ORAL DAILY
Qty: 30 TABLET | Refills: 0 | Status: SHIPPED | OUTPATIENT
Start: 2020-06-26 | End: 2020-07-29 | Stop reason: SDUPTHER

## 2020-06-26 NOTE — TELEPHONE ENCOUNTER
"With a description of her \"left foot swelling and progressing up into the leg with redness and warmth to touch\" I would agree with the advice to be seen at the ER to rule out the possibility of a blood clot or a skin infection (cellulitis), or both  "

## 2020-06-26 NOTE — TELEPHONE ENCOUNTER
Patient called to report that her foot is still swelling; has been keeping it propped up over her heart; patient has an appt on 7/9; please call to advise [phone]

## 2020-07-09 ENCOUNTER — OFFICE VISIT (OUTPATIENT)
Dept: INTERNAL MEDICINE | Age: 75
End: 2020-07-09

## 2020-07-09 VITALS
DIASTOLIC BLOOD PRESSURE: 60 MMHG | OXYGEN SATURATION: 97 % | HEIGHT: 65 IN | SYSTOLIC BLOOD PRESSURE: 110 MMHG | BODY MASS INDEX: 26.12 KG/M2 | TEMPERATURE: 98.3 F | RESPIRATION RATE: 13 BRPM | WEIGHT: 156.8 LBS | HEART RATE: 91 BPM

## 2020-07-09 DIAGNOSIS — E03.9 ACQUIRED HYPOTHYROIDISM: Primary | ICD-10-CM

## 2020-07-09 DIAGNOSIS — R22.42 LOCALIZED SWELLING OF LEFT LOWER LEG: ICD-10-CM

## 2020-07-09 DIAGNOSIS — N30.90 CYSTITIS: ICD-10-CM

## 2020-07-09 DIAGNOSIS — Z00.00 HEALTHCARE MAINTENANCE: ICD-10-CM

## 2020-07-09 DIAGNOSIS — E78.2 MIXED HYPERLIPIDEMIA: ICD-10-CM

## 2020-07-09 DIAGNOSIS — R73.9 HYPERGLYCEMIA: ICD-10-CM

## 2020-07-09 PROCEDURE — 99215 OFFICE O/P EST HI 40 MIN: CPT | Performed by: INTERNAL MEDICINE

## 2020-07-09 RX ORDER — SULFAMETHOXAZOLE AND TRIMETHOPRIM 800; 160 MG/1; MG/1
TABLET ORAL
Qty: 20 TABLET | Refills: 0 | Status: SHIPPED | OUTPATIENT
Start: 2020-07-09 | End: 2020-12-28

## 2020-07-09 NOTE — PROGRESS NOTES
"  Marci Kolb is a 75 y.o. female who presents with   Chief Complaint   Patient presents with   • Hypothyroidism     Levothyroxine 100 mcg daily   • Hyperlipidemia     Simvastatin 40 mg daily   • Blood Sugar Problem     Mild elevations in the past.  No treatment   • Urinary Tract Infection     \"Aching\" with urination-4 days.  No fever or chills   • Left lower leg swelling     Was seen June 12 at [facility].  Doppler negative for DVT   .    75-year-old female.  6-month checkup/lab update visit 2 other complaints-left lower leg swelling and \"aching\" with urination for the past 4 days.  With regard to her left lower extremity swelling she was seen at [facility] on June 12 and a Doppler did not show any evidence of DVT.  She was thought to have \"cellulitis\" and was treated with cephalexin and Lasix 20 mg daily.  She says on today's visit that her symptoms are not much improved.  She says that when she gets out of bed in the morning the swelling is \"almost gone\" but as she gets up and about and as the day wears on the swelling increases and she has a \"stinging sensation\" in her left lower extremity.  With regards to her urinary symptoms she said for the past 4 days that when she urinates she has an aching sensation and toward the end of her urinary stream there is a sensation that \"takes my breath away\".  She denies fever, chills, or visible blood in the urine.    Hypothyroidism   This is a chronic problem. The current episode started more than 1 year ago. The problem has been unchanged. Pertinent negatives include no chills or fever. Treatments tried: Levothyroxine 100 mcg daily.   Hyperlipidemia   This is a chronic problem. The current episode started more than 1 year ago. The problem is controlled. Recent lipid tests were reviewed and are normal. Exacerbating diseases include diabetes and hypothyroidism. Current antihyperlipidemic treatment includes statins. The current treatment provides moderate improvement of " "lipids. There are no compliance problems.    Blood Sugar Problem   This is a chronic problem. The current episode started more than 1 year ago. The problem has been waxing and waning. Pertinent negatives include no chills or fever. She has tried nothing for the symptoms.   Urinary Tract Infection    This is a new problem. The current episode started in the past 7 days. The problem occurs every urination. The problem has been unchanged. The quality of the pain is described as aching. There has been no fever. Pertinent negatives include no chills, discharge, flank pain, frequency, hematuria or urgency. She has tried nothing for the symptoms.        /60   Pulse 91   Temp 98.3 °F (36.8 °C)   Resp 13   Ht 165.1 cm (65\")   Wt 71.1 kg (156 lb 12.8 oz)   SpO2 97%   BMI 26.09 kg/m²     The following portions of the patient's history were reviewed and updated as appropriate: allergies, current medications, past medical history and problem list.    Review of Systems   Constitutional: Negative.  Negative for chills and fever.   HENT: Negative.    Eyes: Negative.    Respiratory: Negative.    Cardiovascular: Negative.    Genitourinary: Negative.  Negative for flank pain, frequency, hematuria and urgency.   Musculoskeletal: Negative.    Skin: Negative.    Neurological: Negative.    Psychiatric/Behavioral: Negative.        Objective   Physical Exam   Constitutional: She is oriented to person, place, and time. She appears well-developed and well-nourished. No distress.   HENT:   Head: Normocephalic and atraumatic.   Eyes: Pupils are equal, round, and reactive to light. Conjunctivae and EOM are normal.   Neck: Normal range of motion. Neck supple. No thyromegaly present.   Neck exam negative.  Carotid auscultation normal-no bruits heard.   Cardiovascular: Normal rate, regular rhythm, normal heart sounds and intact distal pulses. Exam reveals no gallop and no friction rub.   No murmur heard.  Pulmonary/Chest: Effort normal " and breath sounds normal. No respiratory distress. She has no wheezes. She has no rales. She exhibits no tenderness.   Musculoskeletal:   The patient does have mild redness and swelling of her left lower extremity from about the mid lower leg down to the ankles and dorsum of the left foot.  Peripheral pulsations appear palpably normal.  The edema is approximately trace-1+ in quantity.  The visible appearance is that of peripheral vascular insufficiency.  She does not have any palpable calf tenderness.  The history of the swelling as described above also suggest the possibility of peripheral venous/vascular insufficiency.   Neurological: She is alert and oriented to person, place, and time.   Psychiatric: She has a normal mood and affect. Her behavior is normal. Judgment and thought content normal.   Nursing note and vitals reviewed.      Assessment/Plan   Marci was seen today for hypothyroidism, hyperlipidemia, blood sugar problem, urinary tract infection and left lower leg swelling.    Diagnoses and all orders for this visit:    Acquired hypothyroidism  -     Comprehensive Metabolic Panel  -     TSH+Free T4    Mixed hyperlipidemia  -     Comprehensive Metabolic Panel  -     Lipid Panel    Hyperglycemia  -     Comprehensive Metabolic Panel  -     Hemoglobin A1c    Cystitis  -     Urinalysis With Culture If Indicated -  -     sulfamethoxazole-trimethoprim (BACTRIM DS,SEPTRA DS) 800-160 MG per tablet; Take 1 every 12 hours until all are gone for urinary tract infection    Healthcare maintenance  -     Hepatitis C Antibody    Localized swelling of left lower leg      Plan: Labs as above for the physical issues as outlined.  Assuming today's labs are acceptable we will see her in 6 months for a general checkup/lab update visit.    Swelling of her left lower extremity: She was advised leg elevation with support stockings during the day when she is up and about.  I see no need for any further antibiotics at this point.   She will continue Lasix 20 mg daily as needed.    With regard to her cystitis symptoms: We will get a urinalysis for culture if indicated.  We will empirically place her on Bactrim DS pending the result of the urine report.    Total amount of times spent with this patient  was a scheduled 15-minute office visit that evolved into a 40-minute visit.  We discussed each of her problems separately and individually and discussed treatment and advice was given as outlined above.  Better than 50% of the office visit time was spent in direct face-to-face consultation with the patient regarding each of her physical issues as outlined.    Follow-up for today's issues with if any sooner than 6 months will be PRN

## 2020-07-11 LAB
ALBUMIN SERPL-MCNC: 4.3 G/DL (ref 3.5–5.2)
ALBUMIN/GLOB SERPL: 1.4 G/DL
ALP SERPL-CCNC: 114 U/L (ref 39–117)
ALT SERPL-CCNC: 18 U/L (ref 1–33)
APPEARANCE UR: ABNORMAL
AST SERPL-CCNC: 19 U/L (ref 1–32)
BACTERIA #/AREA URNS HPF: ABNORMAL /HPF
BACTERIA UR CULT: ABNORMAL
BACTERIA UR CULT: ABNORMAL
BILIRUB SERPL-MCNC: 0.4 MG/DL (ref 0–1.2)
BILIRUB UR QL STRIP: NEGATIVE
BUN SERPL-MCNC: 11 MG/DL (ref 8–23)
BUN/CREAT SERPL: 11.7 (ref 7–25)
CALCIUM SERPL-MCNC: 10.4 MG/DL (ref 8.6–10.5)
CHLORIDE SERPL-SCNC: 104 MMOL/L (ref 98–107)
CHOLEST SERPL-MCNC: 174 MG/DL (ref 0–200)
CO2 SERPL-SCNC: 27 MMOL/L (ref 22–29)
COLOR UR: YELLOW
CREAT SERPL-MCNC: 0.94 MG/DL (ref 0.57–1)
CRYSTALS URNS MICRO: ABNORMAL
EPI CELLS #/AREA URNS HPF: ABNORMAL /HPF (ref 0–10)
GLOBULIN SER CALC-MCNC: 3 GM/DL
GLUCOSE SERPL-MCNC: 97 MG/DL (ref 65–99)
GLUCOSE UR QL: NEGATIVE
HBA1C MFR BLD: 6.13 % (ref 4.8–5.6)
HCV AB S/CO SERPL IA: <0.1 S/CO RATIO (ref 0–0.9)
HDLC SERPL-MCNC: 38 MG/DL (ref 40–60)
HGB UR QL STRIP: ABNORMAL
KETONES UR QL STRIP: NEGATIVE
LDLC SERPL CALC-MCNC: 101 MG/DL (ref 0–100)
LEUKOCYTE ESTERASE UR QL STRIP: ABNORMAL
MICRO URNS: ABNORMAL
MUCOUS THREADS URNS QL MICRO: PRESENT /HPF
NITRITE UR QL STRIP: NEGATIVE
OTHER ANTIBIOTIC SUSC ISLT: ABNORMAL
PH UR STRIP: 6 [PH] (ref 5–7.5)
POTASSIUM SERPL-SCNC: 4 MMOL/L (ref 3.5–5.2)
PROT SERPL-MCNC: 7.3 G/DL (ref 6–8.5)
PROT UR QL STRIP: ABNORMAL
RBC #/AREA URNS HPF: ABNORMAL /HPF (ref 0–2)
SODIUM SERPL-SCNC: 141 MMOL/L (ref 136–145)
SP GR UR: 1.02 (ref 1–1.03)
T4 FREE SERPL-MCNC: 1.27 NG/DL (ref 0.93–1.7)
TRIGL SERPL-MCNC: 176 MG/DL (ref 0–150)
TSH SERPL DL<=0.005 MIU/L-ACNC: 0.2 UIU/ML (ref 0.27–4.2)
UNIDENT CRYS URNS QL MICRO: PRESENT /LPF
URINALYSIS REFLEX: ABNORMAL
UROBILINOGEN UR STRIP-MCNC: 0.2 MG/DL (ref 0.2–1)
VLDLC SERPL CALC-MCNC: 35.2 MG/DL (ref 5–40)
WBC #/AREA URNS HPF: >30 /HPF (ref 0–5)

## 2020-07-27 DIAGNOSIS — E03.9 ACQUIRED HYPOTHYROIDISM: ICD-10-CM

## 2020-07-27 RX ORDER — LEVOTHYROXINE SODIUM 0.1 MG/1
TABLET ORAL
Qty: 90 TABLET | Refills: 1 | Status: SHIPPED | OUTPATIENT
Start: 2020-07-27 | End: 2021-03-16 | Stop reason: SDUPTHER

## 2020-07-29 DIAGNOSIS — R60.9 PERIPHERAL EDEMA: ICD-10-CM

## 2020-07-29 RX ORDER — FUROSEMIDE 20 MG/1
20 TABLET ORAL DAILY
Qty: 30 TABLET | Refills: 3 | Status: SHIPPED | OUTPATIENT
Start: 2020-07-29 | End: 2021-04-28 | Stop reason: SDUPTHER

## 2020-10-27 DIAGNOSIS — E78.2 MIXED HYPERLIPIDEMIA: ICD-10-CM

## 2020-10-27 RX ORDER — SIMVASTATIN 40 MG
TABLET ORAL
Qty: 90 TABLET | Refills: 3 | Status: SHIPPED | OUTPATIENT
Start: 2020-10-27 | End: 2021-12-14 | Stop reason: SDUPTHER

## 2020-12-28 ENCOUNTER — OFFICE VISIT (OUTPATIENT)
Dept: INTERNAL MEDICINE | Age: 75
End: 2020-12-28

## 2020-12-28 VITALS
SYSTOLIC BLOOD PRESSURE: 114 MMHG | WEIGHT: 154.4 LBS | DIASTOLIC BLOOD PRESSURE: 68 MMHG | HEIGHT: 65 IN | OXYGEN SATURATION: 98 % | BODY MASS INDEX: 25.72 KG/M2 | TEMPERATURE: 96.6 F | HEART RATE: 70 BPM

## 2020-12-28 DIAGNOSIS — E03.9 ACQUIRED HYPOTHYROIDISM: Primary | ICD-10-CM

## 2020-12-28 DIAGNOSIS — E78.2 MIXED HYPERLIPIDEMIA: ICD-10-CM

## 2020-12-28 DIAGNOSIS — Z13.820 SCREENING FOR OSTEOPOROSIS: ICD-10-CM

## 2020-12-28 DIAGNOSIS — R93.7 ABNORMAL FINDINGS ON DIAGNOSTIC IMAGING OF OTHER PARTS OF MUSCULOSKELETAL SYSTEM: ICD-10-CM

## 2020-12-28 DIAGNOSIS — R73.03 PREDIABETES: ICD-10-CM

## 2020-12-28 LAB
ALBUMIN SERPL-MCNC: 4 G/DL (ref 3.5–5.2)
ALBUMIN/GLOB SERPL: 1.4 G/DL
ALP SERPL-CCNC: 111 U/L (ref 39–117)
ALT SERPL-CCNC: 26 U/L (ref 1–33)
AST SERPL-CCNC: 33 U/L (ref 1–32)
BILIRUB SERPL-MCNC: 0.7 MG/DL (ref 0–1.2)
BUN SERPL-MCNC: 9 MG/DL (ref 8–23)
BUN/CREAT SERPL: 9.9 (ref 7–25)
CALCIUM SERPL-MCNC: 9.8 MG/DL (ref 8.6–10.5)
CHLORIDE SERPL-SCNC: 106 MMOL/L (ref 98–107)
CHOLEST SERPL-MCNC: 147 MG/DL (ref 0–200)
CHOLEST/HDLC SERPL: 4.08 {RATIO}
CO2 SERPL-SCNC: 26.6 MMOL/L (ref 22–29)
CREAT SERPL-MCNC: 0.91 MG/DL (ref 0.57–1)
GLOBULIN SER CALC-MCNC: 2.8 GM/DL
GLUCOSE SERPL-MCNC: 91 MG/DL (ref 65–99)
HBA1C MFR BLD: 5.9 % (ref 4.8–5.6)
HDLC SERPL-MCNC: 36 MG/DL (ref 40–60)
LDLC SERPL CALC-MCNC: 85 MG/DL (ref 0–100)
POTASSIUM SERPL-SCNC: 3.9 MMOL/L (ref 3.5–5.2)
PROT SERPL-MCNC: 6.8 G/DL (ref 6–8.5)
SODIUM SERPL-SCNC: 141 MMOL/L (ref 136–145)
T4 FREE SERPL-MCNC: 1.15 NG/DL (ref 0.93–1.7)
TRIGL SERPL-MCNC: 150 MG/DL (ref 0–150)
TSH SERPL DL<=0.005 MIU/L-ACNC: 1.58 UIU/ML (ref 0.27–4.2)
VLDLC SERPL CALC-MCNC: 26 MG/DL (ref 5–40)

## 2020-12-28 PROCEDURE — 99214 OFFICE O/P EST MOD 30 MIN: CPT | Performed by: NURSE PRACTITIONER

## 2020-12-28 NOTE — PROGRESS NOTES
"[facility] [place] [facility]  JAYNE Banegas Kolb / 75 y.o. / female  12/28/2020      VITAL SIGNS     Visit Vitals  /68   Pulse 70   Temp 96.6 °F (35.9 °C) (Temporal)   Ht 165.1 cm (65\")   Wt 70 kg (154 lb 6.4 oz)   SpO2 98%   BMI 25.69 kg/m²       BP Readings from Last 3 Encounters:   12/28/20 114/68   07/09/20 110/60   06/12/20 132/70     Wt Readings from Last 3 Encounters:   12/28/20 70 kg (154 lb 6.4 oz)   07/09/20 71.1 kg (156 lb 12.8 oz)   06/08/20 72.6 kg (160 lb)     Body mass index is 25.69 kg/m².      MEDICATIONS     Current Outpatient Medications   Medication Sig Dispense Refill   • furosemide (Lasix) 20 MG tablet Take 1 tablet by mouth Daily. 30 tablet 3   • levothyroxine (SYNTHROID, LEVOTHROID) 100 MCG tablet TAKE ONE TABLET BY MOUTH DAILY 90 tablet 1   • simvastatin (ZOCOR) 40 MG tablet TAKE ONE TABLET BY MOUTH ONCE NIGHTLY 90 tablet 3     No current facility-administered medications for this visit.        CHIEF COMPLAINT     Establish Care      ASSESSMENT & PLAN     Problem List Items Addressed This Visit        Cardiovascular and Mediastinum    Hyperlipidemia    Relevant Medications    simvastatin (ZOCOR) 40 MG tablet    Other Relevant Orders    Comprehensive Metabolic Panel    Lipid Panel With / Chol / HDL Ratio    TSH+Free T4       Endocrine    Hypothyroidism - Primary    Overview     Acquired hypothyroidism. Takes levothyroxine 100mcg. Stable.          Relevant Medications    levothyroxine (SYNTHROID, LEVOTHROID) 100 MCG tablet    Other Relevant Orders    Comprehensive Metabolic Panel    Hemoglobin A1c    Lipid Panel With / Chol / HDL Ratio    TSH+Free T4      Other Visit Diagnoses     Prediabetes        Relevant Orders    Hemoglobin A1c    Screening for osteoporosis        Relevant Orders    DEXA Bone Density Axial    Abnormal findings on diagnostic imaging of other parts of musculoskeletal system         Relevant Orders    DEXA Bone Density Axial        Orders Placed This " Encounter   Procedures   • DEXA Bone Density Axial   • Comprehensive Metabolic Panel   • Hemoglobin A1c   • Lipid Panel With / Chol / HDL Ratio   • TSH+Free T4     No orders of the defined types were placed in this encounter.      Summary/Discussion:  • Continue current regimen for lower leg swelling, HLD, HTN, and hypothyroidism at this time.   • Fasting labs obtained today. Consider initiation of metformin if patient still in prediabetic range. Consider decreasing lasix dependent upon kidney function.   • Follow up in 4 months for medicare wellness exam along with chronic medical follow-up.     Next Appointment with me: Visit date not found    Return in about 4 months (around 4/28/2021) for Medicare Wellness, Next scheduled follow up.    ______________________________________________________________________    HISTORY OF PRESENT ILLNESS     Patient presents to establish care with new provider. Previous patient of Dr. Penaloza. PCP previously managed acquired hypothyroidism, HLD, elevated fasting glucose, and left lower leg swelling. She currently works nights a week at [employer].     Hypothyroidism   Acquired hypothyroidism. Takes levothyroxine 100mcg daily. Denies any heat or cold intolerance, diarrhea or constipation, heart palps, fatigue, or brittle hair or nails. Last TSH in July 0.205.     HLD   Current treatment with simvastatin 40mg nightly. Denies any associated myalgias with statin. Compliant on medication. Last lipid panel in July indicates well controlled total and LDL cholesterol with decreased HDL and minimally elevated triglycerides.     Elevated Fasting Glucose  Elevated fasting blood glucose in the past. Last HA1c 6.13. Patient has been consistently in prediabetic range. She is not treated with metformin at this time. Denies any polydipsia or polyuria.     Left lower leg swelling   Seen on June 12, 2020 at [facility] ER, negative doppler. Started in lasix 20mg that she is currently taking daily.  Denies any leg swelling or pain at this time. Last GFR 58 in July with normal creatinine and potassium.     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 Nash orthopedic surgery. She states she has attempted injections and physical therapy in the past and tolerates discomfort. She would not like any further treatment at this time. She also sees otolaryngology for ringing in her ears., Derm intermittently monitors skin lesions. She also sees [facility] with history of pelvic order prolapse and placement of pessary. Patient does not seem to have record of DEXA scan and would like ordered today.     Patient Care Team:  Lizzy Rivera MD as PCP - General (Dermatology)  Ward Penaloza MD as PCP - Family Medicine  Abdias Nash MD (Orthopedic Surgery)  Jatinder Dominguez MD as Consulting Physician (Otolaryngology)      REVIEW OF SYSTEMS     Review of Systems   Constitutional: Negative.    HENT: Negative.    Respiratory: Negative.    Cardiovascular: Negative.    Gastrointestinal: Negative.    Endocrine: Negative.    Genitourinary: Negative.    Musculoskeletal: Positive for arthralgias. Negative for joint swelling.   Neurological: Negative.    Psychiatric/Behavioral: Negative.      PHYSICAL EXAMINATION     Physical Exam  Constitutional:       Appearance: Normal appearance.   Eyes:      Pupils: Pupils are equal, round, and reactive to light.   Cardiovascular:      Rate and Rhythm: Normal rate and regular rhythm.      Pulses: Normal pulses.      Heart sounds: Normal heart sounds.   Pulmonary:      Effort: Pulmonary effort is normal.      Breath sounds: Normal breath sounds.   Musculoskeletal:      Right lower leg: No edema.      Left lower leg: No edema.   Skin:     General: Skin is warm and dry.   Neurological:      Mental Status: She is alert and oriented to person, place, and time.    Psychiatric:         Thought Content: Thought content normal.         Judgment: Judgment normal.       REVIEWED DATA     Labs:     Lab Results   Component Value Date     07/09/2020    K 4.0 07/09/2020    CALCIUM 10.4 07/09/2020    AST 19 07/09/2020    ALT 18 07/09/2020    BUN 11 07/09/2020    CREATININE 0.94 07/09/2020    CREATININE 0.67 06/12/2020    CREATININE 0.94 12/30/2019    EGFRIFNONA 58 (L) 07/09/2020    EGFRIFAFRI 70 07/09/2020       Lab Results   Component Value Date    HGBA1C 6.13 (H) 07/09/2020    HGBA1C 6.00 (H) 12/30/2019    HGBA1C 5.81 (H) 04/09/2019    GLU 97 07/09/2020     (H) 12/30/2019     (H) 04/09/2019       Lab Results   Component Value Date     (H) 07/09/2020    LDL 72 12/30/2019    LDL 66 04/09/2019    HDL 38 (L) 07/09/2020    HDL 48 12/30/2019    HDL 44 04/09/2019    TRIG 176 (H) 07/09/2020    TRIG 86 12/30/2019    TRIG 105 04/09/2019       Lab Results   Component Value Date    TSH 0.205 (L) 07/09/2020    FREET4 1.27 07/09/2020       Lab Results   Component Value Date    WBC 7.21 06/12/2020    HGB 13.6 06/12/2020    HGB 12.0 08/07/2019    HGB 12.7 07/29/2019     06/12/2020       Lab Results   Component Value Date    PROTEIN Trace 07/09/2020    GLUCOSEU Negative 07/09/2020    BLOODU 1+ (A) 07/09/2020    NITRITEU Negative 07/09/2020    LEUKOCYTESUR 2+ (A) 07/09/2020       Imaging:         Medical Tests:         Summary of old records / correspondence / consultant report:         Request outside records:         ALLERGIES  Allergies   Allergen Reactions   • Streptomycin Nausea Only   • Penicillins Itching        PFSH:     The following portions of the patient's history were reviewed and updated as appropriate: Allergies / Current Medications / Past Medical History / Surgical History / Social History / Family History    PROBLEM LIST   Patient Active Problem List   Diagnosis   • Diverticulosis of intestine   • Cephalalgia   • Hyperlipidemia   • Hypothyroidism  "  • Knee swelling   • Low back pain   • Cervical pain   • Sciatica   • Arthralgia of hip   • Hyperglycemia   • Status post hysterectomy       PAST MEDICAL HISTORY  Past Medical History:   Diagnosis Date   • Arthritis    • Cataract    • Collapsed lung     HISTORY OF X 2 MANY YEARS AGO. ?LEFT. FROM BLOWN BLEBS.  40 years ago.   • Constipation    • Degenerative disc disease, lumbar    • Depression     RECENT LOSS OF DGHT   • Dry skin    • Elevated cholesterol    • Female cystocele    • History of diverticulosis    • History of migraine    • Hypothyroidism    • Lumbar spine pain     REGAN. FOR MRI ON 7/29/19   • Presence of pessary 07/2019   • Right leg pain     \"RIGHT FOOT TINGLES\"   • Ringing in right ear    • Urinary incontinence    • Uterine prolapse    • Varicose vein of leg     SINDI       SURGICAL HISTORY  Past Surgical History:   Procedure Laterality Date   • ANTERIOR AND POSTERIOR VAGINAL REPAIR N/A 8/6/2019    Procedure: POSSIBLE ANTERIOR AND POSTERIOR VAGINAL REPAIR UTEROSACRAL SUSPENSION;  Surgeon: Kristine Baker MD;  Location: [facility];  Service: Gynecology   • COLONOSCOPY  1993    \"WNL\"   • JOINT REPLACEMENT      Left hip arthroplasty   • TOTAL HIP ARTHROPLASTY Left 1/24/2018    Procedure: LT  TOTAL HIP ARTHROPLASTY;  Surgeon: Rogelio Nash MD;  Location: [facility];  Service:    • TOTAL LAPAROSCOPIC HYSTERECTOMY UTEROSACRAL SUSPENSION WITH TRANSVAGINAL TAPING N/A 8/6/2019    Procedure: TOTAL LAPAROSCOPIC HYSTERECTOMY BILATERAL SALPINGO OOPHORECTOMY TENSION FREE VAGINAL TAPING;  Surgeon: Kristine Baker MD;  Location: [facility];  Service: Gynecology       SOCIAL HISTORY  Social History     Socioeconomic History   • Marital status:      Spouse name: Not on file   • Number of children: Not on file   • Years of education: Not on file   • Highest education level: Not on file   Tobacco Use   • Smoking status: Former Smoker     Packs/day: 1.00     Years: 35.00     Pack years: 35.00     " Types: Cigarettes     Quit date:      Years since quittin.0   • Smokeless tobacco: Never Used   Substance and Sexual Activity   • Alcohol use: No   • Drug use: No   • Sexual activity: Defer       FAMILY HISTORY  Family History   Problem Relation Age of Onset   • Breast cancer Maternal Aunt    • Malig Hyperthermia Neg Hx          Examiner was wearing KN95 mask, face shield and exam gloves during the entire duration of the visit. Patient was masked the entire time.   Minimum social distance of 6 ft maintained entire visit except if physical contact was necessary as documented.     **Dragon Disclaimer:   Much of this encounter note is an electronic transcription/translation of spoken language to printed text. The electronic translation of spoken language may permit erroneous, or at times, nonsensical words or phrases to be inadvertently transcribed. Although I have reviewed the note for such errors, some may still exist.

## 2021-03-03 DIAGNOSIS — Z23 IMMUNIZATION DUE: ICD-10-CM

## 2021-03-05 ENCOUNTER — HOSPITAL ENCOUNTER (OUTPATIENT)
Dept: BONE DENSITY | Facility: HOSPITAL | Age: 76
Discharge: HOME | End: 2021-03-05
Admitting: NURSE PRACTITIONER

## 2021-03-05 DIAGNOSIS — R93.7 ABNORMAL FINDINGS ON DIAGNOSTIC IMAGING OF OTHER PARTS OF MUSCULOSKELETAL SYSTEM: ICD-10-CM

## 2021-03-05 DIAGNOSIS — Z13.820 SCREENING FOR OSTEOPOROSIS: ICD-10-CM

## 2021-03-05 PROCEDURE — 77080 DXA BONE DENSITY AXIAL: CPT

## 2021-03-16 DIAGNOSIS — E03.9 ACQUIRED HYPOTHYROIDISM: ICD-10-CM

## 2021-03-16 RX ORDER — LEVOTHYROXINE SODIUM 0.1 MG/1
100 TABLET ORAL DAILY
Qty: 90 TABLET | Refills: 1 | Status: SHIPPED | OUTPATIENT
Start: 2021-03-16 | End: 2022-01-03

## 2021-04-28 ENCOUNTER — OFFICE VISIT (OUTPATIENT)
Dept: INTERNAL MEDICINE | Age: 76
End: 2021-04-28

## 2021-04-28 VITALS
SYSTOLIC BLOOD PRESSURE: 102 MMHG | BODY MASS INDEX: 25.02 KG/M2 | DIASTOLIC BLOOD PRESSURE: 62 MMHG | HEART RATE: 64 BPM | OXYGEN SATURATION: 97 % | HEIGHT: 65 IN | TEMPERATURE: 96.9 F | WEIGHT: 150.2 LBS

## 2021-04-28 DIAGNOSIS — R60.9 PERIPHERAL EDEMA: ICD-10-CM

## 2021-04-28 DIAGNOSIS — Z00.00 MEDICARE ANNUAL WELLNESS VISIT, SUBSEQUENT: Primary | ICD-10-CM

## 2021-04-28 DIAGNOSIS — R73.9 HYPERGLYCEMIA: ICD-10-CM

## 2021-04-28 DIAGNOSIS — E03.9 ACQUIRED HYPOTHYROIDISM: ICD-10-CM

## 2021-04-28 DIAGNOSIS — E78.2 MIXED HYPERLIPIDEMIA: ICD-10-CM

## 2021-04-28 PROBLEM — M06.9 RHEUMATOID ARTHRITIS, INVOLVING UNSPECIFIED SITE, UNSPECIFIED WHETHER RHEUMATOID FACTOR PRESENT (HCC): Status: ACTIVE | Noted: 2021-04-28

## 2021-04-28 LAB
ALBUMIN SERPL-MCNC: 4.4 G/DL (ref 3.5–5.2)
ALBUMIN/GLOB SERPL: 1.7 G/DL
ALP SERPL-CCNC: 117 U/L (ref 39–117)
ALT SERPL-CCNC: 30 U/L (ref 1–33)
AST SERPL-CCNC: 29 U/L (ref 1–32)
BILIRUB SERPL-MCNC: 0.5 MG/DL (ref 0–1.2)
BUN SERPL-MCNC: 16 MG/DL (ref 8–23)
BUN/CREAT SERPL: 18 (ref 7–25)
CALCIUM SERPL-MCNC: 10.4 MG/DL (ref 8.6–10.5)
CHLORIDE SERPL-SCNC: 106 MMOL/L (ref 98–107)
CHOLEST SERPL-MCNC: 155 MG/DL (ref 0–200)
CHOLEST/HDLC SERPL: 4.08 {RATIO}
CO2 SERPL-SCNC: 28.8 MMOL/L (ref 22–29)
CREAT SERPL-MCNC: 0.89 MG/DL (ref 0.57–1)
GLOBULIN SER CALC-MCNC: 2.6 GM/DL
GLUCOSE SERPL-MCNC: 96 MG/DL (ref 65–99)
HBA1C MFR BLD: 6.1 % (ref 4.8–5.6)
HDLC SERPL-MCNC: 38 MG/DL (ref 40–60)
LDLC SERPL CALC-MCNC: 84 MG/DL (ref 0–100)
POTASSIUM SERPL-SCNC: 4.1 MMOL/L (ref 3.5–5.2)
PROT SERPL-MCNC: 7 G/DL (ref 6–8.5)
SODIUM SERPL-SCNC: 143 MMOL/L (ref 136–145)
T4 FREE SERPL-MCNC: 1.13 NG/DL (ref 0.93–1.7)
TRIGL SERPL-MCNC: 197 MG/DL (ref 0–150)
TSH SERPL DL<=0.005 MIU/L-ACNC: 0.84 UIU/ML (ref 0.27–4.2)
VLDLC SERPL CALC-MCNC: 33 MG/DL (ref 5–40)

## 2021-04-28 PROCEDURE — 99214 OFFICE O/P EST MOD 30 MIN: CPT | Performed by: NURSE PRACTITIONER

## 2021-04-28 PROCEDURE — G0439 PPPS, SUBSEQ VISIT: HCPCS | Performed by: NURSE PRACTITIONER

## 2021-04-28 RX ORDER — FUROSEMIDE 20 MG/1
20 TABLET ORAL DAILY
Qty: 30 TABLET | Refills: 3 | Status: SHIPPED | OUTPATIENT
Start: 2021-04-28 | End: 2021-09-28

## 2021-04-28 RX ORDER — MELATONIN
2000 DAILY
COMMUNITY
End: 2021-09-28

## 2021-04-28 NOTE — PROGRESS NOTES
"    I N T E R N A L  M E D I C I N E  JAYNE WOLFF      ENCOUNTER DATE:  04/28/2021    Marci C Kolb / 76 y.o. / female        MEDICARE ANNUAL WELLNESS VISIT       Chief Complaint: Medicare Wellness-subsequent       Patient's general assessment of her health since a year ago:     - Compared to one year ago, she feels her physical health is slightly worse.    - Compared to one year ago, she feels her mental health is about the same without significant change.      HPI for other active medical problems:     Hypothyroidism: Well-controlled on levothyroxine 100 MCG tablet daily.  No significant increased fatigue, constipation or cold intolerance.    Hyperlipidemia: Well-controlled on simvastatin 40 mg tablet nightly.  No myalgias with statin.    Hyperglycemia: Last hemoglobin A1c in prediabetic range at 5.90.  Increasing exercise.     Peripheral edema: Right greater than left chronically.  On Lasix 20 mg daily.  CMP showed far 60, creatinine 0.91 and normal potassium and sodium.    * The required components of Health Risk Assessment (HRA) that were completed by the patient and/or my staff are contained within this note and in the scanned documents titled \"Health Risk Assessment\" within the media section of the patient's chart in [employer].         HISTORY       Recent Hospitalizations:    Recent hospitalization?: NO    If YES, location, date, and diagnoses:     · Location:   · Date:   · Principle Discharge Dx:   · Secondary Dx:       Patient Care Team:    Patient Care Team:  Supa Sparks APRN as PCP - General (Internal Medicine)  Abdias Nash MD (Orthopedic Surgery)  Jatinder Dominguez MD as Consulting Physician (Otolaryngology)      Allergies:  Streptomycin and Penicillins    Medications:  Current Outpatient Medications on File Prior to Visit   Medication Sig Dispense Refill   • cholecalciferol (VITAMIN D3) 25 MCG (1000 UT) tablet Take 2,000 Units by mouth Daily.     • levothyroxine (SYNTHROID, " "LEVOTHROID) 100 MCG tablet Take 1 tablet by mouth Daily. 90 tablet 1   • simvastatin (ZOCOR) 40 MG tablet TAKE ONE TABLET BY MOUTH ONCE NIGHTLY 90 tablet 3   • [DISCONTINUED] furosemide (Lasix) 20 MG tablet Take 1 tablet by mouth Daily. 30 tablet 3     No current facility-administered medications on file prior to visit.        PFSH:     The following portions of the patient's history were reviewed and updated as appropriate: Allergies / Current Medications / Past Medical History / Surgical History / Social History / Family History    Problem List:  Patient Active Problem List   Diagnosis   • Diverticulosis of intestine   • Cephalalgia   • Hyperlipidemia   • Hypothyroidism   • Knee swelling   • Low back pain   • Cervical pain   • Sciatica   • Arthralgia of hip   • Hyperglycemia   • Status post hysterectomy   • Rheumatoid arthritis, involving unspecified site, unspecified whether rheumatoid factor present (CMS/[facility])       Past Medical History:  Past Medical History:   Diagnosis Date   • Arthritis    • Cataract    • Collapsed lung     HISTORY OF X 2 MANY YEARS AGO. ?LEFT. FROM BLOWN BLEBS.  40 years ago.   • Constipation    • Degenerative disc disease, lumbar    • Depression     RECENT LOSS OF DGHT   • Dry skin    • Elevated cholesterol    • Female cystocele    • History of diverticulosis    • History of migraine    • Hypothyroidism    • Lumbar spine pain     REGAN. FOR MRI ON 7/29/19   • Presence of pessary 07/2019   • Right leg pain     \"RIGHT FOOT TINGLES\"   • Ringing in right ear    • Urinary incontinence    • Uterine prolapse    • Varicose vein of leg     SINDI       Past Surgical History:  Past Surgical History:   Procedure Laterality Date   • ANTERIOR AND POSTERIOR VAGINAL REPAIR N/A 8/6/2019    Procedure: POSSIBLE ANTERIOR AND POSTERIOR VAGINAL REPAIR UTEROSACRAL SUSPENSION;  Surgeon: Kristine Baker MD;  Location: [facility] OR;  Service: Gynecology   • COLONOSCOPY  1993    \"WNL\"   • JOINT REPLACEMENT      Left " "hip arthroplasty   • TOTAL HIP ARTHROPLASTY Left 2018    Procedure: LT  TOTAL HIP ARTHROPLASTY;  Surgeon: Rogelio Nash MD;  Location: [facility];  Service:    • TOTAL LAPAROSCOPIC HYSTERECTOMY UTEROSACRAL SUSPENSION WITH TRANSVAGINAL TAPING N/A 2019    Procedure: TOTAL LAPAROSCOPIC HYSTERECTOMY BILATERAL SALPINGO OOPHORECTOMY TENSION FREE VAGINAL TAPING;  Surgeon: Kristine Baker MD;  Location: [facility];  Service: Gynecology       Social History:  Social History     Socioeconomic History   • Marital status:      Spouse name: Not on file   • Number of children: Not on file   • Years of education: Not on file   • Highest education level: Not on file   Tobacco Use   • Smoking status: Former Smoker     Packs/day: 1.00     Years: 35.00     Pack years: 35.00     Types: Cigarettes     Quit date:      Years since quittin.3   • Smokeless tobacco: Never Used   Substance and Sexual Activity   • Alcohol use: No   • Drug use: No   • Sexual activity: Defer       Family History:  Family History   Problem Relation Age of Onset   • Breast cancer Maternal Aunt    • Malig Hyperthermia Neg Hx          PATIENT ASSESSMENT     Vitals:  /62   Pulse 64   Temp 96.9 °F (36.1 °C)   Ht 165.1 cm (65\")   Wt 68.1 kg (150 lb 3.2 oz)   SpO2 97%   BMI 24.99 kg/m²   BP Readings from Last 3 Encounters:   21 102/62   20 114/68   20 110/60     Wt Readings from Last 3 Encounters:   21 68.1 kg (150 lb 3.2 oz)   20 70 kg (154 lb 6.4 oz)   20 71.1 kg (156 lb 12.8 oz)      Body mass index is 24.99 kg/m².    There were no vitals filed for this visit.      Review of Systems:    Review of Systems  Constitutional neg except per HPI   Resp neg  CV neg  Musc right hip pain - followed by ortho, needs replacement     Physical Exam:    Physical Exam  Constitutional  No distress  Cardiovascular Rate  normal . Rhythm: regular . Heart sounds:  normal  Pulmonary/Chest  Effort normal. " Breath sounds:  normal  Psychiatric  Alert. Judgment and thought content normal. Mood normal   Reviewed Data:    Labs:   Lab Results   Component Value Date     12/28/2020    K 3.9 12/28/2020    CALCIUM 9.8 12/28/2020    AST 33 (H) 12/28/2020    ALT 26 12/28/2020    BUN 9 12/28/2020    CREATININE 0.91 12/28/2020    CREATININE 0.94 07/09/2020    CREATININE 0.67 06/12/2020    EGFRIFNONA 60 (L) 12/28/2020    EGFRIFAFRI 73 12/28/2020       Lab Results   Component Value Date    GLU 91 12/28/2020    GLU 97 07/09/2020     (H) 12/30/2019    HGBA1C 5.90 (H) 12/28/2020    HGBA1C 6.13 (H) 07/09/2020    HGBA1C 6.00 (H) 12/30/2019       Lab Results   Component Value Date    LDL 85 12/28/2020     (H) 07/09/2020    LDL 72 12/30/2019    HDL 36 (L) 12/28/2020    TRIG 150 12/28/2020    CHOLHDLRATIO 4.08 12/28/2020       Lab Results   Component Value Date    TSH 1.580 12/28/2020    FREET4 1.15 12/28/2020          Lab Results   Component Value Date    WBC 7.21 06/12/2020    HGB 13.6 06/12/2020    HGB 12.0 08/07/2019    HGB 12.7 07/29/2019     06/12/2020                 No results found for: PSA    Imaging:          Medical Tests:          Screening for Glaucoma:  Previous screening for glaucoma?: Yes      Hearing Loss Screen:  Finger Rub Hearing Test (right ear): passed  Finger Rub Hearing Test (left ear): passed      Urinary Incontinence Screen:  Episodes of urinary incontinence? : Yes; wears pad       Depression Screen:  PHQ-2/PHQ-9 Depression Screening 4/28/2021   Little interest or pleasure in doing things 0   Feeling down, depressed, or hopeless 0   Total Score 0        PHQ-2: 0 (Not depressed)    PHQ-9: 0 (Negative screening for depression)       FUNCTIONAL, FALL RISK, & COGNITIVE SCREENING (Components below):    DATA:    Functional & Cognitive Status 4/28/2021   Do you have difficulty preparing food and eating? No   Do you have difficulty bathing yourself, getting dressed or grooming yourself? No   Do  you have difficulty using the toilet? No   Do you have difficulty moving around from place to place? No   Do you have trouble with steps or getting out of a bed or a chair? No   Do you need help using the phone?  No   Are you deaf or do you have serious difficulty hearing?  No   Do you need help with transportation? No   Do you need help shopping? No   Do you need help preparing meals?  No   Do you need help with housework?  No   Do you need help with laundry? No   Do you need help taking your medications? No   Do you need help managing money? No   Do you ever drive or ride in a car without wearing a seat belt? No         A) Assessment of Functional Ability:  (Assessment of ability to perform ADL's (showering/bathing, using toilet, dressing, feeding self, moving self around) and IADL's (use telephone, shop, prepare food, housekeep, do laundry, transport independently, take medications independently, and handle finances)    Degree of functional impairment: NONE (based on assessment noted above)      B) Assessment of Fall Risk:  Fall Risk Assessment was completed, and patient is at low risk for falls.       Need for further evaluation of gait, strength, and balance? : No    Timed Up and Go (TUG):   (>= 12 seconds indicates high risk for falling)    Observable abnormalities included: Normal gait pattern       C. Assessment of Cognitive Function:    Mini-Cog Test:     1) Registration (3 objects): Yes   2) Number of objects recalled: 3   3) Clock Draw: Passed? : Yes       Further evaluation required? : No        COUNSELING       A. Identification of Health Risk Factors:    Risk factors include: chronic pain, poor vision and incontinence      B. Age-Appropriate Screening Schedule:  (Refer to the list below for future screening recommendations based on patient's age, sex and/or medical conditions. Orders for these recommended tests are listed in the plan section. The patient has been provided with a written plan)    Health  Maintenance Topics  Health Maintenance   Topic Date Due   • ZOSTER VACCINE (1 of 2) 04/28/2022 (Originally 2/17/1995)   • INFLUENZA VACCINE  08/01/2021   • LIPID PANEL  12/28/2021   • MAMMOGRAM  03/18/2022   • DXA SCAN  03/05/2023   • COLONOSCOPY  12/30/2029   • TDAP/TD VACCINES (2 - Td) 07/29/2030       Health Maintenance Topics Due or Over-Due  Health Maintenance Due   Topic Date Due   • ANNUAL WELLNESS VISIT  12/30/2020   • COVID-19 Vaccine (2 - Pfizer 2-dose series) 05/03/2021         C. Advanced Care Planning:    Advance Care Planning   ACP discussion was held with the patient during this visit. Patient does not have an advance directive, declines further assistance.       D. Patient Self-Management and Personalized Health Advice:    She has been provided with personalized counseling/information (including brochures/handouts) about:     -- optimizing diet/nutrition plans, improving exercise / conditioning, reducing risk for cardiovascular disease (heart, stroke, vascular) and fall prevention      She has been recommended for the following preventative services which has been performed today, will be ordered today or ordered/performed on upcoming follow-up visit:     -- nutrition counseling provided, exercise counseling provided, recommended eye exam for glaucoma screening, counseling for cardiovascular disease risk reduction, urinary incontinence assessment done, screening for diabetes performed (current/reviewed labs/lab ordered), screening for colorectal cancer deferred by patient (risks of not screening explained to patient)      E. Miscellaneous Items:    -Aspirin use counseling: Does not need ASA (and currently is not on it)    -Discussed BMI with her. The BMI is in the acceptable range    -Reviewed use of high risk medication in the elderly: YES    -Reviewed for potential of harmful drug interactions in the elderly: YES        WRAP UP       Assessment & Plan:  1) MEDICARE ANNUAL WELLNESS VISIT    2) OTHER  MEDICAL CONDITIONS ADDRESSED TODAY:            Problem List Items Addressed This Visit        Cardiac and Vasculature    Hyperlipidemia    Relevant Medications    simvastatin (ZOCOR) 40 MG tablet    Other Relevant Orders    Lipid Panel With / Chol / HDL Ratio       Endocrine and Metabolic    Hypothyroidism    Overview     Acquired hypothyroidism. Takes levothyroxine 100mcg. Stable.          Relevant Medications    levothyroxine (SYNTHROID, LEVOTHROID) 100 MCG tablet    Other Relevant Orders    Comprehensive Metabolic Panel    TSH+Free T4    Hyperglycemia    Relevant Orders    Hemoglobin A1c      Other Visit Diagnoses     Medicare annual wellness visit, subsequent    -  Primary    Peripheral edema        Relevant Medications    furosemide (Lasix) 20 MG tablet    Other Relevant Orders    Comprehensive Metabolic Panel                    Orders Placed This Encounter   Procedures   • Comprehensive Metabolic Panel   • TSH+Free T4   • Hemoglobin A1c   • Lipid Panel With / Chol / HDL Ratio       Discussion / Summary:  Hypothyroidism: Continue levothyroxine 100 MCG tablet daily; TSH and T4  Hyperlipidemia: Continue simvastatin 40 mg nightly; lipid panel  Hyperglycemia: Recheck hemoglobin A1c  Peripheral edema : Continue Lasix 20 mg daily; CMP  Patient has declined colonoscopy.  Declined Cologuard at this time.  Up-to-date on mammogram and DEXA screening.  Taking 2000 IU of vitamin D daily with osteopenia.   Follow-up in 3 months for chronic medical follow-up  Medications as of TODAY:              Current Outpatient Medications   Medication Sig Dispense Refill   • cholecalciferol (VITAMIN D3) 25 MCG (1000 UT) tablet Take 2,000 Units by mouth Daily.     • furosemide (Lasix) 20 MG tablet Take 1 tablet by mouth Daily. 30 tablet 3   • levothyroxine (SYNTHROID, LEVOTHROID) 100 MCG tablet Take 1 tablet by mouth Daily. 90 tablet 1   • simvastatin (ZOCOR) 40 MG tablet TAKE ONE TABLET BY MOUTH ONCE NIGHTLY 90 tablet 3     No current  facility-administered medications for this visit.         FOLLOW-UP:            Return in about 4 months (around 8/28/2021) for Next scheduled follow up.                 Future Appointments   Date Time Provider Department Center   8/27/2021  7:45 AM Supa Sparks APRN [facility]           After Visit Summary (AVS) including the Personalized Prevention  Plan Services (PPPS) was either printed and given to the patient at check-out today and/or sent to [facility] for review.       *Examiner was wearing medical surgical mask, face shield and exam gloves during the entire duration of the visit. Patient was masked the entire time.   Minimum social distance of 6 ft maintained entire visit except if physical contact was necessary as documented.      **Dragon Disclaimer:   Much of this encounter note is an electronic transcription/translation of spoken language to printed text. The electronic translation of spoken language may permit erroneous, or at times, nonsensical words or phrases to be inadvertently transcribed. Although I have reviewed the note for such errors, some may still exist.

## 2021-04-29 ENCOUNTER — TELEPHONE (OUTPATIENT)
Dept: INTERNAL MEDICINE | Age: 76
End: 2021-04-29

## 2021-04-29 RX ORDER — ICOSAPENT ETHYL 1000 MG/1
2 CAPSULE ORAL 2 TIMES DAILY WITH MEALS
Qty: 120 CAPSULE | Refills: 5 | Status: SHIPPED | OUTPATIENT
Start: 2021-04-29 | End: 2021-08-09

## 2021-04-29 NOTE — TELEPHONE ENCOUNTER
"----- Message from JAYNE Banegas sent at 4/29/2021 12:32 PM EDT -----  Please call patient as I am recommending new medication.     CYRUS Byers shows normal liver enzymes, electrolytes and kidney function.  Thyroid hormones are in normal range. We can continue 100 MCG levothyroxine daily.  Blood sugar slightly elevated from 4 months prior from 5.9-6.10.  You are in a prediabetic range.  Diabetes will be at a level of 6.5.  Please make sure you decrease your fats, sweets and carbohydrates.  Cholesterol is well controlled, but triglycerides are elevated on lipid panel.  Continue current statin dose but I would like to add a medication called Vascepa 2 g twice daily to help decrease these triglycerides.  This is a regulated fish oil.  If your insurance does not cover this I will suggest \" EPA\" fish oil over-the-counter 2 g twice daily.  If this causes stomach upset you may take 1 g twice daily.    Take care!  "

## 2021-04-29 NOTE — TELEPHONE ENCOUNTER
LVM with pt on cell r/t results. Pt instructed to review results in her MYCHART and call office with questions/concerns. MM

## 2021-05-09 ENCOUNTER — HOSPITAL ENCOUNTER (EMERGENCY)
Facility: HOSPITAL | Age: 76
Discharge: HOME | End: 2021-05-09
Attending: EMERGENCY MEDICINE | Admitting: EMERGENCY MEDICINE

## 2021-05-09 VITALS
TEMPERATURE: 97.4 F | RESPIRATION RATE: 16 BRPM | SYSTOLIC BLOOD PRESSURE: 113 MMHG | DIASTOLIC BLOOD PRESSURE: 66 MMHG | OXYGEN SATURATION: 96 % | BODY MASS INDEX: 24.99 KG/M2 | HEIGHT: 65 IN | WEIGHT: 150 LBS | HEART RATE: 74 BPM
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
## 2021-05-09 DIAGNOSIS — Z51.89 VISIT FOR WOUND CHECK: Primary | ICD-10-CM

## 2021-05-09 LAB
ALBUMIN SERPL-MCNC: 4.4 G/DL (ref 3.5–5.2)
ALBUMIN/GLOB SERPL: 1.5 G/DL
ALP SERPL-CCNC: 124 U/L (ref 39–117)
ALT SERPL W P-5'-P-CCNC: 28 U/L (ref 1–33)
ANION GAP SERPL CALCULATED.3IONS-SCNC: 9.6 MMOL/L (ref 5–15)
AST SERPL-CCNC: 30 U/L (ref 1–32)
BASOPHILS # BLD AUTO: 0.06 10*3/MM3 (ref 0–0.2)
BASOPHILS NFR BLD AUTO: 1 % (ref 0–1.5)
BILIRUB SERPL-MCNC: 0.2 MG/DL (ref 0–1.2)
BUN SERPL-MCNC: 17 MG/DL (ref 8–23)
BUN/CREAT SERPL: 17.9 (ref 7–25)
CALCIUM SPEC-SCNC: 9.9 MG/DL (ref 8.6–10.5)
CHLORIDE SERPL-SCNC: 106 MMOL/L (ref 98–107)
CO2 SERPL-SCNC: 23.4 MMOL/L (ref 22–29)
CREAT SERPL-MCNC: 0.95 MG/DL (ref 0.57–1)
DEPRECATED RDW RBC AUTO: 43 FL (ref 37–54)
EOSINOPHIL # BLD AUTO: 0.11 10*3/MM3 (ref 0–0.4)
EOSINOPHIL NFR BLD AUTO: 1.8 % (ref 0.3–6.2)
ERYTHROCYTE [DISTWIDTH] IN BLOOD BY AUTOMATED COUNT: 12.8 % (ref 12.3–15.4)
GFR SERPL CREATININE-BSD FRML MDRD: 57 ML/MIN/1.73
GLOBULIN UR ELPH-MCNC: 2.9 GM/DL
GLUCOSE SERPL-MCNC: 100 MG/DL (ref 65–99)
HCT VFR BLD AUTO: 41.4 % (ref 34–46.6)
HGB BLD-MCNC: 14.3 G/DL (ref 12–15.9)
IMM GRANULOCYTES # BLD AUTO: 0.02 10*3/MM3 (ref 0–0.05)
IMM GRANULOCYTES NFR BLD AUTO: 0.3 % (ref 0–0.5)
LYMPHOCYTES # BLD AUTO: 2.53 10*3/MM3 (ref 0.7–3.1)
LYMPHOCYTES NFR BLD AUTO: 41.7 % (ref 19.6–45.3)
MCH RBC QN AUTO: 32.4 PG (ref 26.6–33)
MCHC RBC AUTO-ENTMCNC: 34.5 G/DL (ref 31.5–35.7)
MCV RBC AUTO: 93.7 FL (ref 79–97)
MONOCYTES # BLD AUTO: 0.5 10*3/MM3 (ref 0.1–0.9)
MONOCYTES NFR BLD AUTO: 8.2 % (ref 5–12)
NEUTROPHILS NFR BLD AUTO: 2.85 10*3/MM3 (ref 1.7–7)
NEUTROPHILS NFR BLD AUTO: 47 % (ref 42.7–76)
NRBC BLD AUTO-RTO: 0 /100 WBC (ref 0–0.2)
PLATELET # BLD AUTO: 253 10*3/MM3 (ref 140–450)
PMV BLD AUTO: 9.3 FL (ref 6–12)
POTASSIUM SERPL-SCNC: 4.2 MMOL/L (ref 3.5–5.2)
PROT SERPL-MCNC: 7.3 G/DL (ref 6–8.5)
RBC # BLD AUTO: 4.42 10*6/MM3 (ref 3.77–5.28)
SODIUM SERPL-SCNC: 139 MMOL/L (ref 136–145)
WBC # BLD AUTO: 6.07 10*3/MM3 (ref 3.4–10.8)

## 2021-05-09 PROCEDURE — 99283 EMERGENCY DEPT VISIT LOW MDM: CPT

## 2021-05-09 PROCEDURE — 80053 COMPREHEN METABOLIC PANEL: CPT | Performed by: PHYSICIAN ASSISTANT

## 2021-05-09 PROCEDURE — 85025 COMPLETE CBC W/AUTO DIFF WBC: CPT | Performed by: PHYSICIAN ASSISTANT

## 2021-05-09 RX ORDER — DOXYCYCLINE 100 MG/1
100 CAPSULE ORAL 2 TIMES DAILY
Qty: 14 CAPSULE | Refills: 0 | Status: SHIPPED | OUTPATIENT
Start: 2021-05-09 | End: 2021-05-16

## 2021-05-09 NOTE — ED TRIAGE NOTES
Dog bite 8 days ago on RLE.  Was seen at [facility].  Took 7 days of abx and was seen again yesterday at [facility] for redness and swelling.  Was advised to come to ER for eval

## 2021-05-09 NOTE — ED PROVIDER NOTES
EMERGENCY DEPARTMENT ENCOUNTER    Room Number:  07/07  Date seen:  5/9/2021  Time seen: 08:05 EDT  PCP: Supa Sparks APRN  Historian: Patient    HPI:  Chief complaint: Wound check  A complete HPI/ROS/PMH/PSH/SH/FH are unobtainable due to: None  Context:Marci Kolb is a 76 y.o. female, who presents to the ED with c/o dog bite 8 days ago on her right lower extremity.  She was seen in urgent care at that time and was prescribed antibiotics (unable to specify the  Antibiotics).  She reports that she was supposed to be prescribed 10 days of the antibiotic but only had enough for 8 days.  She completed the antibiotics yesterday.  Patient reports that the past 5 days she has had increased pain and redness to the dog bite, she went to urgent care yesterday and advised patient to go to the ER for further evaluation on wound.  Denies fever, nausea, vomiting, chills.  Patient does not have a history of diabetes.    Patient was placed in face mask in first look. Patient was wearing facemask when I entered the room and throughout our encounter. I wore full protective equipment throughout this patient encounter including a face mask, goggles, and gloves. Hand hygiene was performed before donning protective equipment and after removal when leaving the room.      MEDICAL RECORD REVIEW  Patient was seen at [facility] urgent care yesterday and was referred to the ER for cellulitis from the dog bite.    ALLERGIES  Streptomycin and Penicillins    PAST MEDICAL HISTORY  Active Ambulatory Problems     Diagnosis Date Noted   • Diverticulosis of intestine 02/12/2016   • Cephalalgia 02/12/2016   • Hyperlipidemia 02/12/2016   • Hypothyroidism 02/12/2016   • Knee swelling 02/12/2016   • Low back pain 02/12/2016   • Cervical pain 02/12/2016   • Sciatica 02/12/2016   • Arthralgia of hip 02/12/2016   • Hyperglycemia 07/25/2017   • Status post hysterectomy 08/06/2019   • Rheumatoid arthritis, involving unspecified site, unspecified whether  "rheumatoid factor present (CMS/[facility]) 04/28/2021     Resolved Ambulatory Problems     Diagnosis Date Noted   • Difficult or painful urination 02/12/2016   • Gonalgia 02/12/2016   • Osteoarthritis of right hip 01/24/2018   • Atelectasis 01/27/2018   • Cystitis 04/28/2018     Past Medical History:   Diagnosis Date   • Arthritis    • Cataract    • Collapsed lung    • Constipation    • Degenerative disc disease, lumbar    • Depression    • Dry skin    • Elevated cholesterol    • Female cystocele    • History of diverticulosis    • History of migraine    • Lumbar spine pain    • Presence of pessary 07/2019   • Right leg pain    • Ringing in right ear    • Urinary incontinence    • Uterine prolapse    • Varicose vein of leg        PAST SURGICAL HISTORY  Past Surgical History:   Procedure Laterality Date   • ANTERIOR AND POSTERIOR VAGINAL REPAIR N/A 8/6/2019    Procedure: POSSIBLE ANTERIOR AND POSTERIOR VAGINAL REPAIR UTEROSACRAL SUSPENSION;  Surgeon: Kristine Baker MD;  Location: [facility];  Service: Gynecology   • COLONOSCOPY  1993    \"WNL\"   • JOINT REPLACEMENT      Left hip arthroplasty   • TOTAL HIP ARTHROPLASTY Left 1/24/2018    Procedure: LT  TOTAL HIP ARTHROPLASTY;  Surgeon: Rogelio Nash MD;  Location: [facility];  Service:    • TOTAL LAPAROSCOPIC HYSTERECTOMY UTEROSACRAL SUSPENSION WITH TRANSVAGINAL TAPING N/A 8/6/2019    Procedure: TOTAL LAPAROSCOPIC HYSTERECTOMY BILATERAL SALPINGO OOPHORECTOMY TENSION FREE VAGINAL TAPING;  Surgeon: Kristine Baker MD;  Location: [facility];  Service: Gynecology       FAMILY HISTORY  Family History   Problem Relation Age of Onset   • Breast cancer Maternal Aunt    • Malig Hyperthermia Neg Hx        SOCIAL HISTORY  Social History     Socioeconomic History   • Marital status:      Spouse name: Not on file   • Number of children: Not on file   • Years of education: Not on file   • Highest education level: Not on file   Tobacco Use   • Smoking status: Former " Smoker     Packs/day: 1.00     Years: 35.00     Pack years: 35.00     Types: Cigarettes     Quit date:      Years since quittin.3   • Smokeless tobacco: Never Used   Substance and Sexual Activity   • Alcohol use: No   • Drug use: No   • Sexual activity: Defer       REVIEW OF SYSTEMS  Review of Systems    All systems reviewed and negative except for those discussed in HPI.     PHYSICAL EXAM    ED Triage Vitals   Temp Heart Rate Resp BP SpO2   21 0726 21 0726 21 0726 21 0755 21 07   97.4 °F (36.3 °C) 77 16 117/69 97 %      Temp src Heart Rate Source Patient Position BP Location FiO2 (%)   21 0721 0755 21 0755 21 075 --   Tympanic Monitor Lying Right arm      Physical Exam    I have reviewed the triage vital signs and nursing notes.      GENERAL: not distressed; nontoxic appearing  HENT: nares patent  EYES: no scleral icterus  NECK: no ROM limitations  CV: regular rhythm, regular rate  RESPIRATORY: normal effort, clear to auscultation bilaterally  ABDOMEN: soft, nontender  : deferred  MUSCULOSKELETAL: no deformity  NEURO: alert, moves all extremities, follows commands  SKIN: warm, dry; distal right anterior thigh: There is a healing bite wound with a very small area of induration, but no fluctuance, no drainage, no surrounding erythema, warmth, lymphangitis.  Sensation is intact to light touch to the right lower extremity    LAB RESULTS  Recent Results (from the past 24 hour(s))   Comprehensive Metabolic Panel    Collection Time: 21  8:36 AM    Specimen: Blood   Result Value Ref Range    Glucose 100 (H) 65 - 99 mg/dL    BUN 17 8 - 23 mg/dL    Creatinine 0.95 0.57 - 1.00 mg/dL    Sodium 139 136 - 145 mmol/L    Potassium 4.2 3.5 - 5.2 mmol/L    Chloride 106 98 - 107 mmol/L    CO2 23.4 22.0 - 29.0 mmol/L    Calcium 9.9 8.6 - 10.5 mg/dL    Total Protein 7.3 6.0 - 8.5 g/dL    Albumin 4.40 3.50 - 5.20 g/dL    ALT (SGPT) 28 1 - 33 U/L    AST (SGOT)  30 1 - 32 U/L    Alkaline Phosphatase 124 (H) 39 - 117 U/L    Total Bilirubin 0.2 0.0 - 1.2 mg/dL    eGFR Non African Amer 57 (L) >60 mL/min/1.73    Globulin 2.9 gm/dL    A/G Ratio 1.5 g/dL    BUN/Creatinine Ratio 17.9 7.0 - 25.0    Anion Gap 9.6 5.0 - 15.0 mmol/L   CBC Auto Differential    Collection Time: 05/09/21  8:36 AM    Specimen: Blood   Result Value Ref Range    WBC 6.07 3.40 - 10.80 10*3/mm3    RBC 4.42 3.77 - 5.28 10*6/mm3    Hemoglobin 14.3 12.0 - 15.9 g/dL    Hematocrit 41.4 34.0 - 46.6 %    MCV 93.7 79.0 - 97.0 fL    MCH 32.4 26.6 - 33.0 pg    MCHC 34.5 31.5 - 35.7 g/dL    RDW 12.8 12.3 - 15.4 %    RDW-SD 43.0 37.0 - 54.0 fl    MPV 9.3 6.0 - 12.0 fL    Platelets 253 140 - 450 10*3/mm3    Neutrophil % 47.0 42.7 - 76.0 %    Lymphocyte % 41.7 19.6 - 45.3 %    Monocyte % 8.2 5.0 - 12.0 %    Eosinophil % 1.8 0.3 - 6.2 %    Basophil % 1.0 0.0 - 1.5 %    Immature Grans % 0.3 0.0 - 0.5 %    Neutrophils, Absolute 2.85 1.70 - 7.00 10*3/mm3    Lymphocytes, Absolute 2.53 0.70 - 3.10 10*3/mm3    Monocytes, Absolute 0.50 0.10 - 0.90 10*3/mm3    Eosinophils, Absolute 0.11 0.00 - 0.40 10*3/mm3    Basophils, Absolute 0.06 0.00 - 0.20 10*3/mm3    Immature Grans, Absolute 0.02 0.00 - 0.05 10*3/mm3    nRBC 0.0 0.0 - 0.2 /100 WBC         RADIOLOGY RESULTS  No orders to display         PROGRESS, DATA ANALYSIS, CONSULTS AND MEDICAL DECISION MAKING  All labs have been independently reviewed by me.  All radiology studies have been reviewed by me and discussed with radiologist dictating the report. Discussion below represents my analysis of pertinent findings related to patient's condition, differential diagnosis, treatment plan and final disposition.     ED Course as of May 09 1540   Sun May 09, 2021   0807 Offered patient pain medication but she is refusing at this time.    [SS]   0902 WBC: 6.07 [SS]   1100 Patient has completed her Bactrim antibiotics for the dog wound.  Will prescribe her another round of antibiotics  "specifically doxycycline and will refer her to the wound care clinic to recheck her wound later this week.  Advised patient to take a probiotic while she is on doxycycline.  She expressed understanding and all question addressed at this time.  Patient is not febrile, normal normal white blood cell count, the wound is well-appearing and appears to be healing well.  Believe she is safe to be discharged home.    [SS]      ED Course User Index  [SS] Susan Ruff PA-C       The differential diagnosis include but are not limited to abscess, sepsis, cellulitis.       Reviewed pt's history and workup with Dr. Valladares.  After a bedside evaluation, Dr. Valladares agrees with the plan of care.    (FOR DISCHARGE)The patient's history, physical exam, and lab findings were discussed with the physician, who also performed a face to face history and physical exam.  I discussed all results and noted any abnormalities with patient.  Discussed absoute need to recheck abnormalities with their family physician.  I answered any of the patient's questions.  Discussed plan for discharge, as there is no emergent indication for admission.  Pt is agreeable and understands need for follow up and repeat testing.  Pt is aware that discharge does not mean that nothing is wrong but it indicates no emergency is present and they must continue care with their family physician.  Pt is discharged with instructions to follow up with primary care doctor to have their blood pressure rechecked.         Disposition vitals:  /66 (BP Location: Right arm, Patient Position: Lying)   Pulse 74   Temp 97.4 °F (36.3 °C) (Tympanic)   Resp 16   Ht 165.1 cm (65\")   Wt 68 kg (150 lb)   SpO2 96%   BMI 24.96 kg/m²       DIAGNOSIS  Final diagnoses:   Visit for wound check   Dog bite, subsequent encounter       FOLLOW UP   [facility]  [address] [facility] [address] [phone]  Call on 5/11/2021      [facility] " [place] [facility]  [address] [facility] [id number] [phone]    If symptoms worsen, As needed         Susan Ruff PA-C  05/09/21 1545

## 2021-05-09 NOTE — ED NOTES
This RN wearing all appropriate PPE during patient encounter. Hand hygiene performed before and during entering room.       Nanda Edward, RONALD  05/09/21 4701

## 2021-05-09 NOTE — DISCHARGE INSTRUCTIONS
Please take the antibiotic as prescribed.  Make sure you take a probiotic daily while you are taking the antibiotic.  Call wound care clinic this week to recheck your wound in about 3 to 5 days.  Return to the ER if you develop a fever or any other worsening symptoms.

## 2021-05-09 NOTE — ED PROVIDER NOTES
I have supervised the care provided by the midlevel provider.    We have discussed this patient's history, physical exam, and treatment plan.   I have reviewed the note and have personally examined the patient and agree with the plan of care.  See attached attending note.  My personal findings are below:    Patient was bitten by a dog on her right thigh 8 days ago.  She just finished a course of Bactrim yesterday.  She reports increased pain and swelling around the bite.  Denies fever or chills.  She does not have diabetes.    On exam: Awake and alert.  Nontoxic-appearing.  Heart is regular rate and rhythm.  Lungs are clear bilaterally.  There is a healing bite wound on the right thigh.  There is a small area of induration but no fluctuance.  There is no surrounding erythema, warmth, or lymphangitis.  No drainage.    Plan is to obtain labs.     Alli Valladares MD  05/09/21 7423

## 2021-05-13 ENCOUNTER — TELEPHONE (OUTPATIENT)
Dept: INTERNAL MEDICINE | Age: 76
End: 2021-05-13

## 2021-05-13 NOTE — TELEPHONE ENCOUNTER
[employer] CALLED REGARDING icosapent ethyl (Vascepa) 1 g capsule capsule    JULIANNE HUTTON SUBMITTED A PRIOR AUTHORIZATION THAT WAS DENIED BY [employer].  TO CONTACT [employer]- [phone].  CASE ID # [id number].    IF JULIANNE HUTTON NEEDS ASSISTANCE IN SUBMITTING AN APPEAL PLEASE REACH OUT TO [employer] [phone] REFERENCE # [id number]

## 2021-07-30 ENCOUNTER — APPOINTMENT (OUTPATIENT)
Dept: GENERAL RADIOLOGY | Facility: HOSPITAL | Age: 76
End: 2021-07-30

## 2021-07-30 ENCOUNTER — HOSPITAL ENCOUNTER (EMERGENCY)
Facility: HOSPITAL | Age: 76
Discharge: HOME | End: 2021-07-30
Attending: EMERGENCY MEDICINE | Admitting: EMERGENCY MEDICINE

## 2021-07-30 VITALS
DIASTOLIC BLOOD PRESSURE: 84 MMHG | TEMPERATURE: 97.3 F | HEIGHT: 64 IN | HEART RATE: 52 BPM | RESPIRATION RATE: 18 BRPM | BODY MASS INDEX: 25.61 KG/M2 | OXYGEN SATURATION: 93 % | WEIGHT: 150 LBS | SYSTOLIC BLOOD PRESSURE: 137 MMHG

## 2021-07-30 DIAGNOSIS — M53.3 SACROILIAC JOINT PAIN: Primary | ICD-10-CM

## 2021-07-30 PROCEDURE — 73502 X-RAY EXAM HIP UNI 2-3 VIEWS: CPT

## 2021-07-30 PROCEDURE — 99283 EMERGENCY DEPT VISIT LOW MDM: CPT

## 2021-07-30 PROCEDURE — 63710000001 PREDNISONE PER 1 MG: Performed by: NURSE PRACTITIONER

## 2021-07-30 RX ORDER — PREDNISONE 20 MG/1
60 TABLET ORAL ONCE
Status: COMPLETED | OUTPATIENT
Start: 2021-07-30 | End: 2021-07-30

## 2021-07-30 RX ORDER — METHYLPREDNISOLONE 4 MG/1
TABLET ORAL
Qty: 21 EACH | Refills: 0 | Status: SHIPPED | OUTPATIENT
Start: 2021-07-30 | End: 2021-08-09

## 2021-07-30 RX ADMIN — PREDNISONE 60 MG: 20 TABLET ORAL at 11:41

## 2021-08-09 ENCOUNTER — OFFICE VISIT (OUTPATIENT)
Dept: INTERNAL MEDICINE | Age: 76
End: 2021-08-09

## 2021-08-09 VITALS
HEIGHT: 64 IN | HEART RATE: 75 BPM | SYSTOLIC BLOOD PRESSURE: 130 MMHG | WEIGHT: 152.4 LBS | DIASTOLIC BLOOD PRESSURE: 76 MMHG | OXYGEN SATURATION: 98 % | BODY MASS INDEX: 26.02 KG/M2 | TEMPERATURE: 97.5 F

## 2021-08-09 DIAGNOSIS — E03.9 ACQUIRED HYPOTHYROIDISM: ICD-10-CM

## 2021-08-09 DIAGNOSIS — M53.3 PAIN OF LEFT SACROILIAC JOINT: Primary | ICD-10-CM

## 2021-08-09 DIAGNOSIS — E78.2 MIXED HYPERLIPIDEMIA: ICD-10-CM

## 2021-08-09 PROCEDURE — 99213 OFFICE O/P EST LOW 20 MIN: CPT | Performed by: NURSE PRACTITIONER

## 2021-08-09 NOTE — PROGRESS NOTES
"    I N T E R N A L  M E D I C I N E  EDEN ESPINOSA, JAYNE      ENCOUNTER DATE:  08/09/2021    Marci C Kolb / 76 y.o. / female      CHIEF COMPLAINT / REASON FOR OFFICE VISIT     Hospital Follow Up Visit (joint pain)      ASSESSMENT & PLAN     1. Pain of left sacroiliac joint  -Using Tylenol as needed  -Last day of Medrol pack  -Patient declined physical therapy, referral to Ortho, or further work-up of lumbar spine    2. Acquired hypothyroidism  -Continue levothyroxine 100 MCG dose daily    3. Mixed hyperlipidemia  -Continue simvastatin 40 mg daily  -Continue omega-3 fish oil daily    No orders of the defined types were placed in this encounter.    No orders of the defined types were placed in this encounter.      SUMMARY/DISCUSSION  • Follow-up as scheduled August 27 for repeat lab work-up, especially for hypothyroidism with patient being off medication and restart on July 30      Next Appointment with me: 8/27/2021    No follow-ups on file.      VITAL SIGNS     Visit Vitals  /76   Pulse 75   Temp 97.5 °F (36.4 °C) (Temporal)   Ht 162.6 cm (64\")   Wt 69.1 kg (152 lb 6.4 oz)   SpO2 98%   BMI 26.16 kg/m²     Wt Readings from Last 3 Encounters:   08/09/21 69.1 kg (152 lb 6.4 oz)   07/30/21 68 kg (150 lb)   05/09/21 68 kg (150 lb)     Body mass index is 26.16 kg/m².      MEDICATIONS AT THE TIME OF OFFICE VISIT     Current Outpatient Medications on File Prior to Visit   Medication Sig   • cholecalciferol (VITAMIN D3) 25 MCG (1000 UT) tablet Take 2,000 Units by mouth Daily.   • furosemide (Lasix) 20 MG tablet Take 1 tablet by mouth Daily.   • levothyroxine (SYNTHROID, LEVOTHROID) 100 MCG tablet Take 1 tablet by mouth Daily.   • Omega-3 Fatty Acids (OMEGA-3 EPA FISH OIL PO) Take  by mouth.   • simvastatin (ZOCOR) 40 MG tablet TAKE ONE TABLET BY MOUTH ONCE NIGHTLY   • [DISCONTINUED] icosapent ethyl (Vascepa) 1 g capsule capsule Take 2 g by mouth 2 (Two) Times a Day With Meals.   • [DISCONTINUED] methylPREDNISolone " (MEDROL) 4 MG dose pack Take as directed on package instructions.     No current facility-administered medications on file prior to visit.         HISTORY OF PRESENT ILLNESS     Patient presents for ER follow-up on sacroiliac pain.  X-ray of her left hip was performed which did show arthritis of the hip along with degenerative changes of the lumbar spine.  She was given a Medrol pack and she has had some pain relief.  She is on last dosage of medication today.  She takes Tylenol as needed with minimal relief.  Declines use of ibuprofen or other NSAIDs regularly.  She works 6 days a weeks and declines physical therapy.  Would not like to be referred to any neurosurgeon or orthopedic at this time.  She declines further imaging of her lumbar spine.     Hypothyroidism hyperlipidemia: Previously on simvastatin 40 mg along with levothyroxine 100 MCG tablet daily.  She stopped with last lab results in April.  She has been significantly fatigued over the course the last few months and was told to restart at the ER visit which she has restarted both medications.    REVIEW OF SYSTEMS     Constitutional fatigue   Resp neg  CV neg  Musc left hip/lumbar pain       PHYSICAL EXAMINATION     Physical Exam  Constitutional  No distress  Cardiovascular Rate  normal . Rhythm: regular . Heart sounds:  normal  Pulmonary/Chest  Effort normal. Breath sounds:  normal  Musc tenderness left sacroiliac joint  Psychiatric  Alert. Judgment and thought content normal. Mood normal       REVIEWED DATA     Labs:         Imaging:           Medical Tests:             Summary of old records / correspondence / consultant report:           Request outside records:           *Examiner was wearing medical surgical mask, face shield and exam gloves during the entire duration of the visit. Patient was masked the entire time.   Minimum social distance of 6 ft maintained entire visit except if physical contact was necessary as documented.     **Riana  Disclaimer:   Much of this encounter note is an electronic transcription/translation of spoken language to printed text. The electronic translation of spoken language may permit erroneous, or at times, nonsensical words or phrases to be inadvertently transcribed. Although I have reviewed the note for such errors, some may still exist.

## 2021-08-31 ENCOUNTER — OFFICE VISIT (OUTPATIENT)
Dept: INTERNAL MEDICINE | Age: 76
End: 2021-08-31

## 2021-08-31 VITALS
HEART RATE: 75 BPM | OXYGEN SATURATION: 95 % | SYSTOLIC BLOOD PRESSURE: 108 MMHG | TEMPERATURE: 97.1 F | WEIGHT: 147.2 LBS | BODY MASS INDEX: 25.13 KG/M2 | HEIGHT: 64 IN | DIASTOLIC BLOOD PRESSURE: 64 MMHG

## 2021-08-31 DIAGNOSIS — E03.9 HYPOTHYROIDISM, UNSPECIFIED TYPE: ICD-10-CM

## 2021-08-31 DIAGNOSIS — R59.9 SWOLLEN LYMPH NODES: ICD-10-CM

## 2021-08-31 DIAGNOSIS — E78.2 MIXED HYPERLIPIDEMIA: Primary | ICD-10-CM

## 2021-08-31 DIAGNOSIS — R73.9 HYPERGLYCEMIA: ICD-10-CM

## 2021-08-31 PROCEDURE — 99214 OFFICE O/P EST MOD 30 MIN: CPT | Performed by: NURSE PRACTITIONER

## 2021-08-31 RX ORDER — AMMONIUM LACTATE 12 G/100G
LOTION TOPICAL
COMMUNITY
Start: 2021-08-26 | End: 2021-09-28

## 2021-08-31 NOTE — PROGRESS NOTES
"    I N T E R N A L  M E D I C I N E  EDEN ESPINOSA, APRN      ENCOUNTER DATE:  08/31/2021    Marci C Kolb / 76 y.o. / female      CHIEF COMPLAINT / REASON FOR OFFICE VISIT     Hypothyroidism (4 month f/u), Hyperlipidemia, and Hyperglycemia      ASSESSMENT & PLAN     1. Mixed hyperlipidemia  -Continue simvastatin 40 mg daily  - Lipid Panel With / Chol / HDL Ratio    2. Hypothyroidism, unspecified type  -Continue levothyroxine 100 MCG daily  - Comprehensive Metabolic Panel  - TSH+Free T4    3. Hyperglycemia  - Hemoglobin A1c    4. Swollen lymph nodes  -Suspect likely due to dental infection  -If no improvement consider ultrasound head and neck along with antibiotic treatment  - CBC w AUTO Differential    5.  Bilateral lower extremity edema  -Continue Lasix 20 mg daily  -May consider decreasing to every other day depending on labs    Orders Placed This Encounter   Procedures   • Comprehensive Metabolic Panel   • Hemoglobin A1c   • Lipid Panel With / Chol / HDL Ratio   • TSH+Free T4   • CBC w AUTO Differential     No orders of the defined types were placed in this encounter.      SUMMARY/DISCUSSION  • Follow-up in 4 months for chronic medical management      Next Appointment with me: Visit date not found    Return in about 4 months (around 12/31/2021) for Next scheduled follow up.      VITAL SIGNS     Visit Vitals  /64   Pulse 75   Temp 97.1 °F (36.2 °C) (Temporal)   Ht 162.6 cm (64\")   Wt 66.8 kg (147 lb 3.2 oz)   SpO2 95%   BMI 25.27 kg/m²       Wt Readings from Last 3 Encounters:   08/31/21 66.8 kg (147 lb 3.2 oz)   08/09/21 69.1 kg (152 lb 6.4 oz)   07/30/21 68 kg (150 lb)     Body mass index is 25.27 kg/m².      MEDICATIONS AT THE TIME OF OFFICE VISIT     Current Outpatient Medications on File Prior to Visit   Medication Sig   • cholecalciferol (VITAMIN D3) 25 MCG (1000 UT) tablet Take 2,000 Units by mouth Daily.   • furosemide (Lasix) 20 MG tablet Take 1 tablet by mouth Daily.   • levothyroxine (SYNTHROID, " LEVOTHROID) 100 MCG tablet Take 1 tablet by mouth Daily.   • Omega-3 Fatty Acids (OMEGA-3 EPA FISH OIL PO) Take  by mouth.   • simvastatin (ZOCOR) 40 MG tablet TAKE ONE TABLET BY MOUTH ONCE NIGHTLY   • ammonium lactate (LAC-HYDRIN) 12 % lotion      No current facility-administered medications on file prior to visit.         HISTORY OF PRESENT ILLNESS     Patient presents for chronic medical follow-up of hypothyroidism, hyperlipidemia, hyperglycemia, and left lower leg bilateral swelling.     Hypothyroidism   Acquired hypothyroidism. Takes levothyroxine 100mcg daily. Denies any heat or cold intolerance, diarrhea or constipation, heart palps, fatigue, or brittle hair or nails. Last TSH normal range.     HLD   Current treatment with simvastatin 40mg nightly. Denies any associated myalgias with statin. Compliant on medication. Last lipid panel indicates well controlled total and LDL cholesterol with decreased HDL and minimally elevated triglycerides.      Elevated Fasting Glucose  Elevated fasting blood glucose in the past. Last HA1c 6.10. Patient has been consistently in prediabetic range. She is not treated with metformin at this time. Denies any polydipsia or polyuria.      Left lower leg swelling   Seen on June 12, 2020 at [facility] ER, negative doppler. Started in lasix 20mg that she is currently taking daily. Denies any leg swelling or pain at this time.  Last GFR 57 with normal sodium and potassium.    Acute issue today of superficial left cervical lymph node enlargement and tenderness.  She does have a reported history of left lower gum swelling and pain in which she is reportedly seen her dentist in the near future.  No fever, chills or recent illness.     REVIEW OF SYSTEMS     Constitutional neg except per HPI   Resp neg  CV neg  ENT tender node left neck; tenderness to left thumb    PHYSICAL EXAMINATION     Physical Exam  Constitutional  No distress  ENT swollen left superficial cervical lymph node; left lower  gum swelling  Cardiovascular Rate  normal . Rhythm: regular . Heart sounds:  normal  Pulmonary/Chest  Effort normal. Breath sounds:  normal  Musc negative bilateral lower extremity edema with Lasix   psychiatric  Alert. Judgment and thought content normal. Mood normal       REVIEWED DATA     Labs:   Lab Results   Component Value Date    TSH 0.845 04/28/2021     Lab Results   Component Value Date    GLUCOSE 100 (H) 05/09/2021    BUN 17 05/09/2021    CREATININE 0.95 05/09/2021    EGFRIFNONA 57 (L) 05/09/2021    EGFRIFAFRI 74 04/28/2021    BCR 17.9 05/09/2021    K 4.2 05/09/2021    CO2 23.4 05/09/2021    CALCIUM 9.9 05/09/2021    PROTENTOTREF 7.0 04/28/2021    ALBUMIN 4.40 05/09/2021    LABIL2 1.7 04/28/2021    AST 30 05/09/2021    ALT 28 05/09/2021     Lab Results   Component Value Date    CHLPL 155 04/28/2021    TRIG 197 (H) 04/28/2021    HDL 38 (L) 04/28/2021    LDL 84 04/28/2021     Lab Results   Component Value Date    HGBA1C 6.10 (H) 04/28/2021       Imaging:           Medical Tests:             Summary of old records / correspondence / consultant report:           Request outside records:           *Examiner was wearing medical surgical mask, face shield and exam gloves during the entire duration of the visit. Patient was masked the entire time.   Minimum social distance of 6 ft maintained entire visit except if physical contact was necessary as documented.     **Dragon Disclaimer:   Much of this encounter note is an electronic transcription/translation of spoken language to printed text. The electronic translation of spoken language may permit erroneous, or at times, nonsensical words or phrases to be inadvertently transcribed. Although I have reviewed the note for such errors, some may still exist.

## 2021-09-03 LAB
ALBUMIN SERPL-MCNC: 4.6 G/DL (ref 3.5–5.2)
ALBUMIN/GLOB SERPL: 1.6 G/DL
ALP SERPL-CCNC: 84 U/L (ref 39–117)
ALT SERPL-CCNC: 23 U/L (ref 1–33)
AST SERPL-CCNC: 29 U/L (ref 1–32)
BASOPHILS # BLD AUTO: 0.07 10*3/MM3 (ref 0–0.2)
BASOPHILS NFR BLD AUTO: 1.2 % (ref 0–1.5)
BILIRUB SERPL-MCNC: 0.7 MG/DL (ref 0–1.2)
BUN SERPL-MCNC: 17 MG/DL (ref 8–23)
BUN/CREAT SERPL: 17.3 (ref 7–25)
CALCIUM SERPL-MCNC: 10.6 MG/DL (ref 8.6–10.5)
CHLORIDE SERPL-SCNC: 106 MMOL/L (ref 98–107)
CHOLEST SERPL-MCNC: 120 MG/DL (ref 0–200)
CHOLEST/HDLC SERPL: 3.24 {RATIO}
CO2 SERPL-SCNC: 28 MMOL/L (ref 22–29)
CREAT SERPL-MCNC: 0.98 MG/DL (ref 0.57–1)
EOSINOPHIL # BLD AUTO: 0.06 10*3/MM3 (ref 0–0.4)
EOSINOPHIL NFR BLD AUTO: 1.1 % (ref 0.3–6.2)
ERYTHROCYTE [DISTWIDTH] IN BLOOD BY AUTOMATED COUNT: 14.4 % (ref 12.3–15.4)
GLOBULIN SER CALC-MCNC: 2.8 GM/DL
GLUCOSE SERPL-MCNC: 103 MG/DL (ref 65–99)
HBA1C MFR BLD: 5.8 % (ref 4.8–5.6)
HCT VFR BLD AUTO: 42.8 % (ref 34–46.6)
HDLC SERPL-MCNC: 37 MG/DL (ref 40–60)
HGB BLD-MCNC: 13.7 G/DL (ref 12–15.9)
IMM GRANULOCYTES # BLD AUTO: 0.02 10*3/MM3 (ref 0–0.05)
IMM GRANULOCYTES NFR BLD AUTO: 0.4 % (ref 0–0.5)
LDLC SERPL CALC-MCNC: 68 MG/DL (ref 0–100)
LYMPHOCYTES # BLD AUTO: 2.33 10*3/MM3 (ref 0.7–3.1)
LYMPHOCYTES NFR BLD AUTO: 40.9 % (ref 19.6–45.3)
MCH RBC QN AUTO: 32.6 PG (ref 26.6–33)
MCHC RBC AUTO-ENTMCNC: 32 G/DL (ref 31.5–35.7)
MCV RBC AUTO: 101.9 FL (ref 79–97)
MONOCYTES # BLD AUTO: 0.46 10*3/MM3 (ref 0.1–0.9)
MONOCYTES NFR BLD AUTO: 8.1 % (ref 5–12)
NEUTROPHILS # BLD AUTO: 2.75 10*3/MM3 (ref 1.7–7)
NEUTROPHILS NFR BLD AUTO: 48.3 % (ref 42.7–76)
NRBC BLD AUTO-RTO: 0 /100 WBC (ref 0–0.2)
PLATELET # BLD AUTO: 267 10*3/MM3 (ref 140–450)
POTASSIUM SERPL-SCNC: 3.9 MMOL/L (ref 3.5–5.2)
PROT SERPL-MCNC: 7.4 G/DL (ref 6–8.5)
RBC # BLD AUTO: 4.2 10*6/MM3 (ref 3.77–5.28)
SODIUM SERPL-SCNC: 144 MMOL/L (ref 136–145)
T4 FREE SERPL-MCNC: 1.6 NG/DL (ref 0.93–1.7)
TRIGL SERPL-MCNC: 75 MG/DL (ref 0–150)
TSH SERPL DL<=0.005 MIU/L-ACNC: 1.58 UIU/ML (ref 0.27–4.2)
VLDLC SERPL CALC-MCNC: 15 MG/DL (ref 5–40)
WBC # BLD AUTO: 5.69 10*3/MM3 (ref 3.4–10.8)

## 2021-09-28 ENCOUNTER — HOSPITAL ENCOUNTER (EMERGENCY)
Facility: HOSPITAL | Age: 76
Discharge: HOME | End: 2021-09-28
Attending: EMERGENCY MEDICINE | Admitting: EMERGENCY MEDICINE

## 2021-09-28 ENCOUNTER — APPOINTMENT (OUTPATIENT)
Dept: GENERAL RADIOLOGY | Facility: HOSPITAL | Age: 76
End: 2021-09-28

## 2021-09-28 VITALS
TEMPERATURE: 97.8 F | BODY MASS INDEX: 24.99 KG/M2 | DIASTOLIC BLOOD PRESSURE: 65 MMHG | OXYGEN SATURATION: 95 % | HEIGHT: 65 IN | SYSTOLIC BLOOD PRESSURE: 118 MMHG | RESPIRATION RATE: 16 BRPM | HEART RATE: 69 BPM | WEIGHT: 150 LBS

## 2021-09-28 DIAGNOSIS — R05.9 COUGH: ICD-10-CM

## 2021-09-28 DIAGNOSIS — R19.7 DIARRHEA, UNSPECIFIED TYPE: ICD-10-CM

## 2021-09-28 DIAGNOSIS — R53.1 GENERALIZED WEAKNESS: Primary | ICD-10-CM

## 2021-09-28 LAB
ALBUMIN SERPL-MCNC: 4 G/DL (ref 3.5–5.2)
ALBUMIN/GLOB SERPL: 1.4 G/DL
ALP SERPL-CCNC: 68 U/L (ref 39–117)
ALT SERPL W P-5'-P-CCNC: 30 U/L (ref 1–33)
ANION GAP SERPL CALCULATED.3IONS-SCNC: 10.9 MMOL/L (ref 5–15)
AST SERPL-CCNC: 40 U/L (ref 1–32)
BACTERIA UR QL AUTO: ABNORMAL /HPF
BASOPHILS # BLD AUTO: 0.08 10*3/MM3 (ref 0–0.2)
BASOPHILS NFR BLD AUTO: 1.3 % (ref 0–1.5)
BILIRUB SERPL-MCNC: 0.4 MG/DL (ref 0–1.2)
BILIRUB UR QL STRIP: NEGATIVE
BUN SERPL-MCNC: 15 MG/DL (ref 8–23)
BUN/CREAT SERPL: 19.7 (ref 7–25)
CALCIUM SPEC-SCNC: 9.7 MG/DL (ref 8.6–10.5)
CHLORIDE SERPL-SCNC: 104 MMOL/L (ref 98–107)
CLARITY UR: CLEAR
CO2 SERPL-SCNC: 25.1 MMOL/L (ref 22–29)
COLOR UR: YELLOW
CREAT SERPL-MCNC: 0.76 MG/DL (ref 0.57–1)
DEPRECATED RDW RBC AUTO: 47.1 FL (ref 37–54)
EOSINOPHIL # BLD AUTO: 0 10*3/MM3 (ref 0–0.4)
EOSINOPHIL NFR BLD AUTO: 0 % (ref 0.3–6.2)
ERYTHROCYTE [DISTWIDTH] IN BLOOD BY AUTOMATED COUNT: 13.2 % (ref 12.3–15.4)
GFR SERPL CREATININE-BSD FRML MDRD: 74 ML/MIN/1.73
GLOBULIN UR ELPH-MCNC: 2.9 GM/DL
GLUCOSE SERPL-MCNC: 93 MG/DL (ref 65–99)
GLUCOSE UR STRIP-MCNC: NEGATIVE MG/DL
HCT VFR BLD AUTO: 39.7 % (ref 34–46.6)
HGB BLD-MCNC: 13.6 G/DL (ref 12–15.9)
HGB UR QL STRIP.AUTO: ABNORMAL
HYALINE CASTS UR QL AUTO: ABNORMAL /LPF
IMM GRANULOCYTES # BLD AUTO: 0.03 10*3/MM3 (ref 0–0.05)
IMM GRANULOCYTES NFR BLD AUTO: 0.5 % (ref 0–0.5)
KETONES UR QL STRIP: ABNORMAL
LEUKOCYTE ESTERASE UR QL STRIP.AUTO: ABNORMAL
LYMPHOCYTES # BLD AUTO: 2.19 10*3/MM3 (ref 0.7–3.1)
LYMPHOCYTES NFR BLD AUTO: 36.7 % (ref 19.6–45.3)
MCH RBC QN AUTO: 33 PG (ref 26.6–33)
MCHC RBC AUTO-ENTMCNC: 34.3 G/DL (ref 31.5–35.7)
MCV RBC AUTO: 96.4 FL (ref 79–97)
MONOCYTES # BLD AUTO: 0.99 10*3/MM3 (ref 0.1–0.9)
MONOCYTES NFR BLD AUTO: 16.6 % (ref 5–12)
NEUTROPHILS NFR BLD AUTO: 2.67 10*3/MM3 (ref 1.7–7)
NEUTROPHILS NFR BLD AUTO: 44.9 % (ref 42.7–76)
NITRITE UR QL STRIP: NEGATIVE
NRBC BLD AUTO-RTO: 0.2 /100 WBC (ref 0–0.2)
PH UR STRIP.AUTO: 5.5 [PH] (ref 5–8)
PLATELET # BLD AUTO: 191 10*3/MM3 (ref 140–450)
PMV BLD AUTO: 10.2 FL (ref 6–12)
POTASSIUM SERPL-SCNC: 3.5 MMOL/L (ref 3.5–5.2)
PROT SERPL-MCNC: 6.9 G/DL (ref 6–8.5)
PROT UR QL STRIP: ABNORMAL
QT INTERVAL: 406 MS
RBC # BLD AUTO: 4.12 10*6/MM3 (ref 3.77–5.28)
RBC # UR: ABNORMAL /HPF
REF LAB TEST METHOD: ABNORMAL
SARS-COV-2 ORF1AB RESP QL NAA+PROBE: DETECTED
SODIUM SERPL-SCNC: 140 MMOL/L (ref 136–145)
SP GR UR STRIP: 1.03 (ref 1–1.03)
SQUAMOUS #/AREA URNS HPF: ABNORMAL /HPF
T4 FREE SERPL-MCNC: 1.31 NG/DL (ref 0.93–1.7)
TROPONIN T SERPL-MCNC: <0.01 NG/ML (ref 0–0.03)
TSH SERPL DL<=0.05 MIU/L-ACNC: 0.65 UIU/ML (ref 0.27–4.2)
UROBILINOGEN UR QL STRIP: ABNORMAL
WBC # BLD AUTO: 5.96 10*3/MM3 (ref 3.4–10.8)
WBC UR QL AUTO: ABNORMAL /HPF

## 2021-09-28 PROCEDURE — U0004 COV-19 TEST NON-CDC HGH THRU: HCPCS | Performed by: EMERGENCY MEDICINE

## 2021-09-28 PROCEDURE — 99283 EMERGENCY DEPT VISIT LOW MDM: CPT

## 2021-09-28 PROCEDURE — 84443 ASSAY THYROID STIM HORMONE: CPT | Performed by: EMERGENCY MEDICINE

## 2021-09-28 PROCEDURE — U0005 INFEC AGEN DETEC AMPLI PROBE: HCPCS | Performed by: EMERGENCY MEDICINE

## 2021-09-28 PROCEDURE — 81001 URINALYSIS AUTO W/SCOPE: CPT | Performed by: EMERGENCY MEDICINE

## 2021-09-28 PROCEDURE — 71045 X-RAY EXAM CHEST 1 VIEW: CPT

## 2021-09-28 PROCEDURE — 80053 COMPREHEN METABOLIC PANEL: CPT | Performed by: EMERGENCY MEDICINE

## 2021-09-28 PROCEDURE — 93010 ELECTROCARDIOGRAM REPORT: CPT | Performed by: INTERNAL MEDICINE

## 2021-09-28 PROCEDURE — 84484 ASSAY OF TROPONIN QUANT: CPT | Performed by: EMERGENCY MEDICINE

## 2021-09-28 PROCEDURE — 85025 COMPLETE CBC W/AUTO DIFF WBC: CPT | Performed by: EMERGENCY MEDICINE

## 2021-09-28 PROCEDURE — 84439 ASSAY OF FREE THYROXINE: CPT | Performed by: EMERGENCY MEDICINE

## 2021-09-28 PROCEDURE — C9803 HOPD COVID-19 SPEC COLLECT: HCPCS | Performed by: EMERGENCY MEDICINE

## 2021-09-28 PROCEDURE — 93005 ELECTROCARDIOGRAM TRACING: CPT | Performed by: EMERGENCY MEDICINE

## 2021-09-28 RX ORDER — SODIUM CHLORIDE 0.9 % (FLUSH) 0.9 %
10 SYRINGE (ML) INJECTION AS NEEDED
Status: DISCONTINUED | OUTPATIENT
Start: 2021-09-28 | End: 2021-09-28 | Stop reason: HOSPADM

## 2021-09-28 RX ADMIN — SODIUM CHLORIDE, POTASSIUM CHLORIDE, SODIUM LACTATE AND CALCIUM CHLORIDE 1000 ML: 600; 310; 30; 20 INJECTION, SOLUTION INTRAVENOUS at 09:16

## 2021-10-04 ENCOUNTER — TELEMEDICINE (OUTPATIENT)
Dept: INTERNAL MEDICINE | Age: 76
End: 2021-10-04

## 2021-10-04 DIAGNOSIS — U07.1 COVID-19: Primary | ICD-10-CM

## 2021-10-04 PROCEDURE — 99212 OFFICE O/P EST SF 10 MIN: CPT | Performed by: NURSE PRACTITIONER

## 2021-10-04 NOTE — PROGRESS NOTES
[facility]ICINE  JAYNE Banegas      Marci SINGH Kolb / 76 y.o. / female  10/04/2021      CC:  Main reason(s) for today's visit: TELEHEALTH ENCOUNTER: Covid-19 Home Monitoring Video Visit      HPI:     THIS WAS A MYCHART TELEHEALTH ENCOUNTER NECESSITATED BY CURRENT COVID-19 CRISIS.      You have chosen to receive care through a telehealth visit.  Do you consent to use a video/audio connection for your medical care today? Yes due to technical difficulties, [employer] was used for face-to-face visit.  2 identifiers were used including date of birth and home address to verify patient identity.    Patient presents for follow-up on COVID-19 diagnosis with for symptoms 10 days prior.  He presented to the emergency room on September 28, 2021 with generalized weakness, nonproductive cough, and diarrhea.  Today, all symptoms are resolved.  Chest x-ray was negative for any acute findings.  EKG unremarkable.  IV fluids were given patient was discharged.  She is currently taking vitamin C, zinc and 2000 IU of vitamin D3 along with Centrum Silver vitamin.      Patient Care Team:  Supa Sparks APRN as PCP - General (Internal Medicine)  Abdias Nash MD (Orthopedic Surgery)  Jatinder Dominguez MD as Consulting Physician (Otolaryngology)    ASSESSMENT & PLAN:   Diagnosis Plan   1. COVID-19       Summary/Discussion:  • No longer symptomatic.  10 days since first symptom onset with positive COVID-19 result.  May return to work.  • Monitor for continuing symptoms.  Continue walking approximately once every 2 hours for blood clot prevention.  • Follow-up as needed.    Time spent: 15 minutes    Next Appointment with me: 1/3/2022    No follow-ups on file.    ____________________________________________________________________    MEDICATIONS  Current Outpatient Medications   Medication Sig Dispense Refill   • levothyroxine (SYNTHROID, LEVOTHROID) 100 MCG tablet Take 1 tablet by mouth Daily. 90 tablet 1   • simvastatin  (ZOCOR) 40 MG tablet TAKE ONE TABLET BY MOUTH ONCE NIGHTLY (Patient taking differently: Take 40 mg by mouth Every Night.) 90 tablet 3     No current facility-administered medications for this visit.          ____________________________________________________________________      REVIEW OF SYSTEMS    Review of Systems  Constitutional neg except per HPI   Resp neg  CV neg  GI negative     PHYSICAL EXAMINATION  There were no vitals taken for this visit.   No acute distress.  Alert with normal thought and judgment.     REVIEWED DATA:    Labs:   COVID-19 Test Result   Telephone Encounter    Patient Name: Marci Kolb   : 1945   MRN: [id number]     COVID19   Date Value Ref Range Status   2021 Detected (C) Not Detected - Ref. Range Final        Patient was counseled as follows:  • (+) positive COVID-19 test result with or without symptoms   • The majority of patients with a positive test result will recover, symptom severity is variable among those infected.   • Certain comorbidities such as COPD/asthma, DM, CAD and others can increase the risk of more severe illness.   • The optimal duration of home isolation is uncertain. The [place] Centers for Disease Control and Prevention (CDC) has issued recommendations on discontinuation of home isolation.  • For this reason, Marci is strongly encouraged to practice the safest standards in protecting their health and others given the current pandemic concerns.   • If Marci is asymptomatic, she should self-isolate at home for a total of 14 days from time of when lab test for Covid-19 was collected.   o If she is without symptoms, then she should practice social distancing by staying at least 6 feet from other people, including limiting contact with family (especially at home) and friends as much as possible for at least 14 days.   o Encouraged her to wear a mask in addition to social distancing in the home.   o Implement 14 day home self-quarantining, with the  exception of seeking required or essential medical care.   o Continue to wash her hands frequently with soap and hot water, and cover their mouth while coughing.   • If a Marci is symptomatic then she may discontinue home isolation when the following criteria are met:   o At least seven days have passed since symptoms first appeared AND   o At least three days (72 hours) have passed since recovery of symptoms (defined as resolution of fever without the use of fever-reducing medications and improvement in respiratory symptoms [eg, cough, shortness of breath])   • For support of non-emergent symptoms expected with this virus such as increased shortness of breath, fever, cough, or other questions, Marci was asked to please call their primary care physician’s office or the [place] hotline at [phone].   • She was advised to seek care in the Emergency Department should extreme symptoms arise such as new onset confusion, extreme lethargy, hypoxia, or new hypotension.   · Questions were engaged and answered to the best of my ability, patient expressed verbal understanding of their test results and my advice.      Primary Care Physician verified as being: Supa Sparks APRN      Electronically signed by JAYNE Banegas, 10/04/21, 11:24 AM EDT.      Imaging:         Medical Tests:         Summary of old records / correspondence / consultant report:          Request outside records:         ALLERGIES  Allergies   Allergen Reactions   • Streptomycin Nausea Only   • Penicillins Itching        PFSH:     The following portions of the patient's history were reviewed and updated as appropriate: Allergies / Current Medications / Past Medical History / Surgical History / Social History / Family History    PROBLEM LIST   Patient Active Problem List   Diagnosis   • Diverticulosis of intestine   • Cephalalgia   • Hyperlipidemia   • Hypothyroidism   • Knee swelling   • Low back pain   • Cervical pain   • Sciatica   • Arthralgia of  "hip   • Hyperglycemia   • Status post hysterectomy   • Rheumatoid arthritis, involving unspecified site, unspecified whether rheumatoid factor present (HCC)       PAST MEDICAL HISTORY  Past Medical History:   Diagnosis Date   • Arthritis    • Cataract    • Collapsed lung     HISTORY OF X 2 MANY YEARS AGO. ?LEFT. FROM BLOWN BLEBS.  40 years ago.   • Constipation    • Degenerative disc disease, lumbar    • Depression     RECENT LOSS OF DGHT   • Dry skin    • Elevated cholesterol    • Female cystocele    • History of diverticulosis    • History of migraine    • Hypothyroidism    • Lumbar spine pain     REGAN. FOR MRI ON 7/29/19   • Presence of pessary 07/2019   • Right leg pain     \"RIGHT FOOT TINGLES\"   • Ringing in right ear    • Urinary incontinence    • Uterine prolapse    • Varicose vein of leg     SINDI       SURGICAL HISTORY  Past Surgical History:   Procedure Laterality Date   • ANTERIOR AND POSTERIOR VAGINAL REPAIR N/A 8/6/2019    Procedure: POSSIBLE ANTERIOR AND POSTERIOR VAGINAL REPAIR UTEROSACRAL SUSPENSION;  Surgeon: Kristine Baker MD;  Location: [facility];  Service: Gynecology   • COLONOSCOPY  1993    \"WNL\"   • JOINT REPLACEMENT      Left hip arthroplasty   • TOTAL HIP ARTHROPLASTY Left 1/24/2018    Procedure: LT  TOTAL HIP ARTHROPLASTY;  Surgeon: Rogelio Nash MD;  Location: [facility];  Service:    • TOTAL LAPAROSCOPIC HYSTERECTOMY UTEROSACRAL SUSPENSION WITH TRANSVAGINAL TAPING N/A 8/6/2019    Procedure: TOTAL LAPAROSCOPIC HYSTERECTOMY BILATERAL SALPINGO OOPHORECTOMY TENSION FREE VAGINAL TAPING;  Surgeon: Kristine Baker MD;  Location: [facility];  Service: Gynecology       SOCIAL HISTORY  Social History     Socioeconomic History   • Marital status:      Spouse name: Not on file   • Number of children: Not on file   • Years of education: Not on file   • Highest education level: Not on file   Tobacco Use   • Smoking status: Former Smoker     Packs/day: 1.00     Years: 35.00     Pack " years: 35.00     Types: Cigarettes     Quit date:      Years since quittin.7   • Smokeless tobacco: Never Used   Vaping Use   • Vaping Use: Never used   Substance and Sexual Activity   • Alcohol use: No   • Drug use: No   • Sexual activity: Defer           **Riana Disclaimer:   Much of this encounter note is an electronic transcription/translation of spoken language to printed text. The electronic translation of spoken language may permit erroneous, or at times, nonsensical words or phrases to be inadvertently transcribed. Although I have reviewed the note for such errors, some may still exist.

## 2021-12-14 DIAGNOSIS — E78.2 MIXED HYPERLIPIDEMIA: ICD-10-CM

## 2021-12-14 RX ORDER — SIMVASTATIN 40 MG
40 TABLET ORAL NIGHTLY
Qty: 90 TABLET | Refills: 3 | Status: SHIPPED | OUTPATIENT
Start: 2021-12-14 | End: 2023-03-10 | Stop reason: SDUPTHER

## 2021-12-18 DIAGNOSIS — R60.9 PERIPHERAL EDEMA: ICD-10-CM

## 2021-12-20 RX ORDER — FUROSEMIDE 20 MG/1
TABLET ORAL
Qty: 30 TABLET | Refills: 3 | Status: SHIPPED | OUTPATIENT
Start: 2021-12-20 | End: 2022-06-07

## 2022-01-01 DIAGNOSIS — E03.9 ACQUIRED HYPOTHYROIDISM: ICD-10-CM

## 2022-01-03 ENCOUNTER — OFFICE VISIT (OUTPATIENT)
Dept: INTERNAL MEDICINE | Age: 77
End: 2022-01-03

## 2022-01-03 VITALS
HEART RATE: 64 BPM | HEIGHT: 65 IN | OXYGEN SATURATION: 98 % | DIASTOLIC BLOOD PRESSURE: 60 MMHG | WEIGHT: 142 LBS | SYSTOLIC BLOOD PRESSURE: 110 MMHG | BODY MASS INDEX: 23.66 KG/M2 | TEMPERATURE: 96.6 F

## 2022-01-03 DIAGNOSIS — E03.9 HYPOTHYROIDISM, UNSPECIFIED TYPE: ICD-10-CM

## 2022-01-03 DIAGNOSIS — R60.0 BILATERAL LOWER EXTREMITY EDEMA: ICD-10-CM

## 2022-01-03 DIAGNOSIS — E78.2 MIXED HYPERLIPIDEMIA: Primary | ICD-10-CM

## 2022-01-03 DIAGNOSIS — R73.9 HYPERGLYCEMIA: ICD-10-CM

## 2022-01-03 PROCEDURE — 99214 OFFICE O/P EST MOD 30 MIN: CPT | Performed by: NURSE PRACTITIONER

## 2022-01-03 RX ORDER — LEVOTHYROXINE SODIUM 0.1 MG/1
TABLET ORAL
Qty: 90 TABLET | Refills: 3 | Status: SHIPPED | OUTPATIENT
Start: 2022-01-03 | End: 2022-04-19

## 2022-01-03 NOTE — PROGRESS NOTES
"    I N T E R N A L  M E D I C I N E  EDEN ESPINOSA, APRN      ENCOUNTER DATE:  01/03/2022    Marci C Kolb / 76 y.o. / female      CHIEF COMPLAINT / REASON FOR OFFICE VISIT     Hyperlipidemia (4 month f/u)      ASSESSMENT & PLAN     1. Mixed hyperlipidemia  - Continue simvastatin 40mg daily   - Comprehensive Metabolic Panel  - Lipid Panel With / Chol / HDL Ratio    2. Hyperglycemia  Decrease/eliminate soda, caffeine, alcohol and overall caloric intake. Reduce carbohydrates and sweets in diet.  Continue to improve dietary habits with lean proteins, fresh vegetables, fruits, and nuts. Improve aerobic exercise: walking/biking/swimming daily as tolerated, recommend 30 minutes/day at least 5 days/week.  - Comprehensive Metabolic Panel  - Hemoglobin A1c  - CBC & Differential    3. Hypothyroidism, unspecified type  - Continue synthroid 100mcg daily   - Comprehensive Metabolic Panel  - TSH+Free T4  - CBC & Differential    4. Bilateral lower extremity edema  - Continue lasix 20mg daily     Orders Placed This Encounter   Procedures   • Comprehensive Metabolic Panel   • Hemoglobin A1c   • TSH+Free T4   • Lipid Panel With / Chol / HDL Ratio   • CBC & Differential     No orders of the defined types were placed in this encounter.      SUMMARY/DISCUSSION  • Follow-up in 4 months for chronic medical, earlier if needed    Next Appointment with me: Visit date not found    Return in about 4 months (around 5/3/2022) for Medicare Wellness.      VITAL SIGNS     Visit Vitals  /60 (Cuff Size: Adult)   Pulse 64   Temp 96.6 °F (35.9 °C) (Temporal)   Ht 165.1 cm (65\")   Wt 64.4 kg (142 lb)   SpO2 98%   BMI 23.63 kg/m²       Wt Readings from Last 3 Encounters:   01/03/22 64.4 kg (142 lb)   09/28/21 68 kg (150 lb)   08/31/21 66.8 kg (147 lb 3.2 oz)     Body mass index is 23.63 kg/m².      MEDICATIONS AT THE TIME OF OFFICE VISIT     Current Outpatient Medications on File Prior to Visit   Medication Sig   • furosemide (LASIX) 20 MG tablet " TAKE ONE TABLET BY MOUTH DAILY   • simvastatin (ZOCOR) 40 MG tablet Take 1 tablet by mouth Every Night.   • [DISCONTINUED] levothyroxine (SYNTHROID, LEVOTHROID) 100 MCG tablet Take 1 tablet by mouth Daily.   • levothyroxine (SYNTHROID, LEVOTHROID) 100 MCG tablet TAKE ONE TABLET BY MOUTH DAILY     No current facility-administered medications on file prior to visit.         HISTORY OF PRESENT ILLNESS     Patient presents for 3-month follow-up for chronic medical.    Hyperlipidemia: Well-controlled with simvastatin 40 mg nightly. No myalgias.    Lab Results   Component Value Date    GLUCOSE 93 09/28/2021    BUN 15 09/28/2021    CREATININE 0.76 09/28/2021    EGFRIFNONA 74 09/28/2021    EGFRIFAFRI 67 09/02/2021    BCR 19.7 09/28/2021    K 3.5 09/28/2021    CO2 25.1 09/28/2021    CALCIUM 9.7 09/28/2021    PROTENTOTREF 7.4 09/02/2021    ALBUMIN 4.00 09/28/2021    LABIL2 1.6 09/02/2021    AST 40 (H) 09/28/2021    ALT 30 09/28/2021     Lab Results   Component Value Date    CHLPL 120 09/02/2021    TRIG 75 09/02/2021    HDL 37 (L) 09/02/2021    LDL 68 09/02/2021     Hypothyroidism: Well-controlled with Synthroid 100 MCG tablet daily    Lab Results   Component Value Date    TSH 0.651 09/28/2021     Hyperglycemia: Low end of prediabetes.  No medication treatment at this time.    Lab Results   Component Value Date    HGBA1C 5.80 (H) 09/02/2021     Bilateral lower extremity edema: Resolved with Lasix 20 mg daily.  Stable kidney and electrolytes.    REVIEW OF SYSTEMS     Constitutional neg except per HPI   Resp neg  CV neg    PHYSICAL EXAMINATION     Physical Exam  Constitutional  No distress  Cardiovascular Rate  normal . Rhythm: regular . Heart sounds:  normal Edema none   Pulmonary/Chest  Effort normal. Breath sounds:  normal  Psychiatric  Alert. Judgment and thought content normal. Mood normal     REVIEWED DATA     Labs:         Imaging:           Medical Tests:             Summary of old records / correspondence / consultant  report:           Request outside records:           *Examiner was wearing medical surgical mask, face shield and exam gloves during the entire duration of the visit. Patient was masked the entire time.   Minimum social distance of 6 ft maintained entire visit except if physical contact was necessary as documented.     Dictated utilizing Dragon dictation

## 2022-01-04 LAB
ALBUMIN SERPL-MCNC: 4.2 G/DL (ref 3.7–4.7)
ALBUMIN/GLOB SERPL: 1.6 {RATIO} (ref 1.2–2.2)
ALP SERPL-CCNC: 106 IU/L (ref 44–121)
ALT SERPL-CCNC: 17 IU/L (ref 0–32)
AST SERPL-CCNC: 25 IU/L (ref 0–40)
BASOPHILS # BLD AUTO: 0.1 X10E3/UL (ref 0–0.2)
BASOPHILS NFR BLD AUTO: 1 %
BILIRUB SERPL-MCNC: 0.4 MG/DL (ref 0–1.2)
BUN SERPL-MCNC: 12 MG/DL (ref 8–27)
BUN/CREAT SERPL: 13 (ref 12–28)
CALCIUM SERPL-MCNC: 10.2 MG/DL (ref 8.7–10.3)
CHLORIDE SERPL-SCNC: 108 MMOL/L (ref 96–106)
CHOLEST SERPL-MCNC: 142 MG/DL (ref 100–199)
CHOLEST/HDLC SERPL: 3.1 RATIO (ref 0–4.4)
CO2 SERPL-SCNC: 25 MMOL/L (ref 20–29)
CREAT SERPL-MCNC: 0.96 MG/DL (ref 0.57–1)
EOSINOPHIL # BLD AUTO: 0.1 X10E3/UL (ref 0–0.4)
EOSINOPHIL NFR BLD AUTO: 2 %
ERYTHROCYTE [DISTWIDTH] IN BLOOD BY AUTOMATED COUNT: 12.7 % (ref 11.7–15.4)
GLOBULIN SER CALC-MCNC: 2.7 G/DL (ref 1.5–4.5)
GLUCOSE SERPL-MCNC: 99 MG/DL (ref 65–99)
HBA1C MFR BLD: 6 % (ref 4.8–5.6)
HCT VFR BLD AUTO: 42 % (ref 34–46.6)
HDLC SERPL-MCNC: 46 MG/DL
HGB BLD-MCNC: 14.2 G/DL (ref 11.1–15.9)
IMM GRANULOCYTES # BLD AUTO: 0 X10E3/UL (ref 0–0.1)
IMM GRANULOCYTES NFR BLD AUTO: 0 %
LDLC SERPL CALC-MCNC: 79 MG/DL (ref 0–99)
LYMPHOCYTES # BLD AUTO: 2.3 X10E3/UL (ref 0.7–3.1)
LYMPHOCYTES NFR BLD AUTO: 41 %
MCH RBC QN AUTO: 31.7 PG (ref 26.6–33)
MCHC RBC AUTO-ENTMCNC: 33.8 G/DL (ref 31.5–35.7)
MCV RBC AUTO: 94 FL (ref 79–97)
MONOCYTES # BLD AUTO: 0.5 X10E3/UL (ref 0.1–0.9)
MONOCYTES NFR BLD AUTO: 9 %
NEUTROPHILS # BLD AUTO: 2.6 X10E3/UL (ref 1.4–7)
NEUTROPHILS NFR BLD AUTO: 47 %
PLATELET # BLD AUTO: 239 X10E3/UL (ref 150–450)
POTASSIUM SERPL-SCNC: 4.3 MMOL/L (ref 3.5–5.2)
PROT SERPL-MCNC: 6.9 G/DL (ref 6–8.5)
RBC # BLD AUTO: 4.48 X10E6/UL (ref 3.77–5.28)
SODIUM SERPL-SCNC: 145 MMOL/L (ref 134–144)
T4 FREE SERPL-MCNC: 1.25 NG/DL (ref 0.82–1.77)
TRIGL SERPL-MCNC: 88 MG/DL (ref 0–149)
TSH SERPL DL<=0.005 MIU/L-ACNC: 0.16 UIU/ML (ref 0.45–4.5)
VLDLC SERPL CALC-MCNC: 17 MG/DL (ref 5–40)
WBC # BLD AUTO: 5.6 X10E3/UL (ref 3.4–10.8)

## 2022-01-05 ENCOUNTER — TELEPHONE (OUTPATIENT)
Dept: INTERNAL MEDICINE | Age: 77
End: 2022-01-05

## 2022-01-05 NOTE — TELEPHONE ENCOUNTER
HUB MAY READ TO PT    ----- Message from JAYNE Banegas sent at 1/4/2022  7:32 PM EST -----  Please call patient.     Stable kidney, liver and electrolytes.   Cholesterol well controlled.   CBC normal range with no anemia or infection.   Blood sugar in prediabetic range. Limit sweets, simple carbohydrates.   Thyroid lab showing you may be on too much medication. Please Take current dose of levothyroxine 100mcg Monday to Saturday and take half a tablet Sunday since I know you just picked up a 90 day supply. We may change dose if needed at next visit. Please let us know if you have weight loss or heart palpitations.

## 2022-01-26 ENCOUNTER — TELEPHONE (OUTPATIENT)
Dept: INTERNAL MEDICINE | Age: 77
End: 2022-01-26

## 2022-01-26 NOTE — TELEPHONE ENCOUNTER
Caller: Marci Kolb    Relationship: Self    Best call back number: [phone]    What is the best time to reach you: ANY     Who are you requesting to speak with (clinical staff, provider,  specific staff member): CLINIC STAFF     Do you know the name of the person who called: N/A     What was the call regarding: PATIENT HAD HER BOOSTER SHOT AND PATIENT NOTICED SWELLING IN HER LEFT ARM AND WANTED TO KNOW IF SHE SHOULD GO BACK TO THE PLACE SHE GOT THE SHOT OR BE SEEN BY EDEN .  PATIENT PUT AN ICE PACK ON IT AND IT REDUCED THE SWELLING SOME . PLEASE CALL PATIENT AND ADVISE .     Do you require a callback: YES

## 2022-04-15 ENCOUNTER — OFFICE VISIT (OUTPATIENT)
Dept: INTERNAL MEDICINE | Age: 77
End: 2022-04-15

## 2022-04-15 VITALS
DIASTOLIC BLOOD PRESSURE: 68 MMHG | HEIGHT: 65 IN | WEIGHT: 150.4 LBS | SYSTOLIC BLOOD PRESSURE: 112 MMHG | OXYGEN SATURATION: 98 % | HEART RATE: 66 BPM | BODY MASS INDEX: 25.06 KG/M2 | TEMPERATURE: 97.6 F

## 2022-04-15 DIAGNOSIS — R06.09 DYSPNEA ON EXERTION: ICD-10-CM

## 2022-04-15 DIAGNOSIS — E03.9 HYPOTHYROIDISM, UNSPECIFIED TYPE: ICD-10-CM

## 2022-04-15 DIAGNOSIS — I51.7 CARDIAC ENLARGEMENT: ICD-10-CM

## 2022-04-15 DIAGNOSIS — R94.31 ABNORMAL EKG: ICD-10-CM

## 2022-04-15 DIAGNOSIS — R73.9 HYPERGLYCEMIA: ICD-10-CM

## 2022-04-15 DIAGNOSIS — R42 DIZZINESS: Primary | ICD-10-CM

## 2022-04-15 DIAGNOSIS — Z87.891 HISTORY OF NICOTINE DEPENDENCE: ICD-10-CM

## 2022-04-15 DIAGNOSIS — E78.2 MIXED HYPERLIPIDEMIA: ICD-10-CM

## 2022-04-15 DIAGNOSIS — Z12.31 SCREENING MAMMOGRAM FOR BREAST CANCER: ICD-10-CM

## 2022-04-15 DIAGNOSIS — R60.0 LOWER EXTREMITY EDEMA: ICD-10-CM

## 2022-04-15 PROCEDURE — 99214 OFFICE O/P EST MOD 30 MIN: CPT | Performed by: NURSE PRACTITIONER

## 2022-04-15 PROCEDURE — 93000 ELECTROCARDIOGRAM COMPLETE: CPT | Performed by: NURSE PRACTITIONER

## 2022-04-15 RX ORDER — ALBUTEROL SULFATE 90 UG/1
2 AEROSOL, METERED RESPIRATORY (INHALATION) EVERY 4 HOURS PRN
Qty: 6.7 G | Refills: 2 | Status: SHIPPED | OUTPATIENT
Start: 2022-04-15

## 2022-04-15 NOTE — PROGRESS NOTES
I N T E R N A L  M E D I C I N E  JAYNE WOLFF      ENCOUNTER DATE:  04/15/2022    Marci FRANCISCO Kolb / 77 y.o. / female      CHIEF COMPLAINT / REASON FOR OFFICE VISIT     Hypertension (To be check by PCP per ENT)      ASSESSMENT & PLAN     1. Dizziness  -Concurrent ringing in the ears, currently seeing ENT is considering procedure because cardiology work-up  - Adult Transthoracic Echo Complete W/ Cont if Necessary Per Protocol; Future  - Duplex Carotid Ultrasound CAR; Future    2. Dyspnea on exertion  -Albuterol inhaler as needed, suspect she may have a component of COPD with 35-pack-year history  - Adult Transthoracic Echo Complete W/ Cont if Necessary Per Protocol; Future  - Full Pulmonary Function Test With Bronchodilator; Future  - XR Chest PA & Lateral    3. History of nicotine dependence  - XR Chest PA & Lateral    4. Hypothyroidism, unspecified type  -Continue levothyroxine 100 MCG daily    5. Mixed hyperlipidemia  -Continue simvastatin 40    6. Lower extremity edema  -Continue Lasix 20 mg until we have potassium lab results    7. Screening mammogram for breast cancer  - Mammo Screening Bilateral With CAD    Orders Placed This Encounter   Procedures   • Mammo Screening Bilateral With CAD   • XR Chest PA & Lateral   • Comprehensive Metabolic Panel   • Hemoglobin A1c   • Lipid Panel With / Chol / HDL Ratio   • TSH+Free T4   • Urinalysis With Culture If Indicated - Urine, Clean Catch   • Adult Transthoracic Echo Complete W/ Cont if Necessary Per Protocol   • Full Pulmonary Function Test With Bronchodilator   • CBC & Differential     New Medications Ordered This Visit   Medications   • albuterol sulfate  (90 Base) MCG/ACT inhaler     Sig: Inhale 2 puffs Every 4 (Four) Hours As Needed for Wheezing.     Dispense:  6.7 g     Refill:  2       SUMMARY/DISCUSSION  • Follow-up as scheduled in May, earlier if needed    Next Appointment with me: 5/3/2022    No follow-ups on file.      VITAL SIGNS     Visit  "Vitals  /68 (Cuff Size: Adult)   Pulse 66   Temp 97.6 °F (36.4 °C) (Temporal)   Ht 165.1 cm (65\")   Wt 68.2 kg (150 lb 6.4 oz)   SpO2 98%   BMI 25.03 kg/m²     Wt Readings from Last 3 Encounters:   04/15/22 68.2 kg (150 lb 6.4 oz)   01/03/22 64.4 kg (142 lb)   09/28/21 68 kg (150 lb)     Body mass index is 25.03 kg/m².      MEDICATIONS AT THE TIME OF OFFICE VISIT     Current Outpatient Medications on File Prior to Visit   Medication Sig   • furosemide (LASIX) 20 MG tablet TAKE ONE TABLET BY MOUTH DAILY   • levothyroxine (SYNTHROID, LEVOTHROID) 100 MCG tablet TAKE ONE TABLET BY MOUTH DAILY   • simvastatin (ZOCOR) 40 MG tablet Take 1 tablet by mouth Every Night.     No current facility-administered medications on file prior to visit.         HISTORY OF PRESENT ILLNESS     Patient presents for evaluation of dizziness as ENT is cardiology assessment before performing procedure to help with current symptoms.  She has had dyspnea in the past along with dizziness but has declined cardiac work-up due to time constraints with work.  She is now willing to have these performed.  She does admit to dyspnea with exertion, she has had bilateral lower extremity swelling in the past which is well controlled now with Lasix 20 mg daily.  35-pack-year history of smoking.  Last chest x-ray imaging in September 2021 did show heart enlargement.  EKG today showed. first-degree AV block with heart rate of 59.  Denies any heart palpitations.    REVIEW OF SYSTEMS     Constitutional neg except per HPI   Resp dyspnea with exertion  CV neg  Extremity swelling  Neuro dizziness     PHYSICAL EXAMINATION     Physical Exam  Constitutional  No distress  Cardiovascular Rate  normal . Rhythm: regular . Heart sounds:  normal  Trace lower extremity edema with concurrent Lasix  Pulmonary/Chest  Effort normal. Breath sounds:  normal  Psychiatric  Alert. Judgment and thought content normal. Mood normal     REVIEWED DATA     Labs:   Lab Results "   Component Value Date    GLUCOSE 99 01/03/2022    BUN 12 01/03/2022    CREATININE 0.96 01/03/2022    EGFRIFNONA 57 (L) 01/03/2022    EGFRIFAFRI 66 01/03/2022    BCR 13 01/03/2022    K 4.3 01/03/2022    CO2 25 01/03/2022    CALCIUM 10.2 01/03/2022    PROTENTOTREF 6.9 01/03/2022    ALBUMIN 4.2 01/03/2022    LABIL2 1.6 01/03/2022    AST 25 01/03/2022    ALT 17 01/03/2022     Lab Results   Component Value Date    CHLPL 142 01/03/2022    TRIG 88 01/03/2022    HDL 46 01/03/2022    LDL 79 01/03/2022     Lab Results   Component Value Date    HGBA1C 6.0 (H) 01/03/2022     Lab Results   Component Value Date    TSH 0.161 (L) 01/03/2022   .  Lab Results   Component Value Date    WBC 5.6 01/03/2022    HGB 14.2 01/03/2022    HCT 42.0 01/03/2022    MCV 94 01/03/2022     01/03/2022         Imaging:     XR CHEST 1 VW-     Clinical: Cough     COMPARISON 11/20/2018     FINDINGS: There is again biapical pleural thickening and parenchymal  scarring similar to the previous examination. There is cardiac  enlargement. Mediastinum and franki are stable. No effusion, edema or  acute airspace disease.     CONCLUSION: No active cardiovascular pole right process is demonstrated,  there is cardiac enlargement with chronic pleural and parenchymal change  as described above.     This report was finalized on 9/28/2021 9:31 AM by Dr. Andry Alonso M.D.    HEART RATE= 64  bpm  RR Interval= 932  ms  HI Interval= 233  ms  P Horizontal Axis= 2  deg  P Front Axis= 60  deg  QRSD Interval= 86  ms  QT Interval= 406  ms  QRS Axis= -17  deg  T Wave Axis= 43  deg  - ABNORMAL ECG -  Sinus rhythm  Prolonged HI interval  Borderline left axis deviation  Low voltage, precordial leads  Consider anterior infarct  No change from prior tracing  Electronically Signed By: Alli RodríguezREG) ([facility]) 28-Sep-2021 12:51:22  Date and Time of Study: 2021-09-28 09:46:24      ECG 12 Lead    Date/Time: 4/15/2022 6:14 PM  Performed by: Supa Sparks APRN  Authorized by:  Supa Sparks APRN   Comparison: compared with previous ECG from 9/28/2021  Comparison to previous ECG: heart block which was not previously visualized  Rhythm: sinus bradycardia    Clinical impression: abnormal EKG          Medical Tests:             Summary of old records / correspondence / consultant report:           Request outside records:           *Examiner was wearing medical surgical mask, face shield and exam gloves during the entire duration of the visit. Patient was masked the entire time.   Minimum social distance of 6 ft maintained entire visit except if physical contact was necessary as documented.     Dictated utilizing Dragon dictation

## 2022-04-18 LAB
ALBUMIN SERPL-MCNC: 4.5 G/DL (ref 3.7–4.7)
ALBUMIN/GLOB SERPL: 1.6 {RATIO} (ref 1.2–2.2)
ALP SERPL-CCNC: 126 IU/L (ref 44–121)
ALT SERPL-CCNC: 23 IU/L (ref 0–32)
APPEARANCE UR: CLEAR
AST SERPL-CCNC: 27 IU/L (ref 0–40)
BACTERIA #/AREA URNS HPF: ABNORMAL /[HPF]
BACTERIA UR CULT: ABNORMAL
BACTERIA UR CULT: ABNORMAL
BASOPHILS # BLD AUTO: 0.1 X10E3/UL (ref 0–0.2)
BASOPHILS NFR BLD AUTO: 1 %
BILIRUB SERPL-MCNC: 0.4 MG/DL (ref 0–1.2)
BILIRUB UR QL STRIP: NEGATIVE
BUN SERPL-MCNC: 21 MG/DL (ref 8–27)
BUN/CREAT SERPL: 22 (ref 12–28)
CALCIUM SERPL-MCNC: 11.2 MG/DL (ref 8.7–10.3)
CASTS URNS QL MICRO: ABNORMAL /LPF
CHLORIDE SERPL-SCNC: 102 MMOL/L (ref 96–106)
CHOLEST SERPL-MCNC: 206 MG/DL (ref 100–199)
CHOLEST/HDLC SERPL: 3.9 RATIO (ref 0–4.4)
CO2 SERPL-SCNC: 26 MMOL/L (ref 20–29)
COLOR UR: YELLOW
CREAT SERPL-MCNC: 0.95 MG/DL (ref 0.57–1)
EGFRCR SERPLBLD CKD-EPI 2021: 61 ML/MIN/1.73
EOSINOPHIL # BLD AUTO: 0.1 X10E3/UL (ref 0–0.4)
EOSINOPHIL NFR BLD AUTO: 2 %
EPI CELLS #/AREA URNS HPF: ABNORMAL /HPF (ref 0–10)
ERYTHROCYTE [DISTWIDTH] IN BLOOD BY AUTOMATED COUNT: 13.2 % (ref 11.7–15.4)
GLOBULIN SER CALC-MCNC: 2.9 G/DL (ref 1.5–4.5)
GLUCOSE SERPL-MCNC: 87 MG/DL (ref 65–99)
GLUCOSE UR QL STRIP: NEGATIVE
HBA1C MFR BLD: 5.9 % (ref 4.8–5.6)
HCT VFR BLD AUTO: 45.9 % (ref 34–46.6)
HDLC SERPL-MCNC: 53 MG/DL
HGB BLD-MCNC: 15.1 G/DL (ref 11.1–15.9)
HGB UR QL STRIP: NEGATIVE
IMM GRANULOCYTES # BLD AUTO: 0 X10E3/UL (ref 0–0.1)
IMM GRANULOCYTES NFR BLD AUTO: 0 %
KETONES UR QL STRIP: NEGATIVE
LDLC SERPL CALC-MCNC: 116 MG/DL (ref 0–99)
LEUKOCYTE ESTERASE UR QL STRIP: ABNORMAL
LYMPHOCYTES # BLD AUTO: 3.2 X10E3/UL (ref 0.7–3.1)
LYMPHOCYTES NFR BLD AUTO: 45 %
MCH RBC QN AUTO: 32.1 PG (ref 26.6–33)
MCHC RBC AUTO-ENTMCNC: 32.9 G/DL (ref 31.5–35.7)
MCV RBC AUTO: 98 FL (ref 79–97)
MICRO URNS: ABNORMAL
MONOCYTES # BLD AUTO: 0.6 X10E3/UL (ref 0.1–0.9)
MONOCYTES NFR BLD AUTO: 8 %
NEUTROPHILS # BLD AUTO: 3.2 X10E3/UL (ref 1.4–7)
NEUTROPHILS NFR BLD AUTO: 44 %
NITRITE UR QL STRIP: POSITIVE
OTHER ANTIBIOTIC SUSC ISLT: ABNORMAL
PH UR STRIP: 6.5 [PH] (ref 5–7.5)
PLATELET # BLD AUTO: 269 X10E3/UL (ref 150–450)
POTASSIUM SERPL-SCNC: 4.1 MMOL/L (ref 3.5–5.2)
PROT SERPL-MCNC: 7.4 G/DL (ref 6–8.5)
PROT UR QL STRIP: NEGATIVE
RBC # BLD AUTO: 4.71 X10E6/UL (ref 3.77–5.28)
RBC #/AREA URNS HPF: ABNORMAL /HPF (ref 0–2)
SODIUM SERPL-SCNC: 144 MMOL/L (ref 134–144)
SP GR UR STRIP: 1.01 (ref 1–1.03)
T4 FREE SERPL-MCNC: 0.7 NG/DL (ref 0.82–1.77)
TRIGL SERPL-MCNC: 213 MG/DL (ref 0–149)
TSH SERPL DL<=0.005 MIU/L-ACNC: 17.8 UIU/ML (ref 0.45–4.5)
URINALYSIS REFLEX: ABNORMAL
UROBILINOGEN UR STRIP-MCNC: 0.2 MG/DL (ref 0.2–1)
VLDLC SERPL CALC-MCNC: 37 MG/DL (ref 5–40)
WBC # BLD AUTO: 7.2 X10E3/UL (ref 3.4–10.8)
WBC #/AREA URNS HPF: >30 /HPF (ref 0–5)

## 2022-04-19 DIAGNOSIS — E83.52 HYPERCALCEMIA: ICD-10-CM

## 2022-04-19 DIAGNOSIS — E03.9 HYPOTHYROIDISM, UNSPECIFIED TYPE: Primary | ICD-10-CM

## 2022-04-19 RX ORDER — LEVOTHYROXINE SODIUM 112 UG/1
112 TABLET ORAL DAILY
Qty: 30 TABLET | Refills: 2 | Status: SHIPPED | OUTPATIENT
Start: 2022-04-19 | End: 2022-05-08

## 2022-04-19 RX ORDER — SULFAMETHOXAZOLE AND TRIMETHOPRIM 800; 160 MG/1; MG/1
1 TABLET ORAL 2 TIMES DAILY
Qty: 14 TABLET | Refills: 0 | Status: SHIPPED | OUTPATIENT
Start: 2022-04-19 | End: 2022-04-26

## 2022-04-21 ENCOUNTER — TRANSCRIBE ORDERS (OUTPATIENT)
Dept: ADMINISTRATIVE | Facility: HOSPITAL | Age: 77
End: 2022-04-21

## 2022-04-21 DIAGNOSIS — Z01.818 PRE-OP TESTING: Primary | ICD-10-CM

## 2022-05-03 ENCOUNTER — HOSPITAL ENCOUNTER (OUTPATIENT)
Dept: GENERAL RADIOLOGY | Facility: HOSPITAL | Age: 77
Discharge: HOME | End: 2022-05-03
Admitting: NURSE PRACTITIONER

## 2022-05-03 ENCOUNTER — OFFICE VISIT (OUTPATIENT)
Dept: INTERNAL MEDICINE | Age: 77
End: 2022-05-03

## 2022-05-03 VITALS
HEART RATE: 67 BPM | DIASTOLIC BLOOD PRESSURE: 64 MMHG | TEMPERATURE: 96.9 F | BODY MASS INDEX: 25.29 KG/M2 | SYSTOLIC BLOOD PRESSURE: 108 MMHG | WEIGHT: 151.8 LBS | HEIGHT: 65 IN | OXYGEN SATURATION: 98 %

## 2022-05-03 DIAGNOSIS — E03.9 HYPOTHYROIDISM, UNSPECIFIED TYPE: Primary | ICD-10-CM

## 2022-05-03 DIAGNOSIS — R06.09 DYSPNEA ON EXERTION: ICD-10-CM

## 2022-05-03 DIAGNOSIS — E78.5 HYPERLIPIDEMIA, UNSPECIFIED HYPERLIPIDEMIA TYPE: ICD-10-CM

## 2022-05-03 PROCEDURE — 99214 OFFICE O/P EST MOD 30 MIN: CPT | Performed by: NURSE PRACTITIONER

## 2022-05-03 PROCEDURE — 71046 X-RAY EXAM CHEST 2 VIEWS: CPT

## 2022-05-03 NOTE — PROGRESS NOTES
"    I N T E R N A L  M E D I C I N E  EDEN ESPINOSA, APRN      ENCOUNTER DATE:  05/03/2022    Marci C Kolb / 77 y.o. / female      CHIEF COMPLAINT / REASON FOR OFFICE VISIT     Hyperlipidemia (4 month f/u) and Hypothyroidism      ASSESSMENT & PLAN     1. Hypothyroidism, unspecified type  -Continue levothyroxine 112 MCG daily  - TSH+Free T4  - Urinalysis With Culture If Indicated - Urine, Clean Catch  - Vitamin B12    2. Hyperlipidemia, unspecified hyperlipidemia type  -Continue simvastatin 40 mg  - Comprehensive Metabolic Panel  - Lipid Panel With / Chol / HDL Ratio    3. Dyspnea on exertion  -Continue albuterol as needed  -Instructed to obtain chest x-ray today, echocardiogram along with pulmonary function test scheduled    Orders Placed This Encounter   Procedures   • Comprehensive Metabolic Panel   • Lipid Panel With / Chol / HDL Ratio   • TSH+Free T4   • Urinalysis With Culture If Indicated - Urine, Clean Catch   • Vitamin B12     No orders of the defined types were placed in this encounter.      SUMMARY/DISCUSSION  • Follow-up in 3 months for chronic medical, earlier if needed      Next Appointment with me: 8/4/2022    Return in about 3 months (around 8/3/2022) for Next scheduled follow up; may cancel lab appointment may 20, labs today .      VITAL SIGNS     Visit Vitals  /64 (Cuff Size: Adult)   Pulse 67   Temp 96.9 °F (36.1 °C) (Temporal)   Ht 165.1 cm (65\")   Wt 68.9 kg (151 lb 12.8 oz)   SpO2 98%   BMI 25.26 kg/m²     Wt Readings from Last 3 Encounters:   05/03/22 68.9 kg (151 lb 12.8 oz)   04/15/22 68.2 kg (150 lb 6.4 oz)   01/03/22 64.4 kg (142 lb)     Body mass index is 25.26 kg/m².      MEDICATIONS AT THE TIME OF OFFICE VISIT     Current Outpatient Medications on File Prior to Visit   Medication Sig   • albuterol sulfate  (90 Base) MCG/ACT inhaler Inhale 2 puffs Every 4 (Four) Hours As Needed for Wheezing.   • furosemide (LASIX) 20 MG tablet TAKE ONE TABLET BY MOUTH DAILY   • levothyroxine " (Synthroid) 112 MCG tablet Take 1 tablet by mouth Daily.   • simvastatin (ZOCOR) 40 MG tablet Take 1 tablet by mouth Every Night.     No current facility-administered medications on file prior to visit.         HISTORY OF PRESENT ILLNESS     Patient presents for follow-up of hypothyroidism, dyspnea on exertion along with hyperlipidemia.    Hypothyroidism: Significant elevation of TSH 17.8 with low free T4.  Increase levothyroxine to 112 MCG daily.  Recheck labs today.  Mild improvement in dizziness.  She was also concurrently treated for urinary tract infection.    Hyperlipidemia: Compliant on simvastatin.  Last .  Has carotid ultrasound scheduled along with echocardiogram.    Dyspnea on exertion: Improved with albuterol inhaler.  Significant history of smoking.  Scheduled for pulmonary function test.  Discussed importance of obtaining chest x-ray today.      REVIEW OF SYSTEMS     Constitutional neg except per HPI   ENT tinnitus   Resp dyspnea on exertion   CV neg  Neuro dizziness     PHYSICAL EXAMINATION     Physical Exam  Constitutional  No distress  Cardiovascular Rate  normal . Rhythm: regular . Heart sounds:  normal Edema none   Pulmonary/Chest  Effort normal. Breath sounds:  normal  Psychiatric  Alert. Judgment and thought content normal. Mood normal      REVIEWED DATA     Labs:   Lab Results   Component Value Date    CHLPL 206 (H) 04/15/2022    TRIG 213 (H) 04/15/2022    HDL 53 04/15/2022     (H) 04/15/2022     Lab Results   Component Value Date    TSH 17.800 (H) 04/15/2022     Lab Results   Component Value Date    GLUCOSE 87 04/15/2022    BUN 21 04/15/2022    CREATININE 0.95 04/15/2022    EGFRIFNONA 57 (L) 01/03/2022    EGFRIFAFRI 66 01/03/2022    BCR 22 04/15/2022    K 4.1 04/15/2022    CO2 26 04/15/2022    CALCIUM 11.2 (H) 04/15/2022    PROTENTOTREF 7.4 04/15/2022    ALBUMIN 4.5 04/15/2022    LABIL2 1.6 04/15/2022    AST 27 04/15/2022    ALT 23 04/15/2022     Lab Results   Component Value  Date    WBC 7.2 04/15/2022    HGB 15.1 04/15/2022    HCT 45.9 04/15/2022    MCV 98 (H) 04/15/2022     04/15/2022     Lab Results   Component Value Date    HGBA1C 5.9 (H) 04/15/2022         Imaging:           Medical Tests:             Summary of old records / correspondence / consultant report:           Request outside records:           *Examiner was wearing medical surgical mask, face shield and exam gloves during the entire duration of the visit. Patient was masked the entire time.   Minimum social distance of 6 ft maintained entire visit except if physical contact was necessary as documented.     Dictated utilizing Dragon dictation

## 2022-05-05 ENCOUNTER — HOSPITAL ENCOUNTER (OUTPATIENT)
Dept: CARDIOLOGY | Facility: HOSPITAL | Age: 77
Discharge: HOME | End: 2022-05-05

## 2022-05-05 VITALS
BODY MASS INDEX: 25.16 KG/M2 | WEIGHT: 151 LBS | HEIGHT: 65 IN | DIASTOLIC BLOOD PRESSURE: 72 MMHG | SYSTOLIC BLOOD PRESSURE: 116 MMHG | HEART RATE: 69 BPM

## 2022-05-05 DIAGNOSIS — R06.09 DYSPNEA ON EXERTION: ICD-10-CM

## 2022-05-05 DIAGNOSIS — R42 DIZZINESS: ICD-10-CM

## 2022-05-05 LAB
AORTIC DIMENSIONLESS INDEX: 0.8 (DI)
ASCENDING AORTA: 2.5 CM
BH CV ECHO LEFT VENTRICLE GLOBAL LONGITUDINAL STRAIN: -23.6 %
BH CV ECHO MEAS - ACS: 1.57 CM
BH CV ECHO MEAS - AO MAX PG: 6.3 MMHG
BH CV ECHO MEAS - AO MEAN PG: 3.6 MMHG
BH CV ECHO MEAS - AO ROOT DIAM: 2.8 CM
BH CV ECHO MEAS - AO V2 MAX: 125.1 CM/SEC
BH CV ECHO MEAS - AO V2 VTI: 26.9 CM
BH CV ECHO MEAS - AVA(I,D): 3.5 CM2
BH CV ECHO MEAS - EDV(CUBED): 87.4 ML
BH CV ECHO MEAS - EDV(MOD-SP2): 48 ML
BH CV ECHO MEAS - EDV(MOD-SP4): 43 ML
BH CV ECHO MEAS - EF(MOD-BP): 64.9 %
BH CV ECHO MEAS - EF(MOD-SP2): 60.4 %
BH CV ECHO MEAS - EF(MOD-SP4): 67.4 %
BH CV ECHO MEAS - ESV(CUBED): 16.1 ML
BH CV ECHO MEAS - ESV(MOD-SP2): 19 ML
BH CV ECHO MEAS - ESV(MOD-SP4): 14 ML
BH CV ECHO MEAS - FS: 43.1 %
BH CV ECHO MEAS - IVS/LVPW: 0.78 CM
BH CV ECHO MEAS - IVSD: 0.72 CM
BH CV ECHO MEAS - LAT PEAK E' VEL: 8.9 CM/SEC
BH CV ECHO MEAS - LV DIASTOLIC VOL/BSA (35-75): 24.5 CM2
BH CV ECHO MEAS - LV MASS(C)D: 114.2 GRAMS
BH CV ECHO MEAS - LV MAX PG: 4.6 MMHG
BH CV ECHO MEAS - LV MEAN PG: 2.47 MMHG
BH CV ECHO MEAS - LV SYSTOLIC VOL/BSA (12-30): 8 CM2
BH CV ECHO MEAS - LV V1 MAX: 106.7 CM/SEC
BH CV ECHO MEAS - LV V1 VTI: 22.7 CM
BH CV ECHO MEAS - LVIDD: 4.4 CM
BH CV ECHO MEAS - LVIDS: 2.5 CM
BH CV ECHO MEAS - LVOT AREA: 4.1 CM2
BH CV ECHO MEAS - LVOT DIAM: 2.28 CM
BH CV ECHO MEAS - LVPWD: 0.92 CM
BH CV ECHO MEAS - MED PEAK E' VEL: 11.6 CM/SEC
BH CV ECHO MEAS - MV A DUR: 0.15 SEC
BH CV ECHO MEAS - MV A MAX VEL: 110.5 CM/SEC
BH CV ECHO MEAS - MV DEC SLOPE: 317.6 CM/SEC2
BH CV ECHO MEAS - MV DEC TIME: 0.24 MSEC
BH CV ECHO MEAS - MV E MAX VEL: 77.6 CM/SEC
BH CV ECHO MEAS - MV E/A: 0.7
BH CV ECHO MEAS - MV MAX PG: 5.5 MMHG
BH CV ECHO MEAS - MV MEAN PG: 2.22 MMHG
BH CV ECHO MEAS - MV P1/2T: 102.6 MSEC
BH CV ECHO MEAS - MV V2 VTI: 35.5 CM
BH CV ECHO MEAS - MVA(P1/2T): 2.14 CM2
BH CV ECHO MEAS - MVA(VTI): 2.6 CM2
BH CV ECHO MEAS - PA ACC TIME: 0.09 SEC
BH CV ECHO MEAS - PA PR(ACCEL): 38.6 MMHG
BH CV ECHO MEAS - PA V2 MAX: 81.4 CM/SEC
BH CV ECHO MEAS - PULM A REVS DUR: 0.15 SEC
BH CV ECHO MEAS - PULM A REVS VEL: 24 CM/SEC
BH CV ECHO MEAS - PULM DIAS VEL: 47.1 CM/SEC
BH CV ECHO MEAS - PULM S/D: 0.72
BH CV ECHO MEAS - PULM SYS VEL: 33.8 CM/SEC
BH CV ECHO MEAS - QP/QS: 0.53
BH CV ECHO MEAS - RAP SYSTOLE: 3 MMHG
BH CV ECHO MEAS - RV MAX PG: 1.61 MMHG
BH CV ECHO MEAS - RV V1 MAX: 63.4 CM/SEC
BH CV ECHO MEAS - RV V1 VTI: 16.2 CM
BH CV ECHO MEAS - RVOT DIAM: 1.97 CM
BH CV ECHO MEAS - RVSP: 26 MMHG
BH CV ECHO MEAS - SI(MOD-SP2): 16.5 ML/M2
BH CV ECHO MEAS - SI(MOD-SP4): 16.5 ML/M2
BH CV ECHO MEAS - SUP REN AO DIAM: 2.2 CM
BH CV ECHO MEAS - SV(LVOT): 93.1 ML
BH CV ECHO MEAS - SV(MOD-SP2): 29 ML
BH CV ECHO MEAS - SV(MOD-SP4): 29 ML
BH CV ECHO MEAS - SV(RVOT): 49.4 ML
BH CV ECHO MEAS - TAPSE (>1.6): 2.28 CM
BH CV ECHO MEAS - TR MAX PG: 22.8 MMHG
BH CV ECHO MEAS - TR MAX VEL: 238.9 CM/SEC
BH CV ECHO MEASUREMENTS AVERAGE E/E' RATIO: 7.57
BH CV XLRA - RV BASE: 2.8 CM
BH CV XLRA - RV LENGTH: 6.4 CM
BH CV XLRA - RV MID: 2.5 CM
BH CV XLRA - TDI S': 10.6 CM/SEC
BH CV XLRA MEAS LEFT DIST CCA EDV: 18.2 CM/SEC
BH CV XLRA MEAS LEFT DIST CCA PSV: 72.8 CM/SEC
BH CV XLRA MEAS LEFT DIST ICA EDV: -34.7 CM/SEC
BH CV XLRA MEAS LEFT DIST ICA PSV: -97 CM/SEC
BH CV XLRA MEAS LEFT ICA/CCA RATIO: 1.3
BH CV XLRA MEAS LEFT MID CCA EDV: 26 CM/SEC
BH CV XLRA MEAS LEFT MID CCA PSV: 91.8 CM/SEC
BH CV XLRA MEAS LEFT MID ICA EDV: -36.4 CM/SEC
BH CV XLRA MEAS LEFT MID ICA PSV: -87.5 CM/SEC
BH CV XLRA MEAS LEFT PROX CCA EDV: 21.7 CM/SEC
BH CV XLRA MEAS LEFT PROX CCA PSV: 105.7 CM/SEC
BH CV XLRA MEAS LEFT PROX ECA PSV: -97.9 CM/SEC
BH CV XLRA MEAS LEFT PROX ICA EDV: -24.3 CM/SEC
BH CV XLRA MEAS LEFT PROX ICA PSV: -63.3 CM/SEC
BH CV XLRA MEAS LEFT VERTEBRAL A EDV: -13.9 CM/SEC
BH CV XLRA MEAS LEFT VERTEBRAL A PSV: -45.9 CM/SEC
BH CV XLRA MEAS RIGHT DIST CCA EDV: -19.9 CM/SEC
BH CV XLRA MEAS RIGHT DIST CCA PSV: -75.2 CM/SEC
BH CV XLRA MEAS RIGHT DIST ICA EDV: 31.7 CM/SEC
BH CV XLRA MEAS RIGHT DIST ICA PSV: 93.8 CM/SEC
BH CV XLRA MEAS RIGHT ICA/CCA RATIO: 1.3
BH CV XLRA MEAS RIGHT MID CCA EDV: 18.6 CM/SEC
BH CV XLRA MEAS RIGHT MID CCA PSV: 62.7 CM/SEC
BH CV XLRA MEAS RIGHT MID ICA EDV: -32.9 CM/SEC
BH CV XLRA MEAS RIGHT MID ICA PSV: -94.4 CM/SEC
BH CV XLRA MEAS RIGHT PROX CCA EDV: 21.1 CM/SEC
BH CV XLRA MEAS RIGHT PROX CCA PSV: 69.6 CM/SEC
BH CV XLRA MEAS RIGHT PROX ECA PSV: -64 CM/SEC
BH CV XLRA MEAS RIGHT PROX ICA EDV: -18.6 CM/SEC
BH CV XLRA MEAS RIGHT PROX ICA PSV: -62.7 CM/SEC
BH CV XLRA MEAS RIGHT VERTEBRAL A PSV: -47.8 CM/SEC
LEFT ATRIUM VOLUME INDEX: 15.7 ML/M2
LV EF 2D ECHO EST: 65 %
MAXIMAL PREDICTED HEART RATE: 141 BPM
MAXIMAL PREDICTED HEART RATE: 141 BPM
SINUS: 2.7 CM
STJ: 2.37 CM
STRESS TARGET HR: 120 BPM
STRESS TARGET HR: 120 BPM

## 2022-05-05 PROCEDURE — 93356 MYOCRD STRAIN IMG SPCKL TRCK: CPT

## 2022-05-05 PROCEDURE — 93356 MYOCRD STRAIN IMG SPCKL TRCK: CPT | Performed by: INTERNAL MEDICINE

## 2022-05-05 PROCEDURE — 93880 EXTRACRANIAL BILAT STUDY: CPT | Performed by: INTERNAL MEDICINE

## 2022-05-05 PROCEDURE — 93880 EXTRACRANIAL BILAT STUDY: CPT

## 2022-05-05 PROCEDURE — 93306 TTE W/DOPPLER COMPLETE: CPT | Performed by: INTERNAL MEDICINE

## 2022-05-05 PROCEDURE — 93306 TTE W/DOPPLER COMPLETE: CPT

## 2022-05-06 ENCOUNTER — TELEPHONE (OUTPATIENT)
Dept: INTERNAL MEDICINE | Age: 77
End: 2022-05-06

## 2022-05-06 NOTE — TELEPHONE ENCOUNTER
HUB MAY READ TO PT    ----- Message from JAYNE Banegas sent at 5/5/2022  3:22 PM EDT -----  Carotid ultrasound today showing some mild carotid artery stenosis.  Please make sure you are taking your statin daily and add a baby aspirin of 81 mg daily.

## 2022-05-07 LAB
ALBUMIN SERPL-MCNC: 4.4 G/DL (ref 3.7–4.7)
ALBUMIN/GLOB SERPL: 1.6 {RATIO} (ref 1.2–2.2)
ALP SERPL-CCNC: 116 IU/L (ref 44–121)
ALT SERPL-CCNC: 26 IU/L (ref 0–32)
APPEARANCE UR: ABNORMAL
AST SERPL-CCNC: 30 IU/L (ref 0–40)
BACTERIA #/AREA URNS HPF: ABNORMAL /[HPF]
BACTERIA UR CULT: ABNORMAL
BACTERIA UR CULT: ABNORMAL
BILIRUB SERPL-MCNC: 0.4 MG/DL (ref 0–1.2)
BILIRUB UR QL STRIP: NEGATIVE
BUN SERPL-MCNC: 19 MG/DL (ref 8–27)
BUN/CREAT SERPL: 20 (ref 12–28)
CALCIUM SERPL-MCNC: 10.9 MG/DL (ref 8.7–10.3)
CASTS URNS QL MICRO: ABNORMAL /LPF
CHLORIDE SERPL-SCNC: 104 MMOL/L (ref 96–106)
CHOLEST SERPL-MCNC: 158 MG/DL (ref 100–199)
CHOLEST/HDLC SERPL: 3.4 RATIO (ref 0–4.4)
CO2 SERPL-SCNC: 25 MMOL/L (ref 20–29)
COLOR UR: YELLOW
CREAT SERPL-MCNC: 0.95 MG/DL (ref 0.57–1)
EGFRCR SERPLBLD CKD-EPI 2021: 61 ML/MIN/1.73
EPI CELLS #/AREA URNS HPF: ABNORMAL /HPF (ref 0–10)
GLOBULIN SER CALC-MCNC: 2.7 G/DL (ref 1.5–4.5)
GLUCOSE SERPL-MCNC: 97 MG/DL (ref 65–99)
GLUCOSE UR QL STRIP: NEGATIVE
HDLC SERPL-MCNC: 47 MG/DL
HGB UR QL STRIP: ABNORMAL
KETONES UR QL STRIP: NEGATIVE
LDLC SERPL CALC-MCNC: 85 MG/DL (ref 0–99)
LEUKOCYTE ESTERASE UR QL STRIP: ABNORMAL
MICRO URNS: ABNORMAL
NITRITE UR QL STRIP: POSITIVE
OTHER ANTIBIOTIC SUSC ISLT: ABNORMAL
PH UR STRIP: 5.5 [PH] (ref 5–7.5)
POTASSIUM SERPL-SCNC: 4.3 MMOL/L (ref 3.5–5.2)
PROT SERPL-MCNC: 7.1 G/DL (ref 6–8.5)
PROT UR QL STRIP: ABNORMAL
RBC #/AREA URNS HPF: ABNORMAL /HPF (ref 0–2)
SODIUM SERPL-SCNC: 142 MMOL/L (ref 134–144)
SP GR UR STRIP: 1.02 (ref 1–1.03)
T4 FREE SERPL-MCNC: 0.9 NG/DL (ref 0.82–1.77)
TRIGL SERPL-MCNC: 147 MG/DL (ref 0–149)
TSH SERPL DL<=0.005 MIU/L-ACNC: 7.32 UIU/ML (ref 0.45–4.5)
URINALYSIS REFLEX: ABNORMAL
UROBILINOGEN UR STRIP-MCNC: 0.2 MG/DL (ref 0.2–1)
VIT B12 SERPL-MCNC: 575 PG/ML (ref 232–1245)
VLDLC SERPL CALC-MCNC: 26 MG/DL (ref 5–40)
WBC #/AREA URNS HPF: >30 /HPF (ref 0–5)

## 2022-05-08 DIAGNOSIS — E03.9 HYPOTHYROIDISM, UNSPECIFIED TYPE: ICD-10-CM

## 2022-05-08 DIAGNOSIS — N30.01 ACUTE CYSTITIS WITH HEMATURIA: ICD-10-CM

## 2022-05-08 DIAGNOSIS — E83.52 HYPERCALCEMIA: Primary | ICD-10-CM

## 2022-05-08 RX ORDER — LEVOTHYROXINE SODIUM 0.12 MG/1
125 TABLET ORAL DAILY
Qty: 30 TABLET | Refills: 5 | Status: SHIPPED | OUTPATIENT
Start: 2022-05-08 | End: 2022-08-09

## 2022-05-09 ENCOUNTER — TELEPHONE (OUTPATIENT)
Dept: INTERNAL MEDICINE | Age: 77
End: 2022-05-09

## 2022-05-09 NOTE — TELEPHONE ENCOUNTER
----- Message from JAYNE Banegas sent at 5/8/2022  7:33 PM EDT -----  Please call patient.     Calcium still high but decreased with stopping calcium supplement. Please continue off calcium. I would like to check parathyroid labs when rechecking thyroid to see if this may be the cause.   Cholesterol is well controlled. Thyroid is still indicating you need more medication but SIGNIFICANTLY improved. I am going to send in levothyroxine 125mcg daily instead of 112mcg. Recheck lab in 6 weeks.   Urine again shows urinary tract infection, this time resistant to bactrim. I am sending keflex 500mg twice daily for  days to treat. We will recheck your urine with thyroid lab.    Chano returns today for his knees.  He is known to have severe degenerative arthritis of both knees.  I had injected his knees bilaterally back in March and he did great up until recently.  He comes in today for evaluation.    All signs were reviewed    Both knees were evaluated.  There is a varus deformity of both knees at approximately 10°.  Range of motion is well maintained with the exception of a small flexion contracture on both knees 5-130 degrees.  There is no effusion.  Skin is intact.  Collateral and cruciate ligaments are intact.  Strength is 5/5.    Radiograph show severe degenerative arthrosis both knees.    Impression:  Severe degenerative arthritis bilateral knees    Recommendation:  I have recommended to Chano that we proceed with injections once again.  He would like to do that.  I have injected each knee using aseptic technique with 80 mg Depo-Medrol 4 cc 0.5% bupivacaine.  The patient tolerated this procedure well.  All questions and concerns were addressed to the best my ability.

## 2022-05-10 ENCOUNTER — HOSPITAL ENCOUNTER (EMERGENCY)
Facility: HOSPITAL | Age: 77
Discharge: HOME | End: 2022-05-10
Attending: EMERGENCY MEDICINE | Admitting: EMERGENCY MEDICINE

## 2022-05-10 ENCOUNTER — TELEPHONE (OUTPATIENT)
Dept: INTERNAL MEDICINE | Age: 77
End: 2022-05-10

## 2022-05-10 ENCOUNTER — OFFICE VISIT (OUTPATIENT)
Dept: CARDIOLOGY | Facility: CLINIC | Age: 77
End: 2022-05-10

## 2022-05-10 VITALS
HEART RATE: 57 BPM | DIASTOLIC BLOOD PRESSURE: 70 MMHG | OXYGEN SATURATION: 98 % | SYSTOLIC BLOOD PRESSURE: 118 MMHG | TEMPERATURE: 98.7 F | RESPIRATION RATE: 14 BRPM

## 2022-05-10 VITALS
HEIGHT: 65 IN | BODY MASS INDEX: 25.19 KG/M2 | WEIGHT: 151.2 LBS | HEART RATE: 72 BPM | SYSTOLIC BLOOD PRESSURE: 128 MMHG | DIASTOLIC BLOOD PRESSURE: 80 MMHG

## 2022-05-10 DIAGNOSIS — R42 DIZZINESS: Primary | ICD-10-CM

## 2022-05-10 DIAGNOSIS — N39.0 URINARY TRACT INFECTION WITHOUT HEMATURIA, SITE UNSPECIFIED: ICD-10-CM

## 2022-05-10 DIAGNOSIS — G93.41 ACUTE METABOLIC ENCEPHALOPATHY: ICD-10-CM

## 2022-05-10 DIAGNOSIS — G93.40 ACUTE ENCEPHALOPATHY: Primary | ICD-10-CM

## 2022-05-10 DIAGNOSIS — E03.8 OTHER SPECIFIED HYPOTHYROIDISM: ICD-10-CM

## 2022-05-10 LAB
ALBUMIN SERPL-MCNC: 3.7 G/DL (ref 3.5–5.2)
ALBUMIN/GLOB SERPL: 1.2 G/DL
ALP SERPL-CCNC: 89 U/L (ref 39–117)
ALT SERPL W P-5'-P-CCNC: 19 U/L (ref 1–33)
ANION GAP SERPL CALCULATED.3IONS-SCNC: 8.6 MMOL/L (ref 5–15)
AST SERPL-CCNC: 25 U/L (ref 1–32)
BACTERIA UR QL AUTO: ABNORMAL /HPF
BASOPHILS # BLD MANUAL: 0.08 10*3/MM3 (ref 0–0.2)
BASOPHILS NFR BLD MANUAL: 1 % (ref 0–1.5)
BILIRUB SERPL-MCNC: 0.4 MG/DL (ref 0–1.2)
BILIRUB UR QL STRIP: NEGATIVE
BUN SERPL-MCNC: 11 MG/DL (ref 8–23)
BUN/CREAT SERPL: 11.5 (ref 7–25)
CALCIUM SPEC-SCNC: 9.7 MG/DL (ref 8.6–10.5)
CHLORIDE SERPL-SCNC: 108 MMOL/L (ref 98–107)
CLARITY UR: ABNORMAL
CO2 SERPL-SCNC: 22.4 MMOL/L (ref 22–29)
COLOR UR: YELLOW
CREAT SERPL-MCNC: 0.96 MG/DL (ref 0.57–1)
DEPRECATED RDW RBC AUTO: 47.1 FL (ref 37–54)
EGFRCR SERPLBLD CKD-EPI 2021: 60.3 ML/MIN/1.73
EOSINOPHIL # BLD MANUAL: 0.08 10*3/MM3 (ref 0–0.4)
EOSINOPHIL NFR BLD MANUAL: 1 % (ref 0.3–6.2)
ERYTHROCYTE [DISTWIDTH] IN BLOOD BY AUTOMATED COUNT: 13.1 % (ref 12.3–15.4)
GLOBULIN UR ELPH-MCNC: 3.1 GM/DL
GLUCOSE SERPL-MCNC: 101 MG/DL (ref 65–99)
GLUCOSE UR STRIP-MCNC: NEGATIVE MG/DL
HCT VFR BLD AUTO: 39.9 % (ref 34–46.6)
HGB BLD-MCNC: 13.4 G/DL (ref 12–15.9)
HGB UR QL STRIP.AUTO: ABNORMAL
HYALINE CASTS UR QL AUTO: ABNORMAL /LPF
KETONES UR QL STRIP: NEGATIVE
LEUKOCYTE ESTERASE UR QL STRIP.AUTO: ABNORMAL
LYMPHOCYTES # BLD MANUAL: 2.57 10*3/MM3 (ref 0.7–3.1)
LYMPHOCYTES NFR BLD MANUAL: 5 % (ref 5–12)
MCH RBC QN AUTO: 32.6 PG (ref 26.6–33)
MCHC RBC AUTO-ENTMCNC: 33.6 G/DL (ref 31.5–35.7)
MCV RBC AUTO: 97.1 FL (ref 79–97)
MONOCYTES # BLD: 0.39 10*3/MM3 (ref 0.1–0.9)
NEUTROPHILS # BLD AUTO: 4.68 10*3/MM3 (ref 1.7–7)
NEUTROPHILS NFR BLD MANUAL: 60 % (ref 42.7–76)
NITRITE UR QL STRIP: POSITIVE
PH UR STRIP.AUTO: 7.5 [PH] (ref 5–8)
PLAT MORPH BLD: NORMAL
PLATELET # BLD AUTO: 209 10*3/MM3 (ref 140–450)
PMV BLD AUTO: 9.3 FL (ref 6–12)
POTASSIUM SERPL-SCNC: 4 MMOL/L (ref 3.5–5.2)
PROT SERPL-MCNC: 6.8 G/DL (ref 6–8.5)
PROT UR QL STRIP: ABNORMAL
RBC # BLD AUTO: 4.11 10*6/MM3 (ref 3.77–5.28)
RBC # UR STRIP: ABNORMAL /HPF
RBC MORPH BLD: NORMAL
REF LAB TEST METHOD: ABNORMAL
SODIUM SERPL-SCNC: 139 MMOL/L (ref 136–145)
SP GR UR STRIP: 1.01 (ref 1–1.03)
SQUAMOUS #/AREA URNS HPF: ABNORMAL /HPF
TROPONIN T SERPL-MCNC: <0.01 NG/ML (ref 0–0.03)
UROBILINOGEN UR QL STRIP: ABNORMAL
VARIANT LYMPHS NFR BLD MANUAL: 33 % (ref 19.6–45.3)
WBC # UR STRIP: ABNORMAL /HPF
WBC MORPH BLD: NORMAL
WBC NRBC COR # BLD: 7.8 10*3/MM3 (ref 3.4–10.8)

## 2022-05-10 PROCEDURE — 99283 EMERGENCY DEPT VISIT LOW MDM: CPT

## 2022-05-10 PROCEDURE — 36415 COLL VENOUS BLD VENIPUNCTURE: CPT

## 2022-05-10 PROCEDURE — 81001 URINALYSIS AUTO W/SCOPE: CPT | Performed by: EMERGENCY MEDICINE

## 2022-05-10 PROCEDURE — 84484 ASSAY OF TROPONIN QUANT: CPT | Performed by: EMERGENCY MEDICINE

## 2022-05-10 PROCEDURE — 87088 URINE BACTERIA CULTURE: CPT | Performed by: EMERGENCY MEDICINE

## 2022-05-10 PROCEDURE — 96365 THER/PROPH/DIAG IV INF INIT: CPT

## 2022-05-10 PROCEDURE — 87086 URINE CULTURE/COLONY COUNT: CPT | Performed by: EMERGENCY MEDICINE

## 2022-05-10 PROCEDURE — 85025 COMPLETE CBC W/AUTO DIFF WBC: CPT | Performed by: EMERGENCY MEDICINE

## 2022-05-10 PROCEDURE — 80053 COMPREHEN METABOLIC PANEL: CPT | Performed by: EMERGENCY MEDICINE

## 2022-05-10 PROCEDURE — 87186 SC STD MICRODIL/AGAR DIL: CPT | Performed by: EMERGENCY MEDICINE

## 2022-05-10 PROCEDURE — 93000 ELECTROCARDIOGRAM COMPLETE: CPT | Performed by: INTERNAL MEDICINE

## 2022-05-10 PROCEDURE — 99205 OFFICE O/P NEW HI 60 MIN: CPT | Performed by: INTERNAL MEDICINE

## 2022-05-10 PROCEDURE — 25010000002 CEFTRIAXONE PER 250 MG: Performed by: EMERGENCY MEDICINE

## 2022-05-10 PROCEDURE — 85007 BL SMEAR W/DIFF WBC COUNT: CPT | Performed by: EMERGENCY MEDICINE

## 2022-05-10 RX ORDER — CEPHALEXIN 500 MG/1
500 CAPSULE ORAL 3 TIMES DAILY
Qty: 21 CAPSULE | Refills: 0 | Status: SHIPPED | OUTPATIENT
Start: 2022-05-10 | End: 2022-06-07

## 2022-05-10 RX ORDER — SODIUM CHLORIDE 0.9 % (FLUSH) 0.9 %
10 SYRINGE (ML) INJECTION AS NEEDED
Status: DISCONTINUED | OUTPATIENT
Start: 2022-05-10 | End: 2022-05-10 | Stop reason: HOSPADM

## 2022-05-10 RX ADMIN — CEFTRIAXONE SODIUM 1 G: 1 INJECTION, POWDER, FOR SOLUTION INTRAMUSCULAR; INTRAVENOUS at 11:49

## 2022-05-10 NOTE — TELEPHONE ENCOUNTER
PATIENT'S  AND CO WORKER STATES THAT SHAKIR SEEMS VERY OFF.  SHE IS THE  AT THE [employer] AND SHE DIDN'T DO ANY OF HER TASKS.  PER CO- WORKER SHE DOES KNOW HER NAME AND  CAME TO PICK HER UP AND TAKE HER TO HER CARDIOLOGIST'S APPOINTMENT THIS MORNING AT 8:30 AND THEN ON TO THE EMERGENCY ROOM IF HER CARDIOLOGIST THINKS SO.

## 2022-05-10 NOTE — PROGRESS NOTES
[facility]      Patient Name: Marci Kolb  :1945  Age: 77 y.o.  Encounter Provider:  Romulo Kelly Jr, MD      Chief Complaint: Initial evaluation and management of dizziness      HPI  Marci Kolb is a 77 y.o. female past medical history of hypothyroidism and dyslipidemia who presents for initial evaluation of dizziness.  The patient is confused and disoriented and unable to provide any reliable historical details.  Her  accompanies her and provides the majority of history.  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  She walked in with assistance from him but he thought she could have walked in on her own if absolutely necessary.  She has no focal complaints and has difficult time answering any questions but does admit to some dysuria and frequency.  Per the 's account she was treated for a UTI a few weeks back and was completely fine yesterday before leaving the house for work.  Supposedly she had a work-up at local ENT who wanted cardiac evaluation to exclude any cardiac reasons for dizziness.  She admits to tinnitus.  As stated above she denies slurred speech, facial droop or focal neurological deficits.  She had positive urine cultures for E. coli in April and early May.  She had significantly elevated TSH with decreased T4 which improved in early May after adjustment of medications.  She is a former smoker quit in , denies alcohol or illicit drug use.  No family history of cardiac issues.  Patient has no previous cardiac testing other than recent carotid ultrasound showing minimal carotid atherosclerosis and echocardiogram showing normal EF and aortic calcification without stenosis or regurgitation.      The following portions of the patient's history were  "reviewed and updated as appropriate: allergies, current medications, past family history, past medical history, past social history, past surgical history and problem list.      Review of Systems   Constitutional: Positive for chills and malaise/fatigue. Negative for fever.   HENT: Negative for hoarse voice and sore throat.    Eyes: Negative for double vision and photophobia.   Cardiovascular: Positive for palpitations. Negative for chest pain, leg swelling, near-syncope, orthopnea, paroxysmal nocturnal dyspnea and syncope.   Respiratory: Negative for cough and wheezing.    Endocrine: Positive for polyuria.   Skin: Negative for poor wound healing and rash.   Musculoskeletal: Negative for arthritis and joint swelling.   Gastrointestinal: Negative for bloating, abdominal pain, hematemesis and hematochezia.   Neurological: Positive for dizziness and weakness. Negative for focal weakness.   Psychiatric/Behavioral: Negative for depression and suicidal ideas.       OBJECTIVE:   Vital Signs  Vitals:    05/10/22 0849   BP: 128/80   Pulse: 72     Estimated body mass index is 25.16 kg/m² as calculated from the following:    Height as of this encounter: 165.1 cm (65\").    Weight as of this encounter: 68.6 kg (151 lb 3.2 oz).    Vitals reviewed.   Constitutional:       Appearance: Healthy appearance. Not in distress.   Neck:      Vascular: No JVR. JVD normal.   Pulmonary:      Effort: Pulmonary effort is normal.      Breath sounds: Normal breath sounds. No wheezing. No rhonchi. No rales.   Chest:      Chest wall: Not tender to palpatation.   Cardiovascular:      PMI at left midclavicular line. Normal rate. Regular rhythm. Normal S1. Normal S2.      Murmurs: There is no murmur.      No gallop. No click. No rub.   Pulses:     Intact distal pulses.   Edema:     Peripheral edema absent.   Abdominal:      General: Bowel sounds are normal.      Palpations: Abdomen is soft.      Tenderness: There is no abdominal tenderness. "   Musculoskeletal: Normal range of motion.         General: No tenderness. Skin:     General: Skin is warm and dry.   Neurological:      General: No focal deficit present.      Mental Status: Exhibits altered mental status.           ECG 12 Lead    Date/Time: 5/10/2022 9:09 AM  Performed by: Romulo Kelly Jr., MD  Authorized by: Romulo Kelly Jr., MD   Comparison: compared with previous ECG from 4/15/2022  Comparison to previous ECG: Current tracing does not meet criteria for first-degree AV block  Rhythm: sinus rhythm    Clinical impression: normal ECG                  ASSESSMENT:     Encephalopathy  Dizziness  Hypothyroidism  Dyslipidemia    PLAN OF CARE:     1. Encephalopathy -multiple recent urine cultures positive for E. coli.  Uncertain if this is appropriately treated.  Recent labs showing uncontrolled hypothyroidism which seem to be corrected.  Patient has nonfocal neurological exam with supple neck and seemingly normal cervical spine range of motion decreasing suspicion for meningitis.  Case discussed with triage in the emergency room and she will need urgent evaluation for altered mental status.  2. Dizziness -etiology uncertain.  Normal echocardiogram.  She does admit to some palpitations however clinical history is unreliable at this time.  I will have her back to clinic in 4 weeks to reevaluate for cardiac causes of symptoms.  3. Hypothyroidism -TSH was 17 with free T4 of 0.7 in April.  After medication adjustment TSH was mildly elevated with normal free T4.  4. Dyslipidemia -currently on statin    Return to clinic 4 weeks           Discharge Medications          Accurate as of May 10, 2022  8:51 AM. If you have any questions, ask your nurse or doctor.            Continue These Medications      Instructions Start Date   albuterol sulfate  (90 Base) MCG/ACT inhaler  Commonly known as: PROVENTIL HFA;VENTOLIN HFA;PROAIR HFA   2 puffs, Inhalation, Every 4 Hours PRN      furosemide 20 MG  tablet  Commonly known as: LASIX   TAKE ONE TABLET BY MOUTH DAILY      levothyroxine 125 MCG tablet  Commonly known as: Synthroid   125 mcg, Oral, Daily      simvastatin 40 MG tablet  Commonly known as: ZOCOR   40 mg, Oral, Nightly             Thank you for allowing me to participate in the care of your patient,      Sincerely,   Romulo Kelly MD  [facility]  05/10/22  08:51 EDT

## 2022-05-10 NOTE — ED TRIAGE NOTES
Patient to er from pcp office her at [facility] with c/o increase in confusion. Reported the confusion started last night at work. Reported patient was dx with UTI two weeks ago and started on meds. Patient alert x 2 in triage. Patient denied any numbness or weakness in body at this time. Family reported no blood thinners at this time. Patient has mask on in triage along with staff.

## 2022-05-10 NOTE — DISCHARGE INSTRUCTIONS
Your ED testing  revealed urinary tract infection.  Blood work was fairly unremarkable and did not show other serious illness.  Would like to treat with oral antibiotics.  Do not hesitate return to the ED for worsening symptoms, increased confusion or as needed.

## 2022-05-10 NOTE — ED PROVIDER NOTES
EMERGENCY DEPARTMENT ENCOUNTER    Room Number:  30/30  Date of encounter:  5/10/2022  PCP: Supa Sparks APRN  Historian: Patient,  at bedside    I used full protective equipment while examining this patient.  This includes face mask, gloves and protective eyewear.  I washed my hands before entering the room and immediately upon leaving the room      HPI:  Chief Complaint: Altered mental status  A complete HPI/ROS/PMH/PSH/SH/FH are unobtainable due to: None    Context: Marci Kolb is a 77 y.o. female who presents to the ED c/o altered mental status.  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  She went today for a routine scheduled appointment with Dr. Kelly for evaluation of dizziness.  She was found to be confused and sent here to the ED.  Confusion started sometime last night.   states yesterday she just was not feeling very well.  She had some subjective chills but no objective fever.  She had some lower back pain and slept more than usual through the day yesterday.  Patient did have recent UTI but denies current dysuria.  Denies any chest pain or trouble breathing.  Denies any headache.  There is no new focal weakness numbness or change in communication.      MEDICAL RECORD REVIEW  I reviewed prior medical records including office visit with Dr. Kelly today.  Patient with recent UTI according to records.  Cardiologist felt patient showing signs of acute encephalopathy.  Patient has had some ongoing dizziness and seen by ENT and referred to cardiology for work-up.  Patient does have a history of hypothyroidism and hyperlipidemia.    PAST MEDICAL HISTORY  Active Ambulatory Problems     Diagnosis Date Noted   • Diverticulosis of intestine 02/12/2016   • Cephalalgia 02/12/2016   • Hyperlipidemia 02/12/2016   • Hypothyroidism 02/12/2016   • Knee swelling 02/12/2016   • Low back pain 02/12/2016   • Cervical pain 02/12/2016   • Sciatica 02/12/2016   • Arthralgia of hip  "02/12/2016   • Hyperglycemia 07/25/2017   • Status post hysterectomy 08/06/2019   • Rheumatoid arthritis, involving unspecified site, unspecified whether rheumatoid factor present ([facility]) 04/28/2021     Resolved Ambulatory Problems     Diagnosis Date Noted   • Difficult or painful urination 02/12/2016   • Gonalgia 02/12/2016   • Osteoarthritis of right hip 01/24/2018   • Atelectasis 01/27/2018   • Cystitis 04/28/2018     Past Medical History:   Diagnosis Date   • Arthritis    • Cataract    • Collapsed lung    • Constipation    • Degenerative disc disease, lumbar    • Depression    • Dry skin    • Elevated cholesterol    • Female cystocele    • History of diverticulosis    • History of migraine    • Lumbar spine pain    • Presence of pessary 07/2019   • Right leg pain    • Ringing in right ear    • Urinary incontinence    • Uterine prolapse    • Varicose vein of leg          PAST SURGICAL HISTORY  Past Surgical History:   Procedure Laterality Date   • ANTERIOR AND POSTERIOR VAGINAL REPAIR N/A 8/6/2019    Procedure: POSSIBLE ANTERIOR AND POSTERIOR VAGINAL REPAIR UTEROSACRAL SUSPENSION;  Surgeon: Kristine Baker MD;  Location: [facility];  Service: Gynecology   • COLONOSCOPY  1993    \"WNL\"   • JOINT REPLACEMENT      Left hip arthroplasty   • TOTAL HIP ARTHROPLASTY Left 1/24/2018    Procedure: LT  TOTAL HIP ARTHROPLASTY;  Surgeon: Rogelio Nash MD;  Location: [facility];  Service:    • TOTAL LAPAROSCOPIC HYSTERECTOMY UTEROSACRAL SUSPENSION WITH TRANSVAGINAL TAPING N/A 8/6/2019    Procedure: TOTAL LAPAROSCOPIC HYSTERECTOMY BILATERAL SALPINGO OOPHORECTOMY TENSION FREE VAGINAL TAPING;  Surgeon: Kristine Baker MD;  Location: [facility];  Service: Gynecology         FAMILY HISTORY  Family History   Problem Relation Age of Onset   • Cancer Father    • Breast cancer Maternal Aunt    • Malig Hyperthermia Neg Hx          SOCIAL HISTORY  Social History     Socioeconomic History   • Marital status:    Tobacco " Use   • Smoking status: Former Smoker     Packs/day: 1.00     Years: 35.00     Pack years: 35.00     Types: Cigarettes     Quit date:      Years since quittin.3   • Smokeless tobacco: Never Used   Vaping Use   • Vaping Use: Never used   Substance and Sexual Activity   • Alcohol use: No   • Drug use: No   • Sexual activity: Defer         ALLERGIES  Penicillamine, Streptomycin, and Penicillins       REVIEW OF SYSTEMS  Review of Systems   Constitutional: Negative.  Negative for fever.   HENT: Negative.  Negative for sore throat.    Eyes: Negative.    Respiratory: Negative.  Negative for cough.    Cardiovascular: Negative.  Negative for chest pain.   Gastrointestinal: Negative.    Genitourinary: Negative.  Negative for dysuria.   Musculoskeletal: Positive for back pain.   Skin: Negative.  Negative for rash.   Neurological: Positive for dizziness (Intermittent dizziness ongoing over several weeks). Negative for headaches.   Psychiatric/Behavioral: Positive for confusion.   All other systems reviewed and are negative.          PHYSICAL EXAM    I have reviewed the triage vital signs and nursing notes.    ED Triage Vitals [05/10/22 0917]   Temp Heart Rate Resp BP SpO2   98.7 °F (37.1 °C) 69 18 122/79 97 %      Temp src Heart Rate Source Patient Position BP Location FiO2 (%)   Tympanic -- Sitting -- --       Physical Exam  GENERAL: Drowsy female in no obvious distress.  Triage vitals reviewed and are unremarkable  HENT: nares patent, nontender to palpation over frontal and maxillary sinuses.  Oropharynx clear  EYES: no scleral icterus  CV: regular rhythm, regular rate-no murmur  RESPIRATORY: normal effort, clear to auscultation bilaterally  ABDOMEN: soft, nontender to palpation  MUSCULOSKELETAL: no deformity  NEURO: Strength sensation and coordination are grossly intact.  Speech and mentation are unremarkable.  Patient is drowsy but arousable and can answer questions and follow commands.  She does appear to be  mildly encephalopathic.  SKIN: warm, dry      LAB RESULTS  Recent Results (from the past 24 hour(s))   Comprehensive Metabolic Panel    Collection Time: 05/10/22  9:40 AM    Specimen: Blood   Result Value Ref Range    Glucose 101 (H) 65 - 99 mg/dL    BUN 11 8 - 23 mg/dL    Creatinine 0.96 0.57 - 1.00 mg/dL    Sodium 139 136 - 145 mmol/L    Potassium 4.0 3.5 - 5.2 mmol/L    Chloride 108 (H) 98 - 107 mmol/L    CO2 22.4 22.0 - 29.0 mmol/L    Calcium 9.7 8.6 - 10.5 mg/dL    Total Protein 6.8 6.0 - 8.5 g/dL    Albumin 3.70 3.50 - 5.20 g/dL    ALT (SGPT) 19 1 - 33 U/L    AST (SGOT) 25 1 - 32 U/L    Alkaline Phosphatase 89 39 - 117 U/L    Total Bilirubin 0.4 0.0 - 1.2 mg/dL    Globulin 3.1 gm/dL    A/G Ratio 1.2 g/dL    BUN/Creatinine Ratio 11.5 7.0 - 25.0    Anion Gap 8.6 5.0 - 15.0 mmol/L    eGFR 60.3 >60.0 mL/min/1.73   Troponin    Collection Time: 05/10/22  9:40 AM    Specimen: Blood   Result Value Ref Range    Troponin T <0.010 0.000 - 0.030 ng/mL   CBC Auto Differential    Collection Time: 05/10/22  9:40 AM    Specimen: Blood   Result Value Ref Range    WBC 7.80 3.40 - 10.80 10*3/mm3    RBC 4.11 3.77 - 5.28 10*6/mm3    Hemoglobin 13.4 12.0 - 15.9 g/dL    Hematocrit 39.9 34.0 - 46.6 %    MCV 97.1 (H) 79.0 - 97.0 fL    MCH 32.6 26.6 - 33.0 pg    MCHC 33.6 31.5 - 35.7 g/dL    RDW 13.1 12.3 - 15.4 %    RDW-SD 47.1 37.0 - 54.0 fl    MPV 9.3 6.0 - 12.0 fL    Platelets 209 140 - 450 10*3/mm3   Manual Differential    Collection Time: 05/10/22  9:40 AM    Specimen: Blood   Result Value Ref Range    Neutrophil % 60.0 42.7 - 76.0 %    Lymphocyte % 33.0 19.6 - 45.3 %    Monocyte % 5.0 5.0 - 12.0 %    Eosinophil % 1.0 0.3 - 6.2 %    Basophil % 1.0 0.0 - 1.5 %    Neutrophils Absolute 4.68 1.70 - 7.00 10*3/mm3    Lymphocytes Absolute 2.57 0.70 - 3.10 10*3/mm3    Monocytes Absolute 0.39 0.10 - 0.90 10*3/mm3    Eosinophils Absolute 0.08 0.00 - 0.40 10*3/mm3    Basophils Absolute 0.08 0.00 - 0.20 10*3/mm3    RBC Morphology Normal  Normal    WBC Morphology Normal Normal    Platelet Morphology Normal Normal   Urinalysis With Microscopic If Indicated (No Culture) - Urine, Clean Catch    Collection Time: 05/10/22 10:32 AM    Specimen: Urine, Clean Catch   Result Value Ref Range    Color, UA Yellow Yellow, Straw    Appearance, UA Cloudy (A) Clear    pH, UA 7.5 5.0 - 8.0    Specific Gravity, UA 1.014 1.005 - 1.030    Glucose, UA Negative Negative    Ketones, UA Negative Negative    Bilirubin, UA Negative Negative    Blood, UA Small (1+) (A) Negative    Protein, UA Trace (A) Negative    Leuk Esterase, UA Large (3+) (A) Negative    Nitrite, UA Positive (A) Negative    Urobilinogen, UA 1.0 E.U./dL 0.2 - 1.0 E.U./dL   Urinalysis, Microscopic Only - Urine, Clean Catch    Collection Time: 05/10/22 10:32 AM    Specimen: Urine, Clean Catch   Result Value Ref Range    RBC, UA 6-12 (A) None Seen, 0-2 /HPF    WBC, UA Too Numerous to Count (A) None Seen, 0-2 /HPF    Bacteria, UA 4+ (A) None Seen /HPF    Squamous Epithelial Cells, UA 0-2 None Seen, 0-2 /HPF    Hyaline Casts, UA 7-12 None Seen /LPF    Methodology Automated Microscopy        Ordered the above labs and independently reviewed the results.      RADIOLOGY  No Radiology Exams Resulted Within Past 24 Hours    I ordered the above noted radiological studies. Reviewed by me and discussed with radiologist.  See dictation for official radiology interpretation.      PROCEDURES  Procedures      MEDICATIONS GIVEN IN ER    Medications   sodium chloride 0.9 % flush 10 mL (has no administration in time range)   cefTRIAXone (ROCEPHIN) 1 g in sodium chloride 0.9 % 100 mL IVPB-VTB (1 g Intravenous New Bag 5/10/22 1809)         PROGRESS, DATA ANALYSIS, CONSULTS, AND MEDICAL DECISION MAKING    All labs have been independently reviewed by me.  All radiology studies have been reviewed by me and discussed with radiologist dictating the report.   EKG's independently viewed and interpreted by me.  Discussion below  represents my analysis of pertinent findings related to patient's condition, differential diagnosis, treatment plan and final disposition.      ED Course as of 05/10/22 1215   Tue May 10, 2022   0935 [id number] female sent from cardiology clinic with confusion.  Patient is oriented x3 there is no focal neurologic deficit.  She does seem slow to answer questions but shows no dysarthria or aphasia.  No focal motor weakness or numbness.    Differential diagnosis would include but is not limited to the following:    Infection  Metabolic derangement  Medication reaction [DB]   1134 I discussed results of urinalysis and testing with patient and her .  I do suspect that her confusion is related to urinary tract infection.  Confusion is fairly mild at this point she just seems to be more kind of drowsy.  Given that she is afebrile with benign vitals and normal blood work I think we can treat her as outpatient.  We will go ahead and give a dose of IV Rocephin here in the ED prior to discharge. [DB]      ED Course User Index  [DB] Andrew Patel MD       AS OF 12:15 EDT VITALS:    BP - 118/70  HR - 57  TEMP - 98.7 °F (37.1 °C) (Tympanic)  O2 SATS - 98%      DIAGNOSIS  Final diagnoses:   Acute encephalopathy   Urinary tract infection without hematuria, site unspecified         DISPOSITION  DISCHARGE    Patient discharged in stable condition.    Reviewed implications of results, diagnosis, meds, responsibility to follow up, warning signs and symptoms of possible worsening, potential complications and reasons to return to ER, including increased confusion, fever or as needed.    Patient/Family voiced understanding of above instructions.    Discussed plan for discharge, as there is no emergent indication for admission. Patient referred to primary care provider for BP management due to today's BP. Pt/family is agreeable and understands need for follow up and repeat testing.  Pt is aware that discharge does not mean  that nothing is wrong but it indicates no emergency is present that requires admission and they must continue care with follow-up as given below or physician of their choice.     FOLLOW-UP  Supa Sparks, APRN  [address] [id number] [phone]    In 3 days  If Not Better         Medication List      New Prescriptions    cephalexin 500 MG capsule  Commonly known as: KEFLEX  Take 1 capsule by mouth 3 (Three) Times a Day.           Where to Get Your Medications      These medications were sent to [address] [facility] - [phone] [facility] [phone]   [address] [place] [id number]    Phone: [phone]   · cephalexin 500 MG capsule                Andrew Patel MD  05/10/22 8276

## 2022-05-12 LAB — BACTERIA SPEC AEROBE CULT: ABNORMAL

## 2022-05-14 ENCOUNTER — HOSPITAL ENCOUNTER (EMERGENCY)
Facility: HOSPITAL | Age: 77
Discharge: HOME | End: 2022-05-15
Attending: EMERGENCY MEDICINE | Admitting: EMERGENCY MEDICINE

## 2022-05-14 ENCOUNTER — APPOINTMENT (OUTPATIENT)
Dept: CT IMAGING | Facility: HOSPITAL | Age: 77
End: 2022-05-14

## 2022-05-14 ENCOUNTER — APPOINTMENT (OUTPATIENT)
Dept: GENERAL RADIOLOGY | Facility: HOSPITAL | Age: 77
End: 2022-05-14

## 2022-05-14 DIAGNOSIS — S09.90XA CLOSED HEAD INJURY, INITIAL ENCOUNTER: ICD-10-CM
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
## 2022-05-14 DIAGNOSIS — S80.11XA CONTUSION OF RIGHT LOWER EXTREMITY, INITIAL ENCOUNTER: ICD-10-CM

## 2022-05-14 DIAGNOSIS — S80.12XA CONTUSION OF LEFT LOWER EXTREMITY, INITIAL ENCOUNTER: ICD-10-CM

## 2022-05-14 PROCEDURE — 70450 CT HEAD/BRAIN W/O DYE: CPT

## 2022-05-14 PROCEDURE — 73590 X-RAY EXAM OF LOWER LEG: CPT

## 2022-05-14 PROCEDURE — 99283 EMERGENCY DEPT VISIT LOW MDM: CPT

## 2022-05-15 VITALS
DIASTOLIC BLOOD PRESSURE: 69 MMHG | WEIGHT: 150 LBS | SYSTOLIC BLOOD PRESSURE: 128 MMHG | HEART RATE: 69 BPM | HEIGHT: 65 IN | TEMPERATURE: 97.9 F | BODY MASS INDEX: 24.99 KG/M2 | OXYGEN SATURATION: 98 % | RESPIRATION RATE: 14 BRPM

## 2022-05-15 RX ORDER — CHLORZOXAZONE 500 MG/1
500 TABLET ORAL 3 TIMES DAILY PRN
Qty: 30 TABLET | Refills: 0 | Status: SHIPPED | OUTPATIENT
Start: 2022-05-15 | End: 2022-08-04

## 2022-05-15 RX ORDER — NAPROXEN 500 MG/1
500 TABLET ORAL 2 TIMES DAILY PRN
Qty: 20 TABLET | Refills: 0 | Status: SHIPPED | OUTPATIENT
Start: 2022-05-15
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
## 2022-05-23 ENCOUNTER — OFFICE VISIT (OUTPATIENT)
Dept: INTERNAL MEDICINE | Age: 77
End: 2022-05-23

## 2022-05-23 ENCOUNTER — TELEPHONE (OUTPATIENT)
Dept: INTERNAL MEDICINE | Age: 77
End: 2022-05-23

## 2022-05-23 VITALS
TEMPERATURE: 97.8 F | DIASTOLIC BLOOD PRESSURE: 80 MMHG | BODY MASS INDEX: 25.33 KG/M2 | WEIGHT: 152 LBS | HEART RATE: 63 BPM | HEIGHT: 65 IN | SYSTOLIC BLOOD PRESSURE: 132 MMHG | OXYGEN SATURATION: 99 %
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
## 2022-05-23 DIAGNOSIS — N30.00 ACUTE CYSTITIS WITHOUT HEMATURIA: Primary | ICD-10-CM

## 2022-05-23 DIAGNOSIS — E03.9 HYPOTHYROIDISM, UNSPECIFIED TYPE: ICD-10-CM

## 2022-05-23 DIAGNOSIS — M06.9 RHEUMATOID ARTHRITIS, INVOLVING UNSPECIFIED SITE, UNSPECIFIED WHETHER RHEUMATOID FACTOR PRESENT: ICD-10-CM

## 2022-05-23 PROCEDURE — 99214 OFFICE O/P EST MOD 30 MIN: CPT | Performed by: NURSE PRACTITIONER
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
## 2022-05-23 NOTE — TELEPHONE ENCOUNTER
Patient was here today to see Marci English. Marci recommended for patient to return in 4 weeks. Patient did not want to see anyone but Supa. Supa was not available at the time patient is requesting. Patient states that she will call if symptoms worsen. Patient will return for her next scheduled follow up on 8/04/2022.

## 2022-05-26 LAB
BACTERIA UR CULT: ABNORMAL
BACTERIA UR CULT: ABNORMAL
OTHER ANTIBIOTIC SUSC ISLT: ABNORMAL

## 2022-05-27 ENCOUNTER — TELEPHONE (OUTPATIENT)
Dept: INTERNAL MEDICINE | Age: 77
End: 2022-05-27

## 2022-05-27 RX ORDER — NITROFURANTOIN 25; 75 MG/1; MG/1
100 CAPSULE ORAL 2 TIMES DAILY
Qty: 10 CAPSULE | Refills: 0 | Status: SHIPPED | OUTPATIENT
Start: 2022-05-27 | End: 2022-06-01

## 2022-05-27 NOTE — TELEPHONE ENCOUNTER
MALVINTVM INSTRUCTING PT TO RETURN CALL TO BE READ LAB RESULTS. LETTER SENT VIA [email or web address]

## 2022-05-27 NOTE — TELEPHONE ENCOUNTER
----- Message from JAYNE Ramesh sent at 5/27/2022  7:50 AM EDT -----  Ms. Kolb,    Your urine still shows that you have a mild Urinary Tract Infection. I will send into your local pharmacy an antibiotic called Macrobid to be taken twice daily for 5 days, to cover the bacteria shown today.     Drink plenty of water (32 ounces daily).    Sincerely,  Marci GODOY

## 2022-05-27 NOTE — TELEPHONE ENCOUNTER
Spoke with patient ph: [phone], notified patient of Marci's dictation. Also, notified patient that antibiotic was called into pharmacy.

## 2022-06-06 DIAGNOSIS — R60.9 PERIPHERAL EDEMA: ICD-10-CM

## 2022-06-07 ENCOUNTER — OFFICE VISIT (OUTPATIENT)
Dept: INTERNAL MEDICINE | Age: 77
End: 2022-06-07

## 2022-06-07 VITALS
BODY MASS INDEX: 25.19 KG/M2 | DIASTOLIC BLOOD PRESSURE: 64 MMHG | HEIGHT: 65 IN | SYSTOLIC BLOOD PRESSURE: 102 MMHG | OXYGEN SATURATION: 98 % | TEMPERATURE: 97.9 F | HEART RATE: 74 BPM | WEIGHT: 151.2 LBS

## 2022-06-07 DIAGNOSIS — N30.00 ACUTE CYSTITIS WITHOUT HEMATURIA: Primary | ICD-10-CM

## 2022-06-07 PROCEDURE — 99213 OFFICE O/P EST LOW 20 MIN: CPT | Performed by: NURSE PRACTITIONER

## 2022-06-07 RX ORDER — FUROSEMIDE 20 MG/1
TABLET ORAL
Qty: 30 TABLET | Refills: 3 | Status: SHIPPED | OUTPATIENT
Start: 2022-06-07 | End: 2022-10-17

## 2022-06-07 RX ORDER — CIPROFLOXACIN 500 MG/1
500 TABLET, FILM COATED ORAL 2 TIMES DAILY
Qty: 14 TABLET | Refills: 0 | Status: SHIPPED | OUTPATIENT
Start: 2022-06-07 | End: 2022-06-14

## 2022-06-07 NOTE — PROGRESS NOTES
"    I N T E R N A L  M E D I C I N E  JAYNE Cuellar    ENCOUNTER DATE:  06/07/2022    Marci C Kolb / 77 y.o. / female      CHIEF COMPLAINT / REASON FOR OFFICE VISIT     Difficulty Urinating      ASSESSMENT & PLAN     Diagnoses and all orders for this visit:    1. Acute cystitis without hematuria (Primary)  Overview:  April 28, 2018      Other orders  -     ciprofloxacin (Cipro) 500 MG tablet; Take 1 tablet by mouth 2 (Two) Times a Day for 7 days.  Dispense: 14 tablet; Refill: 0  -     Cranberry 450 MG tablet; Take 1 tablet by mouth Daily for 30 days.  Dispense: 30 each; Refill: 4       SUMMARY/DISCUSSION  • Discussion of daily AZO Urinary Health/Cranberry tablets  • Follow up JAYNE Banegas as scheduled  • I spent 30 min in direct care of this patient on this date of service. This time includes times spent by me in the following activities: Preparing for the visit, obtaining and/or reviewing a separately obtained history, performing a medically appropriate examination and/or evaluation, reviewing medical records, reviewing tests, ordering medications, tests, or procedures, counseling and educating the patient, documenting information in the medical record and reviewing office note/correspondence from other providers.       Next Appointment with me: Visit date not found    Return in about 4 weeks (around 7/5/2022) for Recheck.      VITAL SIGNS     Visit Vitals  /64   Pulse 74   Temp 97.9 °F (36.6 °C)   Ht 165.1 cm (65\")   Wt 68.6 kg (151 lb 3.2 oz)   SpO2 98%   BMI 25.16 kg/m²           BP Readings from Last 3 Encounters:   06/07/22 102/64   05/23/22 132/80   05/14/22 128/69     Wt Readings from Last 3 Encounters:   06/07/22 68.6 kg (151 lb 3.2 oz)   05/23/22 68.9 kg (152 lb)   05/15/22 68 kg (150 lb)     Body mass index is 25.16 kg/m².    Blood pressure readings recorded on patient flowsheet:  No flowsheet data found.          MEDICATIONS AT THE TIME OF OFFICE VISIT     Current Outpatient Medications " on File Prior to Visit   Medication Sig Dispense Refill   • albuterol sulfate  (90 Base) MCG/ACT inhaler Inhale 2 puffs Every 4 (Four) Hours As Needed for Wheezing. 6.7 g 2   • chlorzoxazone (PARAFON FORTE) 500 MG tablet Take 1 tablet by mouth 3 (Three) Times a Day As Needed for Muscle Spasms. 30 tablet 0   • furosemide (LASIX) 20 MG tablet TAKE ONE TABLET BY MOUTH DAILY 30 tablet 3   • levothyroxine (Synthroid) 125 MCG tablet Take 1 tablet by mouth Daily. 30 tablet 5   • naproxen (EC NAPROSYN) 500 MG EC tablet Take 1 tablet by mouth 2 (Two) Times a Day As Needed for Mild Pain . 20 tablet 0   • simvastatin (ZOCOR) 40 MG tablet Take 1 tablet by mouth Every Night. 90 tablet 3   • cephalexin (KEFLEX) 500 MG capsule Take 1 capsule by mouth 3 (Three) Times a Day. 21 capsule 0   • [DISCONTINUED] furosemide (LASIX) 20 MG tablet TAKE ONE TABLET BY MOUTH DAILY 30 tablet 3     No current facility-administered medications on file prior to visit.        HISTORY OF PRESENT ILLNESS     77 year old female seen today for Urinary Tract Infection, Unresolved with Macrobid.  UA obtained in the office and remains high in Nitrates and Blood- Discussion of po water, cranberry juice/tablets. Rx for Cipro to pharmacy. Positive for hematuria, dysuria, frequency, urgency. Negative for nausea or vomiting.   Encouraged to get OTC AZO Urinary Health and take daily, along with 32 ounces of water- patient verbalized understanding.       Patient Care Team:  Supa Sparks APRN as PCP - General (Internal Medicine)  Abdias Nash MD (Orthopedic Surgery)  Jatinder Dominguez MD as Consulting Physician (Otolaryngology)    REVIEW OF SYSTEMS     Review of Systems   Constitutional: Negative.    HENT: Negative.    Eyes: Negative.    Respiratory: Negative.    Cardiovascular: Negative.    Gastrointestinal: Negative.    Genitourinary: Positive for difficulty urinating, dysuria, frequency, hematuria and urgency.   Musculoskeletal: Negative.     Skin: Negative.    Neurological: Negative.    Psychiatric/Behavioral: Negative.           PHYSICAL EXAMINATION     Physical Exam  Constitutional:       Appearance: She is normal weight.   Cardiovascular:      Rate and Rhythm: Normal rate and regular rhythm.      Pulses: Normal pulses.      Heart sounds: Normal heart sounds.   Pulmonary:      Effort: Pulmonary effort is normal.      Breath sounds: Normal breath sounds.   Abdominal:      General: Abdomen is flat. Bowel sounds are normal.      Tenderness: There is right CVA tenderness and left CVA tenderness.   Musculoskeletal:         General: Normal range of motion.   Skin:     General: Skin is warm and dry.   Neurological:      Mental Status: She is alert and oriented to person, place, and time.   Psychiatric:         Mood and Affect: Mood normal.           REVIEWED DATA     Labs:     Lab Results   Component Value Date     05/10/2022    K 4.0 05/10/2022    CALCIUM 9.7 05/10/2022    AST 25 05/10/2022    ALT 19 05/10/2022    BUN 11 05/10/2022    CREATININE 0.96 05/10/2022    CREATININE 0.95 05/03/2022    CREATININE 0.95 04/15/2022    EGFRIFNONA 57 (L) 01/03/2022    EGFRIFAFRI 66 01/03/2022       Lab Results   Component Value Date    HGBA1C 5.9 (H) 04/15/2022    HGBA1C 6.0 (H) 01/03/2022    HGBA1C 5.80 (H) 09/02/2021       Lab Results   Component Value Date    LDL 85 05/03/2022     (H) 04/15/2022    LDL 79 01/03/2022    HDL 47 05/03/2022    HDL 53 04/15/2022    TRIG 147 05/03/2022    TRIG 213 (H) 04/15/2022       Lab Results   Component Value Date    TSH 7.320 (H) 05/03/2022    TSH 17.800 (H) 04/15/2022    TSH 0.161 (L) 01/03/2022    FREET4 0.90 05/03/2022    FREET4 0.70 (L) 04/15/2022    FREET4 1.25 01/03/2022       Lab Results   Component Value Date    WBC 7.80 05/10/2022    HGB 13.4 05/10/2022     05/10/2022       No results found for: MALBCRERATIO       Imaging:           Medical Tests:           Summary of old records / correspondence /  consultant report:           Request outside records:             *Examiner was wearing KN95 mask and eye protection during the entire duration of the visit. Patient was masked the entire time. Minimum social distance of 6 ft maintained entire visit except if physical contact was necessary as documented.       Template created by JAYNE Cuellar

## 2022-06-30 ENCOUNTER — OFFICE VISIT (OUTPATIENT)
Dept: INTERNAL MEDICINE | Age: 77
End: 2022-06-30

## 2022-06-30 VITALS
OXYGEN SATURATION: 97 % | WEIGHT: 147 LBS | HEART RATE: 76 BPM | DIASTOLIC BLOOD PRESSURE: 70 MMHG | TEMPERATURE: 96.8 F | BODY MASS INDEX: 24.49 KG/M2 | SYSTOLIC BLOOD PRESSURE: 110 MMHG | HEIGHT: 65 IN

## 2022-06-30 DIAGNOSIS — N39.0 RECURRENT UTI: Primary | ICD-10-CM

## 2022-06-30 LAB
BILIRUB BLD-MCNC: NEGATIVE MG/DL
CLARITY, POC: ABNORMAL
COLOR UR: ABNORMAL
EXPIRATION DATE: ABNORMAL
GLUCOSE UR STRIP-MCNC: NEGATIVE MG/DL
KETONES UR QL: NEGATIVE
LEUKOCYTE EST, POC: ABNORMAL
Lab: ABNORMAL
NITRITE UR-MCNC: NEGATIVE MG/ML
PH UR: 5.5 [PH] (ref 5–8)
PROT UR STRIP-MCNC: NEGATIVE MG/DL
RBC # UR STRIP: ABNORMAL /UL
SP GR UR: 1.02 (ref 1–1.03)
UROBILINOGEN UR QL: NORMAL

## 2022-06-30 PROCEDURE — 81003 URINALYSIS AUTO W/O SCOPE: CPT | Performed by: NURSE PRACTITIONER

## 2022-06-30 PROCEDURE — 99213 OFFICE O/P EST LOW 20 MIN: CPT | Performed by: NURSE PRACTITIONER

## 2022-06-30 NOTE — PROGRESS NOTES
"    I N T E R N A L  M E D I C I N E  JAYNE Cuellar    ENCOUNTER DATE:  06/30/2022    Marci C Kolb / 77 y.o. / female      CHIEF COMPLAINT / REASON FOR OFFICE VISIT     Follow-up (Uti )      ASSESSMENT & PLAN     Diagnoses and all orders for this visit:    1. Recurrent UTI (Primary)  Recently treated for UTI with Cipro 6.7.2022. Negative for dyuria, frequency, or urgency. UA in office remains with blood noted- will await culture. Discussion of po water and daily cranberry juice/tablets for prevention of UTI    Orders:  -     POC Urinalysis Dipstick, Automated  -     Urinalysis With Culture If Indicated -    Other orders  -     Cranberry 450 MG tablet; Take 1 tablet by mouth Daily for 30 days.  Dispense: 30 each; Refill: 4       SUMMARY/DISCUSSION  • Follow up with Clare Cowart APRN as scheudled  • I spent 17 min in direct care of this patient on this date of service. This time includes times spent by me in the following activities: Preparing for the visit, obtaining and/or reviewing a separately obtained history, performing a medically appropriate examination and/or evaluation, reviewing medical records, reviewing tests, ordering medications, tests, or procedures, counseling and educating the patient, documenting information in the medical record and reviewing office note/correspondence from other providers.     Return in about 5 weeks (around 8/4/2022) for Next scheduled follow up.      VITAL SIGNS     Visit Vitals  /70   Pulse 76   Temp 96.8 °F (36 °C) (Temporal)   Ht 165.1 cm (65\")   Wt 66.7 kg (147 lb)   SpO2 97%   BMI 24.46 kg/m²           BP Readings from Last 3 Encounters:   06/30/22 110/70   06/07/22 102/64   05/23/22 132/80     Wt Readings from Last 3 Encounters:   06/30/22 66.7 kg (147 lb)   06/07/22 68.6 kg (151 lb 3.2 oz)   05/23/22 68.9 kg (152 lb)     Body mass index is 24.46 kg/m².    Blood pressure readings recorded on patient flowsheet:  No flowsheet data found.          MEDICATIONS AT " THE TIME OF OFFICE VISIT     Current Outpatient Medications on File Prior to Visit   Medication Sig Dispense Refill   • albuterol sulfate  (90 Base) MCG/ACT inhaler Inhale 2 puffs Every 4 (Four) Hours As Needed for Wheezing. 6.7 g 2   • chlorzoxazone (PARAFON FORTE) 500 MG tablet Take 1 tablet by mouth 3 (Three) Times a Day As Needed for Muscle Spasms. 30 tablet 0   • furosemide (LASIX) 20 MG tablet TAKE ONE TABLET BY MOUTH DAILY 30 tablet 3   • levothyroxine (Synthroid) 125 MCG tablet Take 1 tablet by mouth Daily. 30 tablet 5   • naproxen (EC NAPROSYN) 500 MG EC tablet Take 1 tablet by mouth 2 (Two) Times a Day As Needed for Mild Pain . 20 tablet 0   • simvastatin (ZOCOR) 40 MG tablet Take 1 tablet by mouth Every Night. 90 tablet 3   • [DISCONTINUED] Cranberry 450 MG tablet Take 1 tablet by mouth Daily for 30 days. 30 each 4     No current facility-administered medications on file prior to visit.        HISTORY OF PRESENT ILLNESS     77 year old female here today for follow up UTI. Recently completed cipro on 6.13.2022. Negative for dyuria frequency, urgency, positive for hematuria. UA ordered in office with culture- await results. Encouraged patient to drink at least 32 ounces of water daily and try cranberry juice/tablets for prevention of UTIs.  No additional concerns voiced. Patient states she is going out of town next week to visit family in [place].       Patient Care Team:  Supa Sparks APRN as PCP - General (Internal Medicine)  Abdias Nash MD (Orthopedic Surgery)  Jatinder Dominguez MD as Consulting Physician (Otolaryngology)    REVIEW OF SYSTEMS     Review of Systems   Constitutional: Negative.    HENT: Negative.    Respiratory: Negative.    Genitourinary: Positive for hematuria.          PHYSICAL EXAMINATION     Physical Exam  Constitutional:       Appearance: She is normal weight.   Cardiovascular:      Rate and Rhythm: Normal rate and regular rhythm.      Pulses: Normal pulses.       Heart sounds: Normal heart sounds.   Pulmonary:      Effort: Pulmonary effort is normal.   Skin:     General: Skin is warm and dry.   Neurological:      Mental Status: She is alert and oriented to person, place, and time.           REVIEWED DATA     Labs:     Lab Results   Component Value Date     05/10/2022    K 4.0 05/10/2022    CALCIUM 9.7 05/10/2022    AST 25 05/10/2022    ALT 19 05/10/2022    BUN 11 05/10/2022    CREATININE 0.96 05/10/2022    CREATININE 0.95 05/03/2022    CREATININE 0.95 04/15/2022    EGFRIFNONA 57 (L) 01/03/2022    EGFRIFAFRI 66 01/03/2022       Lab Results   Component Value Date    HGBA1C 5.9 (H) 04/15/2022    HGBA1C 6.0 (H) 01/03/2022    HGBA1C 5.80 (H) 09/02/2021       Lab Results   Component Value Date    LDL 85 05/03/2022     (H) 04/15/2022    LDL 79 01/03/2022    HDL 47 05/03/2022    HDL 53 04/15/2022    TRIG 147 05/03/2022    TRIG 213 (H) 04/15/2022       Lab Results   Component Value Date    TSH 7.320 (H) 05/03/2022    TSH 17.800 (H) 04/15/2022    TSH 0.161 (L) 01/03/2022    FREET4 0.90 05/03/2022    FREET4 0.70 (L) 04/15/2022    FREET4 1.25 01/03/2022       Lab Results   Component Value Date    WBC 7.80 05/10/2022    HGB 13.4 05/10/2022     05/10/2022       No results found for: MALBCRERATIO     Brief Urine Lab Results  (Last result in the past 365 days)      Color   Clarity   Blood   Leuk Est   Nitrite   Protein   CREAT   Urine HCG        06/30/22 1016 Dark Yellow   Turbid   Moderate   Small (1+)   Negative   Negative                 Imaging:           Medical Tests:           Summary of old records / correspondence / consultant report:           Request outside records:             *Examiner was wearing KN95 mask and eye protection during the entire duration of the visit. Patient was masked the entire time. Minimum social distance of 6 ft maintained entire visit except if physical contact was necessary as documented.       Template created by JAYNE Cuellar

## 2022-07-02 LAB
APPEARANCE UR: CLEAR
BACTERIA #/AREA URNS HPF: ABNORMAL /[HPF]
BACTERIA UR CULT: ABNORMAL
BACTERIA UR CULT: ABNORMAL
BILIRUB UR QL STRIP: NEGATIVE
CASTS URNS QL MICRO: ABNORMAL /LPF
COLOR UR: YELLOW
EPI CELLS #/AREA URNS HPF: ABNORMAL /HPF (ref 0–10)
GLUCOSE UR QL STRIP: NEGATIVE
HGB UR QL STRIP: ABNORMAL
KETONES UR QL STRIP: NEGATIVE
LEUKOCYTE ESTERASE UR QL STRIP: ABNORMAL
MICRO URNS: ABNORMAL
NITRITE UR QL STRIP: NEGATIVE
PH UR STRIP: 6 [PH] (ref 5–7.5)
PROT UR QL STRIP: NEGATIVE
RBC #/AREA URNS HPF: ABNORMAL /HPF (ref 0–2)
SP GR UR STRIP: 1.02 (ref 1–1.03)
URINALYSIS REFLEX: ABNORMAL
UROBILINOGEN UR STRIP-MCNC: 0.2 MG/DL (ref 0.2–1)
WBC #/AREA URNS HPF: ABNORMAL /HPF (ref 0–5)

## 2022-07-19 ENCOUNTER — LAB (OUTPATIENT)
Dept: LAB | Facility: HOSPITAL | Age: 77
End: 2022-07-19

## 2022-07-19 LAB — SARS-COV-2 ORF1AB RESP QL NAA+PROBE: NOT DETECTED

## 2022-07-19 PROCEDURE — U0005 INFEC AGEN DETEC AMPLI PROBE: HCPCS | Performed by: NURSE PRACTITIONER

## 2022-07-19 PROCEDURE — U0004 COV-19 TEST NON-CDC HGH THRU: HCPCS | Performed by: NURSE PRACTITIONER

## 2022-07-21 ENCOUNTER — HOSPITAL ENCOUNTER (OUTPATIENT)
Dept: RESPIRATORY THERAPY | Facility: HOSPITAL | Age: 77
Discharge: HOME | End: 2022-07-21

## 2022-07-21 ENCOUNTER — HOSPITAL ENCOUNTER (OUTPATIENT)
Dept: MAMMOGRAPHY | Facility: HOSPITAL | Age: 77
Discharge: HOME | End: 2022-07-21

## 2022-07-21 DIAGNOSIS — R06.09 DYSPNEA ON EXERTION: ICD-10-CM

## 2022-07-21 DIAGNOSIS — R92.8 ABNORMAL MAMMOGRAM OF RIGHT BREAST: Primary | ICD-10-CM

## 2022-07-21 PROCEDURE — 94726 PLETHYSMOGRAPHY LUNG VOLUMES: CPT

## 2022-07-21 PROCEDURE — 77063 BREAST TOMOSYNTHESIS BI: CPT

## 2022-07-21 PROCEDURE — 94664 DEMO&/EVAL PT USE INHALER: CPT

## 2022-07-21 PROCEDURE — 94640 AIRWAY INHALATION TREATMENT: CPT

## 2022-07-21 PROCEDURE — 94060 EVALUATION OF WHEEZING: CPT

## 2022-07-21 PROCEDURE — 77067 SCR MAMMO BI INCL CAD: CPT

## 2022-07-21 RX ORDER — ALBUTEROL SULFATE 2.5 MG/3ML
2.5 SOLUTION RESPIRATORY (INHALATION) ONCE
Status: COMPLETED | OUTPATIENT
Start: 2022-07-21 | End: 2022-07-21

## 2022-07-21 RX ADMIN — ALBUTEROL SULFATE 2.5 MG: 2.5 SOLUTION RESPIRATORY (INHALATION) at 08:45

## 2022-07-25 ENCOUNTER — TELEPHONE (OUTPATIENT)
Dept: INTERNAL MEDICINE | Age: 77
End: 2022-07-25

## 2022-07-25 NOTE — TELEPHONE ENCOUNTER
----- Message from JAYNE Banegas sent at 7/21/2022  6:01 PM EDT -----  Abnormality in the right breast. Radiology is recommending further evaluation with a diagnostic mammogram and ultrasound. I have place the order and you should get a call to schedule.

## 2022-08-04 ENCOUNTER — OFFICE VISIT (OUTPATIENT)
Dept: INTERNAL MEDICINE | Age: 77
End: 2022-08-04

## 2022-08-04 VITALS
HEIGHT: 65 IN | BODY MASS INDEX: 24.56 KG/M2 | OXYGEN SATURATION: 98 % | DIASTOLIC BLOOD PRESSURE: 66 MMHG | HEART RATE: 65 BPM | WEIGHT: 147.4 LBS | SYSTOLIC BLOOD PRESSURE: 112 MMHG | TEMPERATURE: 97.1 F

## 2022-08-04 DIAGNOSIS — Z86.73 OLD LACUNAR STROKE WITHOUT LATE EFFECT: ICD-10-CM

## 2022-08-04 DIAGNOSIS — R73.9 HYPERGLYCEMIA: ICD-10-CM

## 2022-08-04 DIAGNOSIS — E03.9 HYPOTHYROIDISM, UNSPECIFIED TYPE: ICD-10-CM

## 2022-08-04 DIAGNOSIS — R09.89 SWOLLEN VEIN: ICD-10-CM

## 2022-08-04 DIAGNOSIS — R60.0 BILATERAL LOWER EXTREMITY EDEMA: ICD-10-CM

## 2022-08-04 DIAGNOSIS — E83.52 HYPERCALCEMIA: ICD-10-CM

## 2022-08-04 DIAGNOSIS — E78.5 HYPERLIPIDEMIA, UNSPECIFIED HYPERLIPIDEMIA TYPE: Primary | ICD-10-CM

## 2022-08-04 DIAGNOSIS — R59.9 SWOLLEN LYMPH NODES: ICD-10-CM

## 2022-08-04 DIAGNOSIS — J43.9 PULMONARY EMPHYSEMA, UNSPECIFIED EMPHYSEMA TYPE: ICD-10-CM

## 2022-08-04 PROBLEM — G89.4 CHRONIC PAIN DISORDER: Status: ACTIVE | Noted: 2022-08-04

## 2022-08-04 PROBLEM — M19.91 PRIMARY OSTEOARTHRITIS: Status: ACTIVE | Noted: 2022-08-04

## 2022-08-04 PROBLEM — Z96.642 HISTORY OF TOTAL LEFT HIP REPLACEMENT: Status: ACTIVE | Noted: 2022-08-04

## 2022-08-04 PROBLEM — M48.061 LUMBAR SPINAL STENOSIS: Status: ACTIVE | Noted: 2022-08-04

## 2022-08-04 PROCEDURE — 99214 OFFICE O/P EST MOD 30 MIN: CPT | Performed by: NURSE PRACTITIONER

## 2022-08-04 RX ORDER — ASPIRIN 81 MG/1
81 TABLET, CHEWABLE ORAL DAILY
COMMUNITY
End: 2023-03-10 | Stop reason: SDUPTHER

## 2022-08-04 RX ORDER — FLUTICASONE PROPIONATE AND SALMETEROL 250; 50 UG/1; UG/1
1 POWDER RESPIRATORY (INHALATION)
Qty: 60 EACH | Refills: 3 | Status: SHIPPED | OUTPATIENT
Start: 2022-08-04
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
## 2022-08-06 LAB
ALBUMIN SERPL-MCNC: 4.3 G/DL (ref 3.7–4.7)
ALBUMIN/GLOB SERPL: 1.7 {RATIO} (ref 1.2–2.2)
ALP SERPL-CCNC: 115 IU/L (ref 44–121)
ALT SERPL-CCNC: 21 IU/L (ref 0–32)
APPEARANCE UR: CLEAR
AST SERPL-CCNC: 25 IU/L (ref 0–40)
BACTERIA #/AREA URNS HPF: ABNORMAL /[HPF]
BACTERIA UR CULT: ABNORMAL
BACTERIA UR CULT: ABNORMAL
BASOPHILS # BLD AUTO: 0.1 X10E3/UL (ref 0–0.2)
BASOPHILS NFR BLD AUTO: 1 %
BILIRUB SERPL-MCNC: 0.4 MG/DL (ref 0–1.2)
BILIRUB UR QL STRIP: NEGATIVE
BUN SERPL-MCNC: 17 MG/DL (ref 8–27)
BUN/CREAT SERPL: 21 (ref 12–28)
CALCIUM SERPL-MCNC: 10.2 MG/DL (ref 8.7–10.3)
CASTS URNS QL MICRO: ABNORMAL /LPF
CHLORIDE SERPL-SCNC: 105 MMOL/L (ref 96–106)
CHOLEST SERPL-MCNC: 144 MG/DL (ref 100–199)
CHOLEST/HDLC SERPL: 3.9 RATIO (ref 0–4.4)
CO2 SERPL-SCNC: 25 MMOL/L (ref 20–29)
COLOR UR: YELLOW
CREAT SERPL-MCNC: 0.8 MG/DL (ref 0.57–1)
CRYSTALS URNS MICRO: ABNORMAL
EGFRCR SERPLBLD CKD-EPI 2021: 75 ML/MIN/1.73
EOSINOPHIL # BLD AUTO: 0.1 X10E3/UL (ref 0–0.4)
EOSINOPHIL NFR BLD AUTO: 2 %
EPI CELLS #/AREA URNS HPF: ABNORMAL /HPF (ref 0–10)
ERYTHROCYTE [DISTWIDTH] IN BLOOD BY AUTOMATED COUNT: 11.8 % (ref 11.7–15.4)
GLOBULIN SER CALC-MCNC: 2.6 G/DL (ref 1.5–4.5)
GLUCOSE SERPL-MCNC: 94 MG/DL (ref 65–99)
GLUCOSE UR QL STRIP: NEGATIVE
HBA1C MFR BLD: 6.1 % (ref 4.8–5.6)
HCT VFR BLD AUTO: 40.1 % (ref 34–46.6)
HDLC SERPL-MCNC: 37 MG/DL
HGB BLD-MCNC: 13.5 G/DL (ref 11.1–15.9)
HGB UR QL STRIP: NEGATIVE
IMM GRANULOCYTES # BLD AUTO: 0 X10E3/UL (ref 0–0.1)
IMM GRANULOCYTES NFR BLD AUTO: 0 %
KETONES UR QL STRIP: NEGATIVE
LDLC SERPL CALC-MCNC: 71 MG/DL (ref 0–99)
LEUKOCYTE ESTERASE UR QL STRIP: ABNORMAL
LYMPHOCYTES # BLD AUTO: 2.7 X10E3/UL (ref 0.7–3.1)
LYMPHOCYTES NFR BLD AUTO: 38 %
MCH RBC QN AUTO: 31.9 PG (ref 26.6–33)
MCHC RBC AUTO-ENTMCNC: 33.7 G/DL (ref 31.5–35.7)
MCV RBC AUTO: 95 FL (ref 79–97)
MICRO URNS: ABNORMAL
MONOCYTES # BLD AUTO: 0.6 X10E3/UL (ref 0.1–0.9)
MONOCYTES NFR BLD AUTO: 8 %
MUCOUS THREADS URNS QL MICRO: PRESENT
NEUTROPHILS # BLD AUTO: 3.6 X10E3/UL (ref 1.4–7)
NEUTROPHILS NFR BLD AUTO: 51 %
NITRITE UR QL STRIP: NEGATIVE
PH UR STRIP: 5.5 [PH] (ref 5–7.5)
PLATELET # BLD AUTO: 240 X10E3/UL (ref 150–450)
POTASSIUM SERPL-SCNC: 4.2 MMOL/L (ref 3.5–5.2)
PROT SERPL-MCNC: 6.9 G/DL (ref 6–8.5)
PROT UR QL STRIP: NEGATIVE
PTH-INTACT SERPL-MCNC: 75 PG/ML (ref 15–65)
RBC # BLD AUTO: 4.23 X10E6/UL (ref 3.77–5.28)
RBC #/AREA URNS HPF: ABNORMAL /HPF (ref 0–2)
SODIUM SERPL-SCNC: 142 MMOL/L (ref 134–144)
SP GR UR STRIP: 1.02 (ref 1–1.03)
T4 FREE SERPL-MCNC: 1.6 NG/DL (ref 0.82–1.77)
TRIGL SERPL-MCNC: 218 MG/DL (ref 0–149)
TSH SERPL DL<=0.005 MIU/L-ACNC: 0.1 UIU/ML (ref 0.45–4.5)
UNIDENT CRYS URNS QL MICRO: PRESENT
URINALYSIS REFLEX: ABNORMAL
UROBILINOGEN UR STRIP-MCNC: 0.2 MG/DL (ref 0.2–1)
VLDLC SERPL CALC-MCNC: 36 MG/DL (ref 5–40)
WBC # BLD AUTO: 7 X10E3/UL (ref 3.4–10.8)
WBC #/AREA URNS HPF: ABNORMAL /HPF (ref 0–5)

## 2022-08-09 DIAGNOSIS — E83.52 HYPERCALCEMIA: Primary | ICD-10-CM

## 2022-08-09 DIAGNOSIS — E03.9 HYPOTHYROIDISM, UNSPECIFIED TYPE: ICD-10-CM

## 2022-08-09 DIAGNOSIS — R79.89 ELEVATED PTHRP LEVEL: ICD-10-CM

## 2022-08-09 RX ORDER — NITROFURANTOIN 25; 75 MG/1; MG/1
100 CAPSULE ORAL 2 TIMES DAILY
Qty: 10 CAPSULE | Refills: 0 | Status: SHIPPED | OUTPATIENT
Start: 2022-08-09 | End: 2022-08-14

## 2022-08-09 RX ORDER — LEVOTHYROXINE SODIUM 112 UG/1
112 TABLET ORAL DAILY
Qty: 30 TABLET | Refills: 5 | Status: SHIPPED | OUTPATIENT
Start: 2022-08-09 | End: 2022-12-13

## 2022-08-10 ENCOUNTER — TELEPHONE (OUTPATIENT)
Dept: INTERNAL MEDICINE | Age: 77
End: 2022-08-10

## 2022-08-10 NOTE — TELEPHONE ENCOUNTER
MALVINTVM INSTRUCTING PT TO RETURN CALL TO BE READ LAB RESULTS. LETTER SENT VIA [employer]/MAILED. MM

## 2022-08-10 NOTE — TELEPHONE ENCOUNTER
----- Message from JAYNE Banegas sent at 8/9/2022  6:21 PM EDT -----  Calcium is back to normal, however PTH is significantly elevated.  This is typically a marker in parathyroid.  I would like to refer you to endocrinology for further evaluation.  Thyroid is now showing you are on too much medication, I would like to decrease you back to levothyroxine 112 MCG daily.  We can recheck labs again at November appointment.   Blood sugar is in prediabetic range along with increased triglycerides.  I know you are very active at your job.  Please practice healthy diet low in simple sweets, carbohydrates and fats.  Incidental finding of urinary tract infection.  I am sending you in Macrobid to treat.

## 2022-08-12 ENCOUNTER — TELEPHONE (OUTPATIENT)
Dept: INTERNAL MEDICINE | Age: 77
End: 2022-08-12

## 2022-08-12 ENCOUNTER — HOSPITAL ENCOUNTER (OUTPATIENT)
Dept: CARDIOLOGY | Facility: HOSPITAL | Age: 77
Discharge: HOME | End: 2022-08-12
Admitting: NURSE PRACTITIONER

## 2022-08-12 DIAGNOSIS — R09.89 SWOLLEN VEIN: ICD-10-CM

## 2022-08-12 DIAGNOSIS — R60.0 BILATERAL LOWER EXTREMITY EDEMA: ICD-10-CM

## 2022-08-12 LAB
BH CV LOWER VASCULAR LEFT COMMON FEMORAL AUGMENT: NORMAL
BH CV LOWER VASCULAR LEFT COMMON FEMORAL COMPETENT: NORMAL
BH CV LOWER VASCULAR LEFT COMMON FEMORAL COMPRESS: NORMAL
BH CV LOWER VASCULAR LEFT COMMON FEMORAL PHASIC: NORMAL
BH CV LOWER VASCULAR LEFT COMMON FEMORAL SPONT: NORMAL
BH CV LOWER VASCULAR RIGHT COMMON FEMORAL AUGMENT: NORMAL
BH CV LOWER VASCULAR RIGHT COMMON FEMORAL COMPETENT: NORMAL
BH CV LOWER VASCULAR RIGHT COMMON FEMORAL COMPRESS: NORMAL
BH CV LOWER VASCULAR RIGHT COMMON FEMORAL PHASIC: NORMAL
BH CV LOWER VASCULAR RIGHT COMMON FEMORAL SPONT: NORMAL
BH CV LOWER VASCULAR RIGHT DISTAL FEMORAL COMPRESS: NORMAL
BH CV LOWER VASCULAR RIGHT GASTRONEMIUS COMPRESS: NORMAL
BH CV LOWER VASCULAR RIGHT GREATER SAPH AK COMPRESS: NORMAL
BH CV LOWER VASCULAR RIGHT GREATER SAPH BK COMPRESS: NORMAL
BH CV LOWER VASCULAR RIGHT LESSER SAPH COMPRESS: NORMAL
BH CV LOWER VASCULAR RIGHT MID FEMORAL AUGMENT: NORMAL
BH CV LOWER VASCULAR RIGHT MID FEMORAL COMPETENT: NORMAL
BH CV LOWER VASCULAR RIGHT MID FEMORAL COMPRESS: NORMAL
BH CV LOWER VASCULAR RIGHT MID FEMORAL PHASIC: NORMAL
BH CV LOWER VASCULAR RIGHT MID FEMORAL SPONT: NORMAL
BH CV LOWER VASCULAR RIGHT PERONEAL COMPRESS: NORMAL
BH CV LOWER VASCULAR RIGHT POPLITEAL AUGMENT: NORMAL
BH CV LOWER VASCULAR RIGHT POPLITEAL COMPETENT: NORMAL
BH CV LOWER VASCULAR RIGHT POPLITEAL COMPRESS: NORMAL
BH CV LOWER VASCULAR RIGHT POPLITEAL PHASIC: NORMAL
BH CV LOWER VASCULAR RIGHT POPLITEAL SPONT: NORMAL
BH CV LOWER VASCULAR RIGHT POSTERIOR TIBIAL COMPRESS: NORMAL
BH CV LOWER VASCULAR RIGHT PROFUNDA FEMORAL COMPRESS: NORMAL
BH CV LOWER VASCULAR RIGHT PROXIMAL FEMORAL COMPRESS: NORMAL
BH CV LOWER VASCULAR RIGHT SAPHENOFEMORAL JUNCTION COMPRESS: NORMAL
MAXIMAL PREDICTED HEART RATE: 141 BPM
STRESS TARGET HR: 120 BPM

## 2022-08-12 PROCEDURE — 93971 EXTREMITY STUDY: CPT

## 2022-08-12 NOTE — TELEPHONE ENCOUNTER
HUB MAY READ TO PT    ----- Message from JAYNE Banegas sent at 8/12/2022 10:33 AM EDT -----  Right lower extremity does not show any signs of superficial inflammation of the vein or blood clot.  Likely this is just a hematoma (bruising) that is taking time to resolve.

## 2022-08-16 ENCOUNTER — HOSPITAL ENCOUNTER (OUTPATIENT)
Dept: MAMMOGRAPHY | Facility: HOSPITAL | Age: 77
Discharge: HOME | End: 2022-08-16

## 2022-08-16 ENCOUNTER — HOSPITAL ENCOUNTER (OUTPATIENT)
Dept: ULTRASOUND IMAGING | Facility: HOSPITAL | Age: 77
Discharge: HOME | End: 2022-08-16

## 2022-08-16 ENCOUNTER — TELEPHONE (OUTPATIENT)
Dept: INTERNAL MEDICINE | Age: 77
End: 2022-08-16

## 2022-08-16 ENCOUNTER — HOSPITAL ENCOUNTER (OUTPATIENT)
Dept: CT IMAGING | Facility: HOSPITAL | Age: 77
Discharge: HOME | End: 2022-08-16

## 2022-08-16 DIAGNOSIS — R92.8 ABNORMAL MAMMOGRAM OF RIGHT BREAST: ICD-10-CM

## 2022-08-16 DIAGNOSIS — N63.10 MASS OF RIGHT BREAST, UNSPECIFIED QUADRANT: Primary | ICD-10-CM

## 2022-08-16 DIAGNOSIS — R59.9 SWOLLEN LYMPH NODES: ICD-10-CM

## 2022-08-16 LAB — CREAT BLDA-MCNC: 0.9 MG/DL (ref 0.6–1.3)

## 2022-08-16 PROCEDURE — 70491 CT SOFT TISSUE NECK W/DYE: CPT

## 2022-08-16 PROCEDURE — 25010000002 IOPAMIDOL 61 % SOLUTION: Performed by: NURSE PRACTITIONER

## 2022-08-16 PROCEDURE — 77065 DX MAMMO INCL CAD UNI: CPT

## 2022-08-16 PROCEDURE — 82565 ASSAY OF CREATININE: CPT

## 2022-08-16 PROCEDURE — G0279 TOMOSYNTHESIS, MAMMO: HCPCS

## 2022-08-16 PROCEDURE — 76642 ULTRASOUND BREAST LIMITED: CPT

## 2022-08-16 RX ADMIN — IOPAMIDOL 75 ML: 612 INJECTION, SOLUTION INTRAVENOUS at 12:37

## 2022-08-16 NOTE — TELEPHONE ENCOUNTER
Called pt to inform and it sounded like phone disconnected unless she hung up    OKAY FOR HUB TO READ!! DryncHART MESSAGE SENT TO PT AS WELL!    ----- Message from JAYNE Banegas sent at 8/16/2022  3:35 PM EDT -----  Mass in the breast is likely benign with findings of lipoma or fatty tissue.  Recommend 6-month ultrasound to confirm

## 2022-09-20 PROBLEM — M85.80 OSTEOPENIA: Status: ACTIVE | Noted: 2021-03-01

## 2022-09-20 PROBLEM — N18.30 CRI (CHRONIC RENAL INSUFFICIENCY), STAGE 3 (MODERATE) (HCC): Status: ACTIVE | Noted: 2022-09-20

## 2022-09-20 PROBLEM — E83.52 HYPERCALCEMIA: Status: ACTIVE | Noted: 2021-09-01

## 2022-10-14 DIAGNOSIS — R60.9 PERIPHERAL EDEMA: ICD-10-CM

## 2022-10-17 RX ORDER — FUROSEMIDE 20 MG/1
TABLET ORAL
Qty: 30 TABLET | Refills: 5 | Status: SHIPPED | OUTPATIENT
Start: 2022-10-17

## 2022-10-20 ENCOUNTER — OFFICE VISIT (OUTPATIENT)
Dept: INTERNAL MEDICINE | Age: 77
End: 2022-10-20

## 2022-10-20 VITALS
WEIGHT: 147 LBS | DIASTOLIC BLOOD PRESSURE: 70 MMHG | TEMPERATURE: 96.9 F | OXYGEN SATURATION: 98 % | HEART RATE: 66 BPM | BODY MASS INDEX: 24.49 KG/M2 | HEIGHT: 65 IN | SYSTOLIC BLOOD PRESSURE: 116 MMHG

## 2022-10-20 DIAGNOSIS — N30.90 RECURRENT CYSTITIS: ICD-10-CM

## 2022-10-20 DIAGNOSIS — N30.01 ACUTE CYSTITIS WITH HEMATURIA: Primary | ICD-10-CM

## 2022-10-20 LAB
BILIRUB BLD-MCNC: NEGATIVE MG/DL
CLARITY, POC: ABNORMAL
COLOR UR: ABNORMAL
EXPIRATION DATE: ABNORMAL
GLUCOSE UR STRIP-MCNC: NEGATIVE MG/DL
KETONES UR QL: NEGATIVE
LEUKOCYTE EST, POC: ABNORMAL
Lab: ABNORMAL
NITRITE UR-MCNC: POSITIVE MG/ML
PH UR: 5.5 [PH] (ref 5–8)
PROT UR STRIP-MCNC: ABNORMAL MG/DL
RBC # UR STRIP: ABNORMAL /UL
SP GR UR: 1.03 (ref 1–1.03)
UROBILINOGEN UR QL: ABNORMAL

## 2022-10-20 PROCEDURE — 99213 OFFICE O/P EST LOW 20 MIN: CPT

## 2022-10-20 PROCEDURE — 81003 URINALYSIS AUTO W/O SCOPE: CPT

## 2022-10-20 RX ORDER — CEPHALEXIN 500 MG/1
500 CAPSULE ORAL 2 TIMES DAILY
Qty: 14 CAPSULE | Refills: 0 | Status: SHIPPED | OUTPATIENT
Start: 2022-10-20 | End: 2022-10-27

## 2022-10-20 NOTE — PROGRESS NOTES
"    I N T E R N A L  M E D I C I N E  Syl Jean-BaptisteJAYNE    ENCOUNTER DATE:  10/20/2022    Marci C Kolb / 77 y.o. / female      CHIEF COMPLAINT / REASON FOR OFFICE VISIT     Urinary Tract Infection      ASSESSMENT & PLAN     Diagnoses and all orders for this visit:    1. Acute cystitis with hematuria (Primary)  Overview:  April 28, 2018    Orders:  -     POC Urinalysis Dipstick, Automated  -     Urinalysis With Microscopic If Indicated (No Culture) - Urine, Clean Catch  -     Urine Culture - Urine, Urine, Clean Catch  -     cephalexin (Keflex) 500 MG capsule; Take 1 capsule by mouth 2 (Two) Times a Day for 7 days.  Dispense: 14 capsule; Refill: 0    2. Recurrent cystitis  -     Ambulatory Referral to Urology       SUMMARY/DISCUSSION  • POC urine today with leukocytes, nitrites and blood.  Prior urine cultures have grown Beta hemolytic Streptococcus, group B.    • Pt educated on importance of hydration with water, and visiting ER for any fever, chills, abdominal or back pain, diminished urine production.  • Return for worsening or non improving symptoms.  • Recommended following up with urology for personal hx of recurrent cystitis.        Next Appointment with me: Visit date not found    No follow-ups on file.      VITAL SIGNS     Visit Vitals  /70   Pulse 66   Temp 96.9 °F (36.1 °C)   Ht 165.1 cm (65\")   Wt 66.7 kg (147 lb)   SpO2 98%   BMI 24.46 kg/m²             Wt Readings from Last 3 Encounters:   10/20/22 66.7 kg (147 lb)   08/04/22 66.9 kg (147 lb 6.4 oz)   06/30/22 66.7 kg (147 lb)     Body mass index is 24.46 kg/m².        MEDICATIONS AT THE TIME OF OFFICE VISIT     Current Outpatient Medications on File Prior to Visit   Medication Sig Dispense Refill   • albuterol sulfate  (90 Base) MCG/ACT inhaler Inhale 2 puffs Every 4 (Four) Hours As Needed for Wheezing. 6.7 g 2   • aspirin 81 MG chewable tablet Chew 81 mg Daily.     • Fluticasone-Salmeterol (ADVAIR) 250-50 MCG/ACT DISKUS Inhale 1 puff 2 " "(Two) Times a Day. Please rinse mouth with each use 60 each 3   • furosemide (LASIX) 20 MG tablet TAKE ONE TABLET BY MOUTH DAILY 30 tablet 5   • levothyroxine (Synthroid) 112 MCG tablet Take 1 tablet by mouth Daily. 30 tablet 5   • naproxen (EC NAPROSYN) 500 MG EC tablet Take 1 tablet by mouth 2 (Two) Times a Day As Needed for Mild Pain . 20 tablet 0   • simvastatin (ZOCOR) 40 MG tablet Take 1 tablet by mouth Every Night. 90 tablet 3     No current facility-administered medications on file prior to visit.        HISTORY OF PRESENT ILLNESS     She reports a sensation of a \"weak ache\" with urination, frequency, urgency x 1 week.  No hematuria, fever/ chills, back or abdominal pain.  She has had several recurrent cystitis infections in the last months.      Patient Care Team:  Supa Sparks APRN as PCP - General (Internal Medicine)  Abdias Nash MD (Orthopedic Surgery)  Jatinder Dominguez MD as Consulting Physician (Otolaryngology)  Romulo Kelly Jr., MD as Consulting Physician (Cardiology)    REVIEW OF SYSTEMS     Review of Systems   Constitutional: Negative for chills, fever and unexpected weight change.   Respiratory: Negative for cough, chest tightness and shortness of breath.    Cardiovascular: Negative for chest pain, palpitations and leg swelling.   Genitourinary: Positive for dysuria, frequency and urgency. Negative for decreased urine volume, difficulty urinating, flank pain and hematuria.   Neurological: Negative for dizziness, weakness, light-headedness and headaches.   Psychiatric/Behavioral: The patient is not nervous/anxious.           PHYSICAL EXAMINATION     Physical Exam  Vitals reviewed.   Constitutional:       General: She is not in acute distress.     Appearance: Normal appearance. She is not ill-appearing, toxic-appearing or diaphoretic.   HENT:      Head: Normocephalic and atraumatic.   Cardiovascular:      Rate and Rhythm: Normal rate and regular rhythm.      Heart sounds: Normal " heart sounds.   Pulmonary:      Effort: Pulmonary effort is normal.      Breath sounds: Normal breath sounds.   Abdominal:      Palpations: Abdomen is soft.      Tenderness: There is no abdominal tenderness. There is no right CVA tenderness or left CVA tenderness.   Neurological:      Mental Status: She is alert and oriented to person, place, and time. Mental status is at baseline.   Psychiatric:         Mood and Affect: Mood normal.         Behavior: Behavior normal.         Thought Content: Thought content normal.         Judgment: Judgment normal.           REVIEWED DATA     Labs:           Imaging:            Medical Tests:           Summary of old records / correspondence / consultant report:           Request outside records:

## 2022-10-23 LAB
APPEARANCE UR: ABNORMAL
BACTERIA #/AREA URNS HPF: ABNORMAL /HPF
BACTERIA UR CULT: ABNORMAL
BACTERIA UR CULT: ABNORMAL
BILIRUB UR QL STRIP: NEGATIVE
CASTS URNS MICRO: ABNORMAL
COLOR UR: YELLOW
EPI CELLS #/AREA URNS HPF: ABNORMAL /HPF
GLUCOSE UR QL STRIP: NEGATIVE
HGB UR QL STRIP: ABNORMAL
KETONES UR QL STRIP: NEGATIVE
LEUKOCYTE ESTERASE UR QL STRIP: ABNORMAL
NITRITE UR QL STRIP: POSITIVE
OTHER ANTIBIOTIC SUSC ISLT: ABNORMAL
PH UR STRIP: 5.5 [PH] (ref 5–8)
PROT UR QL STRIP: ABNORMAL
RBC #/AREA URNS HPF: ABNORMAL /HPF
SP GR UR STRIP: 1.02 (ref 1–1.03)
UROBILINOGEN UR STRIP-MCNC: ABNORMAL MG/DL
WBC #/AREA URNS HPF: ABNORMAL /HPF

## 2022-11-09 ENCOUNTER — OFFICE VISIT (OUTPATIENT)
Dept: INTERNAL MEDICINE | Age: 77
End: 2022-11-09

## 2022-11-09 ENCOUNTER — OFFICE VISIT (OUTPATIENT)
Dept: ENDOCRINOLOGY | Age: 77
End: 2022-11-09

## 2022-11-09 VITALS
BODY MASS INDEX: 24.62 KG/M2 | SYSTOLIC BLOOD PRESSURE: 120 MMHG | HEART RATE: 62 BPM | TEMPERATURE: 96.9 F | HEIGHT: 65 IN | OXYGEN SATURATION: 98 % | WEIGHT: 147.8 LBS | DIASTOLIC BLOOD PRESSURE: 72 MMHG

## 2022-11-09 VITALS
HEART RATE: 63 BPM | DIASTOLIC BLOOD PRESSURE: 72 MMHG | WEIGHT: 147 LBS | BODY MASS INDEX: 24.49 KG/M2 | TEMPERATURE: 98 F | RESPIRATION RATE: 16 BRPM | OXYGEN SATURATION: 96 % | HEIGHT: 65 IN | SYSTOLIC BLOOD PRESSURE: 118 MMHG

## 2022-11-09 DIAGNOSIS — K59.00 CONSTIPATION, UNSPECIFIED CONSTIPATION TYPE: ICD-10-CM

## 2022-11-09 DIAGNOSIS — E55.9 VITAMIN D DEFICIENCY: ICD-10-CM

## 2022-11-09 DIAGNOSIS — R73.03 PREDIABETES: ICD-10-CM

## 2022-11-09 DIAGNOSIS — E21.3 HYPERPARATHYROIDISM, UNSPECIFIED: ICD-10-CM

## 2022-11-09 DIAGNOSIS — E78.5 HYPERLIPIDEMIA, UNSPECIFIED HYPERLIPIDEMIA TYPE: ICD-10-CM

## 2022-11-09 DIAGNOSIS — E03.9 HYPOTHYROIDISM, UNSPECIFIED TYPE: ICD-10-CM

## 2022-11-09 DIAGNOSIS — E21.3 HYPERPARATHYROIDISM: ICD-10-CM

## 2022-11-09 DIAGNOSIS — N39.0 RECURRENT UTI: Primary | ICD-10-CM

## 2022-11-09 DIAGNOSIS — N18.30 CRI (CHRONIC RENAL INSUFFICIENCY), STAGE 3 (MODERATE): ICD-10-CM

## 2022-11-09 DIAGNOSIS — I65.23 BILATERAL CAROTID ARTERY STENOSIS: ICD-10-CM

## 2022-11-09 DIAGNOSIS — E83.52 HYPERCALCEMIA: Primary | ICD-10-CM

## 2022-11-09 PROBLEM — E78.00 HYPERCHOLESTEROLEMIA: Status: ACTIVE | Noted: 2022-11-09

## 2022-11-09 PROBLEM — G47.9 SLEEP DISTURBANCE: Status: ACTIVE | Noted: 2022-11-09

## 2022-11-09 PROBLEM — Z90.710 STATUS POST HYSTERECTOMY: Status: RESOLVED | Noted: 2019-08-06 | Resolved: 2022-11-09

## 2022-11-09 PROBLEM — R73.9 HYPERGLYCEMIA: Status: RESOLVED | Noted: 2017-07-25 | Resolved: 2022-11-09

## 2022-11-09 PROBLEM — Z96.642 HISTORY OF TOTAL LEFT HIP REPLACEMENT: Status: RESOLVED | Noted: 2022-08-04 | Resolved: 2022-11-09
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
## 2022-11-09 PROBLEM — R73.09 IMPAIRED GLUCOSE METABOLISM: Status: ACTIVE | Noted: 2022-11-09

## 2022-11-09 LAB
BILIRUB BLD-MCNC: NEGATIVE MG/DL
CLARITY, POC: CLEAR
COLOR UR: ABNORMAL
EXPIRATION DATE: ABNORMAL
GLUCOSE UR STRIP-MCNC: NEGATIVE MG/DL
KETONES UR QL: NEGATIVE
LEUKOCYTE EST, POC: ABNORMAL
Lab: ABNORMAL
NITRITE UR-MCNC: POSITIVE MG/ML
PH UR: 6 [PH] (ref 5–8)
PROT UR STRIP-MCNC: NEGATIVE MG/DL
RBC # UR STRIP: ABNORMAL /UL
SP GR UR: 1.03 (ref 1–1.03)
UROBILINOGEN UR QL: ABNORMAL

## 2022-11-09 PROCEDURE — 99214 OFFICE O/P EST MOD 30 MIN: CPT | Performed by: NURSE PRACTITIONER

## 2022-11-09 PROCEDURE — 81003 URINALYSIS AUTO W/O SCOPE: CPT | Performed by: NURSE PRACTITIONER

## 2022-11-09 PROCEDURE — 99204 OFFICE O/P NEW MOD 45 MIN: CPT | Performed by: INTERNAL MEDICINE

## 2022-11-09 RX ORDER — THIAMINE HCL 100 MG
1 TABLET ORAL DAILY
COMMUNITY
End: 2022-11-09 | Stop reason: SDUPTHER

## 2022-11-09 RX ORDER — CHLORAL HYDRATE 500 MG
1000 CAPSULE ORAL
COMMUNITY

## 2022-11-09 RX ORDER — SULFAMETHOXAZOLE AND TRIMETHOPRIM 800; 160 MG/1; MG/1
1 TABLET ORAL 2 TIMES DAILY
Qty: 10 TABLET | Refills: 0
Start: 2022-11-09 | End: 2022-11-14

## 2022-11-09 RX ORDER — CHOLECALCIFEROL (VITAMIN D3) 50 MCG
2000 TABLET ORAL DAILY
Qty: 90 TABLET | Refills: 0
Start: 2022-11-09 | End: 2023-03-10 | Stop reason: SDUPTHER

## 2022-11-09 RX ORDER — SULFAMETHOXAZOLE AND TRIMETHOPRIM 800; 160 MG/1; MG/1
1 TABLET ORAL 2 TIMES DAILY
Qty: 10 TABLET | Refills: 0 | Status: SHIPPED | OUTPATIENT
Start: 2022-11-09 | End: 2022-11-09

## 2022-11-09 NOTE — ASSESSMENT & PLAN NOTE
The patient was recommended to do Benefiber or Metamucil powder in her water daily. Along with over-the-counter Colace to take daily. If her symptoms do not improve, a referral for colorectal specialist will be placed.

## 2022-11-09 NOTE — PATIENT INSTRUCTIONS
As discussed the abnormal calcium is not new has been noted since early 2021.  The level has been normal at times and elevated at times.  As surgery is primary treatment for possible parathyroid problem, first need to establish if that is needed or if the calcium can just be monitored.  Testing you are about to do will sort this out.  From there focus to maintain normal hydration status, avoid use of HCTZ type medication, and maintain Vit D status over 40 ng/mL.     As discussed related to your work schedule you will do the 24 hour urine from Sunday morning around 7-8 AM to Monday morning around 7-8 AM.  Also will use the hospital lab for better timing related to your work schedule.    As thyroid replacement is part of this as well, want to ensure the thyroid replacement is optimal as found that had not been the case earlier this year.  Focus on taking medication as directed.  If a dose change is needed will address after have labs.

## 2022-11-09 NOTE — PROGRESS NOTES
I N T E R N A L  M E D I C I N E  JAYNE WOLFF      ENCOUNTER DATE:  11/09/2022    Marci C Kolb / 77 y.o. / female      CHIEF COMPLAINT / REASON FOR OFFICE VISIT     Hyperlipidemia, Hypothyroidism, Constipation, Urinary Tract Infection, and Vitamin D Deficiency      ASSESSMENT & PLAN     Problem List Items Addressed This Visit        Cardiac and Vasculature    Hyperlipidemia    Relevant Medications    simvastatin (ZOCOR) 40 MG tablet    Other Relevant Orders    Lipid Panel With / Chol / HDL Ratio    Bilateral carotid artery stenosis    Overview     Mild, first noted 05/2022, on statin and baby aspirin         Relevant Orders    CBC w AUTO Differential       Endocrine and Metabolic    Hypothyroidism    Overview     Acquired hypothyroidism.          Relevant Medications    levothyroxine (Synthroid) 112 MCG tablet    Other Relevant Orders    TSH+Free T4    CBC w AUTO Differential    Hyperparathyroidism (HCC)    Relevant Orders    Comprehensive metabolic panel       Gastrointestinal Abdominal     Constipation    Current Assessment & Plan     The patient was recommended to do Benefiber or Metamucil powder in her water daily. Along with over-the-counter Colace to take daily. If her symptoms do not improve, a referral for colorectal specialist will be placed.             Genitourinary and Reproductive     Recurrent UTI - Primary    Current Assessment & Plan     Patient was provided phone number for first urology to follow-up for recurrent UTI         Relevant Medications    sulfamethoxazole-trimethoprim (BACTRIM DS,SEPTRA DS) 800-160 MG per tablet    Other Relevant Orders    Urinalysis With Culture If Indicated - Urine, Clean Catch   Other Visit Diagnoses     Prediabetes        Relevant Orders    Hemoglobin A1c    CBC w AUTO Differential    Vitamin D deficiency         Labs are obtained today.     Relevant Orders    Vitamin D,25-Hydroxy        Orders Placed This Encounter   Procedures   • Urinalysis With Culture  "If Indicated - Urine, Clean Catch   • Hemoglobin A1c   • Lipid Panel With / Chol / HDL Ratio   • TSH+Free T4   • Comprehensive metabolic panel   • Vitamin D,25-Hydroxy   • POCT urinalysis dipstick, automated   • CBC w AUTO Differential     New Medications Ordered This Visit   Medications   • sulfamethoxazole-trimethoprim (BACTRIM DS,SEPTRA DS) 800-160 MG per tablet     Sig: Take 1 tablet by mouth 2 (Two) Times a Day for 5 days.     Dispense:  10 tablet     Refill:  0       SUMMARY/DISCUSSION  • Follow-up in 4 months for chronic medical, earlier if needed    Next Appointment with me: Visit date not found    Return in about 4 months (around 3/9/2023) for Next scheduled follow up.      VITAL SIGNS     Visit Vitals  /72 (Cuff Size: Adult)   Pulse 62   Temp 96.9 °F (36.1 °C) (Temporal)   Ht 165.1 cm (65\")   Wt 67 kg (147 lb 12.8 oz)   SpO2 98%   BMI 24.60 kg/m²     Wt Readings from Last 3 Encounters:   11/09/22 67 kg (147 lb 12.8 oz)   10/20/22 66.7 kg (147 lb)   08/04/22 66.9 kg (147 lb 6.4 oz)     Body mass index is 24.6 kg/m².      MEDICATIONS AT THE TIME OF OFFICE VISIT     Current Outpatient Medications on File Prior to Visit   Medication Sig   • albuterol sulfate  (90 Base) MCG/ACT inhaler Inhale 2 puffs Every 4 (Four) Hours As Needed for Wheezing.   • aspirin 81 MG chewable tablet Chew 81 mg Daily.   • Fluticasone-Salmeterol (ADVAIR) 250-50 MCG/ACT DISKUS Inhale 1 puff 2 (Two) Times a Day. Please rinse mouth with each use   • furosemide (LASIX) 20 MG tablet TAKE ONE TABLET BY MOUTH DAILY   • levothyroxine (Synthroid) 112 MCG tablet Take 1 tablet by mouth Daily.   • naproxen (EC NAPROSYN) 500 MG EC tablet Take 1 tablet by mouth 2 (Two) Times a Day As Needed for Mild Pain .   • simvastatin (ZOCOR) 40 MG tablet Take 1 tablet by mouth Every Night.     No current facility-administered medications on file prior to visit.          HISTORY OF PRESENT ILLNESS     The patient presents to the clinic today for " "chronic medical follow-up of hyperlipidemia, hypothyroidism, along with recurrent urinary tract infections.     Hyperlipidemia:  Her cholesterol is well controlled. She currently is taking simvastatin 40 mg.     Hypothyroidism:  The patient's parathyroid level was recently checked due to elevated calcium. She is scheduled to see Dr. Peterson on 11/09/2022 at 2:00 PM for further evaluation. She reports the history of kidney stones. The patient currently takes levothyroxine 112 mcg.     Recurrent urinary tract infections:  The patient reports hematuria. She was told that she has E. coli and was prescribed Keflex on 10/20/2022 by Syl Jean-Baptiste. She states that she wipes back to front after urination. She states she has to hold her bladder at work frequently. She states that when the phone rings it cannot ring more than 3 times if you are sitting there in front of it. She has a hard time holding it in like she used to. She has had several accidents before she can even get to the restroom in time. She states that she will have to change her clothes. She had not seen a urologist yet, when they called her, she was at work.    Constipation:  She reports that she has constipation.  On occasion, she will be able to have a regular bowel movement and then she will wake up and go to the bathroom and will notice \"quite a bit\" of stool on her underwear. She states that it is more liquid seepage. She has a bowel movement daily and on occasion she describes her stools to look like \"rabbit mitchell.\"     Vitamin D deficiency:  She currently takes a vitamin D supplement. She is unsure of how much she takes daily.     REVIEW OF SYSTEMS     Constitutional neg except per HPI   Resp neg  CV neg   urinary frequency, dysuria    PHYSICAL EXAMINATION     Physical Exam  Constitutional  No distress.  Afebrile  Cardiovascular Rate  normal . Rhythm: regular . Heart sounds:  normal  Pulmonary/Chest  Effort normal. Breath sounds:  normal  Musc " negative CVA tenderness  Psychiatric  Alert. Judgment and thought content normal. Mood normal     REVIEWED DATA     Labs:   Lab Results   Component Value Date    GLUCOSE 94 08/04/2022    BUN 17 08/04/2022    CREATININE 0.90 08/16/2022    EGFRIFNONA 57 (L) 01/03/2022    EGFRIFAFRI 66 01/03/2022    BCR 21 08/04/2022    K 4.2 08/04/2022    CO2 25 08/04/2022    CALCIUM 10.2 08/04/2022    PROTENTOTREF 6.9 08/04/2022    ALBUMIN 4.3 08/04/2022    LABIL2 1.7 08/04/2022    AST 25 08/04/2022    ALT 21 08/04/2022     Lab Results   Component Value Date    HGBA1C 6.1 (H) 08/04/2022     Lab Results   Component Value Date    CHLPL 144 08/04/2022    TRIG 218 (H) 08/04/2022    HDL 37 (L) 08/04/2022    LDL 71 08/04/2022     Brief Urine Lab Results  (Last result in the past 365 days)      Color   Clarity   Blood   Leuk Est   Nitrite   Protein   CREAT   Urine HCG        11/09/22 0916 Clemencia   Clear   Moderate   Small (1+)   Positive   Negative               Lab Results   Component Value Date    TSH 0.095 (L) 08/04/2022     Imaging:           Medical Tests:           Summary of old records / correspondence / consultant report:           Request outside records:             *Examiner was wearing medical surgical mask, face shield and exam gloves during the entire duration of the visit. Patient was masked the entire time.     Transcribed from ambient dictation for JAYNE Banegas by Kelsey Cook.  11/09/22   13:19 EST    Patient or patient representative verbalized consent to the visit recording.  I have personally performed the services described in this document as transcribed by the above individual, and it is both accurate and complete.

## 2022-11-13 LAB
25(OH)D3+25(OH)D2 SERPL-MCNC: 51.5 NG/ML (ref 30–100)
ALBUMIN SERPL-MCNC: 4.4 G/DL (ref 3.5–5.2)
ALBUMIN/GLOB SERPL: 1.7 G/DL
ALP SERPL-CCNC: 120 U/L (ref 39–117)
ALT SERPL-CCNC: 18 U/L (ref 1–33)
APPEARANCE UR: ABNORMAL
AST SERPL-CCNC: 25 U/L (ref 1–32)
BACTERIA #/AREA URNS HPF: ABNORMAL /HPF
BACTERIA UR CULT: ABNORMAL
BACTERIA UR CULT: ABNORMAL
BASOPHILS # BLD AUTO: 0.1 10*3/MM3 (ref 0–0.2)
BASOPHILS NFR BLD AUTO: 1.4 % (ref 0–1.5)
BILIRUB SERPL-MCNC: 0.4 MG/DL (ref 0–1.2)
BILIRUB UR QL STRIP: NEGATIVE
BUN SERPL-MCNC: 14 MG/DL (ref 8–23)
BUN/CREAT SERPL: 14.9 (ref 7–25)
CALCIUM SERPL-MCNC: 10.6 MG/DL (ref 8.6–10.5)
CASTS URNS QL MICRO: ABNORMAL /LPF
CHLORIDE SERPL-SCNC: 107 MMOL/L (ref 98–107)
CHOLEST SERPL-MCNC: 153 MG/DL (ref 0–200)
CHOLEST/HDLC SERPL: 3.06 {RATIO}
CO2 SERPL-SCNC: 30.6 MMOL/L (ref 22–29)
COLOR UR: YELLOW
CREAT SERPL-MCNC: 0.94 MG/DL (ref 0.57–1)
CRYSTALS URNS MICRO: ABNORMAL
EGFRCR SERPLBLD CKD-EPI 2021: 61.9 ML/MIN/1.73
EOSINOPHIL # BLD AUTO: 0.16 10*3/MM3 (ref 0–0.4)
EOSINOPHIL NFR BLD AUTO: 2.3 % (ref 0.3–6.2)
EPI CELLS #/AREA URNS HPF: ABNORMAL /HPF (ref 0–10)
ERYTHROCYTE [DISTWIDTH] IN BLOOD BY AUTOMATED COUNT: 13.1 % (ref 12.3–15.4)
GLOBULIN SER CALC-MCNC: 2.6 GM/DL
GLUCOSE SERPL-MCNC: 97 MG/DL (ref 65–99)
GLUCOSE UR QL STRIP: NEGATIVE
HBA1C MFR BLD: 6.1 % (ref 4.8–5.6)
HCT VFR BLD AUTO: 41.4 % (ref 34–46.6)
HDLC SERPL-MCNC: 50 MG/DL (ref 40–60)
HGB BLD-MCNC: 13.9 G/DL (ref 12–15.9)
HGB UR QL STRIP: ABNORMAL
IMM GRANULOCYTES # BLD AUTO: 0.01 10*3/MM3 (ref 0–0.05)
IMM GRANULOCYTES NFR BLD AUTO: 0.1 % (ref 0–0.5)
KETONES UR QL STRIP: NEGATIVE
LDLC SERPL CALC-MCNC: 82 MG/DL (ref 0–100)
LEUKOCYTE ESTERASE UR QL STRIP: ABNORMAL
LYMPHOCYTES # BLD AUTO: 2.75 10*3/MM3 (ref 0.7–3.1)
LYMPHOCYTES NFR BLD AUTO: 38.9 % (ref 19.6–45.3)
MCH RBC QN AUTO: 32.3 PG (ref 26.6–33)
MCHC RBC AUTO-ENTMCNC: 33.6 G/DL (ref 31.5–35.7)
MCV RBC AUTO: 96.3 FL (ref 79–97)
MICRO URNS: ABNORMAL
MONOCYTES # BLD AUTO: 0.47 10*3/MM3 (ref 0.1–0.9)
MONOCYTES NFR BLD AUTO: 6.6 % (ref 5–12)
MUCOUS THREADS URNS QL MICRO: PRESENT /HPF
NEUTROPHILS # BLD AUTO: 3.58 10*3/MM3 (ref 1.7–7)
NEUTROPHILS NFR BLD AUTO: 50.7 % (ref 42.7–76)
NITRITE UR QL STRIP: POSITIVE
NRBC BLD AUTO-RTO: 0.1 /100 WBC (ref 0–0.2)
OTHER ANTIBIOTIC SUSC ISLT: ABNORMAL
PH UR STRIP: 5.5 [PH] (ref 5–7.5)
PLATELET # BLD AUTO: 236 10*3/MM3 (ref 140–450)
POTASSIUM SERPL-SCNC: 4.3 MMOL/L (ref 3.5–5.2)
PROT SERPL-MCNC: 7 G/DL (ref 6–8.5)
PROT UR QL STRIP: ABNORMAL
RBC # BLD AUTO: 4.3 10*6/MM3 (ref 3.77–5.28)
RBC #/AREA URNS HPF: ABNORMAL /HPF (ref 0–2)
SODIUM SERPL-SCNC: 144 MMOL/L (ref 136–145)
SP GR UR STRIP: 1.02 (ref 1–1.03)
T4 FREE SERPL-MCNC: 1.06 NG/DL (ref 0.93–1.7)
TRIGL SERPL-MCNC: 118 MG/DL (ref 0–150)
TSH SERPL DL<=0.005 MIU/L-ACNC: 2.83 UIU/ML (ref 0.27–4.2)
UNIDENT CRYS URNS QL MICRO: PRESENT /LPF
URINALYSIS REFLEX: ABNORMAL
UROBILINOGEN UR STRIP-MCNC: 0.2 MG/DL (ref 0.2–1)
VLDLC SERPL CALC-MCNC: 21 MG/DL (ref 5–40)
WBC # BLD AUTO: 7.07 10*3/MM3 (ref 3.4–10.8)
WBC #/AREA URNS HPF: >30 /HPF (ref 0–5)

## 2022-11-14 ENCOUNTER — LAB (OUTPATIENT)
Dept: LAB | Facility: HOSPITAL | Age: 77
End: 2022-11-14

## 2022-11-14 DIAGNOSIS — N18.30 CRI (CHRONIC RENAL INSUFFICIENCY), STAGE 3 (MODERATE): ICD-10-CM

## 2022-11-14 DIAGNOSIS — E83.52 HYPERCALCEMIA: ICD-10-CM

## 2022-11-14 DIAGNOSIS — E03.9 HYPOTHYROIDISM, UNSPECIFIED TYPE: ICD-10-CM

## 2022-11-14 DIAGNOSIS — E21.3 HYPERPARATHYROIDISM, UNSPECIFIED: ICD-10-CM

## 2022-11-14 LAB
ALBUMIN SERPL-MCNC: 4.2 G/DL (ref 3.5–5.2)
ANION GAP SERPL CALCULATED.3IONS-SCNC: 8.8 MMOL/L (ref 5–15)
BUN SERPL-MCNC: 12 MG/DL (ref 8–23)
BUN/CREAT SERPL: 11.9 (ref 7–25)
CALCIUM 24H UR-MCNC: 13.7 MG/DL
CALCIUM 24H UR-MRATE: 219.2 MG/24 HR (ref 100–300)
CALCIUM SPEC-SCNC: 10.3 MG/DL (ref 8.6–10.5)
CALCIUM SPEC-SCNC: 10.5 MG/DL (ref 8.6–10.5)
CHLORIDE SERPL-SCNC: 104 MMOL/L (ref 98–107)
CO2 SERPL-SCNC: 25.2 MMOL/L (ref 22–29)
COLLECT DURATION TIME UR: 24 HRS
COLLECT DURATION TIME UR: 24 HRS
CREAT CL 24H UR+SERPL-VRATE: 43.9 ML/MIN (ref 88–128)
CREAT CL 24H UR+SERPL-VRATE: 63.2 L/24 HR (ref 126.7–184.3)
CREAT SERPL-MCNC: 1.01 MG/DL (ref 0.57–1)
CREAT SERPL-MCNC: 1.01 MG/DL (ref 0.57–1)
CREAT UR-MCNC: 40.1 MG/DL
CREATINE 24H UR-MRATE: 0.64 G/24 HR (ref 0.7–1.6)
EGFRCR SERPLBLD CKD-EPI 2021: 56.7 ML/MIN/1.73
EGFRCR SERPLBLD CKD-EPI 2021: 56.7 ML/MIN/1.73
GLUCOSE SERPL-MCNC: 107 MG/DL (ref 65–99)
MAGNESIUM SERPL-MCNC: 2.3 MG/DL (ref 1.6–2.4)
PHOSPHATE SERPL-MCNC: 2.7 MG/DL (ref 2.5–4.5)
POTASSIUM SERPL-SCNC: 4.6 MMOL/L (ref 3.5–5.2)
PTH-INTACT SERPL-MCNC: 107 PG/ML (ref 15–65)
SODIUM SERPL-SCNC: 138 MMOL/L (ref 136–145)
SPECIMEN VOL 24H UR: 1600 ML
SPECIMEN VOL 24H UR: 1600 ML
T3FREE SERPL-MCNC: 2.92 PG/ML (ref 2–4.4)
T4 FREE SERPL-MCNC: 1.22 NG/DL (ref 0.93–1.7)
TSH SERPL DL<=0.05 MIU/L-ACNC: 1.88 UIU/ML (ref 0.27–4.2)

## 2022-11-14 PROCEDURE — 84080 ASSAY ALKALINE PHOSPHATASES: CPT

## 2022-11-14 PROCEDURE — 82565 ASSAY OF CREATININE: CPT

## 2022-11-14 PROCEDURE — 84443 ASSAY THYROID STIM HORMONE: CPT

## 2022-11-14 PROCEDURE — 82340 ASSAY OF CALCIUM IN URINE: CPT

## 2022-11-14 PROCEDURE — 82310 ASSAY OF CALCIUM: CPT

## 2022-11-14 PROCEDURE — 84481 FREE ASSAY (FT-3): CPT

## 2022-11-14 PROCEDURE — 82570 ASSAY OF URINE CREATININE: CPT

## 2022-11-14 PROCEDURE — 80069 RENAL FUNCTION PANEL: CPT

## 2022-11-14 PROCEDURE — 83970 ASSAY OF PARATHORMONE: CPT

## 2022-11-14 PROCEDURE — 86376 MICROSOMAL ANTIBODY EACH: CPT

## 2022-11-14 PROCEDURE — 86800 THYROGLOBULIN ANTIBODY: CPT

## 2022-11-14 PROCEDURE — 84439 ASSAY OF FREE THYROXINE: CPT

## 2022-11-14 PROCEDURE — 84075 ASSAY ALKALINE PHOSPHATASE: CPT

## 2022-11-14 PROCEDURE — 82575 CREATININE CLEARANCE TEST: CPT

## 2022-11-14 PROCEDURE — 83735 ASSAY OF MAGNESIUM: CPT

## 2022-11-14 PROCEDURE — 82652 VIT D 1 25-DIHYDROXY: CPT

## 2022-11-14 PROCEDURE — 36415 COLL VENOUS BLD VENIPUNCTURE: CPT

## 2022-11-14 PROCEDURE — 82523 COLLAGEN CROSSLINKS: CPT

## 2022-11-15 LAB
1,25(OH)2D SERPL-MCNC: 58.6 PG/ML (ref 24.8–81.5)
COLLAGEN NTX UR-SCNC: 126 NMOL BCE
COLLAGEN NTX/CREAT UR-SRTO: 33 NM BCE/MM CR (ref 0–89)
CREAT UR-MCNC: 42.9 MG/DL
INTERPRETIVE GUIDE:: NORMAL
THYROPEROXIDASE AB SERPL-ACNC: 64 IU/ML (ref 0–34)

## 2022-11-16 LAB
ALP BONE CFR SERPL: 60 % (ref 14–68)
ALP INTEST CFR SERPL: 9 % (ref 0–18)
ALP LIVER CFR SERPL: 31 % (ref 18–85)
ALP SERPL-CCNC: 123 IU/L (ref 44–121)

## 2022-11-18 LAB — THYROGLOB AB SERPL-ACNC: 195 IU/ML

## 2022-12-13 RX ORDER — LEVOTHYROXINE SODIUM 112 UG/1
112 TABLET ORAL DAILY
Qty: 90 TABLET | Refills: 1 | Status: SHIPPED | OUTPATIENT
Start: 2022-12-13 | End: 2023-03-23

## 2023-02-16 ENCOUNTER — TELEPHONE (OUTPATIENT)
Dept: INTERNAL MEDICINE | Age: 78
End: 2023-02-16
Payer: COMMERCIAL

## 2023-02-16 DIAGNOSIS — N63.14 BREAST LUMP ON RIGHT SIDE AT 4 O'CLOCK POSITION: Primary | ICD-10-CM

## 2023-02-17 ENCOUNTER — HOSPITAL ENCOUNTER (OUTPATIENT)
Dept: ULTRASOUND IMAGING | Facility: HOSPITAL | Age: 78
Discharge: HOME | End: 2023-02-17
Admitting: NURSE PRACTITIONER
Payer: MEDICARE

## 2023-02-17 DIAGNOSIS — N63.10 MASS OF RIGHT BREAST, UNSPECIFIED QUADRANT: ICD-10-CM

## 2023-02-17 PROCEDURE — 76642 ULTRASOUND BREAST LIMITED: CPT

## 2023-02-19 DIAGNOSIS — Z12.31 ENCOUNTER FOR SCREENING MAMMOGRAM FOR MALIGNANT NEOPLASM OF BREAST: Primary | ICD-10-CM

## 2023-03-10 ENCOUNTER — OFFICE VISIT (OUTPATIENT)
Dept: INTERNAL MEDICINE | Age: 78
End: 2023-03-10
Payer: MEDICARE

## 2023-03-10 VITALS
HEIGHT: 65 IN | SYSTOLIC BLOOD PRESSURE: 102 MMHG | OXYGEN SATURATION: 95 % | HEART RATE: 63 BPM | BODY MASS INDEX: 24.99 KG/M2 | WEIGHT: 150 LBS | TEMPERATURE: 96.9 F | DIASTOLIC BLOOD PRESSURE: 60 MMHG

## 2023-03-10 DIAGNOSIS — R73.01 IMPAIRED FASTING GLUCOSE: ICD-10-CM

## 2023-03-10 DIAGNOSIS — E83.52 HYPERCALCEMIA: ICD-10-CM

## 2023-03-10 DIAGNOSIS — E78.2 MIXED HYPERLIPIDEMIA: ICD-10-CM

## 2023-03-10 DIAGNOSIS — I65.23 BILATERAL CAROTID ARTERY STENOSIS: Primary | ICD-10-CM

## 2023-03-10 DIAGNOSIS — E55.9 VITAMIN D DEFICIENCY: ICD-10-CM

## 2023-03-10 DIAGNOSIS — E78.5 HYPERLIPIDEMIA, UNSPECIFIED HYPERLIPIDEMIA TYPE: ICD-10-CM

## 2023-03-10 DIAGNOSIS — E03.9 HYPOTHYROIDISM, UNSPECIFIED TYPE: ICD-10-CM

## 2023-03-10 LAB
25(OH)D3+25(OH)D2 SERPL-MCNC: 39.1 NG/ML (ref 30–100)
ALBUMIN SERPL-MCNC: 4.3 G/DL (ref 3.5–5.2)
ALBUMIN/GLOB SERPL: 1.7 G/DL
ALP SERPL-CCNC: 107 U/L (ref 39–117)
ALT SERPL-CCNC: 27 U/L (ref 1–33)
AST SERPL-CCNC: 29 U/L (ref 1–32)
BILIRUB SERPL-MCNC: 0.3 MG/DL (ref 0–1.2)
BUN SERPL-MCNC: 18 MG/DL (ref 8–23)
BUN/CREAT SERPL: 22 (ref 7–25)
CALCIUM SERPL-MCNC: 10.5 MG/DL (ref 8.6–10.5)
CHLORIDE SERPL-SCNC: 107 MMOL/L (ref 98–107)
CHOLEST SERPL-MCNC: 134 MG/DL (ref 0–200)
CHOLEST/HDLC SERPL: 3.44 {RATIO}
CO2 SERPL-SCNC: 27.8 MMOL/L (ref 22–29)
CREAT SERPL-MCNC: 0.82 MG/DL (ref 0.57–1)
EGFRCR SERPLBLD CKD-EPI 2021: 72.4 ML/MIN/1.73
GLOBULIN SER CALC-MCNC: 2.6 GM/DL
GLUCOSE SERPL-MCNC: 117 MG/DL (ref 65–99)
HBA1C MFR BLD: 6 % (ref 4.8–5.6)
HDLC SERPL-MCNC: 39 MG/DL (ref 40–60)
LDLC SERPL CALC-MCNC: 63 MG/DL (ref 0–100)
POTASSIUM SERPL-SCNC: 4.3 MMOL/L (ref 3.5–5.2)
PROT SERPL-MCNC: 6.9 G/DL (ref 6–8.5)
SODIUM SERPL-SCNC: 143 MMOL/L (ref 136–145)
T4 FREE SERPL-MCNC: 1.28 NG/DL (ref 0.93–1.7)
TRIGL SERPL-MCNC: 195 MG/DL (ref 0–150)
TSH SERPL DL<=0.005 MIU/L-ACNC: 0.87 UIU/ML (ref 0.27–4.2)
VLDLC SERPL CALC-MCNC: 32 MG/DL (ref 5–40)

## 2023-03-10 PROCEDURE — 99214 OFFICE O/P EST MOD 30 MIN: CPT | Performed by: NURSE PRACTITIONER

## 2023-03-10 RX ORDER — SIMVASTATIN 40 MG
40 TABLET ORAL NIGHTLY
Qty: 90 TABLET | Refills: 3
Start: 2023-03-10 | End: 2023-03-27 | Stop reason: SDUPTHER

## 2023-03-10 RX ORDER — CHOLECALCIFEROL (VITAMIN D3) 50 MCG
2000 TABLET ORAL DAILY
Qty: 90 TABLET | Refills: 0
Start: 2023-03-10

## 2023-03-10 RX ORDER — ASPIRIN 81 MG/1
81 TABLET, CHEWABLE ORAL DAILY
Start: 2023-03-10

## 2023-03-10 RX ORDER — METHENAMINE, SODIUM PHOSPHATE, MONOBASIC, MONOHYDRATE, PHENYL SALICYLATE, METHYLENE BLUE, AND HYOSCYAMINE SULFATE 120; 40.8; 36; 10; .12 MG/1; MG/1; MG/1; MG/1; MG/1
CAPSULE ORAL AS NEEDED
COMMUNITY
Start: 2022-12-14 | End: 2023-03-10

## 2023-03-10 NOTE — PROGRESS NOTES
I N T E R N A L  M E D I C I N E  JAYNE WOLFF      ENCOUNTER DATE:  03/10/2023    Marci FRANCISCO Kolb / 78 y.o. / female      CHIEF COMPLAINT / REASON FOR OFFICE VISIT     Hyperlipidemia, Hypothyroidism, and Vitamin D Deficiency      ASSESSMENT & PLAN     Problem List Items Addressed This Visit        Cardiac and Vasculature    Bilateral carotid artery stenosis - Primary    Overview     Mild, first noted 05/2022, on statin and baby aspirin         Relevant Medications    aspirin 81 MG chewable tablet       Endocrine and Metabolic    Hypothyroidism    Overview     Acquired hypothyroidism.          Relevant Medications    levothyroxine (SYNTHROID, LEVOTHROID) 112 MCG tablet    Other Relevant Orders    TSH+Free T4       Genitourinary and Reproductive     Hypercalcemia    Relevant Medications    Cholecalciferol (Vitamin D) 50 MCG (2000 UT) tablet   Other Visit Diagnoses     Impaired fasting glucose        Relevant Orders    Comprehensive Metabolic Panel    Hemoglobin A1c    Hyperlipidemia, unspecified hyperlipidemia type        Relevant Medications    simvastatin (ZOCOR) 40 MG tablet    Other Relevant Orders    Comprehensive Metabolic Panel    Lipid Panel With / Chol / HDL Ratio    Vitamin D deficiency        Relevant Medications    Cholecalciferol (Vitamin D) 50 MCG (2000 UT) tablet    Other Relevant Orders    Vitamin D,25-Hydroxy    Mixed hyperlipidemia        Relevant Medications    simvastatin (ZOCOR) 40 MG tablet        Orders Placed This Encounter   Procedures   • TSH+Free T4   • Comprehensive Metabolic Panel   • Lipid Panel With / Chol / HDL Ratio   • Hemoglobin A1c   • Vitamin D,25-Hydroxy     New Medications Ordered This Visit   Medications   • Cholecalciferol (Vitamin D) 50 MCG (2000 UT) tablet     Sig: Take 2,000 Units by mouth Daily.     Dispense:  90 tablet     Refill:  0     OTC item   • aspirin 81 MG chewable tablet     Sig: Chew 1 tablet Daily.   • simvastatin (ZOCOR) 40 MG tablet     Sig: Take 1  "tablet by mouth Every Night.     Dispense:  90 tablet     Refill:  3       SUMMARY/DISCUSSION      Next Appointment with me: Visit date not found    Return in about 4 months (around 7/10/2023) for Next scheduled follow up.      VITAL SIGNS     Visit Vitals  /60 (Cuff Size: Adult)   Pulse 63   Temp 96.9 °F (36.1 °C) (Temporal)   Ht 165.1 cm (65\")   Wt 68 kg (150 lb)   SpO2 95%   BMI 24.96 kg/m²       @BP@  Wt Readings from Last 3 Encounters:   03/10/23 68 kg (150 lb)   11/09/22 66.7 kg (147 lb)   11/09/22 67 kg (147 lb 12.8 oz)     Body mass index is 24.96 kg/m².      MEDICATIONS AT THE TIME OF OFFICE VISIT     Current Outpatient Medications on File Prior to Visit   Medication Sig   • Fluticasone-Salmeterol (ADVAIR) 250-50 MCG/ACT DISKUS Inhale 1 puff 2 (Two) Times a Day. Please rinse mouth with each use   • furosemide (LASIX) 20 MG tablet TAKE ONE TABLET BY MOUTH DAILY   • levothyroxine (SYNTHROID, LEVOTHROID) 112 MCG tablet TAKE 1 TABLET BY MOUTH DAILY   • Multiple Vitamins-Minerals (b complex-C-E-zinc) tablet Take 1 tablet by mouth Daily. 50mg, once daily   • naproxen (EC NAPROSYN) 500 MG EC tablet Take 1 tablet by mouth 2 (Two) Times a Day As Needed for Mild Pain .   • Omega-3 Fatty Acids (fish oil) 1000 MG capsule capsule Take 1 capsule by mouth Daily With Breakfast.   • [DISCONTINUED] aspirin 81 MG chewable tablet Chew 1 tablet Daily.   • [DISCONTINUED] Cholecalciferol (Vitamin D) 50 MCG (2000 UT) tablet Take 2,000 Units by mouth Daily.   • [DISCONTINUED] simvastatin (ZOCOR) 40 MG tablet Take 1 tablet by mouth Every Night.   • albuterol sulfate  (90 Base) MCG/ACT inhaler Inhale 2 puffs Every 4 (Four) Hours As Needed for Wheezing.   • [DISCONTINUED] uribel (URO-MP) 118 MG capsule capsule As Needed.     No current facility-administered medications on file prior to visit.          HISTORY OF PRESENT ILLNESS     Endocrinology had evaluated patient's hypercalcemia.  Labs did show mild case of " hyperparathyroidism but not concerning enough to warrant intervention as the calcium has been around 0.1 mg/dL over normal and has not been consistently over 1 mg/dL.  Chronic renal insufficiency stage IIIa, endocrinology recommended increasing hydration, keeping vitamin D over 40.  Last vitamin D level around 51.  Recommended monitoring through PCP basic BMP and calcium and renal status.  Confirmed Hashimoto's as underlying cause of hypothyroidism.  Endocrinology has released and sees only as needed now.    Impaired fasting glucose with hemoglobin A1c of 6.1, no medication treatment yet.    Hyperlipidemia controlled with simvastatin at current dose with last LDL of 82.  She does have bilateral carotid stenosis 1 to 15%, now on baby aspirin.  Carotid ultrasound in May 2022.     Hypothyroidism last TSH 1.880.  Controlled on levothyroxine 112 mcg daily.    Lower extremity edema controlled with Lasix 20 mg daily with last creatinine of 1.01 and GFR 56.  Consistently on her feet at work she works in [employer] business.    Emphysema-like changes on chest x-ray imaging pulmonary function test was negative.  She does have some dyspnea with exertion also uses the Advair Diskus daily with some improvement.  No need for rescue inhaler.  Echocardiogram in April showed EF of 64.9%, left ventricular diastolic function consistent with grade 1 impaired relaxation, aortic valve abnormal structure with a moderate calcification of the aortic valve, aortic valve stenosis present, mild tricuspid valve regurg.  She was referred to cardiology but declines.      REVIEW OF SYSTEMS     Constitutional neg except per HPI   Resp baseline dyspnea on exertion  CV neg    PHYSICAL EXAMINATION     Physical Exam  Constitutional  No distress  Cardiovascular Rate  normal . Rhythm: regular . Heart sounds:  normal  Pulmonary/Chest  Effort normal. Breath sounds:  normal  Psychiatric  Alert. Judgment and thought content normal. Mood normal     REVIEWED DATA      Labs:   Lab Results   Component Value Date    TSH 1.880 11/14/2022    THYROIDAB 64 (H) 11/14/2022     Lab Results   Component Value Date    WBC 7.07 11/09/2022    HGB 13.9 11/09/2022    HCT 41.4 11/09/2022    MCV 96.3 11/09/2022     11/09/2022     Lab Results   Component Value Date    GLUCOSE 107 (H) 11/14/2022    BUN 12 11/14/2022    CREATININE 1.01 (H) 11/14/2022    CREATININE 1.01 (H) 11/14/2022    EGFRRESULT 61.9 11/09/2022    EGFR 56.7 (L) 11/14/2022    EGFR 56.7 (L) 11/14/2022    BCR 11.9 11/14/2022    K 4.6 11/14/2022    CO2 25.2 11/14/2022    CALCIUM 10.3 11/14/2022    CALCIUM 10.5 11/14/2022    PROTENTOTREF 7.0 11/09/2022    ALBUMIN 4.20 11/14/2022    BILITOT 0.4 11/09/2022    AST 25 11/09/2022    ALT 18 11/09/2022     Lab Results   Component Value Date    CHLPL 153 11/09/2022    TRIG 118 11/09/2022    HDL 50 11/09/2022    LDL 82 11/09/2022     Lab Results   Component Value Date    HGBA1C 6.10 (H) 11/09/2022         Imaging:           Medical Tests:           Summary of old records / correspondence / consultant report:           Request outside records:             *Examiner was wearing medical surgical mask.   **Dragon dictation used for documentation.

## 2023-03-23 DIAGNOSIS — E78.2 MIXED HYPERLIPIDEMIA: ICD-10-CM

## 2023-03-23 RX ORDER — SIMVASTATIN 40 MG
TABLET ORAL
Qty: 90 TABLET | Refills: 3 | OUTPATIENT
Start: 2023-03-23

## 2023-03-23 RX ORDER — LEVOTHYROXINE SODIUM 112 UG/1
112 TABLET ORAL DAILY
Qty: 90 TABLET | Refills: 1 | Status: SHIPPED | OUTPATIENT
Start: 2023-03-23

## 2023-03-27 DIAGNOSIS — E78.2 MIXED HYPERLIPIDEMIA: ICD-10-CM

## 2023-03-27 RX ORDER — SIMVASTATIN 40 MG
40 TABLET ORAL NIGHTLY
Qty: 90 TABLET | Refills: 3 | Status: SHIPPED | OUTPATIENT
Start: 2023-03-27

## 2023-03-27 NOTE — TELEPHONE ENCOUNTER
"Caller: Marci Kolb \"MANISHA\"    Relationship: Self    Best call back number: [phone]     Requested Prescriptions:   Requested Prescriptions     Pending Prescriptions Disp Refills   • simvastatin (ZOCOR) 40 MG tablet 90 tablet 3     Sig: Take 1 tablet by mouth Every Night.        Pharmacy where request should be sent: [id number] [address] AT [facility] [phone] [facility] [phone]      Last office visit with prescribing clinician: 3/10/2023   Last telemedicine visit with prescribing clinician: 7/6/2023   Next office visit with prescribing clinician: 7/6/2023     Additional details provided by patient: PATIENT IS NEEDING TO SWITCH THIS TO [employer] INSTEAD OF THE MAIL ORDER    Does the patient have less than a 3 day supply:  [x] Yes  [] No    Would you like a call back once the refill request has been completed: [x] Yes [] No    If the office needs to give you a call back, can they leave a voicemail: [x] Yes [] No    Carl Link Rep   03/27/23 10:43 EDT           "

## 2023-05-18 ENCOUNTER — TELEPHONE (OUTPATIENT)
Dept: INTERNAL MEDICINE | Age: 78
End: 2023-05-18
Payer: MEDICARE

## 2023-05-18 NOTE — TELEPHONE ENCOUNTER
Pt advised that PCP out of the office and would not be able to answer question until return.  Pt did ml appt for 5/22/23 for back pain.

## 2023-05-18 NOTE — TELEPHONE ENCOUNTER
"  Caller: Marci Kolb \"MANISHA\"    Relationship: Self    Best call back number: [phone]    Who are you requesting to speak with (clinical staff, provider,  specific staff member):CLINICAL     What was the call regarding: PATIENT IS CALLING TO SEE IF EDEN ESPINOSA WOULD BE OKAY WITH HER GOING TO THE GYM, AND START WORKING OUT TO HELP WITH HER GAIN MUSCLE. PATIENT ALSO WANTED TO LET EDEN ESPINOSA KNOW SHE IS BACKING PAIN ON HER LOWER BACK THAT GOES DOWN HER LEG.     Do you require a callback:YES   "

## 2023-05-22 ENCOUNTER — HOSPITAL ENCOUNTER (OUTPATIENT)
Dept: GENERAL RADIOLOGY | Facility: HOSPITAL | Age: 78
Discharge: HOME | End: 2023-05-22
Payer: MEDICARE

## 2023-05-22 ENCOUNTER — OFFICE VISIT (OUTPATIENT)
Dept: INTERNAL MEDICINE | Age: 78
End: 2023-05-22
Payer: OTHER GOVERNMENT

## 2023-05-22 VITALS
BODY MASS INDEX: 24.32 KG/M2 | TEMPERATURE: 97.6 F | OXYGEN SATURATION: 92 % | SYSTOLIC BLOOD PRESSURE: 126 MMHG | DIASTOLIC BLOOD PRESSURE: 74 MMHG | WEIGHT: 146 LBS | HEART RATE: 82 BPM | HEIGHT: 65 IN

## 2023-05-22 DIAGNOSIS — M54.40 BACK PAIN OF LUMBAR REGION WITH SCIATICA: Primary | ICD-10-CM

## 2023-05-22 DIAGNOSIS — M54.2 CERVICAL PAIN (NECK): ICD-10-CM

## 2023-05-22 PROCEDURE — 72100 X-RAY EXAM L-S SPINE 2/3 VWS: CPT

## 2023-05-22 PROCEDURE — 72040 X-RAY EXAM NECK SPINE 2-3 VW: CPT

## 2023-05-22 RX ORDER — METHYLPREDNISOLONE 4 MG/1
TABLET ORAL
Qty: 21 TABLET | Refills: 0 | Status: SHIPPED | OUTPATIENT
Start: 2023-05-22 | End: 2023-05-23

## 2023-05-22 RX ORDER — CYCLOBENZAPRINE HCL 10 MG
10 TABLET ORAL NIGHTLY PRN
Qty: 15 TABLET | Refills: 0 | Status: SHIPPED | OUTPATIENT
Start: 2023-05-22 | End: 2023-05-23

## 2023-05-22 NOTE — PROGRESS NOTES
"    I N T E R N A L  M E D I C I N E  JAYNE Goins    ENCOUNTER DATE:  05/22/2023    Marci FRANCISCO Kolb / 78 y.o. / female      CHIEF COMPLAINT / REASON FOR OFFICE VISIT     Pain (Right side from lower back to foot )      ASSESSMENT & PLAN     Diagnoses and all orders for this visit:    1. Back pain of lumbar region with sciatica (Primary)  -     XR Spine Lumbar 2 or 3 View  -     methylPREDNISolone (MEDROL) 4 MG dose pack; Take as directed on package instructions.  Dispense: 21 tablet; Refill: 0  -     cyclobenzaprine (FLEXERIL) 10 MG tablet; Take 1 tablet by mouth At Night As Needed for Muscle Spasms.  Dispense: 15 tablet; Refill: 0  -     Ambulatory Referral to Physical Therapy Evaluate and treat    2. Cervical pain (neck)  -     XR Spine Cervical 2 or 3 View  -     methylPREDNISolone (MEDROL) 4 MG dose pack; Take as directed on package instructions.  Dispense: 21 tablet; Refill: 0  -     cyclobenzaprine (FLEXERIL) 10 MG tablet; Take 1 tablet by mouth At Night As Needed for Muscle Spasms.  Dispense: 15 tablet; Refill: 0  -     Ambulatory Referral to Physical Therapy Evaluate and treat         SUMMARY/DISCUSSION  • Update XR imaging.  Start lidocaine pain patches.  Flexeril PRN nightly.  Start steroid dose pack to help with pain.  Counseled to avoid taking steroids with naproxen.  Referral to PT placed.  She is aware of importance of visiting ER for acutely worsening symptoms, numbness, tingling, weakness.  May consider MRI for non improving symptoms.  Follow up with Supa GODOY as scheduled for re assessment in approximately 6 weeks.      Next Appointment with me: Visit date not found    Return for Next scheduled follow up.      VITAL SIGNS     Visit Vitals  /74   Pulse 82   Temp 97.6 °F (36.4 °C)   Ht 165.1 cm (65\")   Wt 66.2 kg (146 lb)   SpO2 92%   BMI 24.30 kg/m²             Wt Readings from Last 3 Encounters:   05/22/23 66.2 kg (146 lb)   03/10/23 68 kg (150 lb)   11/09/22 66.7 kg (147 lb)     Body " mass index is 24.3 kg/m².        MEDICATIONS AT THE TIME OF OFFICE VISIT     Current Outpatient Medications on File Prior to Visit   Medication Sig Dispense Refill   • albuterol sulfate  (90 Base) MCG/ACT inhaler Inhale 2 puffs Every 4 (Four) Hours As Needed for Wheezing. 6.7 g 2   • aspirin 81 MG chewable tablet Chew 1 tablet Daily.     • Cholecalciferol (Vitamin D) 50 MCG (2000 UT) tablet Take 2,000 Units by mouth Daily. 90 tablet 0   • Fluticasone-Salmeterol (ADVAIR) 250-50 MCG/ACT DISKUS Inhale 1 puff 2 (Two) Times a Day. Please rinse mouth with each use 60 each 3   • furosemide (LASIX) 20 MG tablet TAKE ONE TABLET BY MOUTH DAILY 30 tablet 5   • levothyroxine (SYNTHROID, LEVOTHROID) 112 MCG tablet TAKE 1 TABLET BY MOUTH DAILY 90 tablet 1   • Multiple Vitamins-Minerals (b complex-C-E-zinc) tablet Take 1 tablet by mouth Daily. 50mg, once daily     • naproxen (EC NAPROSYN) 500 MG EC tablet Take 1 tablet by mouth 2 (Two) Times a Day As Needed for Mild Pain . 20 tablet 0   • Omega-3 Fatty Acids (fish oil) 1000 MG capsule capsule Take 1 capsule by mouth Daily With Breakfast.     • simvastatin (ZOCOR) 40 MG tablet Take 1 tablet by mouth Every Night. 90 tablet 3     No current facility-administered medications on file prior to visit.        HISTORY OF PRESENT ILLNESS     Here today for acute exacerbation of her chronic right sided lumbar pain with radiation to RLE as well as right sided neck tension and tightness with associated right hand numbness.  She reports some right food numbness as well.  Denies tingling, weakness, bowel/ bladder changes, saddle anesthesia.    Denies any recent trauma or injury.  Her neck and hand symptoms are noticeably worse when she drives.  She is using icy hot with some benefit.  She has some lidocaine pain patches at home but has not yet tried.       July 2019 Lumbar MRI:     IMPRESSION:   1. L5-S1 disc bulge with moderate right-sided predominant facet osteoarthritis resulting in  narrowing of the lateral recesses with contact and possibly compression of the traversing S1 nerve roots.   2. Severe L4-L5 facet osteoarthritis with degenerative grade 1 anterolisthesis and a mild disc bulge resulting in mild central canal, lateral recess, and neural foraminal stenosis.   3. Mild T12-L1 through L3-L4 disc bulges with small superimposed left foraminal disc protrusion at L2-L3.        Patient Care Team:  Supa Sparks APRN as PCP - General (Internal Medicine)  Abdias Nash MD (Orthopedic Surgery)  Jatinder Dominguez MD as Consulting Physician (Otolaryngology)  Romulo Kelly Jr., MD as Consulting Physician (Cardiology)    REVIEW OF SYSTEMS     Review of Systems   Constitutional: Negative for chills, fever and unexpected weight change.   Respiratory: Negative for cough, chest tightness and shortness of breath.    Cardiovascular: Negative for chest pain, palpitations and leg swelling.   Musculoskeletal: Positive for back pain and neck pain.   Neurological: Positive for numbness (Right hand and foot). Negative for dizziness, weakness, light-headedness and headaches.   Psychiatric/Behavioral: The patient is not nervous/anxious.           PHYSICAL EXAMINATION     Physical Exam  Vitals reviewed.   Constitutional:       General: She is not in acute distress.     Appearance: Normal appearance. She is not ill-appearing, toxic-appearing or diaphoretic.   HENT:      Head: Normocephalic and atraumatic.   Cardiovascular:      Rate and Rhythm: Normal rate and regular rhythm.      Heart sounds: Normal heart sounds.   Pulmonary:      Effort: Pulmonary effort is normal.      Breath sounds: Normal breath sounds.   Musculoskeletal:      Right lower leg: No edema.      Left lower leg: No edema.   Skin:     General: Skin is warm and dry.   Neurological:      Mental Status: She is alert and oriented to person, place, and time. Mental status is at baseline.      Sensory: Sensory deficit (RLE and RUE numbness)  present.      Motor: Weakness (R  < L ) present.      Coordination: Coordination normal.      Gait: Gait normal.   Psychiatric:         Mood and Affect: Mood normal.         Behavior: Behavior normal.         Thought Content: Thought content normal.         Judgment: Judgment normal.           REVIEWED DATA     Labs:           Imaging:            Medical Tests:           Summary of old records / correspondence / consultant report:           Request outside records:

## 2023-05-23 ENCOUNTER — TELEPHONE (OUTPATIENT)
Dept: INTERNAL MEDICINE | Age: 78
End: 2023-05-23
Payer: MEDICARE

## 2023-05-23 RX ORDER — METHYLPREDNISOLONE 4 MG/1
TABLET ORAL
Qty: 21 TABLET | Refills: 0 | Status: SHIPPED | OUTPATIENT
Start: 2023-05-23

## 2023-05-23 RX ORDER — METHYLPREDNISOLONE 4 MG/1
TABLET ORAL
Qty: 21 TABLET | Refills: 0 | Status: SHIPPED | OUTPATIENT
Start: 2023-05-23 | End: 2023-05-23

## 2023-05-23 RX ORDER — CYCLOBENZAPRINE HCL 10 MG
10 TABLET ORAL NIGHTLY PRN
Qty: 15 TABLET | Refills: 0 | Status: SHIPPED | OUTPATIENT
Start: 2023-05-23

## 2023-05-23 RX ORDER — CYCLOBENZAPRINE HCL 10 MG
10 TABLET ORAL NIGHTLY PRN
Qty: 15 TABLET | Refills: 0 | Status: SHIPPED | OUTPATIENT
Start: 2023-05-23 | End: 2023-05-23

## 2023-05-23 NOTE — TELEPHONE ENCOUNTER
Patient called about yesterday's prescriptions, they need to be sent to the [facility] pharmacy in her chart, they were sent to [facility].

## 2023-06-06 ENCOUNTER — TELEPHONE (OUTPATIENT)
Dept: INTERNAL MEDICINE | Age: 78
End: 2023-06-06

## 2023-06-06 DIAGNOSIS — M54.30 SCIATICA, UNSPECIFIED LATERALITY: Primary | ICD-10-CM

## 2023-06-06 NOTE — TELEPHONE ENCOUNTER
"    Caller: Marci Klob \"MANISHA\"    Relationship: Self    Best call back number: [phone]     What is the medical concern/diagnosis: SCIATICA NERVE PAIN    What specialty or service is being requested: PHYSICAL THERAPY     Any additional details: WANTS ANOTHER REFERRAL TO [facility] [address]        "

## 2023-06-14 DIAGNOSIS — R60.9 PERIPHERAL EDEMA: ICD-10-CM

## 2023-06-15 RX ORDER — FUROSEMIDE 20 MG/1
TABLET ORAL
Qty: 30 TABLET | Refills: 5 | Status: SHIPPED | OUTPATIENT
Start: 2023-06-15

## 2023-07-24 ENCOUNTER — HOSPITAL ENCOUNTER (OUTPATIENT)
Dept: MAMMOGRAPHY | Facility: HOSPITAL | Age: 78
Discharge: HOME | End: 2023-07-24
Admitting: NURSE PRACTITIONER
Payer: MEDICARE

## 2023-07-24 DIAGNOSIS — Z12.31 ENCOUNTER FOR SCREENING MAMMOGRAM FOR MALIGNANT NEOPLASM OF BREAST: ICD-10-CM

## 2023-07-24 DIAGNOSIS — N64.89 DISTORTION OF CONTOUR OF BREAST: Primary | ICD-10-CM

## 2023-07-24 PROCEDURE — 77067 SCR MAMMO BI INCL CAD: CPT

## 2023-07-24 PROCEDURE — 77063 BREAST TOMOSYNTHESIS BI: CPT

## 2023-07-26 ENCOUNTER — TELEPHONE (OUTPATIENT)
Dept: INTERNAL MEDICINE | Age: 78
End: 2023-07-26
Payer: MEDICARE

## 2023-08-07 RX ORDER — LEVOTHYROXINE SODIUM 112 UG/1
112 TABLET ORAL DAILY
Qty: 30 TABLET | Refills: 0 | Status: SHIPPED | OUTPATIENT
Start: 2023-08-07 | End: 2023-08-08

## 2023-08-08 RX ORDER — LEVOTHYROXINE SODIUM 112 UG/1
112 TABLET ORAL DAILY
Qty: 90 TABLET | Refills: 1 | Status: SHIPPED | OUTPATIENT
Start: 2023-08-08

## 2023-08-21 ENCOUNTER — OFFICE VISIT (OUTPATIENT)
Dept: INTERNAL MEDICINE | Age: 78
End: 2023-08-21
Payer: MEDICARE

## 2023-08-21 VITALS
BODY MASS INDEX: 24.96 KG/M2 | WEIGHT: 149.8 LBS | HEART RATE: 84 BPM | HEIGHT: 65 IN | SYSTOLIC BLOOD PRESSURE: 104 MMHG | DIASTOLIC BLOOD PRESSURE: 52 MMHG | TEMPERATURE: 99.3 F | OXYGEN SATURATION: 94 %

## 2023-08-21 DIAGNOSIS — R30.0 DYSURIA: Primary | ICD-10-CM

## 2023-08-21 DIAGNOSIS — N39.0 URINARY TRACT INFECTION WITHOUT HEMATURIA, SITE UNSPECIFIED: ICD-10-CM

## 2023-08-21 DIAGNOSIS — E03.9 HYPOTHYROIDISM, UNSPECIFIED TYPE: ICD-10-CM

## 2023-08-21 DIAGNOSIS — E78.5 HYPERLIPIDEMIA, UNSPECIFIED HYPERLIPIDEMIA TYPE: ICD-10-CM

## 2023-08-21 DIAGNOSIS — R73.01 IMPAIRED FASTING BLOOD SUGAR: ICD-10-CM

## 2023-08-21 LAB
BILIRUB BLD-MCNC: NEGATIVE MG/DL
CLARITY, POC: ABNORMAL
COLOR UR: YELLOW
EXPIRATION DATE: ABNORMAL
GLUCOSE UR STRIP-MCNC: NEGATIVE MG/DL
KETONES UR QL: NEGATIVE
LEUKOCYTE EST, POC: ABNORMAL
Lab: ABNORMAL
NITRITE UR-MCNC: NEGATIVE MG/ML
PH UR: 7 [PH] (ref 5–8)
PROT UR STRIP-MCNC: NEGATIVE MG/DL
RBC # UR STRIP: ABNORMAL /UL
SP GR UR: 1.02 (ref 1–1.03)
UROBILINOGEN UR QL: NORMAL

## 2023-08-21 PROCEDURE — 99214 OFFICE O/P EST MOD 30 MIN: CPT | Performed by: NURSE PRACTITIONER

## 2023-08-21 PROCEDURE — 81003 URINALYSIS AUTO W/O SCOPE: CPT | Performed by: NURSE PRACTITIONER

## 2023-08-21 RX ORDER — NITROFURANTOIN 25; 75 MG/1; MG/1
100 CAPSULE ORAL 2 TIMES DAILY
Qty: 10 CAPSULE | Refills: 0 | Status: SHIPPED | OUTPATIENT
Start: 2023-08-21 | End: 2023-08-26

## 2023-08-21 RX ORDER — PHENAZOPYRIDINE HYDROCHLORIDE 100 MG/1
200 TABLET, FILM COATED ORAL 3 TIMES DAILY PRN
Qty: 6 TABLET | Refills: 0 | Status: SHIPPED | OUTPATIENT
Start: 2023-08-21

## 2023-08-21 NOTE — PROGRESS NOTES
I N T E R N A L  M E D I C I N E  JAYNE WOLFF      ENCOUNTER DATE:  08/21/2023    Marci FRANCISCO Kolb / 78 y.o. / female      CHIEF COMPLAINT / REASON FOR OFFICE VISIT     Difficulty Urinating, Hyperlipidemia, and Hypothyroidism      ASSESSMENT & PLAN     Problem List Items Addressed This Visit          Endocrine and Metabolic    Hypothyroidism    Overview     Acquired hypothyroidism.          Relevant Medications    levothyroxine (SYNTHROID, LEVOTHROID) 112 MCG tablet    Other Relevant Orders    TSH+Free T4    CBC w AUTO Differential     Other Visit Diagnoses       Dysuria    -  Primary    Relevant Orders    POCT urinalysis dipstick, automated (Completed)    Hyperlipidemia, unspecified hyperlipidemia type        Relevant Orders    Lipid Panel With / Chol / HDL Ratio    Comprehensive Metabolic Panel    Urinalysis With Culture If Indicated - Urine, Clean Catch    CBC w AUTO Differential    Urinary tract infection without hematuria, site unspecified        Impaired fasting blood sugar        Relevant Orders    Hemoglobin A1c    Urinalysis With Culture If Indicated - Urine, Clean Catch          Orders Placed This Encounter   Procedures    Lipid Panel With / Chol / HDL Ratio    Comprehensive Metabolic Panel    TSH+Free T4    Hemoglobin A1c    Urinalysis With Culture If Indicated - Urine, Clean Catch    POCT urinalysis dipstick, automated    CBC w AUTO Differential     New Medications Ordered This Visit   Medications    phenazopyridine (Pyridium) 100 MG tablet     Sig: Take 2 tablets by mouth 3 (Three) Times a Day As Needed for Bladder Spasms.     Dispense:  6 tablet     Refill:  0    nitrofurantoin, macrocrystal-monohydrate, (Macrobid) 100 MG capsule     Sig: Take 1 capsule by mouth 2 (Two) Times a Day for 5 days.     Dispense:  10 capsule     Refill:  0       SUMMARY/DISCUSSION  Patient understands red flags for emergency room treatment including development of fever chills or worsening flank pain.  We will treat  "with Pyridium along with Macrobid and send out for urinalysis with reflex to culture.    Next Appointment with me: Visit date not found    Return in about 4 months (around 12/21/2023) for Next scheduled follow up.      VITAL SIGNS     Visit Vitals  /52 (Cuff Size: Adult)   Pulse 84   Temp 99.3 øF (37.4 øC) (Oral)   Ht 165.1 cm (65\")   Wt 67.9 kg (149 lb 12.8 oz)   SpO2 94%   BMI 24.93 kg/mý     Wt Readings from Last 3 Encounters:   08/21/23 67.9 kg (149 lb 12.8 oz)   05/22/23 66.2 kg (146 lb)   03/10/23 68 kg (150 lb)     Body mass index is 24.93 kg/mý.      MEDICATIONS AT THE TIME OF OFFICE VISIT     Current Outpatient Medications on File Prior to Visit   Medication Sig    albuterol sulfate  (90 Base) MCG/ACT inhaler Inhale 2 puffs Every 4 (Four) Hours As Needed for Wheezing.    aspirin 81 MG chewable tablet Chew 1 tablet Daily.    Cholecalciferol (Vitamin D) 50 MCG (2000 UT) tablet Take 2,000 Units by mouth Daily.    cyclobenzaprine (FLEXERIL) 10 MG tablet Take 1 tablet by mouth At Night As Needed for Muscle Spasms.    Fluticasone-Salmeterol (ADVAIR) 250-50 MCG/ACT DISKUS Inhale 1 puff 2 (Two) Times a Day. Please rinse mouth with each use    furosemide (LASIX) 20 MG tablet TAKE 1 TABLET BY MOUTH DAILY    levothyroxine (SYNTHROID, LEVOTHROID) 112 MCG tablet TAKE 1 TABLET BY MOUTH DAILY    Multiple Vitamins-Minerals (b complex-C-E-zinc) tablet Take 1 tablet by mouth Daily. 50mg, once daily    naproxen (EC NAPROSYN) 500 MG EC tablet Take 1 tablet by mouth 2 (Two) Times a Day As Needed for Mild Pain .    Omega-3 Fatty Acids (fish oil) 1000 MG capsule capsule Take 1 capsule by mouth Daily With Breakfast.    simvastatin (ZOCOR) 40 MG tablet Take 1 tablet by mouth Every Night.    [DISCONTINUED] methylPREDNISolone (MEDROL) 4 MG dose pack Take as directed on package instructions.     No current facility-administered medications on file prior to visit.          HISTORY OF PRESENT ILLNESS     Endocrinology had " evaluated patient's hypercalcemia.  Labs did show mild case of hyperparathyroidism but not concerning enough to warrant intervention as the calcium has been around 0.1 mg/dL over normal and has not been consistently over 1 mg/dL.  Chronic renal insufficiency stage IIIa, endocrinology recommended increasing hydration, keeping vitamin D over 40.  Last vitamin D level around 51.  Recommended monitoring through PCP basic BMP and calcium and renal status.  Confirmed Hashimoto's as underlying cause of hypothyroidism. Endocrinology has released and sees only as needed now.     Impaired fasting glucose with hemoglobin A1c of 6.0, no medication treatment yet.     Hyperlipidemia controlled with simvastatin at current dose with last LDL of 82.  She does have bilateral carotid stenosis 1 to 15%, now on baby aspirin.  Carotid ultrasound in May 2022.     Hypothyroidism last TSH 0.874.  Controlled on levothyroxine 112 mcg daily.     Lower extremity edema controlled with Lasix 20 mg daily, stable kidney.  Consistently on her feet at work she works in [employer] business.     Emphysema-like changes on chest x-ray imaging pulmonary function test was negative.  She does have some dyspnea with exertion also uses the Advair Diskus daily with some improvement.  No need for rescue inhaler.  Echocardiogram in April showed EF of 64.9%, left ventricular diastolic function consistent with grade 1 impaired relaxation, aortic valve abnormal structure with a moderate calcification of the aortic valve, aortic valve stenosis present, mild tricuspid valve regurg.  She was referred to cardiology but declines.     Dysuria, some lower lumbar pain with no fever or chills.  Urine dipstick positive for UTI.     REVIEW OF SYSTEMS     Constitutional neg except per HPI   Resp baseline   CV neg   Gu dysuria, frequency    PHYSICAL EXAMINATION     Physical Exam  Constitutional  No distress  Cardiovascular Rate  normal . Rhythm: regular . Heart sounds:   normal  Pulmonary/Chest  Effort normal. Breath sounds:  normal  Musc lumbar pain  Psychiatric  Alert. Judgment and thought content normal. Mood normal      REVIEWED DATA     Labs:   Lab Results   Component Value Date    WBC 7.07 11/09/2022    HGB 13.9 11/09/2022    HCT 41.4 11/09/2022    MCV 96.3 11/09/2022     11/09/2022      Lab Results   Component Value Date    GLUCOSE 117 (H) 03/10/2023    BUN 18 03/10/2023    CREATININE 0.82 03/10/2023    EGFRRESULT 72.4 03/10/2023    EGFR 56.7 (L) 11/14/2022    EGFR 56.7 (L) 11/14/2022    BCR 22.0 03/10/2023    K 4.3 03/10/2023    CO2 27.8 03/10/2023    CALCIUM 10.5 03/10/2023    PROTENTOTREF 6.9 03/10/2023    ALBUMIN 4.3 03/10/2023    BILITOT 0.3 03/10/2023    AST 29 03/10/2023    ALT 27 03/10/2023     Lab Results   Component Value Date    TSH 0.874 03/10/2023    THYROIDAB 64 (H) 11/14/2022      Lab Results   Component Value Date    HGBA1C 6.00 (H) 03/10/2023     Lab Results   Component Value Date    CHLPL 134 03/10/2023    TRIG 195 (H) 03/10/2023    HDL 39 (L) 03/10/2023    LDL 63 03/10/2023          Imaging:           Medical Tests:           Summary of old records / correspondence / consultant report:           Request outside records:             *Examiner was wearing medical surgical mask, face shield and exam gloves during the entire duration of the visit. Patient was masked the entire time.   Minimum social distance of 6 ft maintained entire visit except if physical contact was necessary as documented.     **Dragon Disclaimer:   Much of this encounter note is an electronic transcription/translation of spoken language to printed text. The electronic translation of spoken language may permit erroneous, or at times, nonsensical words or phrases to be inadvertently transcribed. Although I have reviewed the note for such errors, some may still exist.

## 2023-08-23 LAB
ALBUMIN SERPL-MCNC: 4.2 G/DL (ref 3.5–5.2)
ALBUMIN/GLOB SERPL: 1.8 G/DL
ALP SERPL-CCNC: 157 U/L (ref 39–117)
ALT SERPL-CCNC: 25 U/L (ref 1–33)
APPEARANCE UR: ABNORMAL
AST SERPL-CCNC: 28 U/L (ref 1–32)
BACTERIA #/AREA URNS HPF: ABNORMAL /HPF
BACTERIA UR CULT: ABNORMAL
BACTERIA UR CULT: ABNORMAL
BASOPHILS # BLD AUTO: 0.08 10*3/MM3 (ref 0–0.2)
BASOPHILS NFR BLD AUTO: 0.9 % (ref 0–1.5)
BILIRUB SERPL-MCNC: 0.3 MG/DL (ref 0–1.2)
BILIRUB UR QL STRIP: NEGATIVE
BUN SERPL-MCNC: 14 MG/DL (ref 8–23)
BUN/CREAT SERPL: 17.5 (ref 7–25)
CALCIUM SERPL-MCNC: 10.2 MG/DL (ref 8.6–10.5)
CASTS URNS QL MICRO: ABNORMAL /LPF
CHLORIDE SERPL-SCNC: 107 MMOL/L (ref 98–107)
CHOLEST SERPL-MCNC: 180 MG/DL (ref 0–200)
CHOLEST/HDLC SERPL: 4.39 {RATIO}
CO2 SERPL-SCNC: 25.3 MMOL/L (ref 22–29)
COLOR UR: YELLOW
CREAT SERPL-MCNC: 0.8 MG/DL (ref 0.57–1)
EGFRCR SERPLBLD CKD-EPI 2021: 74.6 ML/MIN/1.73
EOSINOPHIL # BLD AUTO: 0.15 10*3/MM3 (ref 0–0.4)
EOSINOPHIL NFR BLD AUTO: 1.7 % (ref 0.3–6.2)
EPI CELLS #/AREA URNS HPF: ABNORMAL /HPF (ref 0–10)
ERYTHROCYTE [DISTWIDTH] IN BLOOD BY AUTOMATED COUNT: 13.2 % (ref 12.3–15.4)
GLOBULIN SER CALC-MCNC: 2.4 GM/DL
GLUCOSE SERPL-MCNC: 104 MG/DL (ref 65–99)
GLUCOSE UR QL STRIP: NEGATIVE
HBA1C MFR BLD: 5.8 % (ref 4.8–5.6)
HCT VFR BLD AUTO: 40.6 % (ref 34–46.6)
HDLC SERPL-MCNC: 41 MG/DL (ref 40–60)
HGB BLD-MCNC: 13.9 G/DL (ref 12–15.9)
HGB UR QL STRIP: ABNORMAL
IMM GRANULOCYTES # BLD AUTO: 0.03 10*3/MM3 (ref 0–0.05)
IMM GRANULOCYTES NFR BLD AUTO: 0.3 % (ref 0–0.5)
KETONES UR QL STRIP: NEGATIVE
LDLC SERPL CALC-MCNC: 91 MG/DL (ref 0–100)
LEUKOCYTE ESTERASE UR QL STRIP: ABNORMAL
LYMPHOCYTES # BLD AUTO: 3.1 10*3/MM3 (ref 0.7–3.1)
LYMPHOCYTES NFR BLD AUTO: 34.9 % (ref 19.6–45.3)
MCH RBC QN AUTO: 32.3 PG (ref 26.6–33)
MCHC RBC AUTO-ENTMCNC: 34.2 G/DL (ref 31.5–35.7)
MCV RBC AUTO: 94.4 FL (ref 79–97)
MICRO URNS: ABNORMAL
MONOCYTES # BLD AUTO: 0.61 10*3/MM3 (ref 0.1–0.9)
MONOCYTES NFR BLD AUTO: 6.9 % (ref 5–12)
NEUTROPHILS # BLD AUTO: 4.91 10*3/MM3 (ref 1.7–7)
NEUTROPHILS NFR BLD AUTO: 55.3 % (ref 42.7–76)
NITRITE UR QL STRIP: NEGATIVE
NRBC BLD AUTO-RTO: 0 /100 WBC (ref 0–0.2)
OTHER ANTIBIOTIC SUSC ISLT: ABNORMAL
PH UR STRIP: 7 [PH] (ref 5–7.5)
PLATELET # BLD AUTO: 260 10*3/MM3 (ref 140–450)
POTASSIUM SERPL-SCNC: 4.1 MMOL/L (ref 3.5–5.2)
PROT SERPL-MCNC: 6.6 G/DL (ref 6–8.5)
PROT UR QL STRIP: NEGATIVE
RBC # BLD AUTO: 4.3 10*6/MM3 (ref 3.77–5.28)
RBC #/AREA URNS HPF: ABNORMAL /HPF (ref 0–2)
SODIUM SERPL-SCNC: 142 MMOL/L (ref 136–145)
SP GR UR STRIP: 1.01 (ref 1–1.03)
T4 FREE SERPL-MCNC: 1.25 NG/DL (ref 0.93–1.7)
TRIGL SERPL-MCNC: 285 MG/DL (ref 0–150)
TSH SERPL DL<=0.005 MIU/L-ACNC: 2.09 UIU/ML (ref 0.27–4.2)
URINALYSIS REFLEX: ABNORMAL
UROBILINOGEN UR STRIP-MCNC: 1 MG/DL (ref 0.2–1)
VLDLC SERPL CALC-MCNC: 48 MG/DL (ref 5–40)
WBC # BLD AUTO: 8.88 10*3/MM3 (ref 3.4–10.8)
WBC #/AREA URNS HPF: >30 /HPF (ref 0–5)

## 2023-09-05 ENCOUNTER — TELEPHONE (OUTPATIENT)
Dept: INTERNAL MEDICINE | Age: 78
End: 2023-09-05

## 2023-09-05 NOTE — TELEPHONE ENCOUNTER
"  Caller: Marci Kolb \"MANISHA\"    Relationship: Self    Best call back number:[phone]     What was the call regarding: PATIENT STATES THAT SHE HAD AN APPOINTMENT TODAY BUT HAS TO CANCEL DUE TO HAVING COVID. PLEASE ADVISE PATIENT ON NEXT STEPS ASAP.   "

## 2023-09-14 ENCOUNTER — OFFICE VISIT (OUTPATIENT)
Dept: INTERNAL MEDICINE | Age: 78
End: 2023-09-14
Payer: OTHER GOVERNMENT

## 2023-09-14 ENCOUNTER — TELEPHONE (OUTPATIENT)
Dept: INTERNAL MEDICINE | Age: 78
End: 2023-09-14
Payer: MEDICARE

## 2023-09-14 VITALS
OXYGEN SATURATION: 96 % | TEMPERATURE: 98.6 F | BODY MASS INDEX: 24.26 KG/M2 | HEIGHT: 65 IN | HEART RATE: 83 BPM | DIASTOLIC BLOOD PRESSURE: 58 MMHG | SYSTOLIC BLOOD PRESSURE: 106 MMHG | WEIGHT: 145.6 LBS

## 2023-09-14 DIAGNOSIS — N39.0 RECURRENT UTI: ICD-10-CM

## 2023-09-14 DIAGNOSIS — N30.01 ACUTE CYSTITIS WITH HEMATURIA: Primary | ICD-10-CM

## 2023-09-14 LAB
BILIRUB BLD-MCNC: NEGATIVE MG/DL
CLARITY, POC: ABNORMAL
COLOR UR: ABNORMAL
EXPIRATION DATE: ABNORMAL
GLUCOSE UR STRIP-MCNC: NEGATIVE MG/DL
KETONES UR QL: NEGATIVE
LEUKOCYTE EST, POC: ABNORMAL
Lab: ABNORMAL
NITRITE UR-MCNC: POSITIVE MG/ML
PH UR: 6.5 [PH] (ref 5–8)
PROT UR STRIP-MCNC: ABNORMAL MG/DL
RBC # UR STRIP: ABNORMAL /UL
SP GR UR: 1.02 (ref 1–1.03)
UROBILINOGEN UR QL: NORMAL

## 2023-09-14 PROCEDURE — 99213 OFFICE O/P EST LOW 20 MIN: CPT | Performed by: NURSE PRACTITIONER

## 2023-09-14 PROCEDURE — 81003 URINALYSIS AUTO W/O SCOPE: CPT | Performed by: NURSE PRACTITIONER

## 2023-09-14 RX ORDER — PHENAZOPYRIDINE HYDROCHLORIDE 100 MG/1
200 TABLET, FILM COATED ORAL 3 TIMES DAILY PRN
Qty: 6 TABLET | Refills: 0 | Status: SHIPPED | OUTPATIENT
Start: 2023-09-14

## 2023-09-14 RX ORDER — CIPROFLOXACIN 250 MG/1
250 TABLET, FILM COATED ORAL 2 TIMES DAILY
Qty: 10 TABLET | Refills: 0 | Status: SHIPPED | OUTPATIENT
Start: 2023-09-14 | End: 2023-09-19

## 2023-09-14 NOTE — PROGRESS NOTES
"    I N T E R N A L  M E D I C I N E  EDEN ESPINOSA, APRN      ENCOUNTER DATE:  09/14/2023    Marci C Kolb / 78 y.o. / female      CHIEF COMPLAINT / REASON FOR OFFICE VISIT     Urinary Tract Infection (Since last friday)      ASSESSMENT & PLAN     Problem List Items Addressed This Visit          Genitourinary and Reproductive     Recurrent UTI    Relevant Medications    ciprofloxacin (Cipro) 250 MG tablet    Other Relevant Orders    Ambulatory Referral to Urology (Completed)     Other Visit Diagnoses       Acute cystitis with hematuria    -  Primary    Relevant Medications    ciprofloxacin (Cipro) 250 MG tablet    phenazopyridine (Pyridium) 100 MG tablet    Other Relevant Orders    POCT urinalysis dipstick, automated (Completed)    Urinalysis With Culture If Indicated - Urine, Clean Catch          Orders Placed This Encounter   Procedures    Urinalysis With Culture If Indicated - Urine, Clean Catch    Ambulatory Referral to Urology    POCT urinalysis dipstick, automated     New Medications Ordered This Visit   Medications    ciprofloxacin (Cipro) 250 MG tablet     Sig: Take 1 tablet by mouth 2 (Two) Times a Day for 5 days.     Dispense:  10 tablet     Refill:  0    phenazopyridine (Pyridium) 100 MG tablet     Sig: Take 2 tablets by mouth 3 (Three) Times a Day As Needed for Bladder Spasms.     Dispense:  6 tablet     Refill:  0       SUMMARY/DISCUSSION  Recurrent UTI, will refer to urology.  Recent UTI we will go ahead and treat with Cipro as urinary tract infection came back quickly after Macrobid.  Pyridium for pain relief.  She will follow-up if no symptom improvement    Next Appointment with me: 12/28/2023    Return for Next scheduled follow up.      VITAL SIGNS     Visit Vitals  /58 (BP Location: Left arm, Patient Position: Sitting, Cuff Size: Adult)   Pulse 83   Temp 98.6 °F (37 °C) (Temporal)   Ht 165.1 cm (65\")   Wt 66 kg (145 lb 9.6 oz)   SpO2 96%   BMI 24.23 kg/m²     Wt Readings from Last 3 " Encounters:   09/14/23 66 kg (145 lb 9.6 oz)   08/21/23 67.9 kg (149 lb 12.8 oz)   05/22/23 66.2 kg (146 lb)     Body mass index is 24.23 kg/m².    MEDICATIONS AT THE TIME OF OFFICE VISIT     Current Outpatient Medications on File Prior to Visit   Medication Sig    albuterol sulfate  (90 Base) MCG/ACT inhaler Inhale 2 puffs Every 4 (Four) Hours As Needed for Wheezing.    aspirin 81 MG chewable tablet Chew 1 tablet Daily.    Cholecalciferol (Vitamin D) 50 MCG (2000 UT) tablet Take 2,000 Units by mouth Daily.    cyclobenzaprine (FLEXERIL) 10 MG tablet Take 1 tablet by mouth At Night As Needed for Muscle Spasms.    Fluticasone-Salmeterol (ADVAIR) 250-50 MCG/ACT DISKUS Inhale 1 puff 2 (Two) Times a Day. Please rinse mouth with each use    furosemide (LASIX) 20 MG tablet TAKE 1 TABLET BY MOUTH DAILY    levothyroxine (SYNTHROID, LEVOTHROID) 112 MCG tablet TAKE 1 TABLET BY MOUTH DAILY    Multiple Vitamins-Minerals (b complex-C-E-zinc) tablet Take 1 tablet by mouth Daily. 50mg, once daily    naproxen (EC NAPROSYN) 500 MG EC tablet Take 1 tablet by mouth 2 (Two) Times a Day As Needed for Mild Pain .    Omega-3 Fatty Acids (fish oil) 1000 MG capsule capsule Take 1 capsule by mouth Daily With Breakfast.    simvastatin (ZOCOR) 40 MG tablet Take 1 tablet by mouth Every Night.    [DISCONTINUED] phenazopyridine (Pyridium) 100 MG tablet Take 2 tablets by mouth 3 (Three) Times a Day As Needed for Bladder Spasms.     No current facility-administered medications on file prior to visit.   Full  HISTORY OF PRESENT ILLNESS     Over the last few days patient has had increasing dysuria, no fever chills or flank pain.  No visible hematuria.  She has recently had a UTI on August 21.     REVIEW OF SYSTEMS     Constitutional neg except per HPI   Resp neg  CV neg    dysuria     PHYSICAL EXAMINATION     Physical Exam  Constitutional  No distress  Musc neg CVA tenderness     REVIEWED DATA     Labs:   Brief Urine Lab Results  (Last result  in the past 365 days)        Color   Clarity   Blood   Leuk Est   Nitrite   Protein   CREAT   Urine HCG        09/14/23 1558 Orange   Cloudy   Small   Small (1+)   Positive   30 mg/dL                        Imaging:           Medical Tests:           Summary of old records / correspondence / consultant report:           Request outside records:             *Dragon dictation used for documentation.

## 2023-09-14 NOTE — TELEPHONE ENCOUNTER
"Caller: Marci Kolb \"MANISHA\"    Relationship: Self    Best call back number: [phone]    What medication are you requesting: ANTIBIOTIC OR SOMETHING FOR A UTI    What are your current symptoms: PAIN WHEN URINATING    How long have you been experiencing symptoms: 4 DAYS    Have you had these symptoms before:    [x] Yes  [] No    Have you been treated for these symptoms before:   [x] Yes  [] No    If a prescription is needed, what is your preferred pharmacy and phone number: [id number] [place], [place] - [address] AT [facility] - [phone] [facility] [phone]      Additional notes:    PATIENT STATES THAT SHE HAS ANOTHER UTI SETTING UP ON HER AND WOULD LIKE FOR YOU TO SEND IN SOME MEDICATION FOR HER.    PLEASE ADVISE.    "

## 2023-09-18 LAB
APPEARANCE UR: ABNORMAL
BACTERIA #/AREA URNS HPF: ABNORMAL /HPF
BACTERIA UR CULT: ABNORMAL
BACTERIA UR CULT: ABNORMAL
BILIRUB UR QL STRIP: NEGATIVE
CASTS URNS QL MICRO: ABNORMAL /LPF
COLOR UR: YELLOW
CRYSTALS URNS MICRO: ABNORMAL
EPI CELLS #/AREA URNS HPF: ABNORMAL /HPF (ref 0–10)
GLUCOSE UR QL STRIP: NEGATIVE
HGB UR QL STRIP: ABNORMAL
KETONES UR QL STRIP: NEGATIVE
LEUKOCYTE ESTERASE UR QL STRIP: ABNORMAL
MICRO URNS: ABNORMAL
MUCOUS THREADS URNS QL MICRO: PRESENT /HPF
NITRITE UR QL STRIP: POSITIVE
OTHER ANTIBIOTIC SUSC ISLT: ABNORMAL
PH UR STRIP: 6 [PH] (ref 5–7.5)
PROT UR QL STRIP: ABNORMAL
RBC #/AREA URNS HPF: ABNORMAL /HPF (ref 0–2)
SP GR UR STRIP: 1.02 (ref 1–1.03)
UNIDENT CRYS URNS QL MICRO: PRESENT /LPF
URINALYSIS REFLEX: ABNORMAL
UROBILINOGEN UR STRIP-MCNC: 0.2 MG/DL (ref 0.2–1)
WBC #/AREA URNS HPF: >30 /HPF (ref 0–5)

## 2023-10-05 ENCOUNTER — TELEPHONE (OUTPATIENT)
Dept: INTERNAL MEDICINE | Age: 78
End: 2023-10-05
Payer: MEDICARE

## 2023-10-05 NOTE — TELEPHONE ENCOUNTER
Relay    Open order /overdue  Mammo and US    Cancelled /no show     Are you planing to reschedule if yes. contact scheduling [phone]  If not call our office to cancel

## 2023-11-05 NOTE — ADDENDUM NOTE
Addended by: EDEN ESPINOSA on: 5/23/2023 12:12 PM     Modules accepted: Orders     All other review of systems negative, except as noted in HPI

## 2023-11-10 ENCOUNTER — OFFICE VISIT (OUTPATIENT)
Dept: INTERNAL MEDICINE | Facility: CLINIC | Age: 78
End: 2023-11-10
Payer: MEDICARE

## 2023-11-10 VITALS
SYSTOLIC BLOOD PRESSURE: 110 MMHG | WEIGHT: 145 LBS | DIASTOLIC BLOOD PRESSURE: 70 MMHG | BODY MASS INDEX: 24.16 KG/M2 | HEIGHT: 65 IN

## 2023-11-10 DIAGNOSIS — Z86.73 HISTORY OF CVA (CEREBROVASCULAR ACCIDENT): Chronic | ICD-10-CM

## 2023-11-10 DIAGNOSIS — N18.31 STAGE 3A CHRONIC KIDNEY DISEASE: Chronic | ICD-10-CM

## 2023-11-10 DIAGNOSIS — Z87.09 HISTORY OF PNEUMOTHORAX: ICD-10-CM

## 2023-11-10 DIAGNOSIS — E03.9 ACQUIRED HYPOTHYROIDISM: Chronic | ICD-10-CM

## 2023-11-10 DIAGNOSIS — R73.01 IMPAIRED FASTING GLUCOSE: Chronic | ICD-10-CM

## 2023-11-10 DIAGNOSIS — R60.9 PERIPHERAL EDEMA: ICD-10-CM

## 2023-11-10 DIAGNOSIS — E78.00 HYPERCHOLESTEROLEMIA: Chronic | ICD-10-CM

## 2023-11-10 DIAGNOSIS — Z76.89 ENCOUNTER TO ESTABLISH CARE WITH NEW DOCTOR: Primary | ICD-10-CM

## 2023-11-10 RX ORDER — SIMVASTATIN 40 MG
40 TABLET ORAL NIGHTLY
Qty: 90 TABLET | Refills: 3 | Status: SHIPPED | OUTPATIENT
Start: 2023-11-10

## 2023-11-10 RX ORDER — LEVOTHYROXINE SODIUM 112 UG/1
112 TABLET ORAL DAILY
Qty: 90 TABLET | Refills: 4 | Status: SHIPPED | OUTPATIENT
Start: 2023-11-10

## 2023-11-10 RX ORDER — METHENAMINE, SODIUM PHOSPHATE, MONOBASIC, ANHYDROUS, PHENYL SALICYLATE, METHYLENE BLUE AND HYOSCYAMINE SULFATE 118; 40.8; 36; 10; .12 MG/1; MG/1; MG/1; MG/1; MG/1
CAPSULE ORAL
COMMUNITY
Start: 2023-10-17

## 2023-11-10 RX ORDER — FUROSEMIDE 20 MG/1
20 TABLET ORAL DAILY PRN
Start: 2023-11-10

## 2023-11-10 NOTE — PROGRESS NOTES
"Chief Complaint  Establish Care and Hip Pain (Having a hip replacement )    Subjective        Marci Kolb presents to [facility]  History of Present Illness    She is establishing care with me today.  Prior PCP JAYNE Banegas.    She is being treated by primary care for her hypothyroidism, hyperlipidemia, impaired fasting glucose, chronic renal insufficiency.  She is also on Advair but no pulmonary condition listed in the chart.  She is taking her medication as prescribed below.  She is having right hip replacement soon.  She already had the left one replaced.  She informs me that she has a history of two previous spontaneous pneumothorax occurrence on the right.  She is on ASA therapy for a prior history of CVA.  This is also addressed by maximizing blood pressure control and cholesterol control.      Current Outpatient Medications:     aspirin 81 MG chewable tablet, Chew 1 tablet Daily., Disp: , Rfl:     Cholecalciferol (Vitamin D) 50 MCG (2000 UT) tablet, Take 2,000 Units by mouth Daily., Disp: 90 tablet, Rfl: 0    furosemide (LASIX) 20 MG tablet, Take 1 tablet by mouth Daily As Needed (swelling)., Disp: , Rfl:     levothyroxine (SYNTHROID, LEVOTHROID) 112 MCG tablet, Take 1 tablet by mouth Daily., Disp: 90 tablet, Rfl: 4    Meth-Hyo-M Bl-Na Phos-Ph Sal (Uro-MP) 118 MG capsule, TAKE 1 CAPSULE BY MOUTH EVERY 8 HOURS AS NEEDED FOR BLADDER SYMPTOMS, Disp: , Rfl:     Multiple Vitamins-Minerals (b complex-C-E-zinc) tablet, Take 1 tablet by mouth Daily. 50mg, once daily, Disp: , Rfl:     Omega-3 Fatty Acids (fish oil) 1000 MG capsule capsule, Take 1 capsule by mouth Daily With Breakfast., Disp: , Rfl:     simvastatin (ZOCOR) 40 MG tablet, Take 1 tablet by mouth Every Night., Disp: 90 tablet, Rfl: 3      Objective   Vital Signs:  /70 (BP Location: Left arm, Patient Position: Sitting, Cuff Size: Small Adult)   Ht 165.1 cm (65\")   Wt 65.8 kg (145 lb)   BMI 24.13 kg/m² " "  Estimated body mass index is 24.13 kg/m² as calculated from the following:    Height as of this encounter: 165.1 cm (65\").    Weight as of this encounter: 65.8 kg (145 lb).       BMI is within normal parameters. No other follow-up for BMI required.      Physical Exam  Vitals and nursing note reviewed.   Constitutional:       General: She is not in acute distress.     Appearance: Normal appearance.   Cardiovascular:      Rate and Rhythm: Normal rate and regular rhythm.      Heart sounds: Normal heart sounds. No murmur heard.  Pulmonary:      Effort: Pulmonary effort is normal.      Breath sounds: Normal breath sounds.   Neurological:      Mental Status: She is alert.        Result Review :  The following data was reviewed by: Jatinder Nugent MD on 11/10/2023:  Common labs          3/10/2023    08:15 8/21/2023    15:38   Common Labs   Glucose 117  104    BUN 18  14    Creatinine 0.82  0.80    Sodium 143  142    Potassium 4.3  4.1    Chloride 107  107    Calcium 10.5  10.2    Total Protein 6.9  6.6    Albumin 4.3  4.2    Total Bilirubin 0.3  0.3    Alkaline Phosphatase 107  157    AST (SGOT) 29  28    ALT (SGPT) 27  25    WBC  8.88    Hemoglobin  13.9    Hematocrit  40.6    Platelets  260    Total Cholesterol 134  180    Triglycerides 195  285    HDL Cholesterol 39  41    LDL Cholesterol  63  91    Hemoglobin A1C 6.00  5.80                   Assessment and Plan   Diagnoses and all orders for this visit:    1. Encounter to establish care with new doctor (Primary)    2. Acquired hypothyroidism  -     TSH Rfx On Abnormal To Free T4; Future  -     levothyroxine (SYNTHROID, LEVOTHROID) 112 MCG tablet; Take 1 tablet by mouth Daily.  Dispense: 90 tablet; Refill: 4    3. Hypercholesterolemia  -     CBC (No Diff); Future  -     Comprehensive Metabolic Panel; Future  -     Lipid Panel With LDL / HDL Ratio; Future  -     simvastatin (ZOCOR) 40 MG tablet; Take 1 tablet by mouth Every Night.  Dispense: 90 tablet; Refill: 3    4. " Stage 3a chronic kidney disease  -     CBC (No Diff); Future  -     Comprehensive Metabolic Panel; Future  -     Microalbumin / Creatinine Urine Ratio - Urine, Clean Catch; Future    5. Impaired fasting glucose  -     Hemoglobin A1c; Future    6. History of pneumothorax    7. Peripheral edema  -     furosemide (LASIX) 20 MG tablet; Take 1 tablet by mouth Daily As Needed (swelling).    8. History of CVA (cerebrovascular accident)        Clinically stable chronic conditions as above.  Continue all medications as above.  Labs reviewed/ordered as above.         I spent 40 minutes caring for Marci on this date of service. This time includes time spent by me in the following activities:preparing for the visit, reviewing tests, obtaining and/or reviewing a separately obtained history, performing a medically appropriate examination and/or evaluation , counseling and educating the patient/family/caregiver, ordering medications, tests, or procedures, documenting information in the medical record, and independently interpreting results and communicating that information with the patient/family/caregiver  Follow Up   Return in about 3 months (around 2/5/2024) for Medicare Wellness.  Patient was given instructions and counseling regarding her condition or for health maintenance advice. Please see specific information pulled into the AVS if appropriate.

## 2023-11-10 NOTE — PATIENT INSTRUCTIONS
"MyChart Tips:    MyChart is a useful part of our patient care program and is a great way for us to communicate lab results and for you to request refills.  Not all medical questions are appropriate for MyChart such as new medical issues that require taking a history, performing an exam, getting labs/studies or researching medical questions needed to be addressed in the office.    Examples of medical issues that are APPROPRIATE for MyChart:  -Follow-up on problems we have already addressed in a visit such as home testing results, blood pressure readings, glucose readings  -Questions that can be answered with a simple \"yes\" or \"no\"    Communication that is NOT APPROPRIATE for MyChart:  -MyChart is not for new problems, serious problems or urgent problems.  Urgent matters should be addressed by phone, in the office, urgent care or the ER.  -[employer] messages are not email.  Staff will check messages each weekday.  We strive for a 48-hour turnaround on messaging.    -[employer] is not for private issues.  Messages are received first by our office staff.    Please allow up to 7 business days for lab results to be sent through [employer] to you before contacting your provider.  Sometimes we are waiting for results to get back from the lab and also your provider needs time to analyze them thoroughly before making recommendations.  While the internet has great resources, it is not a substitute for interpreting lab results.    We also ask that you not send [employer] messages and telephone calls regarding the same issue simultaneously.  This slows down the process of returning your call/message and confuses staff.    Be mindful that [employer] messages and telephone calls become part of your permanent medical record.    Lastly, your provider cannot access your [employer].  It is a service which communicates with the EHR (Electronic Health Record).  Sometimes there are errors in [employer] that staff and providers cannot see and these errors " are not part of your EHR.  Vaccines and screening reminders have been incorrect in MyChart.    We appreciate your respect for the limitations and boundaries of [employer].

## 2023-11-16 ENCOUNTER — PATIENT ROUNDING (BHMG ONLY) (OUTPATIENT)
Dept: INTERNAL MEDICINE | Facility: CLINIC | Age: 78
End: 2023-11-16
Payer: MEDICARE

## 2023-12-01 ENCOUNTER — LAB (OUTPATIENT)
Facility: HOSPITAL | Age: 78
End: 2023-12-01
Payer: MEDICARE

## 2023-12-01 DIAGNOSIS — R73.01 IMPAIRED FASTING GLUCOSE: Chronic | ICD-10-CM

## 2023-12-01 DIAGNOSIS — E78.00 HYPERCHOLESTEROLEMIA: Chronic | ICD-10-CM

## 2023-12-01 DIAGNOSIS — N18.31 STAGE 3A CHRONIC KIDNEY DISEASE: Chronic | ICD-10-CM

## 2023-12-01 DIAGNOSIS — E03.9 ACQUIRED HYPOTHYROIDISM: ICD-10-CM

## 2023-12-01 LAB
ALBUMIN SERPL-MCNC: 4 G/DL (ref 3.5–5.2)
ALBUMIN UR-MCNC: 2.1 MG/DL
ALBUMIN/GLOB SERPL: 1.3 G/DL
ALP SERPL-CCNC: 112 U/L (ref 39–117)
ALT SERPL W P-5'-P-CCNC: 16 U/L (ref 1–33)
ANION GAP SERPL CALCULATED.3IONS-SCNC: 11.1 MMOL/L (ref 5–15)
AST SERPL-CCNC: 25 U/L (ref 1–32)
BILIRUB SERPL-MCNC: 0.5 MG/DL (ref 0–1.2)
BUN SERPL-MCNC: 13 MG/DL (ref 8–23)
BUN/CREAT SERPL: 14 (ref 7–25)
CALCIUM SPEC-SCNC: 10.6 MG/DL (ref 8.6–10.5)
CHLORIDE SERPL-SCNC: 108 MMOL/L (ref 98–107)
CHOLEST SERPL-MCNC: 142 MG/DL (ref 0–200)
CO2 SERPL-SCNC: 22.9 MMOL/L (ref 22–29)
CREAT SERPL-MCNC: 0.93 MG/DL (ref 0.57–1)
CREAT UR-MCNC: 145.9 MG/DL
DEPRECATED RDW RBC AUTO: 42.7 FL (ref 37–54)
EGFRCR SERPLBLD CKD-EPI 2021: 62.3 ML/MIN/1.73
ERYTHROCYTE [DISTWIDTH] IN BLOOD BY AUTOMATED COUNT: 12.5 % (ref 12.3–15.4)
GLOBULIN UR ELPH-MCNC: 3.1 GM/DL
GLUCOSE SERPL-MCNC: 90 MG/DL (ref 65–99)
HBA1C MFR BLD: 5.9 % (ref 4.8–5.6)
HCT VFR BLD AUTO: 42.6 % (ref 34–46.6)
HDLC SERPL-MCNC: 36 MG/DL (ref 40–60)
HGB BLD-MCNC: 14.4 G/DL (ref 12–15.9)
LDLC SERPL CALC-MCNC: 81 MG/DL (ref 0–100)
LDLC/HDLC SERPL: 2.14 {RATIO}
MCH RBC QN AUTO: 31.9 PG (ref 26.6–33)
MCHC RBC AUTO-ENTMCNC: 33.8 G/DL (ref 31.5–35.7)
MCV RBC AUTO: 94.5 FL (ref 79–97)
MICROALBUMIN/CREAT UR: 14.4 MG/G (ref 0–29)
PLATELET # BLD AUTO: 265 10*3/MM3 (ref 140–450)
PMV BLD AUTO: 10.4 FL (ref 6–12)
POTASSIUM SERPL-SCNC: 4.7 MMOL/L (ref 3.5–5.2)
PROT SERPL-MCNC: 7.1 G/DL (ref 6–8.5)
RBC # BLD AUTO: 4.51 10*6/MM3 (ref 3.77–5.28)
SODIUM SERPL-SCNC: 142 MMOL/L (ref 136–145)
T4 FREE SERPL-MCNC: 1.49 NG/DL (ref 0.93–1.7)
TRIGL SERPL-MCNC: 144 MG/DL (ref 0–150)
TSH SERPL DL<=0.05 MIU/L-ACNC: 0.05 UIU/ML (ref 0.27–4.2)
VLDLC SERPL-MCNC: 25 MG/DL (ref 5–40)
WBC NRBC COR # BLD AUTO: 8.48 10*3/MM3 (ref 3.4–10.8)

## 2023-12-01 PROCEDURE — 80061 LIPID PANEL: CPT

## 2023-12-01 PROCEDURE — 82570 ASSAY OF URINE CREATININE: CPT

## 2023-12-01 PROCEDURE — 85027 COMPLETE CBC AUTOMATED: CPT

## 2023-12-01 PROCEDURE — 36415 COLL VENOUS BLD VENIPUNCTURE: CPT

## 2023-12-01 PROCEDURE — 84439 ASSAY OF FREE THYROXINE: CPT

## 2023-12-01 PROCEDURE — 83036 HEMOGLOBIN GLYCOSYLATED A1C: CPT

## 2023-12-01 PROCEDURE — 80053 COMPREHEN METABOLIC PANEL: CPT

## 2023-12-01 PROCEDURE — 82043 UR ALBUMIN QUANTITATIVE: CPT

## 2023-12-01 PROCEDURE — 84443 ASSAY THYROID STIM HORMONE: CPT

## 2024-02-06 DIAGNOSIS — E03.9 ACQUIRED HYPOTHYROIDISM: Chronic | ICD-10-CM

## 2024-02-06 RX ORDER — LEVOTHYROXINE SODIUM 112 UG/1
112 TABLET ORAL DAILY
Qty: 90 TABLET | Refills: 0 | Status: SHIPPED | OUTPATIENT
Start: 2024-02-06

## 2024-02-12 ENCOUNTER — TELEPHONE (OUTPATIENT)
Dept: INTERNAL MEDICINE | Facility: CLINIC | Age: 79
End: 2024-02-12
Payer: MEDICARE

## 2024-02-13 ENCOUNTER — HOSPITAL ENCOUNTER (OUTPATIENT)
Facility: HOSPITAL | Age: 79
Discharge: HOME | End: 2024-02-13
Payer: MEDICARE

## 2024-02-13 ENCOUNTER — OFFICE VISIT (OUTPATIENT)
Dept: INTERNAL MEDICINE | Facility: CLINIC | Age: 79
End: 2024-02-13
Payer: MEDICARE

## 2024-02-13 VITALS
BODY MASS INDEX: 23.66 KG/M2 | HEART RATE: 74 BPM | HEIGHT: 65 IN | DIASTOLIC BLOOD PRESSURE: 70 MMHG | RESPIRATION RATE: 18 BRPM | SYSTOLIC BLOOD PRESSURE: 118 MMHG | WEIGHT: 142 LBS | OXYGEN SATURATION: 95 %

## 2024-02-13 DIAGNOSIS — M25.551 ACUTE PAIN OF RIGHT HIP: ICD-10-CM

## 2024-02-13 DIAGNOSIS — M54.50 ACUTE RIGHT-SIDED LOW BACK PAIN WITHOUT SCIATICA: ICD-10-CM

## 2024-02-13 DIAGNOSIS — M16.11 ARTHRITIS OF RIGHT HIP: ICD-10-CM

## 2024-02-13 DIAGNOSIS — R07.89 ACUTE CHEST WALL PAIN: ICD-10-CM
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
## 2024-02-13 DIAGNOSIS — M51.36 DDD (DEGENERATIVE DISC DISEASE), LUMBAR: Chronic | ICD-10-CM

## 2024-02-13 PROBLEM — M50.30 DDD (DEGENERATIVE DISC DISEASE), CERVICAL: Chronic | Status: ACTIVE | Noted: 2024-02-13

## 2024-02-13 PROBLEM — M51.369 DDD (DEGENERATIVE DISC DISEASE), LUMBAR: Chronic | Status: ACTIVE | Noted: 2024-02-13

## 2024-02-13 PROBLEM — M19.91 PRIMARY OSTEOARTHRITIS: Status: RESOLVED | Noted: 2022-08-04 | Resolved: 2024-02-13

## 2024-02-13 PROCEDURE — 71101 X-RAY EXAM UNILAT RIBS/CHEST: CPT

## 2024-02-13 PROCEDURE — 73502 X-RAY EXAM HIP UNI 2-3 VIEWS: CPT

## 2024-02-13 PROCEDURE — 72110 X-RAY EXAM L-2 SPINE 4/>VWS: CPT

## 2024-02-15 ENCOUNTER — TELEPHONE (OUTPATIENT)
Dept: INTERNAL MEDICINE | Facility: CLINIC | Age: 79
End: 2024-02-15
Payer: MEDICARE

## 2024-02-19 ENCOUNTER — OFFICE VISIT (OUTPATIENT)
Dept: INTERNAL MEDICINE | Facility: CLINIC | Age: 79
End: 2024-02-19
Payer: MEDICARE

## 2024-02-19 VITALS
OXYGEN SATURATION: 96 % | WEIGHT: 146.2 LBS | HEIGHT: 65 IN | SYSTOLIC BLOOD PRESSURE: 122 MMHG | HEART RATE: 69 BPM | TEMPERATURE: 98.5 F | DIASTOLIC BLOOD PRESSURE: 70 MMHG | BODY MASS INDEX: 24.36 KG/M2

## 2024-02-19 DIAGNOSIS — N18.2 STAGE 2 CHRONIC KIDNEY DISEASE: Chronic | ICD-10-CM

## 2024-02-19 DIAGNOSIS — R73.03 PREDIABETES: Chronic | ICD-10-CM

## 2024-02-19 DIAGNOSIS — E03.9 ACQUIRED HYPOTHYROIDISM: Chronic | ICD-10-CM

## 2024-02-19 DIAGNOSIS — M54.2 CERVICAL PAIN (NECK): ICD-10-CM

## 2024-02-19 DIAGNOSIS — Z00.00 MEDICARE ANNUAL WELLNESS VISIT, SUBSEQUENT: Primary | ICD-10-CM

## 2024-02-19 DIAGNOSIS — E78.00 HYPERCHOLESTEROLEMIA: Chronic | ICD-10-CM

## 2024-02-19 PROCEDURE — 99214 OFFICE O/P EST MOD 30 MIN: CPT | Performed by: FAMILY MEDICINE

## 2024-02-19 PROCEDURE — 1159F MED LIST DOCD IN RCRD: CPT | Performed by: FAMILY MEDICINE

## 2024-02-19 PROCEDURE — 1170F FXNL STATUS ASSESSED: CPT | Performed by: FAMILY MEDICINE

## 2024-02-19 PROCEDURE — G0439 PPPS, SUBSEQ VISIT: HCPCS | Performed by: FAMILY MEDICINE

## 2024-02-19 PROCEDURE — 1160F RVW MEDS BY RX/DR IN RCRD: CPT | Performed by: FAMILY MEDICINE

## 2024-02-19 NOTE — PROGRESS NOTES
The ABCs of the Annual Wellness Visit  Subsequent Medicare Wellness Visit    Subjective    Marci Kolb is a 79 y.o. female who presents for a Subsequent Medicare Wellness Visit.    The following portions of the patient's history were reviewed and   updated as appropriate: allergies, current medications, past family history, past medical history, past social history, past surgical history, and problem list.    Compared to one year ago, the patient feels her physical   health is the same.    Compared to one year ago, the patient feels her mental   health is the same.    Recent Hospitalizations:  She was not admitted to the hospital during the last year.       Current Medical Providers:  Patient Care Team:  Jatinder Nugent MD as PCP - General (Family Medicine)  Abdias Nash MD (Orthopedic Surgery)  Jatinder Dominguez MD as Consulting Physician (Otolaryngology)  Romulo Kelly Jr., MD as Consulting Physician (Cardiology)    Outpatient Medications Prior to Visit   Medication Sig Dispense Refill    aspirin 81 MG chewable tablet Chew 1 tablet Daily.      Cholecalciferol (Vitamin D) 50 MCG (2000 UT) tablet Take 2,000 Units by mouth Daily. 90 tablet 0    furosemide (LASIX) 20 MG tablet Take 1 tablet by mouth Daily As Needed (swelling).      levothyroxine (SYNTHROID, LEVOTHROID) 112 MCG tablet TAKE 1 TABLET BY MOUTH DAILY 90 tablet 0    Meth-Hyo-M Bl-Na Phos-Ph Sal (Uro-MP) 118 MG capsule TAKE 1 CAPSULE BY MOUTH EVERY 8 HOURS AS NEEDED FOR BLADDER SYMPTOMS      Multiple Vitamins-Minerals (b complex-C-E-zinc) tablet Take 1 tablet by mouth Daily. 50mg, once daily      Omega-3 Fatty Acids (fish oil) 1000 MG capsule capsule Take 1 capsule by mouth Daily With Breakfast.      simvastatin (ZOCOR) 40 MG tablet Take 1 tablet by mouth Every Night. 90 tablet 3     No facility-administered medications prior to visit.       No opioid medication identified on active medication list. I have reviewed chart for other  "potential  high risk medication/s and harmful drug interactions in the elderly.        Aspirin is on active medication list. Aspirin use is indicated based on review of current medical condition/s. Pros and cons of this therapy have been discussed today. Benefits of this medication outweigh potential harm.  Patient has been encouraged to continue taking this medication.  .      Patient Active Problem List   Diagnosis    Diverticulosis of intestine    Acquired hypothyroidism    Low back pain    Cervical pain    Sciatica    Acute cystitis without hematuria    Rheumatoid arthritis, involving unspecified site, unspecified whether rheumatoid factor present    Chronic pain disorder    Lumbar spinal stenosis    Stage 2 chronic kidney disease    Osteopenia    Hypercalcemia    Recurrent UTI    Bilateral carotid artery stenosis    Constipation    Hyperparathyroidism, unspecified    Hypercholesterolemia    Prediabetes    Sleep disturbance    History of pneumothorax    History of CVA (cerebrovascular accident)    DDD (degenerative disc disease), lumbar    DDD (degenerative disc disease), cervical    Arthritis of right hip     Advance Care Planning   Advance Care Planning     Advance Directive is not on file.  ACP discussion was held with the patient during this visit. Patient has an advance directive (not in EMR), copy requested.     Objective    Vitals:    02/19/24 1250   BP: 122/70   BP Location: Left arm   Patient Position: Sitting   Cuff Size: Adult   Pulse: 69   Temp: 98.5 °F (36.9 °C)   TempSrc: Oral   SpO2: 96%   Weight: 66.3 kg (146 lb 3.2 oz)   Height: 165.1 cm (65\")   PainSc: 0-No pain     Estimated body mass index is 24.33 kg/m² as calculated from the following:    Height as of this encounter: 165.1 cm (65\").    Weight as of this encounter: 66.3 kg (146 lb 3.2 oz).    BMI is within normal parameters. No other follow-up for BMI required.      Does the patient have evidence of cognitive impairment? No    Lab Results "   Component Value Date    TRIG 144 2023    HDL 36 (L) 2023    LDL 81 2023    VLDL 25 2023    HGBA1C 5.90 (H) 2023        HEALTH RISK ASSESSMENT    Smoking Status:  Social History     Tobacco Use   Smoking Status Former    Packs/day: 1.00    Years: 35.00    Additional pack years: 0.00    Total pack years: 35.00    Types: Cigarettes    Start date:     Quit date: 2000    Years since quittin.1   Smokeless Tobacco Never     Alcohol Consumption:  Social History     Substance and Sexual Activity   Alcohol Use No     Fall Risk Screen:    RICHARD Fall Risk Assessment was completed, and patient is at HIGH risk for falls. Assessment completed on:2024    Depression Screenin/19/2024    12:47 PM   PHQ-2/PHQ-9 Depression Screening   Little Interest or Pleasure in Doing Things 0-->not at all   Feeling Down, Depressed or Hopeless 0-->not at all   PHQ-9: Brief Depression Severity Measure Score 0       Health Habits and Functional and Cognitive Screenin/19/2024    12:48 PM   Functional & Cognitive Status   Do you have difficulty preparing food and eating? No   Do you have difficulty bathing yourself, getting dressed or grooming yourself? No   Do you have difficulty using the toilet? No   Do you have difficulty moving around from place to place? No   Do you have trouble with steps or getting out of a bed or a chair? Yes   Current Diet Unhealthy Diet   Dental Exam Up to date   Eye Exam Up to date   Exercise (times per week) 0 times per week   Current Exercises Include No Regular Exercise   Do you need help using the phone?  No   Are you deaf or do you have serious difficulty hearing?  Yes   Do you need help to go to places out of walking distance? Yes   Do you need help shopping? No   Do you need help preparing meals?  No   Do you need help with housework?  Yes   Do you need help with laundry? No   Do you need help taking your medications? No   Do you need help managing  money? No   Do you ever drive or ride in a car without wearing a seat belt? No   Have you felt unusual stress, anger or loneliness in the last month? Yes   Who do you live with? Spouse   If you need help, do you have trouble finding someone available to you? No   Have you been bothered in the last four weeks by sexual problems? No   Do you have difficulty concentrating, remembering or making decisions? Yes       Age-appropriate Screening Schedule:  Refer to the list below for future screening recommendations based on patient's age, sex and/or medical conditions. Orders for these recommended tests are listed in the plan section. The patient has been provided with a written plan.    Health Maintenance   Topic Date Due    Pneumococcal Vaccine 65+ (1 of 2 - PCV) Never done    RSV Vaccine - Adults (1 - 1-dose 60+ series) Never done    ANNUAL WELLNESS VISIT  04/28/2022    DXA SCAN  03/05/2023    INFLUENZA VACCINE  08/01/2023    COVID-19 Vaccine (4 - 2023-24 season) 09/01/2023    LIPID PANEL  12/01/2024    COLORECTAL CANCER SCREENING  12/30/2029    TDAP/TD VACCINES (2 - Td or Tdap) 07/29/2030    ZOSTER VACCINE  Completed                  CMS Preventative Services Quick Reference  Risk Factors Identified During Encounter  Immunizations Discussed/Encouraged: Influenza, Prevnar 20 (Pneumococcal 20-valent conjugate), Shingrix, and COVID19  The above risks/problems have been discussed with the patient.  Pertinent information has been shared with the patient in the After Visit Summary.  An After Visit Summary and PPPS were made available to the patient.    Follow Up:   Next Medicare Wellness visit to be scheduled in 1 year.       Additional E&M Note during same encounter follows:  Patient has multiple medical problems which are significant and separately identifiable that require additional work above and beyond the Medicare Wellness Visit.      Chief Complaint  Medicare Wellness-subsequent    Subjective        HPI  Marci C  "Cortes is also being seen today for hypothyroidism, hyperlipidemia, prediabetes, chronic renal disease.  Blood pressure controlled in the office today.  She is taking her medications as prescribed below.  Main concern is still having pain 2/2 to her fall I saw her for recently.  She is having some neck pain which is bothersome to her on the right.      Current Outpatient Medications:     aspirin 81 MG chewable tablet, Chew 1 tablet Daily., Disp: , Rfl:     Cholecalciferol (Vitamin D) 50 MCG (2000 UT) tablet, Take 2,000 Units by mouth Daily., Disp: 90 tablet, Rfl: 0    furosemide (LASIX) 20 MG tablet, Take 1 tablet by mouth Daily As Needed (swelling)., Disp: , Rfl:     levothyroxine (SYNTHROID, LEVOTHROID) 112 MCG tablet, TAKE 1 TABLET BY MOUTH DAILY, Disp: 90 tablet, Rfl: 0    Meth-Hyo-M Bl-Na Phos-Ph Sal (Uro-MP) 118 MG capsule, TAKE 1 CAPSULE BY MOUTH EVERY 8 HOURS AS NEEDED FOR BLADDER SYMPTOMS, Disp: , Rfl:     Multiple Vitamins-Minerals (b complex-C-E-zinc) tablet, Take 1 tablet by mouth Daily. 50mg, once daily, Disp: , Rfl:     Omega-3 Fatty Acids (fish oil) 1000 MG capsule capsule, Take 1 capsule by mouth Daily With Breakfast., Disp: , Rfl:     simvastatin (ZOCOR) 40 MG tablet, Take 1 tablet by mouth Every Night., Disp: 90 tablet, Rfl: 3           Objective   Vital Signs:  /70 (BP Location: Left arm, Patient Position: Sitting, Cuff Size: Adult)   Pulse 69   Temp 98.5 °F (36.9 °C) (Oral)   Ht 165.1 cm (65\")   Wt 66.3 kg (146 lb 3.2 oz)   SpO2 96%   BMI 24.33 kg/m²     Physical Exam  Vitals and nursing note reviewed.   Constitutional:       General: She is not in acute distress.     Appearance: Normal appearance.   Cardiovascular:      Rate and Rhythm: Normal rate and regular rhythm.      Heart sounds: Normal heart sounds. No murmur heard.  Pulmonary:      Effort: Pulmonary effort is normal.      Breath sounds: Normal breath sounds.   Musculoskeletal:      Cervical back: Spasms present. Normal " range of motion.   Neurological:      Mental Status: She is alert.          The following data was reviewed by: Jatinder Nugent MD on 02/19/2024:  Common labs          3/10/2023    08:15 8/21/2023    15:38 12/1/2023    13:26   Common Labs   Glucose 117  104  90    BUN 18  14  13    Creatinine 0.82  0.80  0.93    Sodium 143  142  142    Potassium 4.3  4.1  4.7    Chloride 107  107  108    Calcium 10.5  10.2  10.6    Total Protein 6.9  6.6     Albumin 4.3  4.2  4.0    Total Bilirubin 0.3  0.3  0.5    Alkaline Phosphatase 107  157  112    AST (SGOT) 29  28  25    ALT (SGPT) 27  25  16    WBC  8.88  8.48    Hemoglobin  13.9  14.4    Hematocrit  40.6  42.6    Platelets  260  265    Total Cholesterol   142    Total Cholesterol 134  180     Triglycerides 195  285  144    HDL Cholesterol 39  41  36    LDL Cholesterol  63  91  81    Hemoglobin A1C 6.00  5.80  5.90    Microalbumin, Urine   2.1                 Assessment and Plan   Diagnoses and all orders for this visit:    1. Medicare annual wellness visit, subsequent (Primary)    2. Acquired hypothyroidism  -     TSH Rfx On Abnormal To Free T4    3. Prediabetes  -     Renal Function Panel    4. Hypercholesterolemia  -     Renal Function Panel    5. Stage 2 chronic kidney disease  -     Renal Function Panel  -     Microalbumin / Creatinine Urine Ratio - Urine, Clean Catch        Clinically stable chronic conditions as above.  Continue all medications as above.  Labs reviewed/ordered as above.    I offered to refer to PT for the neck pain.  She will let me know if she is interested in the future but currently declines.         Follow Up   Return in about 6 months (around 8/19/2024) for Next scheduled follow up.  Patient was given instructions and counseling regarding her condition or for health maintenance advice. Please see specific information pulled into the AVS if appropriate.

## 2024-02-19 NOTE — PATIENT INSTRUCTIONS
"MyChart Tips:    [employer] can be a useful part of our patient care program and is a way for us to communicate lab results and for you to request refills.  Here is some information regarding the best way to use [employer] for communication.       Examples of medical issues that are APPROPRIATE for [employer]:  -Follow-up on problems we have already addressed in a visit such as home testing results, blood pressure readings, glucose readings  -Questions that can be answered with a simple \"yes\" or \"no\"  -Please keep communication concise and to the point.  All other types of communication should be held for an office visit.    Communication that is NOT APPROPRIATE for [employer]:  -New problems, serious problems or urgent problems.  Urgent matters should be addressed by phone for advice, in the office, urgent care or the ER.  -Requesting Paxlovid or Antibiotics: These types of problems require an evaluation unless your provider approved this previously.    -[employer] messages are not email.  Staff will check messages each weekday.  We strive for a 48-hour turnaround on messaging.    -[employer] is not for private issues.  Messages are received first by our office staff.    Please be mindful that sometimes it may take longer to receive a response on [employer].  Many times of the year we have high volumes of messages coming into physician inboxes.      Please also be mindful that [employer] messages become part of your permanent medical record.    We appreciate your respect for the limitations and boundaries of [employer].      Lab Results:  Please allow up to 7 business days for lab results to be sent through [employer] to you before contacting your provider.  Sometimes we are waiting for results to get back from the lab and also your provider needs time to analyze them thoroughly before making recommendations.  Also, providers may be out of the office on vacation.      While the internet has great resources, it is not a substitute for " interpreting lab results.

## 2024-03-18 DIAGNOSIS — E03.9 ACQUIRED HYPOTHYROIDISM: Chronic | ICD-10-CM

## 2024-03-25 RX ORDER — LEVOTHYROXINE SODIUM 112 UG/1
112 TABLET ORAL DAILY
Qty: 90 TABLET | Refills: 1 | Status: SHIPPED | OUTPATIENT
Start: 2024-03-25

## 2024-04-24 DIAGNOSIS — E78.00 HYPERCHOLESTEROLEMIA: Chronic | ICD-10-CM

## 2024-04-24 RX ORDER — SIMVASTATIN 40 MG
40 TABLET ORAL NIGHTLY
Qty: 90 TABLET | Refills: 3 | Status: SHIPPED | OUTPATIENT
Start: 2024-04-24

## 2024-04-24 NOTE — TELEPHONE ENCOUNTER
"Caller: Marci Kolb \"Zeeshan\"    Relationship: Self    Best call back number: [phone]     Requested Prescriptions:   Requested Prescriptions     Pending Prescriptions Disp Refills    simvastatin (ZOCOR) 40 MG tablet 90 tablet 3     Sig: Take 1 tablet by mouth Every Night.        Pharmacy where request should be sent: [id number] - [address] - [phone]  - [phone] FX     Last office visit with prescribing clinician: 2/19/2024   Last telemedicine visit with prescribing clinician: Visit date not found   Next office visit with prescribing clinician: 8/19/2024     Additional details provided by patient: PATIENT HAS 1 DAY LEFT    Does the patient have less than a 3 day supply:  [x] Yes  [] No    Would you like a call back once the refill request has been completed: [] Yes [x] No    If the office needs to give you a call back, can they leave a voicemail: [] Yes [x] No    Carl Link Rep   04/24/24 12:03 EDT         "

## 2024-05-28 ENCOUNTER — OFFICE VISIT (OUTPATIENT)
Dept: INTERNAL MEDICINE | Facility: CLINIC | Age: 79
End: 2024-05-28
Payer: MEDICARE

## 2024-05-28 ENCOUNTER — HOSPITAL ENCOUNTER (OUTPATIENT)
Facility: HOSPITAL | Age: 79
Discharge: HOME | End: 2024-05-28
Admitting: FAMILY MEDICINE
Payer: MEDICARE

## 2024-05-28 VITALS
WEIGHT: 148 LBS | BODY MASS INDEX: 24.63 KG/M2 | DIASTOLIC BLOOD PRESSURE: 78 MMHG | OXYGEN SATURATION: 99 % | HEART RATE: 69 BPM | TEMPERATURE: 97.3 F | SYSTOLIC BLOOD PRESSURE: 128 MMHG

## 2024-05-28 DIAGNOSIS — M95.2 DEFORMITY OF SKULL: ICD-10-CM

## 2024-05-28 DIAGNOSIS — M95.2 DEFORMITY OF SKULL: Primary | ICD-10-CM

## 2024-05-28 PROCEDURE — 1126F AMNT PAIN NOTED NONE PRSNT: CPT | Performed by: FAMILY MEDICINE

## 2024-05-28 PROCEDURE — 1160F RVW MEDS BY RX/DR IN RCRD: CPT | Performed by: FAMILY MEDICINE

## 2024-05-28 PROCEDURE — G2211 COMPLEX E/M VISIT ADD ON: HCPCS | Performed by: FAMILY MEDICINE

## 2024-05-28 PROCEDURE — 1159F MED LIST DOCD IN RCRD: CPT | Performed by: FAMILY MEDICINE

## 2024-05-28 PROCEDURE — 70260 X-RAY EXAM OF SKULL: CPT

## 2024-05-28 PROCEDURE — 99213 OFFICE O/P EST LOW 20 MIN: CPT | Performed by: FAMILY MEDICINE

## 2024-05-28 NOTE — PATIENT INSTRUCTIONS
"MyChart Tips:    [employer] can be a useful part of our patient care program and is a way for us to communicate lab results and for you to request refills.  Here is some information regarding the best way to use [employer] for communication.       Examples of medical issues that are APPROPRIATE for [employer]:  -Follow-up on problems we have already addressed in a visit such as home testing results, blood pressure readings, glucose readings  -Questions that can be answered with a simple \"yes\" or \"no\"  -Please keep communication concise and to the point.  All other types of communication should be held for an office visit.    Communication that is NOT APPROPRIATE for [employer]:  -New problems, serious problems or urgent problems.  Urgent matters should be addressed by phone for advice, in the office, urgent care or the ER.  -Requesting Paxlovid or Antibiotics: These types of problems require an evaluation unless your provider approved this previously.    -[employer] messages are not email.  Staff will check messages each weekday.  We strive for a 48-hour turnaround on messaging.    -[employer] is not for private issues.  Messages are received first by our office staff.    Please be mindful that sometimes it may take longer to receive a response on [employer].  Many times of the year we have high volumes of messages coming into physician inboxes.      Please also be mindful that [employer] messages become part of your permanent medical record.    We appreciate your respect for the limitations and boundaries of [employer].      Lab Results:  Please allow up to 7 business days for lab results to be sent through [employer] to you before contacting your provider.  Sometimes we are waiting for results to get back from the lab and also your provider needs time to analyze them thoroughly before making recommendations.  Also, providers may be out of the office on vacation.      While the internet has great resources, it is not a substitute for " interpreting lab results.
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
## 2024-05-30 DIAGNOSIS — M95.2 DEFORMITY OF SKULL: Primary | ICD-10-CM

## 2024-08-19 ENCOUNTER — OFFICE VISIT (OUTPATIENT)
Dept: INTERNAL MEDICINE | Facility: CLINIC | Age: 79
End: 2024-08-19
Payer: MEDICARE

## 2024-08-19 VITALS
SYSTOLIC BLOOD PRESSURE: 120 MMHG | OXYGEN SATURATION: 99 % | HEART RATE: 76 BPM | DIASTOLIC BLOOD PRESSURE: 74 MMHG | WEIGHT: 143 LBS | HEIGHT: 65 IN | TEMPERATURE: 98.4 F | BODY MASS INDEX: 23.82 KG/M2

## 2024-08-19 DIAGNOSIS — E03.9 ACQUIRED HYPOTHYROIDISM: Primary | Chronic | ICD-10-CM

## 2024-08-19 DIAGNOSIS — R73.03 PREDIABETES: Chronic | ICD-10-CM

## 2024-08-19 DIAGNOSIS — E78.00 HYPERCHOLESTEROLEMIA: Chronic | ICD-10-CM

## 2024-08-19 DIAGNOSIS — N30.01 ACUTE CYSTITIS WITH HEMATURIA: ICD-10-CM

## 2024-08-19 DIAGNOSIS — N18.2 STAGE 2 CHRONIC KIDNEY DISEASE: Chronic | ICD-10-CM

## 2024-08-19 LAB
ALBUMIN SERPL-MCNC: 3.8 G/DL (ref 3.5–5.2)
ALBUMIN/GLOB SERPL: 1.4 G/DL
ALP SERPL-CCNC: 127 U/L (ref 39–117)
ALT SERPL-CCNC: 17 U/L (ref 1–33)
AST SERPL-CCNC: 21 U/L (ref 1–32)
BILIRUB BLD-MCNC: NEGATIVE MG/DL
BILIRUB SERPL-MCNC: 0.3 MG/DL (ref 0–1.2)
BUN SERPL-MCNC: 14 MG/DL (ref 8–23)
BUN/CREAT SERPL: 20.3 (ref 7–25)
CALCIUM SERPL-MCNC: 10 MG/DL (ref 8.6–10.5)
CHLORIDE SERPL-SCNC: 109 MMOL/L (ref 98–107)
CHOLEST SERPL-MCNC: 126 MG/DL (ref 0–200)
CLARITY, POC: ABNORMAL
CO2 SERPL-SCNC: 25.9 MMOL/L (ref 22–29)
COLOR UR: YELLOW
CREAT SERPL-MCNC: 0.69 MG/DL (ref 0.57–1)
EGFRCR SERPLBLD CKD-EPI 2021: 87.3 ML/MIN/1.73
EXPIRATION DATE: ABNORMAL
GLOBULIN SER CALC-MCNC: 2.8 GM/DL
GLUCOSE SERPL-MCNC: 77 MG/DL (ref 65–99)
GLUCOSE UR STRIP-MCNC: NEGATIVE MG/DL
HBA1C MFR BLD: 5.9 % (ref 4.8–5.6)
HDLC SERPL-MCNC: 32 MG/DL (ref 40–60)
KETONES UR QL: NEGATIVE
LDLC SERPL CALC-MCNC: 55 MG/DL (ref 0–100)
LDLC/HDLC SERPL: 1.41 {RATIO}
LEUKOCYTE EST, POC: ABNORMAL
Lab: ABNORMAL
NITRITE UR-MCNC: NEGATIVE MG/ML
PH UR: 7.5 [PH] (ref 5–8)
POTASSIUM SERPL-SCNC: 3.9 MMOL/L (ref 3.5–5.2)
PROT SERPL-MCNC: 6.6 G/DL (ref 6–8.5)
PROT UR STRIP-MCNC: NEGATIVE MG/DL
RBC # UR STRIP: ABNORMAL /UL
SODIUM SERPL-SCNC: 142 MMOL/L (ref 136–145)
SP GR UR: 1.01 (ref 1–1.03)
TRIGL SERPL-MCNC: 244 MG/DL (ref 0–150)
TSH SERPL DL<=0.005 MIU/L-ACNC: 0.02 UIU/ML (ref 0.27–4.2)
UROBILINOGEN UR QL: ABNORMAL
VLDLC SERPL CALC-MCNC: 39 MG/DL (ref 5–40)

## 2024-08-19 PROCEDURE — 1160F RVW MEDS BY RX/DR IN RCRD: CPT | Performed by: FAMILY MEDICINE

## 2024-08-19 PROCEDURE — 1126F AMNT PAIN NOTED NONE PRSNT: CPT | Performed by: FAMILY MEDICINE

## 2024-08-19 PROCEDURE — 1159F MED LIST DOCD IN RCRD: CPT | Performed by: FAMILY MEDICINE

## 2024-08-19 PROCEDURE — 81003 URINALYSIS AUTO W/O SCOPE: CPT | Performed by: FAMILY MEDICINE

## 2024-08-19 PROCEDURE — 99214 OFFICE O/P EST MOD 30 MIN: CPT | Performed by: FAMILY MEDICINE

## 2024-08-19 PROCEDURE — G2211 COMPLEX E/M VISIT ADD ON: HCPCS | Performed by: FAMILY MEDICINE

## 2024-08-19 NOTE — PATIENT INSTRUCTIONS
"MyChart Tips:    [employer] can be a useful part of our patient care program and is a way for us to communicate lab results and for you to request refills.  Here is some information regarding the best way to use [employer] for communication.       Examples of medical issues that are APPROPRIATE for [employer]:  -Follow-up on problems we have already addressed in a visit such as home testing results, blood pressure readings, glucose readings  -Questions that can be answered with a simple \"yes\" or \"no\"  -Please keep communication concise and to the point.  All other types of communication should be held for an office visit.    Communication that is NOT APPROPRIATE for [employer]:  -New problems, serious problems or urgent problems.  Urgent matters should be addressed by phone for advice, in the office, urgent care or the ER.  -Requesting Paxlovid or Antibiotics: These types of problems require an evaluation unless your provider approved this previously.    -[employer] messages are not email.  Staff will check messages each weekday.  We strive for a 48-hour turnaround on messaging.    -[employer] is not for private issues.  Messages are received first by our office staff.    Please be mindful that sometimes it may take longer to receive a response on [employer].  Many times of the year we have high volumes of messages coming into physician inboxes.      Please also be mindful that [employer] messages become part of your permanent medical record.    We appreciate your respect for the limitations and boundaries of [employer].      Lab Results:  Please allow up to 7 business days for lab results to be sent through [employer] to you before contacting your provider.  Sometimes we are waiting for results to get back from the lab and also your provider needs time to analyze them thoroughly before making recommendations.  Also, providers may be out of the office on vacation.      While the internet has great resources, it is not a substitute for " interpreting lab results.

## 2024-08-19 NOTE — PROGRESS NOTES
"Chief Complaint  Prediabetes, Hyperlipidemia, Hypothyroidism, and Urinary Tract Infection (F/u)    Subjective        Marci Kolb presents to [facility]  History of Present Illness    Marci Kolb is being seen today for hypothyroidism, hyperlipidemia, prediabetes, chronic renal disease.  Blood pressure controlled in the office today.  She is taking her medications as prescribed below.     Also complaining of recent UTI.   She went to [facility] 7/31 and was treated with Bactrim DS.  However she was called and told she needed to switch to Macrobid.  She has finished the Rx and is feeling better but wanted to make sure the infection is gone.        Current Outpatient Medications:     aspirin 81 MG chewable tablet, Chew 1 tablet Daily., Disp: , Rfl:     Cholecalciferol (Vitamin D) 50 MCG (2000 UT) tablet, Take 2,000 Units by mouth Daily., Disp: 90 tablet, Rfl: 0    furosemide (LASIX) 20 MG tablet, Take 1 tablet by mouth Daily As Needed (swelling)., Disp: , Rfl:     levothyroxine (SYNTHROID, LEVOTHROID) 112 MCG tablet, Take 1 tablet by mouth Daily., Disp: 90 tablet, Rfl: 1    Multiple Vitamins-Minerals (b complex-C-E-zinc) tablet, Take 1 tablet by mouth Daily. 50mg, once daily, Disp: , Rfl:     Omega-3 Fatty Acids (fish oil) 1000 MG capsule capsule, Take 1 capsule by mouth Daily With Breakfast., Disp: , Rfl:     simvastatin (ZOCOR) 40 MG tablet, Take 1 tablet by mouth Every Night., Disp: 90 tablet, Rfl: 3      Objective   Vital Signs:  /74 (BP Location: Left arm, Patient Position: Sitting, Cuff Size: Adult)   Pulse 76   Temp 98.4 °F (36.9 °C) (Oral)   Ht 165.1 cm (65\")   Wt 64.9 kg (143 lb)   SpO2 99%   BMI 23.80 kg/m²   Estimated body mass index is 23.8 kg/m² as calculated from the following:    Height as of this encounter: 165.1 cm (65\").    Weight as of this encounter: 64.9 kg (143 lb).    BMI is within normal parameters. No other follow-up for BMI " required.      Physical Exam  Vitals and nursing note reviewed.   Constitutional:       General: She is not in acute distress.     Appearance: Normal appearance.   Cardiovascular:      Rate and Rhythm: Normal rate and regular rhythm.      Heart sounds: Normal heart sounds. No murmur heard.  Pulmonary:      Effort: Pulmonary effort is normal.      Breath sounds: Normal breath sounds.   Neurological:      Mental Status: She is alert.        Result Review :  The following data was reviewed by: Jatinder Nugent MD on 08/19/2024:  Common labs          12/1/2023    13:26   Common Labs   Glucose 90    BUN 13    Creatinine 0.93    Sodium 142    Potassium 4.7    Chloride 108    Calcium 10.6    Albumin 4.0    Total Bilirubin 0.5    Alkaline Phosphatase 112    AST (SGOT) 25    ALT (SGPT) 16    WBC 8.48    Hemoglobin 14.4    Hematocrit 42.6    Platelets 265    Total Cholesterol 142    Triglycerides 144    HDL Cholesterol 36    LDL Cholesterol  81    Hemoglobin A1C 5.90    Microalbumin, Urine 2.1                Assessment and Plan   Diagnoses and all orders for this visit:    1. Acquired hypothyroidism (Primary)  -     TSH    2. Prediabetes  -     Hemoglobin A1c    3. Hypercholesterolemia  -     Comprehensive Metabolic Panel  -     Lipid Panel With LDL / HDL Ratio    4. Stage 2 chronic kidney disease  -     Comprehensive Metabolic Panel    5. Acute cystitis with hematuria  -     POCT urinalysis dipstick, automated  -     Urine Culture - Urine, Urine, Clean Catch      Clinically stable chronic conditions as above.  Continue all medications as above.  Labs reviewed/ordered as above.  I will get urinalysis dip along with urine culture to confirm the infection is adequately treated.  Clinically I believe she is doing much better since she is not complaining of any further symptoms.             Follow Up   Return if symptoms worsen or fail to improve.  Patient was given instructions and counseling regarding her condition or for  health maintenance advice. Please see specific information pulled into the AVS if appropriate.

## 2024-08-20 ENCOUNTER — TELEPHONE (OUTPATIENT)
Dept: INTERNAL MEDICINE | Facility: CLINIC | Age: 79
End: 2024-08-20
Payer: MEDICARE

## 2024-08-20 NOTE — TELEPHONE ENCOUNTER
"Relay     \"Please let her know that her TSH is lower than it was last time.  I could not order a T4  because she had already taken her thyroid dose that day.  I would recommend having this rechecked in 2 weeks with the nurse practitioner.  I would recommend a morning appointment and have her hold her thyroid dose that morning so that a T4 could be obtained as well.  No need to fast but hold the thyroid dose.     All of her other labs look pretty stable.  Keep working on healthy eating.\"      [facility]- please schedule early morning appt with JAYNE in 2 weeks.                "

## 2024-08-20 NOTE — TELEPHONE ENCOUNTER
----- Message from Jatinder Nugent sent at 8/20/2024  7:13 AM EDT -----  Please let her know that her TSH is lower than it was last time.  I could not order a T4  because she had already taken her thyroid dose that day.  I would recommend having this rechecked in 2 weeks with the nurse practitioner.  I would recommend a morning appointment and have her hold her thyroid dose that morning so that a T4 could be obtained as well.  No need to fast but hold the thyroid dose.    All of her other labs look pretty stable.  Keep working on healthy eating.

## 2024-08-21 LAB
BACTERIA UR CULT: NORMAL
BACTERIA UR CULT: NORMAL

## 2024-08-21 NOTE — TELEPHONE ENCOUNTER
Called patient to relay Urine culture results. Also gave blood work results below. Scheduled 9/5 with Rahel to recheck thyroid. Advised to hold thyroid medication that day. She voiced understanding and has no further questions.

## 2024-09-05 ENCOUNTER — OFFICE VISIT (OUTPATIENT)
Dept: INTERNAL MEDICINE | Facility: CLINIC | Age: 79
End: 2024-09-05
Payer: MEDICARE

## 2024-09-05 VITALS
DIASTOLIC BLOOD PRESSURE: 70 MMHG | BODY MASS INDEX: 23.46 KG/M2 | OXYGEN SATURATION: 100 % | HEART RATE: 74 BPM | WEIGHT: 141 LBS | SYSTOLIC BLOOD PRESSURE: 118 MMHG | TEMPERATURE: 96.9 F

## 2024-09-05 DIAGNOSIS — E03.9 ACQUIRED HYPOTHYROIDISM: Chronic | ICD-10-CM

## 2024-09-05 DIAGNOSIS — E21.3 HYPERPARATHYROIDISM, UNSPECIFIED: Primary | ICD-10-CM

## 2024-09-05 DIAGNOSIS — L85.3 DRY SKIN DERMATITIS: Chronic | ICD-10-CM

## 2024-09-05 DIAGNOSIS — Z76.0 ENCOUNTER FOR MEDICATION REFILL: ICD-10-CM

## 2024-09-05 PROCEDURE — G2211 COMPLEX E/M VISIT ADD ON: HCPCS

## 2024-09-05 PROCEDURE — 1126F AMNT PAIN NOTED NONE PRSNT: CPT

## 2024-09-05 PROCEDURE — 99214 OFFICE O/P EST MOD 30 MIN: CPT

## 2024-09-05 RX ORDER — AMMONIUM LACTATE 12 G/100G
LOTION TOPICAL AS NEEDED
Qty: 225 G | Refills: 2 | Status: SHIPPED | OUTPATIENT
Start: 2024-09-05

## 2024-09-05 RX ORDER — AMMONIUM LACTATE 12 G/100G
400 LOTION TOPICAL AS NEEDED
COMMUNITY
End: 2024-09-05 | Stop reason: SDUPTHER

## 2024-09-06 LAB
T3FREE SERPL-MCNC: 3.1 PG/ML (ref 2–4.4)
T4 FREE SERPL-MCNC: 1.48 NG/DL (ref 0.92–1.68)
TSH SERPL DL<=0.005 MIU/L-ACNC: 0.03 UIU/ML (ref 0.27–4.2)

## 2024-09-18 PROBLEM — L85.3 DRY SKIN DERMATITIS: Chronic | Status: ACTIVE | Noted: 2024-09-18

## 2024-09-24 DIAGNOSIS — E03.9 ACQUIRED HYPOTHYROIDISM: Chronic | ICD-10-CM

## 2024-09-24 RX ORDER — LEVOTHYROXINE SODIUM 112 UG/1
112 TABLET ORAL DAILY
Qty: 90 TABLET | Refills: 0 | OUTPATIENT
Start: 2024-09-24

## 2024-09-25 ENCOUNTER — TELEPHONE (OUTPATIENT)
Dept: INTERNAL MEDICINE | Facility: CLINIC | Age: 79
End: 2024-09-25
Payer: MEDICARE

## 2024-09-26 DIAGNOSIS — E03.9 ACQUIRED HYPOTHYROIDISM: Chronic | ICD-10-CM

## 2024-09-26 RX ORDER — LEVOTHYROXINE SODIUM 112 UG/1
112 TABLET ORAL DAILY
Qty: 90 TABLET | Refills: 1 | Status: SHIPPED | OUTPATIENT
Start: 2024-09-26

## 2024-10-01 ENCOUNTER — TELEPHONE (OUTPATIENT)
Dept: INTERNAL MEDICINE | Facility: CLINIC | Age: 79
End: 2024-10-01
Payer: MEDICARE

## 2024-10-01 NOTE — TELEPHONE ENCOUNTER
Well , 24 Months Old  Well-child exams are recommended visits with a health care provider to track your child's growth and development at certain ages. This sheet tells you what to expect during this visit.  Recommended immunizations  • Your child may get doses of the following vaccines if needed to catch up on missed doses:  ? Hepatitis B vaccine.  ? Diphtheria and tetanus toxoids and acellular pertussis (DTaP) vaccine.  ? Inactivated poliovirus vaccine.  • Haemophilus influenzae type b (Hib) vaccine. Your child may get doses of this vaccine if needed to catch up on missed doses, or if he or she has certain high-risk conditions.  • Pneumococcal conjugate (PCV13) vaccine. Your child may get this vaccine if he or she:  ? Has certain high-risk conditions.  ? Missed a previous dose.  ? Received the 7-valent pneumococcal vaccine (PCV7).  • Pneumococcal polysaccharide (PPSV23) vaccine. Your child may get doses of this vaccine if he or she has certain high-risk conditions.  • Influenza vaccine (flu shot). Starting at age 6 months, your child should be given the flu shot every year. Children between the ages of 6 months and 8 years who get the flu shot for the first time should get a second dose at least 4 weeks after the first dose. After that, only a single yearly (annual) dose is recommended.  • Measles, mumps, and rubella (MMR) vaccine. Your child may get doses of this vaccine if needed to catch up on missed doses. A second dose of a 2-dose series should be given at age 4-6 years. The second dose may be given before 4 years of age if it is given at least 4 weeks after the first dose.  • Varicella vaccine. Your child may get doses of this vaccine if needed to catch up on missed doses. A second dose of a 2-dose series should be given at age 4-6 years. If the second dose is given before 4 years of age, it should be given at least 3 months after the first dose.  • Hepatitis A vaccine. Children who received  "  [facility] staff attempted to follow warm transfer process and was unsuccessful     Caller: Marci Kolb \"Zeeshan\"    Relationship to patient: Self    Best call back number: [phone]     Patient is needing: PATIENT IS NEEDING TO BE SEEN FOR A POSSIBLE UTI. PLEASE CALL TO SCHEDULE PATIENT.        " one dose before 24 months of age should get a second dose 6-18 months after the first dose. If the first dose has not been given by 24 months of age, your child should get this vaccine only if he or she is at risk for infection or if you want your child to have hepatitis A protection.  • Meningococcal conjugate vaccine. Children who have certain high-risk conditions, are present during an outbreak, or are traveling to a country with a high rate of meningitis should get this vaccine.  Your child may receive vaccines as individual doses or as more than one vaccine together in one shot (combination vaccines). Talk with your child's health care provider about the risks and benefits of combination vaccines.  Testing  Vision  • Your child's eyes will be assessed for normal structure (anatomy) and function (physiology). Your child may have more vision tests done depending on his or her risk factors.  Other tests    • Depending on your child's risk factors, your child's health care provider may screen for:  ? Low red blood cell count (anemia).  ? Lead poisoning.  ? Hearing problems.  ? Tuberculosis (TB).  ? High cholesterol.  ? Autism spectrum disorder (ASD).  • Starting at this age, your child's health care provider will measure BMI (body mass index) annually to screen for obesity. BMI is an estimate of body fat and is calculated from your child's height and weight.  General instructions  Parenting tips  • Praise your child's good behavior by giving him or her your attention.  • Spend some one-on-one time with your child daily. Vary activities. Your child's attention span should be getting longer.  • Set consistent limits. Keep rules for your child clear, short, and simple.  • Discipline your child consistently and fairly.  ? Make sure your child's caregivers are consistent with your discipline routines.  ? Avoid shouting at or spanking your child.  ? Recognize that your child has a limited ability to understand  "consequences at this age.  • Provide your child with choices throughout the day.  • When giving your child instructions (not choices), avoid asking yes and no questions (\"Do you want a bath?\"). Instead, give clear instructions (\"Time for a bath.\").  • Interrupt your child's inappropriate behavior and show him or her what to do instead. You can also remove your child from the situation and have him or her do a more appropriate activity.  • If your child cries to get what he or she wants, wait until your child briefly calms down before you give him or her the item or activity. Also, model the words that your child should use (for example, \"cookie please\" or \"climb up\").  • Avoid situations or activities that may cause your child to have a temper tantrum, such as shopping trips.  Oral health    • Brush your child's teeth after meals and before bedtime.  • Take your child to a dentist to discuss oral health. Ask if you should start using fluoride toothpaste to clean your child's teeth.  • Give fluoride supplements or apply fluoride varnish to your child's teeth as told by your child's health care provider.  • Provide all beverages in a cup and not in a bottle. Using a cup helps to prevent tooth decay.  • Check your child's teeth for brown or white spots. These are signs of tooth decay.  • If your child uses a pacifier, try to stop giving it to your child when he or she is awake.  Sleep  • Children at this age typically need 12 or more hours of sleep a day and may only take one nap in the afternoon.  • Keep naptime and bedtime routines consistent.  • Have your child sleep in his or her own sleep space.  Toilet training  • When your child becomes aware of wet or soiled diapers and stays dry for longer periods of time, he or she may be ready for toilet training. To toilet train your child:  ? Let your child see others using the toilet.  ? Introduce your child to a potty chair.  ? Give your child lots of praise when he or " she successfully uses the potty chair.  • Talk with your health care provider if you need help toilet training your child. Do not force your child to use the toilet. Some children will resist toilet training and may not be trained until 3 years of age. It is normal for boys to be toilet trained later than girls.  What's next?  Your next visit will take place when your child is 30 months old.  Summary  • Your child may need certain immunizations to catch up on missed doses.  • Depending on your child's risk factors, your child's health care provider may screen for vision and hearing problems, as well as other conditions.  • Children this age typically need 12 or more hours of sleep a day and may only take one nap in the afternoon.  • Your child may be ready for toilet training when he or she becomes aware of wet or soiled diapers and stays dry for longer periods of time.  • Take your child to a dentist to discuss oral health. Ask if you should start using fluoride toothpaste to clean your child's teeth.  This information is not intended to replace advice given to you by your health care provider. Make sure you discuss any questions you have with your health care provider.  Document Revised: 08/26/2022 Document Reviewed: 09/13/2019  Elsevier Patient Education © 2022 [employer]

## 2024-10-03 ENCOUNTER — OFFICE VISIT (OUTPATIENT)
Dept: INTERNAL MEDICINE | Facility: CLINIC | Age: 79
End: 2024-10-03
Payer: MEDICARE

## 2024-10-03 VITALS
OXYGEN SATURATION: 95 % | DIASTOLIC BLOOD PRESSURE: 72 MMHG | BODY MASS INDEX: 23.32 KG/M2 | HEIGHT: 65 IN | SYSTOLIC BLOOD PRESSURE: 120 MMHG | HEART RATE: 68 BPM | WEIGHT: 140 LBS

## 2024-10-03 DIAGNOSIS — R30.9 PAIN WITH URINATION: Primary | ICD-10-CM

## 2024-10-03 DIAGNOSIS — R30.0 DYSURIA: ICD-10-CM

## 2024-10-03 LAB
BILIRUB BLD-MCNC: NEGATIVE MG/DL
CLARITY, POC: ABNORMAL
COLOR UR: YELLOW
EXPIRATION DATE: ABNORMAL
GLUCOSE UR STRIP-MCNC: NEGATIVE MG/DL
KETONES UR QL: NEGATIVE
LEUKOCYTE EST, POC: ABNORMAL
Lab: ABNORMAL
NITRITE UR-MCNC: NEGATIVE MG/ML
PH UR: 7 [PH] (ref 5–8)
PROT UR STRIP-MCNC: NEGATIVE MG/DL
RBC # UR STRIP: ABNORMAL /UL
SP GR UR: 1.01 (ref 1–1.03)
UROBILINOGEN UR QL: ABNORMAL

## 2024-10-03 PROCEDURE — 1160F RVW MEDS BY RX/DR IN RCRD: CPT

## 2024-10-03 PROCEDURE — 99213 OFFICE O/P EST LOW 20 MIN: CPT

## 2024-10-03 PROCEDURE — 1159F MED LIST DOCD IN RCRD: CPT

## 2024-10-03 PROCEDURE — 1126F AMNT PAIN NOTED NONE PRSNT: CPT

## 2024-10-03 PROCEDURE — 81003 URINALYSIS AUTO W/O SCOPE: CPT

## 2024-10-03 RX ORDER — NITROFURANTOIN 25; 75 MG/1; MG/1
100 CAPSULE ORAL 2 TIMES DAILY
Qty: 14 CAPSULE | Refills: 0 | Status: SHIPPED | OUTPATIENT
Start: 2024-10-03 | End: 2024-10-10

## 2024-10-03 NOTE — PROGRESS NOTES
"Chief Complaint  Pain with Urination  and Shortness of Breath    Subjective        Marci Kolb presents to [facility]  History of Present Illness  79-year-old female presenting with pain with urination and shortness of breath.  Previous patient of Dr. Nugent and is establishing with Dr. Haider in November.  States been having dysuria and pain is in her lower abdomen.  This happens when she urinates.  The pain is so much that she has to take deep breaths and becomes short of breath.  Denies fever, change in urine characteristics, changes in back pain.  Most recent urine culture showed Bactrim resistant E. coli.  Shortness of Breath        Objective   Vital Signs:  /72 (BP Location: Left arm, Patient Position: Sitting, Cuff Size: Adult)   Pulse 68   Ht 165.1 cm (65\")   Wt 63.5 kg (140 lb)   SpO2 95%   BMI 23.30 kg/m²   Estimated body mass index is 23.3 kg/m² as calculated from the following:    Height as of this encounter: 165.1 cm (65\").    Weight as of this encounter: 63.5 kg (140 lb).       BMI is within normal parameters. No other follow-up for BMI required.      Physical Exam  Vitals reviewed.   Constitutional:       Appearance: Normal appearance.   HENT:      Head: Normocephalic.   Musculoskeletal:         General: Normal range of motion.      Cervical back: Normal range of motion.   Skin:     General: Skin is warm and dry.      Capillary Refill: Capillary refill takes less than 2 seconds.   Neurological:      General: No focal deficit present.      Mental Status: She is alert and oriented to person, place, and time.   Psychiatric:         Mood and Affect: Mood normal.         Behavior: Behavior normal.         Thought Content: Thought content normal.         Judgment: Judgment normal.        Result Review :      Common labs          12/1/2023    13:26 8/19/2024    14:00   Common Labs   Glucose 90  77    BUN 13  14    Creatinine 0.93  0.69    Sodium 142  142  "   Potassium 4.7  3.9    Chloride 108  109    Calcium 10.6  10.0    Total Protein  6.6    Albumin 4.0  3.8    Total Bilirubin 0.5  0.3    Alkaline Phosphatase 112  127    AST (SGOT) 25  21    ALT (SGPT) 16  17    WBC 8.48     Hemoglobin 14.4     Hematocrit 42.6     Platelets 265     Total Cholesterol 142     Total Cholesterol  126    Triglycerides 144  244    HDL Cholesterol 36  32    LDL Cholesterol  81  55    Hemoglobin A1C 5.90  5.90    Microalbumin, Urine 2.1           Current Outpatient Medications on File Prior to Visit   Medication Sig Dispense Refill    ammonium lactate (LAC-HYDRIN) 12 % lotion Apply  topically to the appropriate area as directed As Needed for Dry Skin or Irritation. 225 g 2    aspirin 81 MG chewable tablet Chew 1 tablet Daily.      Cholecalciferol (Vitamin D) 50 MCG (2000 UT) tablet Take 2,000 Units by mouth Daily. 90 tablet 0    furosemide (LASIX) 20 MG tablet Take 1 tablet by mouth Daily As Needed (swelling).      levothyroxine (SYNTHROID, LEVOTHROID) 112 MCG tablet Take 1 tablet by mouth Daily. 90 tablet 1    Multiple Vitamins-Minerals (b complex-C-E-zinc) tablet Take 1 tablet by mouth Daily. 50mg, once daily      Omega-3 Fatty Acids (fish oil) 1000 MG capsule capsule Take 1 capsule by mouth Daily With Breakfast.      simvastatin (ZOCOR) 40 MG tablet Take 1 tablet by mouth Every Night. 90 tablet 3     No current facility-administered medications on file prior to visit.                Assessment and Plan     Diagnoses and all orders for this visit:    1. Pain with urination (Primary)  -     POCT urinalysis dipstick, automated  -     nitrofurantoin, macrocrystal-monohydrate, (Macrobid) 100 MG capsule; Take 1 capsule by mouth 2 (Two) Times a Day for 7 days.  Dispense: 14 capsule; Refill: 0    2. Dysuria  -     Urine Culture - Urine, Urine, Clean Catch  -     nitrofurantoin, macrocrystal-monohydrate, (Macrobid) 100 MG capsule; Take 1 capsule by mouth 2 (Two) Times a Day for 7 days.  Dispense:  14 capsule; Refill: 0        Patient Instructions   Take Macrobid as prescribed. Stay hydrated with water. Urine to be cultured. Results to determine if antibiotic to be changed. Follow-up as needed.            Follow Up     No follow-ups on file.  Patient was given instructions and counseling regarding her condition or for health maintenance advice. Please see specific information pulled into the AVS if appropriate.

## 2024-10-03 NOTE — PATIENT INSTRUCTIONS
Take Macrobid as prescribed. Stay hydrated with water. Urine to be cultured. Results to determine if antibiotic to be changed. Follow-up as needed.

## 2024-10-05 LAB
BACTERIA UR CULT: NORMAL
BACTERIA UR CULT: NORMAL

## 2024-10-07 NOTE — PROGRESS NOTES
Urine culture did not grow any bacteria.  Recommend continuing antibiotic as prescribed due to symptoms.

## 2024-10-17 ENCOUNTER — PRE-ADMISSION TESTING (OUTPATIENT)
Dept: PREADMISSION TESTING | Facility: HOSPITAL | Age: 79
End: 2024-10-17
Payer: MEDICARE

## 2024-10-17 ENCOUNTER — HOSPITAL ENCOUNTER (OUTPATIENT)
Dept: GENERAL RADIOLOGY | Facility: HOSPITAL | Age: 79
Discharge: HOME | End: 2024-10-17
Payer: MEDICARE

## 2024-10-17 VITALS
BODY MASS INDEX: 23.78 KG/M2 | SYSTOLIC BLOOD PRESSURE: 119 MMHG | TEMPERATURE: 98.4 F | OXYGEN SATURATION: 96 % | DIASTOLIC BLOOD PRESSURE: 73 MMHG | WEIGHT: 139.3 LBS | HEIGHT: 64 IN | RESPIRATION RATE: 20 BRPM | HEART RATE: 71 BPM

## 2024-10-17 LAB
ALBUMIN SERPL-MCNC: 3.8 G/DL (ref 3.5–5.2)
ALBUMIN/GLOB SERPL: 1.2 G/DL
ALP SERPL-CCNC: 126 U/L (ref 39–117)
ALT SERPL W P-5'-P-CCNC: 19 U/L (ref 1–33)
ANION GAP SERPL CALCULATED.3IONS-SCNC: 7.8 MMOL/L (ref 5–15)
AST SERPL-CCNC: 24 U/L (ref 1–32)
BILIRUB SERPL-MCNC: 0.4 MG/DL (ref 0–1.2)
BUN SERPL-MCNC: 12 MG/DL (ref 8–23)
BUN/CREAT SERPL: 14.6 (ref 7–25)
CALCIUM SPEC-SCNC: 10.4 MG/DL (ref 8.6–10.5)
CHLORIDE SERPL-SCNC: 110 MMOL/L (ref 98–107)
CO2 SERPL-SCNC: 26.2 MMOL/L (ref 22–29)
CREAT SERPL-MCNC: 0.82 MG/DL (ref 0.57–1)
DEPRECATED RDW RBC AUTO: 44.6 FL (ref 37–54)
EGFRCR SERPLBLD CKD-EPI 2021: 72 ML/MIN/1.73
ERYTHROCYTE [DISTWIDTH] IN BLOOD BY AUTOMATED COUNT: 12.8 % (ref 12.3–15.4)
GLOBULIN UR ELPH-MCNC: 3.2 GM/DL
GLUCOSE SERPL-MCNC: 87 MG/DL (ref 65–99)
HBA1C MFR BLD: 5.7 % (ref 4.8–5.6)
HCT VFR BLD AUTO: 39.7 % (ref 34–46.6)
HGB BLD-MCNC: 12.9 G/DL (ref 12–15.9)
INR PPP: 1.09 (ref 0.9–1.1)
MCH RBC QN AUTO: 31.5 PG (ref 26.6–33)
MCHC RBC AUTO-ENTMCNC: 32.5 G/DL (ref 31.5–35.7)
MCV RBC AUTO: 96.8 FL (ref 79–97)
PLATELET # BLD AUTO: 230 10*3/MM3 (ref 140–450)
PMV BLD AUTO: 9.5 FL (ref 6–12)
POTASSIUM SERPL-SCNC: 4.4 MMOL/L (ref 3.5–5.2)
PROT SERPL-MCNC: 7 G/DL (ref 6–8.5)
PROTHROMBIN TIME: 14.3 SECONDS (ref 11.7–14.2)
QT INTERVAL: 410 MS
QTC INTERVAL: 410 MS
RBC # BLD AUTO: 4.1 10*6/MM3 (ref 3.77–5.28)
SODIUM SERPL-SCNC: 144 MMOL/L (ref 136–145)
WBC NRBC COR # BLD AUTO: 6.98 10*3/MM3 (ref 3.4–10.8)

## 2024-10-17 PROCEDURE — 73502 X-RAY EXAM HIP UNI 2-3 VIEWS: CPT

## 2024-10-17 PROCEDURE — 85610 PROTHROMBIN TIME: CPT

## 2024-10-17 PROCEDURE — 85027 COMPLETE CBC AUTOMATED: CPT

## 2024-10-17 PROCEDURE — 80053 COMPREHEN METABOLIC PANEL: CPT

## 2024-10-17 PROCEDURE — 93010 ELECTROCARDIOGRAM REPORT: CPT | Performed by: INTERNAL MEDICINE

## 2024-10-17 PROCEDURE — 36415 COLL VENOUS BLD VENIPUNCTURE: CPT

## 2024-10-17 PROCEDURE — 93005 ELECTROCARDIOGRAM TRACING: CPT

## 2024-10-17 PROCEDURE — 83036 HEMOGLOBIN GLYCOSYLATED A1C: CPT

## 2024-10-17 RX ORDER — BRIMONIDINE TARTRATE 1 MG/ML
1 SOLUTION/ DROPS OPHTHALMIC 2 TIMES DAILY
COMMUNITY
Start: 2024-10-17

## 2024-10-17 RX ORDER — ASPIRIN 81 MG/1
81 TABLET ORAL DAILY
COMMUNITY

## 2024-10-17 RX ORDER — FLUOROMETHOLONE 0.1 %
1 SUSPENSION, DROPS(FINAL DOSAGE FORM)(ML) OPHTHALMIC (EYE) 2 TIMES DAILY
COMMUNITY
Start: 2024-10-11

## 2024-10-17 NOTE — DISCHARGE INSTRUCTIONS
Take the following medications the morning of surgery:  LEVOTHYROXINE    If you are on prescription narcotic pain medication to control your pain you may also take that medication the morning of surgery.      General Instructions:     Do not eat solid food after midnight the night before surgery.  Clear liquids day of surgery are allowed but must be stopped at least two hours before your hospital arrival time.       Allowed clear liquids      Water, sodas, and tea or coffee with no cream or milk added.       12 to 20 ounces of a clear liquid that contains carbohydrates is recommended.  If non-diabetic, have Gatorade or Powerade.  If diabetic, have G2 or Powerade Zero.     Do not have liquids red in color.  Do not consume chicken, beef, pork or vegetable broth or bouillon cubes of any variety as they are not considered clear liquids and are not allowed.      Infants may have breast milk up to four hours before surgery.  Infants drinking formula may drink formula up to six hours before surgery.   Patients who avoid smoking, chewing tobacco and alcohol for 4 weeks prior to surgery have a reduced risk of post-operative complications.  Quit smoking as many days before surgery as you can.  Do not smoke, use chewing tobacco or drink alcohol the day of surgery.   If applicable bring your C-PAP/ BI-PAP machine in with you to preop day of surgery.  Bring any papers given to you in the doctor’s office.  Wear clean comfortable clothes.  Do not wear contact lenses, false eyelashes or make-up.  Bring a case for your glasses.   Bring crutches or walker if applicable.  Remove all piercings.  Leave jewelry and any other valuables at home.  Hair extensions with metal clips must be removed prior to surgery.  The Pre-Admission Testing nurse will instruct you to bring medications if unable to obtain an accurate list in Pre-Admission Testing.        If you were given a blood bank ID arm band remember to bring it with you the day of  surgery.    Preventing a Surgical Site Infection:  For 2 to 3 days before surgery, avoid shaving with a razor because the razor can irritate skin and make it easier to develop an infection.    Any areas of open skin can increase the risk of a post-operative wound infection by allowing bacteria to enter and travel throughout the body.  Notify your surgeon if you have any skin wounds / rashes even if it is not near the expected surgical site.  The area will need assessed to determine if surgery should be delayed until it is healed.  The night prior to surgery shower using a fresh bar of anti-bacterial soap (such as Dial) and clean washcloth.  Sleep in a clean bed with clean clothing.  Do not allow pets to sleep with you.  Shower on the morning of surgery using a fresh bar of anti-bacterial soap (such as Dial) and clean washcloth.  Dry with a clean towel and dress in clean clothing.  Ask your surgeon if you will be receiving antibiotics prior to surgery.  Make sure you, your family, and all healthcare providers clean their hands with soap and water or an alcohol based hand  before caring for you or your wound.    Day of surgery:  Your arrival time is approximately two hours before your scheduled surgery time.  Please note if you have an early arrival time the surgery doors do not open before 5:00 AM.  Upon arrival, a Pre-op nurse and Anesthesiologist will review your health history, obtain vital signs, and answer questions you may have.  The only belongings needed at this time will be a list of your home medications and if applicable your C-PAP/BI-PAP machine.  A Pre-op nurse will start an IV and you may receive medication in preparation for surgery, including something to help you relax.     Please be aware that surgery does come with discomfort.  We want to make every effort to control your discomfort so please discuss any uncontrolled symptoms with your nurse.   Your doctor will most likely have prescribed  pain medications.      If you are going home after surgery you will receive individualized written care instructions before being discharged.  A responsible adult must drive you to and from the hospital on the day of your surgery and ideally stay with you through the night.   .  Discharge prescriptions can be filled by the hospital pharmacy during regular pharmacy hours.  If you are having surgery late in the day/evening your prescription may be e-prescribed to your pharmacy.  Please verify your pharmacy hours or chose a 24 hour pharmacy to avoid not having access to your prescription because your pharmacy has closed for the day.    If you are staying overnight following surgery, you will be transported to your hospital room following the recovery period.  [facility] has all private rooms.    If you have any questions please call Pre-Admission Testing at [phone].  Deductibles and co-payments are collected on the day of service. Please be prepared to pay the required co-pay, deductible or deposit on the day of service as defined by your plan.    Call your surgeon immediately if you experience any of the following symptoms:  Sore Throat  Shortness of Breath or difficulty breathing  Cough  Chills  Body soreness or muscle pain  Headache  Fever  New loss of taste or smell  Do not arrive for your surgery ill.  Your procedure will need to be rescheduled to another time.  You will need to call your physician before the day of surgery to avoid any unnecessary exposure to hospital staff as well as other patients.      CHLORHEXIDINE CLOTH INSTRUCTIONS  The NIGHT BEFORE AND THE MORNING of surgery follow these instructions using the Chlorhexidine cloths you've been given.  These steps reduce bacteria on the body.  Do not use the cloths near your eyes, ears mouth, genitalia or on open wounds.  Throw the cloths away after use but do not try to flush them down a toilet.      Open and remove one cloth at a time  from the package.    Leave the cloth unfolded and begin the bathing.  Massage the skin with the cloths using gentle pressure to remove bacteria.  Do not scrub harshly.   Follow the steps below with one 2% CHG cloth per area (6 total cloths).  One cloth for neck, shoulders and chest.  One cloth for both arms, hands, fingers and underarms (do underarms last).  One cloth for the abdomen followed by groin.  One cloth for right leg and foot including between the toes.  One cloth for left leg and foot including between the toes.  The last cloth is to be used for the back of the neck, back and buttocks.    Allow the CHG to air dry 3 minutes on the skin which will give it time to work and decrease the chance of irritation.  The skin may feel sticky until it is dry.  Do not rinse with water or any other liquid or you will lose the beneficial effects of the CHG.  If mild skin irritation occurs, do rinse the skin to remove the CHG.  Report this to the nurse at time of admission.  Do not apply lotions, creams, ointments, deodorants or perfumes after using the clothes. Dress in clean clothes before coming to the hospital.

## 2024-10-18 NOTE — PAT
"Sched for RTHA with Dr Purdy.  She is here for PAT today.  Previous LTHA several years ago.  Now with severe pain in Right hip and down leg that \"brings her to tears\".      PMH includes HTN, thyroid disease, hyperlipidemia, OA, depression, CRD, and freq UTI's  Just recently completed ATB for a UTI and is now asymptomatic. No other recent c/o.    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  She is  and her  is available to assist her.    WDWN elderly female in NAD.  Affect very flat but answers questions appropriately.  Walks with antalgic gait, but no neuro deficits noted.  Chest CTA.  S1S2 RRR. No edema noted.  All PAT tests pending.    "

## 2024-10-23 ENCOUNTER — TELEPHONE (OUTPATIENT)
Dept: INTERNAL MEDICINE | Facility: CLINIC | Age: 79
End: 2024-10-23
Payer: MEDICARE

## 2024-10-23 NOTE — TELEPHONE ENCOUNTER
"        Hub staff attempted to follow warm transfer process and was unsuccessful     Caller: Marci Kolb \"Carron\"    Relationship to patient: Self    Best call back number: [phone]     Patient is needing: PATIENT CALLED AND SHE IS TO HAVE SURGERY 11/06 FOR HIP REPLACEMENT. SHE HAS A COUPLE OF \"ISSUES\"  THAT MAY KEEP HER FROM HAVING THE SURGERY AND SHE NEEDS TO BE SEEN BY HER PROVIDER PRIOR TO 11/06. PLEASE CALL PATIENT TO ASSIST HER IN SEEING SOMEONE PRIOR TO THAT TIME. SHE STILL HAS HER 11/19 APPT TO ESTABLISH          "

## 2024-10-25 NOTE — TELEPHONE ENCOUNTER
"Caller: Marci Kolb \"Zeeshan\"    Relationship: Self    Best call back number: [phone]     What was the call regarding: PATIENT CALLED TO FOLLOW UP ON REQUEST. PLEASE CALL BACK    "

## 2024-10-28 ENCOUNTER — OFFICE VISIT (OUTPATIENT)
Dept: INTERNAL MEDICINE | Facility: CLINIC | Age: 79
End: 2024-10-28
Payer: MEDICARE

## 2024-10-28 VITALS
HEIGHT: 64 IN | WEIGHT: 136.8 LBS | DIASTOLIC BLOOD PRESSURE: 76 MMHG | TEMPERATURE: 96.9 F | BODY MASS INDEX: 23.35 KG/M2 | SYSTOLIC BLOOD PRESSURE: 130 MMHG

## 2024-10-28 DIAGNOSIS — N30.00 ACUTE CYSTITIS WITHOUT HEMATURIA: Primary | ICD-10-CM

## 2024-10-28 DIAGNOSIS — R30.0 DYSURIA: ICD-10-CM

## 2024-10-28 LAB
BILIRUB BLD-MCNC: NEGATIVE MG/DL
CLARITY, POC: ABNORMAL
COLOR UR: YELLOW
EXPIRATION DATE: ABNORMAL
GLUCOSE UR STRIP-MCNC: NEGATIVE MG/DL
KETONES UR QL: NEGATIVE
LEUKOCYTE EST, POC: ABNORMAL
Lab: ABNORMAL
NITRITE UR-MCNC: NEGATIVE MG/ML
PH UR: 7 [PH] (ref 5–8)
PROT UR STRIP-MCNC: ABNORMAL MG/DL
RBC # UR STRIP: ABNORMAL /UL
SP GR UR: 1.01 (ref 1–1.03)
UROBILINOGEN UR QL: ABNORMAL

## 2024-10-28 PROCEDURE — 99213 OFFICE O/P EST LOW 20 MIN: CPT | Performed by: STUDENT IN AN ORGANIZED HEALTH CARE EDUCATION/TRAINING PROGRAM

## 2024-10-28 PROCEDURE — 1126F AMNT PAIN NOTED NONE PRSNT: CPT | Performed by: STUDENT IN AN ORGANIZED HEALTH CARE EDUCATION/TRAINING PROGRAM

## 2024-10-28 PROCEDURE — 1159F MED LIST DOCD IN RCRD: CPT | Performed by: STUDENT IN AN ORGANIZED HEALTH CARE EDUCATION/TRAINING PROGRAM

## 2024-10-28 PROCEDURE — 1160F RVW MEDS BY RX/DR IN RCRD: CPT | Performed by: STUDENT IN AN ORGANIZED HEALTH CARE EDUCATION/TRAINING PROGRAM

## 2024-10-28 PROCEDURE — 81003 URINALYSIS AUTO W/O SCOPE: CPT | Performed by: STUDENT IN AN ORGANIZED HEALTH CARE EDUCATION/TRAINING PROGRAM

## 2024-10-28 RX ORDER — NITROFURANTOIN 25; 75 MG/1; MG/1
100 CAPSULE ORAL 2 TIMES DAILY
Qty: 14 CAPSULE | Refills: 0 | Status: SHIPPED | OUTPATIENT
Start: 2024-10-28 | End: 2024-11-04

## 2024-10-28 RX ORDER — BRIMONIDINE TARTRATE 2 MG/ML
SOLUTION/ DROPS OPHTHALMIC
COMMUNITY
Start: 2024-10-22

## 2024-10-28 NOTE — PROGRESS NOTES
"Chief Complaint  Urinary Tract Infection (X2 days, aching after voiding, surgery 11/6)    Subjective        Marci Kolb presents to [facility]  History of Present Illness  This is an 79-year-old female here for dysuria.  Her symptoms began a couple days ago and she has an upcoming orthopedic procedure for right hip replacement next week.  No fever or back pain.  She was treated for UTI in early October with nitrofurantoin.   She has had prior urine cultures from 2023 that are resistant to ampicillin and Augmentin as well as another culture resistant to Bactrim.  Prior cultures have grown E. coli.  She also had a culture from 2022 that showed streptococcus in urine.    Objective   Vital Signs:  /76 (BP Location: Left arm, Patient Position: Sitting, Cuff Size: Adult)   Temp 96.9 °F (36.1 °C) (Temporal)   Ht 162.6 cm (64.02\")   Wt 62.1 kg (136 lb 12.8 oz)   BMI 23.47 kg/m²   Estimated body mass index is 23.47 kg/m² as calculated from the following:    Height as of this encounter: 162.6 cm (64.02\").    Weight as of this encounter: 62.1 kg (136 lb 12.8 oz).    BMI is within normal parameters. No other follow-up for BMI required.      Physical Exam  Vitals and nursing note reviewed.   Constitutional:       General: She is not in acute distress.     Appearance: She is normal weight. She is not toxic-appearing.   Neurological:      Mental Status: She is alert and oriented to person, place, and time.   Psychiatric:         Mood and Affect: Mood normal.         Thought Content: Thought content normal.         Judgment: Judgment normal.        Result Review :    CMP          12/1/2023    13:26 8/19/2024    14:00 10/17/2024    11:14   CMP   Glucose 90  77  87    BUN 13  14  12    Creatinine 0.93  0.69  0.82    EGFR 62.3   72.0    Sodium 142  142  144    Potassium 4.7  3.9  4.4    Chloride 108  109  110    Calcium 10.6  10.0  10.4    Total Protein  6.6     Total Protein 7.1   7.0  "   Albumin 4.0  3.8  3.8    Globulin  2.8     Globulin 3.1   3.2    Total Bilirubin 0.5  0.3  0.4    Alkaline Phosphatase 112  127  126    AST (SGOT) 25  21  24    ALT (SGPT) 16  17  19    Albumin/Globulin Ratio 1.3   1.2    BUN/Creatinine Ratio 14.0  20.3  14.6    Anion Gap 11.1   7.8      BMP          12/1/2023    13:26 8/19/2024    14:00 10/17/2024    11:14   BMP   BUN 13  14  12    Creatinine 0.93  0.69  0.82    Sodium 142  142  144    Potassium 4.7  3.9  4.4    Chloride 108  109  110    CO2 22.9  25.9  26.2    Calcium 10.6  10.0  10.4      UA          8/19/2024    13:30 10/3/2024    15:07 10/28/2024    15:20   Urinalysis   Ketones, UA Negative  Negative  Negative    Leukocytes, UA Trace  Small (1+)  Moderate (2+)      Urine Culture          8/19/2024    13:30 10/3/2024    16:06   Urine Culture   Urine Culture Final report  Final report                Assessment and Plan   Diagnoses and all orders for this visit:    1. Acute cystitis without hematuria (Primary)    2. Dysuria  -     POC Urinalysis Dipstick, Automated  -     Urine Culture - Urine, Urine, Clean Catch; Future    Other orders  -     nitrofurantoin, macrocrystal-monohydrate, (Macrobid) 100 MG capsule; Take 1 capsule by mouth 2 (Two) Times a Day for 7 days.  Dispense: 14 capsule; Refill: 0      Follow urine culture to give further guidance regarding antimicrobial therapy. Advised patient to reach out with new or worsening symptoms including fever and back pain and she voiced understanding and agreement with this plan.       Follow Up   Return for Next scheduled follow up.  Patient was given instructions and counseling regarding her condition or for health maintenance advice. Please see specific information pulled into the AVS if appropriate.

## 2024-10-30 LAB
BACTERIA UR CULT: ABNORMAL
BACTERIA UR CULT: ABNORMAL
OTHER ANTIBIOTIC SUSC ISLT: ABNORMAL

## 2024-11-06 ENCOUNTER — APPOINTMENT (OUTPATIENT)
Dept: GENERAL RADIOLOGY | Facility: HOSPITAL | Age: 79
End: 2024-11-06
Payer: MEDICARE

## 2024-11-06 ENCOUNTER — ANESTHESIA (OUTPATIENT)
Dept: PERIOP | Facility: HOSPITAL | Age: 79
End: 2024-11-06
Payer: MEDICARE

## 2024-11-06 ENCOUNTER — HOSPITAL ENCOUNTER (OUTPATIENT)
Facility: HOSPITAL | Age: 79
Setting detail: OBSERVATION
Discharge: HOME | End: 2024-11-08
Attending: ORTHOPAEDIC SURGERY | Admitting: ORTHOPAEDIC SURGERY
Payer: MEDICARE

## 2024-11-06 ENCOUNTER — ANESTHESIA EVENT (OUTPATIENT)
Dept: PERIOP | Facility: HOSPITAL | Age: 79
End: 2024-11-06
Payer: MEDICARE

## 2024-11-06 DIAGNOSIS — Z96.641 H/O TOTAL HIP ARTHROPLASTY, RIGHT: Primary | ICD-10-CM

## 2024-11-06 PROCEDURE — C1776 JOINT DEVICE (IMPLANTABLE): HCPCS | Performed by: ORTHOPAEDIC SURGERY

## 2024-11-06 PROCEDURE — C1713 ANCHOR/SCREW BN/BN,TIS/BN: HCPCS | Performed by: ORTHOPAEDIC SURGERY

## 2024-11-06 PROCEDURE — 25010000002 EPINEPHRINE 1 MG/ML SOLUTION 30 ML VIAL: Performed by: ORTHOPAEDIC SURGERY

## 2024-11-06 PROCEDURE — 76000 FLUOROSCOPY <1 HR PHYS/QHP: CPT

## 2024-11-06 PROCEDURE — 25010000002 ROPIVACAINE PER 1 MG: Performed by: ORTHOPAEDIC SURGERY

## 2024-11-06 PROCEDURE — 25010000002 KETOROLAC TROMETHAMINE PER 15 MG: Performed by: ORTHOPAEDIC SURGERY

## 2024-11-06 PROCEDURE — 97161 PT EVAL LOW COMPLEX 20 MIN: CPT

## 2024-11-06 PROCEDURE — 25010000002 PROPOFOL 10 MG/ML EMULSION: Performed by: NURSE ANESTHETIST, CERTIFIED REGISTERED

## 2024-11-06 PROCEDURE — 25010000002 BUPIVACAINE PF 0.75 % SOLUTION: Performed by: ANESTHESIOLOGY

## 2024-11-06 PROCEDURE — 25810000003 LACTATED RINGERS PER 1000 ML: Performed by: ANESTHESIOLOGY

## 2024-11-06 PROCEDURE — 73501 X-RAY EXAM HIP UNI 1 VIEW: CPT

## 2024-11-06 PROCEDURE — G0378 HOSPITAL OBSERVATION PER HR: HCPCS

## 2024-11-06 PROCEDURE — 25010000002 ONDANSETRON PER 1 MG: Performed by: NURSE ANESTHETIST, CERTIFIED REGISTERED

## 2024-11-06 PROCEDURE — 25010000002 HYDROMORPHONE PER 4 MG: Performed by: ORTHOPAEDIC SURGERY

## 2024-11-06 PROCEDURE — 97110 THERAPEUTIC EXERCISES: CPT

## 2024-11-06 PROCEDURE — 25010000002 CLONIDINE PER 1 MG: Performed by: ORTHOPAEDIC SURGERY

## 2024-11-06 PROCEDURE — 25010000002 GLYCOPYRROLATE 0.2 MG/ML SOLUTION: Performed by: NURSE ANESTHETIST, CERTIFIED REGISTERED

## 2024-11-06 PROCEDURE — 25010000002 CEFAZOLIN PER 500 MG: Performed by: ORTHOPAEDIC SURGERY

## 2024-11-06 DEVICE — DEV CONTRL TISS STRATAFIX SPIRAL MNCRYL UD 3/0 PLS 30CM: Type: IMPLANTABLE DEVICE | Site: HIP | Status: FUNCTIONAL

## 2024-11-06 DEVICE — DEV CONTRL TISS STRATAFIX SYMM PDS PLUS VIL CT-1 45CM: Type: IMPLANTABLE DEVICE | Site: HIP | Status: FUNCTIONAL

## 2024-11-06 DEVICE — FEMORAL HEAD Ø 36 SIZE L
Type: IMPLANTABLE DEVICE | Site: HIP | Status: FUNCTIONAL
Brand: MECTACER BIOLOX DELTA FEMORAL BALL HEAD

## 2024-11-06 DEVICE — ACETABULAR SHELL Ø52 TWO HOLES
Type: IMPLANTABLE DEVICE | Site: HIP | Status: FUNCTIONAL
Brand: MPACT ACETABULAR SYSTEM

## 2024-11-06 DEVICE — MBRACE CABLE
Type: IMPLANTABLE DEVICE | Site: HIP | Status: FUNCTIONAL
Brand: MBRACE SYSTEM

## 2024-11-06 DEVICE — FLAT PE  HC LINER Ø 36 / E
Type: IMPLANTABLE DEVICE | Site: HIP | Status: FUNCTIONAL
Brand: MPACT ACETABULAR SYSTEM

## 2024-11-06 DEVICE — AMISTEM C CEMENTED STEM STANDARD SIZE 1
Type: IMPLANTABLE DEVICE | Site: HIP | Status: FUNCTIONAL
Brand: AMISTEM C FEMORAL STEMS

## 2024-11-06 DEVICE — TOTL HIP: Type: IMPLANTABLE DEVICE | Status: FUNCTIONAL

## 2024-11-06 DEVICE — PALACOS® R IS A FAST-CURING, RADIOPAQUE, POLY(METHYL METHACRYLATE)-BASED BONE CEMENT.PALACOS ® R CONTAINS THE X-RAY CONTRAST MEDIUM ZIRCONIUM DIOXIDE. TO IMPROVE VISIBILITY IN THE SURGICAL FIELD PALACOS ® R HAS BEEN COLOURED WITH CHLOROPHYLL (E141). THE BONE CEMENT IS PREPARED DIRECTLY BEFORE USE BY MIXING A POLYMER POWDER COMPONENT WITH A LIQUID MONOMER COMPONENT. A DUCTILE DOUGH FORMS WHICH CURES WITHIN A FEW MINUTES.
Type: IMPLANTABLE DEVICE | Site: HIP | Status: FUNCTIONAL
Brand: PALACOS®

## 2024-11-06 RX ORDER — SODIUM CHLORIDE 0.9 % (FLUSH) 0.9 %
3 SYRINGE (ML) INJECTION EVERY 12 HOURS SCHEDULED
Status: DISCONTINUED | OUTPATIENT
Start: 2024-11-06 | End: 2024-11-06 | Stop reason: HOSPADM

## 2024-11-06 RX ORDER — GLYCOPYRROLATE 0.2 MG/ML
INJECTION INTRAMUSCULAR; INTRAVENOUS AS NEEDED
Status: DISCONTINUED | OUTPATIENT
Start: 2024-11-06 | End: 2024-11-06 | Stop reason: SURG

## 2024-11-06 RX ORDER — ATORVASTATIN CALCIUM 20 MG/1
20 TABLET, FILM COATED ORAL DAILY
Status: DISCONTINUED | OUTPATIENT
Start: 2024-11-06 | End: 2024-11-08 | Stop reason: HOSPADM

## 2024-11-06 RX ORDER — PREGABALIN 75 MG/1
75 CAPSULE ORAL ONCE
Status: DISCONTINUED | OUTPATIENT
Start: 2024-11-06 | End: 2024-11-06 | Stop reason: HOSPADM

## 2024-11-06 RX ORDER — ONDANSETRON 4 MG/1
4 TABLET, ORALLY DISINTEGRATING ORAL EVERY 6 HOURS PRN
Status: DISCONTINUED | OUTPATIENT
Start: 2024-11-06 | End: 2024-11-08 | Stop reason: HOSPADM

## 2024-11-06 RX ORDER — ASPIRIN 81 MG/1
81 TABLET ORAL EVERY 12 HOURS SCHEDULED
Status: DISCONTINUED | OUTPATIENT
Start: 2024-11-06 | End: 2024-11-08 | Stop reason: HOSPADM

## 2024-11-06 RX ORDER — HYDROCODONE BITARTRATE AND ACETAMINOPHEN 5; 325 MG/1; MG/1
2 TABLET ORAL EVERY 4 HOURS PRN
Status: DISCONTINUED | OUTPATIENT
Start: 2024-11-06 | End: 2024-11-08 | Stop reason: HOSPADM

## 2024-11-06 RX ORDER — NALOXONE HCL 0.4 MG/ML
0.1 VIAL (ML) INJECTION
Status: DISCONTINUED | OUTPATIENT
Start: 2024-11-06 | End: 2024-11-07

## 2024-11-06 RX ORDER — BRIMONIDINE TARTRATE 2 MG/ML
1 SOLUTION/ DROPS OPHTHALMIC 3 TIMES DAILY
Status: DISCONTINUED | OUTPATIENT
Start: 2024-11-06 | End: 2024-11-06 | Stop reason: SDUPTHER

## 2024-11-06 RX ORDER — HYDROCODONE BITARTRATE AND ACETAMINOPHEN 5; 325 MG/1; MG/1
1 TABLET ORAL EVERY 4 HOURS PRN
Status: DISCONTINUED | OUTPATIENT
Start: 2024-11-06 | End: 2024-11-08 | Stop reason: HOSPADM

## 2024-11-06 RX ORDER — ONDANSETRON 2 MG/ML
4 INJECTION INTRAMUSCULAR; INTRAVENOUS ONCE AS NEEDED
Status: DISCONTINUED | OUTPATIENT
Start: 2024-11-06 | End: 2024-11-06 | Stop reason: HOSPADM

## 2024-11-06 RX ORDER — NALOXONE HCL 0.4 MG/ML
0.2 VIAL (ML) INJECTION AS NEEDED
Status: DISCONTINUED | OUTPATIENT
Start: 2024-11-06 | End: 2024-11-06 | Stop reason: HOSPADM

## 2024-11-06 RX ORDER — DIPHENHYDRAMINE HYDROCHLORIDE 50 MG/ML
12.5 INJECTION INTRAMUSCULAR; INTRAVENOUS
Status: DISCONTINUED | OUTPATIENT
Start: 2024-11-06 | End: 2024-11-06 | Stop reason: HOSPADM

## 2024-11-06 RX ORDER — EPHEDRINE SULFATE 50 MG/ML
5 INJECTION, SOLUTION INTRAVENOUS ONCE AS NEEDED
Status: DISCONTINUED | OUTPATIENT
Start: 2024-11-06 | End: 2024-11-06 | Stop reason: HOSPADM

## 2024-11-06 RX ORDER — LEVOTHYROXINE SODIUM 112 UG/1
112 TABLET ORAL
Status: DISCONTINUED | OUTPATIENT
Start: 2024-11-07 | End: 2024-11-08 | Stop reason: HOSPADM

## 2024-11-06 RX ORDER — ACETAMINOPHEN 500 MG
1000 TABLET ORAL ONCE
Status: COMPLETED | OUTPATIENT
Start: 2024-11-06 | End: 2024-11-06

## 2024-11-06 RX ORDER — FLUOROMETHOLONE 0.1 %
1 SUSPENSION, DROPS(FINAL DOSAGE FORM)(ML) OPHTHALMIC (EYE) 2 TIMES DAILY
Status: DISCONTINUED | OUTPATIENT
Start: 2024-11-06 | End: 2024-11-08 | Stop reason: HOSPADM

## 2024-11-06 RX ORDER — HYDRALAZINE HYDROCHLORIDE 20 MG/ML
5 INJECTION INTRAMUSCULAR; INTRAVENOUS
Status: DISCONTINUED | OUTPATIENT
Start: 2024-11-06 | End: 2024-11-06 | Stop reason: HOSPADM

## 2024-11-06 RX ORDER — PROPOFOL 10 MG/ML
VIAL (ML) INTRAVENOUS AS NEEDED
Status: DISCONTINUED | OUTPATIENT
Start: 2024-11-06 | End: 2024-11-06 | Stop reason: SURG

## 2024-11-06 RX ORDER — KETOROLAC TROMETHAMINE 15 MG/ML
15 INJECTION, SOLUTION INTRAMUSCULAR; INTRAVENOUS EVERY 6 HOURS
Status: DISPENSED | OUTPATIENT
Start: 2024-11-06 | End: 2024-11-07

## 2024-11-06 RX ORDER — IPRATROPIUM BROMIDE AND ALBUTEROL SULFATE 2.5; .5 MG/3ML; MG/3ML
3 SOLUTION RESPIRATORY (INHALATION) ONCE AS NEEDED
Status: DISCONTINUED | OUTPATIENT
Start: 2024-11-06 | End: 2024-11-06 | Stop reason: HOSPADM

## 2024-11-06 RX ORDER — MULTIPLE VITAMINS W/ MINERALS TAB 9MG-400MCG
1 TAB ORAL DAILY
Status: DISCONTINUED | OUTPATIENT
Start: 2024-11-07 | End: 2024-11-08 | Stop reason: HOSPADM

## 2024-11-06 RX ORDER — PHENYLEPHRINE HCL IN 0.9% NACL 1 MG/10 ML
SYRINGE (ML) INTRAVENOUS AS NEEDED
Status: DISCONTINUED | OUTPATIENT
Start: 2024-11-06 | End: 2024-11-06 | Stop reason: SURG

## 2024-11-06 RX ORDER — DOCUSATE SODIUM 100 MG/1
100 CAPSULE, LIQUID FILLED ORAL 2 TIMES DAILY PRN
Status: DISCONTINUED | OUTPATIENT
Start: 2024-11-06 | End: 2024-11-08 | Stop reason: HOSPADM

## 2024-11-06 RX ORDER — ACETAMINOPHEN 325 MG/1
325 TABLET ORAL EVERY 4 HOURS PRN
Status: DISCONTINUED | OUTPATIENT
Start: 2024-11-06 | End: 2024-11-08 | Stop reason: HOSPADM

## 2024-11-06 RX ORDER — MIDAZOLAM HYDROCHLORIDE 1 MG/ML
0.5 INJECTION, SOLUTION INTRAMUSCULAR; INTRAVENOUS
Status: DISCONTINUED | OUTPATIENT
Start: 2024-11-06 | End: 2024-11-06 | Stop reason: HOSPADM

## 2024-11-06 RX ORDER — TRANEXAMIC ACID 100 MG/ML
INJECTION, SOLUTION INTRAVENOUS AS NEEDED
Status: DISCONTINUED | OUTPATIENT
Start: 2024-11-06 | End: 2024-11-06 | Stop reason: SURG

## 2024-11-06 RX ORDER — PROMETHAZINE HYDROCHLORIDE 25 MG/1
25 TABLET ORAL ONCE AS NEEDED
Status: DISCONTINUED | OUTPATIENT
Start: 2024-11-06 | End: 2024-11-06 | Stop reason: HOSPADM

## 2024-11-06 RX ORDER — SODIUM CHLORIDE 0.9 % (FLUSH) 0.9 %
3-10 SYRINGE (ML) INJECTION AS NEEDED
Status: DISCONTINUED | OUTPATIENT
Start: 2024-11-06 | End: 2024-11-06 | Stop reason: HOSPADM

## 2024-11-06 RX ORDER — FENTANYL CITRATE 50 UG/ML
25 INJECTION, SOLUTION INTRAMUSCULAR; INTRAVENOUS
Status: DISCONTINUED | OUTPATIENT
Start: 2024-11-06 | End: 2024-11-06 | Stop reason: HOSPADM

## 2024-11-06 RX ORDER — BUPIVACAINE HYDROCHLORIDE 7.5 MG/ML
INJECTION, SOLUTION EPIDURAL; RETROBULBAR
Status: COMPLETED | OUTPATIENT
Start: 2024-11-06 | End: 2024-11-06

## 2024-11-06 RX ORDER — BRIMONIDINE TARTRATE 2 MG/ML
1 SOLUTION/ DROPS OPHTHALMIC 2 TIMES DAILY
Status: DISCONTINUED | OUTPATIENT
Start: 2024-11-06 | End: 2024-11-08 | Stop reason: HOSPADM

## 2024-11-06 RX ORDER — FENTANYL CITRATE 50 UG/ML
50 INJECTION, SOLUTION INTRAMUSCULAR; INTRAVENOUS ONCE AS NEEDED
Status: DISCONTINUED | OUTPATIENT
Start: 2024-11-06 | End: 2024-11-06 | Stop reason: HOSPADM

## 2024-11-06 RX ORDER — MELOXICAM 7.5 MG/1
7.5 TABLET ORAL DAILY
Status: DISCONTINUED | OUTPATIENT
Start: 2024-11-08 | End: 2024-11-08 | Stop reason: HOSPADM

## 2024-11-06 RX ORDER — HYDROMORPHONE HYDROCHLORIDE 1 MG/ML
0.25 INJECTION, SOLUTION INTRAMUSCULAR; INTRAVENOUS; SUBCUTANEOUS
Status: DISCONTINUED | OUTPATIENT
Start: 2024-11-06 | End: 2024-11-06 | Stop reason: HOSPADM

## 2024-11-06 RX ORDER — ONDANSETRON 2 MG/ML
INJECTION INTRAMUSCULAR; INTRAVENOUS AS NEEDED
Status: DISCONTINUED | OUTPATIENT
Start: 2024-11-06 | End: 2024-11-06 | Stop reason: SURG

## 2024-11-06 RX ORDER — ONDANSETRON 2 MG/ML
4 INJECTION INTRAMUSCULAR; INTRAVENOUS EVERY 6 HOURS PRN
Status: DISCONTINUED | OUTPATIENT
Start: 2024-11-06 | End: 2024-11-08 | Stop reason: HOSPADM

## 2024-11-06 RX ORDER — HYDROCODONE BITARTRATE AND ACETAMINOPHEN 5; 325 MG/1; MG/1
1 TABLET ORAL ONCE AS NEEDED
Status: DISCONTINUED | OUTPATIENT
Start: 2024-11-06 | End: 2024-11-06 | Stop reason: HOSPADM

## 2024-11-06 RX ORDER — FAMOTIDINE 10 MG/ML
20 INJECTION, SOLUTION INTRAVENOUS ONCE
Status: COMPLETED | OUTPATIENT
Start: 2024-11-06 | End: 2024-11-06

## 2024-11-06 RX ORDER — DROPERIDOL 2.5 MG/ML
0.62 INJECTION, SOLUTION INTRAMUSCULAR; INTRAVENOUS
Status: DISCONTINUED | OUTPATIENT
Start: 2024-11-06 | End: 2024-11-06 | Stop reason: HOSPADM

## 2024-11-06 RX ORDER — EPHEDRINE SULFATE 50 MG/ML
INJECTION INTRAVENOUS AS NEEDED
Status: DISCONTINUED | OUTPATIENT
Start: 2024-11-06 | End: 2024-11-06 | Stop reason: SURG

## 2024-11-06 RX ORDER — MAGNESIUM HYDROXIDE 1200 MG/15ML
LIQUID ORAL AS NEEDED
Status: DISCONTINUED | OUTPATIENT
Start: 2024-11-06 | End: 2024-11-06 | Stop reason: HOSPADM

## 2024-11-06 RX ORDER — HYDROMORPHONE HYDROCHLORIDE 1 MG/ML
0.5 INJECTION, SOLUTION INTRAMUSCULAR; INTRAVENOUS; SUBCUTANEOUS
Status: DISCONTINUED | OUTPATIENT
Start: 2024-11-06 | End: 2024-11-07

## 2024-11-06 RX ORDER — HYDROCODONE BITARTRATE AND ACETAMINOPHEN 7.5; 325 MG/1; MG/1
1 TABLET ORAL EVERY 4 HOURS PRN
Status: DISCONTINUED | OUTPATIENT
Start: 2024-11-06 | End: 2024-11-06 | Stop reason: HOSPADM

## 2024-11-06 RX ORDER — PROMETHAZINE HYDROCHLORIDE 25 MG/1
25 SUPPOSITORY RECTAL ONCE AS NEEDED
Status: DISCONTINUED | OUTPATIENT
Start: 2024-11-06 | End: 2024-11-06 | Stop reason: HOSPADM

## 2024-11-06 RX ORDER — LABETALOL HYDROCHLORIDE 5 MG/ML
5 INJECTION, SOLUTION INTRAVENOUS
Status: DISCONTINUED | OUTPATIENT
Start: 2024-11-06 | End: 2024-11-06 | Stop reason: HOSPADM

## 2024-11-06 RX ORDER — LIDOCAINE HYDROCHLORIDE 10 MG/ML
0.5 INJECTION, SOLUTION INFILTRATION; PERINEURAL ONCE AS NEEDED
Status: DISCONTINUED | OUTPATIENT
Start: 2024-11-06 | End: 2024-11-06 | Stop reason: HOSPADM

## 2024-11-06 RX ORDER — POLYETHYLENE GLYCOL 3350 17 G/17G
17 POWDER, FOR SOLUTION ORAL 2 TIMES DAILY
Status: DISCONTINUED | OUTPATIENT
Start: 2024-11-06 | End: 2024-11-08 | Stop reason: HOSPADM

## 2024-11-06 RX ORDER — CELECOXIB 200 MG/1
400 CAPSULE ORAL ONCE
Status: DISCONTINUED | OUTPATIENT
Start: 2024-11-06 | End: 2024-11-06 | Stop reason: HOSPADM

## 2024-11-06 RX ORDER — SODIUM CHLORIDE, SODIUM LACTATE, POTASSIUM CHLORIDE, CALCIUM CHLORIDE 600; 310; 30; 20 MG/100ML; MG/100ML; MG/100ML; MG/100ML
9 INJECTION, SOLUTION INTRAVENOUS CONTINUOUS
Status: ACTIVE | OUTPATIENT
Start: 2024-11-06 | End: 2024-11-07

## 2024-11-06 RX ORDER — FLUMAZENIL 0.1 MG/ML
0.2 INJECTION INTRAVENOUS AS NEEDED
Status: DISCONTINUED | OUTPATIENT
Start: 2024-11-06 | End: 2024-11-06 | Stop reason: HOSPADM

## 2024-11-06 RX ADMIN — ACETAMINOPHEN 1000 MG: 500 TABLET ORAL at 08:03

## 2024-11-06 RX ADMIN — Medication 100 MCG: at 11:40

## 2024-11-06 RX ADMIN — SODIUM CHLORIDE 2000 MG: 900 INJECTION INTRAVENOUS at 10:34

## 2024-11-06 RX ADMIN — POLYETHYLENE GLYCOL 3350 17 G: 17 POWDER, FOR SOLUTION ORAL at 21:30

## 2024-11-06 RX ADMIN — ASPIRIN 81 MG: 81 TABLET, COATED ORAL at 18:35

## 2024-11-06 RX ADMIN — ATORVASTATIN CALCIUM 20 MG: 20 TABLET, FILM COATED ORAL at 18:35

## 2024-11-06 RX ADMIN — SODIUM CHLORIDE, SODIUM LACTATE, POTASSIUM CHLORIDE, CALCIUM CHLORIDE: 20; 30; 600; 310 INJECTION, SOLUTION INTRAVENOUS at 10:40

## 2024-11-06 RX ADMIN — GLYCOPYRROLATE 0.2 MG: 0.2 INJECTION INTRAMUSCULAR; INTRAVENOUS at 11:57

## 2024-11-06 RX ADMIN — EPHEDRINE SULFATE 5 MG: 50 INJECTION INTRAVENOUS at 11:40

## 2024-11-06 RX ADMIN — TRANEXAMIC ACID 1000 MG: 1 INJECTION, SOLUTION INTRAVENOUS at 12:11

## 2024-11-06 RX ADMIN — PROPOFOL 75 MCG/KG/MIN: 10 INJECTION, EMULSION INTRAVENOUS at 10:55

## 2024-11-06 RX ADMIN — FLUOROMETHOLONE 1 DROP: 1 SOLUTION/ DROPS OPHTHALMIC at 21:33

## 2024-11-06 RX ADMIN — HYDROCODONE BITARTRATE AND ACETAMINOPHEN 2 TABLET: 5; 325 TABLET ORAL at 21:31

## 2024-11-06 RX ADMIN — Medication 100 MCG: at 12:15

## 2024-11-06 RX ADMIN — PROPOFOL 20 MG: 10 INJECTION, EMULSION INTRAVENOUS at 10:57

## 2024-11-06 RX ADMIN — HYDROMORPHONE HYDROCHLORIDE 0.5 MG: 1 INJECTION, SOLUTION INTRAMUSCULAR; INTRAVENOUS; SUBCUTANEOUS at 18:41

## 2024-11-06 RX ADMIN — EPHEDRINE SULFATE 5 MG: 50 INJECTION INTRAVENOUS at 11:31

## 2024-11-06 RX ADMIN — TRANEXAMIC ACID 1000 MG: 1 INJECTION, SOLUTION INTRAVENOUS at 11:12

## 2024-11-06 RX ADMIN — Medication 100 MCG: at 12:06

## 2024-11-06 RX ADMIN — SODIUM CHLORIDE 2000 MG: 900 INJECTION INTRAVENOUS at 18:35

## 2024-11-06 RX ADMIN — FAMOTIDINE 20 MG: 10 INJECTION INTRAVENOUS at 09:36

## 2024-11-06 RX ADMIN — KETOROLAC TROMETHAMINE 15 MG: 15 INJECTION, SOLUTION INTRAMUSCULAR; INTRAVENOUS at 21:30

## 2024-11-06 RX ADMIN — BUPIVACAINE HYDROCHLORIDE 1.6 ML: 7.5 INJECTION, SOLUTION EPIDURAL; RETROBULBAR at 10:41

## 2024-11-06 RX ADMIN — Medication 100 MCG: at 11:29

## 2024-11-06 RX ADMIN — BRIMONIDINE TARTRATE 1 DROP: 2 SOLUTION/ DROPS OPHTHALMIC at 21:33

## 2024-11-06 RX ADMIN — HYDROCODONE BITARTRATE AND ACETAMINOPHEN 2 TABLET: 5; 325 TABLET ORAL at 15:45

## 2024-11-06 RX ADMIN — SODIUM CHLORIDE, SODIUM LACTATE, POTASSIUM CHLORIDE, CALCIUM CHLORIDE 9 ML/HR: 20; 30; 600; 310 INJECTION, SOLUTION INTRAVENOUS at 13:08

## 2024-11-06 RX ADMIN — ONDANSETRON 4 MG: 2 INJECTION INTRAMUSCULAR; INTRAVENOUS at 10:46

## 2024-11-06 RX ADMIN — Medication 100 MCG: at 12:31

## 2024-11-06 RX ADMIN — Medication 100 MCG: at 11:55

## 2024-11-06 NOTE — PLAN OF CARE
Goal Outcome Evaluation:  Plan of Care Reviewed With: patient              Patient is a 79 y.o. female POD0 R LILLI anterior with expected post op weakness and impaired functional mobility. Patient is ind at baseline without use of AD and lives with her spouse. Today, patient performed bed mobility with SBA, required Luis for STS, and tx to chair using rwx requiring min-modA. Limited mobility this date as pt block is still present and limited DF on RLE.  VC for sequencing of LE to chair. Patient will benefit from skilled PT services acutely to address functional deficits as well as improve level of independence prior to discharge.   Anticipated Discharge Disposition (PT): home with home health, home with assist, skilled nursing facility (pending progress with PT next service date)

## 2024-11-06 NOTE — DISCHARGE PLACEMENT REQUEST
"Shakir Dominguez \"Zeeshan\" (79 y.o. Female)       Date of Birth   1945    Social Security Number       Address   [address] [place] [address]    Home Phone   [phone]    MRN   [id number]       [facility]    Marital Status                               Admission Date   11/6/24    Admission Type   Elective    Admitting Provider   Chalino Purdy MD    Attending Provider   Chalino Purdy MD    Department, Room/Bed   [address], P781/1       Discharge Date       Discharge Disposition       Discharge Destination                                 Attending Provider: Chalino Purdy MD    Allergies: Streptomycin, Penicillins    Isolation: None   Infection: None   Code Status: CPR    Ht: 162.6 cm (64.02\")   Wt: 62.1 kg (136 lb 12.8 oz)    Admission Cmt: None   Principal Problem: H/O total hip arthroplasty, right [Z96.641]                   Active Insurance as of 11/6/2024       Primary Coverage       Payor Plan Insurance Group Employer/Plan Group    ANTHEM MEDICARE REPLACEMENT ANTHEM MEDICARE ADVANTAGE [id number]       Payor Plan Address Payor Plan Phone Number Payor Plan Fax Number Effective Dates    [address] [phone]  1/1/2024 - None Entered    [facility] [address]         Subscriber Name Subscriber Birth Date Member ID       SHAKIR DOMINGUEZ 1945 [id number]               Secondary Coverage       Payor Plan Insurance Group Employer/Plan Group     FOR LIFE  FOR LIFE MC SUP  [id number]       Payor Plan Address Payor Plan Phone Number Payor Plan Fax Number Effective Dates    [address] [phone]  8/10/2022 - None Entered    [facility] [address]         Subscriber Name Subscriber Birth Date Member ID       SHAKIR DOMINGUEZ 1945 [id number]                     Emergency Contacts        (Rel.) Home Phone Work Phone Mobile Phone    DominguezAndrew wallis (Spouse) [phone] -- [phone]              "

## 2024-11-06 NOTE — ANESTHESIA PROCEDURE NOTES
Spinal Block    Pre-sedation assessment completed: 11/6/2024 10:40 AM    Patient reassessed immediately prior to procedure    Patient location during procedure: OR  Start Time: 11/6/2024 10:41 AM  Stop Time: 11/6/2024 10:49 AM  Performed By  Anesthesiologist: Johan Jo MD  Preanesthetic Checklist  Completed: patient identified, IV checked, site marked, risks and benefits discussed, surgical consent, monitors and equipment checked, pre-op evaluation and timeout performed  Spinal Block Prep:  Sterile Tech:cap, gloves and sterile barriers  Patient Monitoring:EKG, continuous pulse oximetry and blood pressure monitoring    Spinal Block Procedure  Approach:right paramedian  Location:L4-L5  Needle Gauge:24 G    Fluid Appearance:clear  Medications: bupivacaine PF (MARCAINE) 0.75 % injection - Spinal   1.6 mL - 11/6/2024 10:41:00 AM   Post Assessment  Patient Tolerance:patient tolerated the procedure well with no apparent complications  Complications no

## 2024-11-06 NOTE — THERAPY EVALUATION
Patient Name: Marci Kolb  : 1945    MRN: [id number]                              Today's Date: 2024       Admit Date: 2024    Visit Dx: No diagnosis found.  Patient Active Problem List   Diagnosis    Diverticulosis of intestine    Acquired hypothyroidism    Low back pain    Cervical pain    Sciatica    Acute cystitis without hematuria    Rheumatoid arthritis, involving unspecified site, unspecified whether rheumatoid factor present    Chronic pain disorder    Lumbar spinal stenosis    Stage 2 chronic kidney disease    Osteopenia    Hypercalcemia    Recurrent UTI    Bilateral carotid artery stenosis    Constipation    Hyperparathyroidism, unspecified    Hypercholesterolemia    Prediabetes    Sleep disturbance    History of pneumothorax    History of CVA (cerebrovascular accident)    DDD (degenerative disc disease), lumbar    DDD (degenerative disc disease), cervical    Arthritis of right hip    Dry skin dermatitis    H/O total hip arthroplasty, right     Past Medical History:   Diagnosis Date    Arthritis     Cataract     Collapsed lung     HISTORY OF X 2 MANY YEARS AGO. ?LEFT. FROM BLOWN BLEBS.  40 years ago.    Constipation     CRI (chronic renal insufficiency), stage 3 (moderate)     stage 3A as of  ; 24 hour urine confirmed right at stage 3A     Degenerative disc disease, lumbar     Degenerative lumbar disc     Depression     RECENT LOSS OF DGHT    Diverticulitis     Dry eyes     Dry skin     Edentulous     Female cystocele     Frequent UTI     PT STATES WAS TREATED FOR ONE RECENTLY    Hashimoto's disease     found as cause for hypothyroidism    History of diverticulosis     History of migraine     History of stroke     PT STATES WAS MILD 3-4 YEARS AGO    Hypercalcemia 2021    Hypercholesterolemia     Hyperparathyroidism, primary 2022    determined to be mostly primary as of , as renal status shown to be stage 2-3A    Hypothyroidism     Impaired glucose  "metabolism     Lumbar spine pain     Memory problem     Osteopenia 03/2021    mild noted on baseline DXA    Right hip pain     Right leg pain     \"RIGHT FOOT TINGLES\"    Ringing in right ear     Sleep disturbance     related to working night sift and maintains abnormal wake / sleep cycle    Spinal stenosis, lumbar     Urinary incontinence     Uterine prolapse     HX    Varicose vein of leg     SINDI    Visual floaters      Past Surgical History:   Procedure Laterality Date    ANTERIOR AND POSTERIOR VAGINAL REPAIR N/A 08/06/2019    Procedure: POSSIBLE ANTERIOR AND POSTERIOR VAGINAL REPAIR UTEROSACRAL SUSPENSION;  Surgeon: Kristine Baker MD;  Location: [facility];  Service: Gynecology    COLONOSCOPY  1993    \"WNL\"    TOTAL HIP ARTHROPLASTY Left 01/24/2018    Procedure: LT  TOTAL HIP ARTHROPLASTY;  Surgeon: Rogelio Nash MD;  Location: [facility];  Service:     TOTAL LAPAROSCOPIC HYSTERECTOMY UTEROSACRAL SUSPENSION WITH TRANSVAGINAL TAPING N/A 08/06/2019    Procedure: TOTAL LAPAROSCOPIC HYSTERECTOMY BILATERAL SALPINGO OOPHORECTOMY TENSION FREE VAGINAL TAPING;  Surgeon: Kristine Baker MD;  Location: [facility];  Service: Gynecology      General Information       Row Name 11/06/24 1540          Physical Therapy Time and Intention    Document Type evaluation  -CB     Mode of Treatment individual therapy;physical therapy  -CB       Row Name 11/06/24 1540          General Information    Patient Profile Reviewed yes  -CB     Prior Level of Function independent:;gait;transfer;bed mobility  -CB     Existing Precautions/Restrictions fall;hip, anterior;right  -CB     Barriers to Rehab none identified  -CB       Row Name 11/06/24 1540          Living Environment    People in Home spouse  -CB       Row Name 11/06/24 1540          Home Main Entrance    Number of Stairs, Main Entrance twelve  -CB     Stair Railings, Main Entrance railing on left side (ascending)  -CB       Row Name 11/06/24 1540          Cognition    " Orientation Status (Cognition) oriented x 3  -CB       Row Name 11/06/24 1540          Safety Issues/Impairments Affecting Functional Mobility    Impairments Affecting Function (Mobility) endurance/activity tolerance;sensation/sensory awareness;strength;pain;balance  -CB               User Key  (r) = Recorded By, (t) = Taken By, (c) = Cosigned By      Initials Name Provider Type    Roxana Potts, PT Physical Therapist                   Mobility       Row Name 11/06/24 1541          Bed Mobility    Bed Mobility supine-sit  -CB     Supine-Sit [place] (Bed Mobility) standby assist  -CB       Row Name 11/06/24 1541          Bed-Chair Transfer    Bed-Chair [place] (Transfers) minimum assist (75% patient effort);verbal cues;moderate assist (50% patient effort)  -CB     Assistive Device (Bed-Chair Transfers) walker, front-wheeled  -CB     Comment, (Bed-Chair Transfer) VC for LE sequencing  -CB       Row Name 11/06/24 1541          Sit-Stand Transfer    Sit-Stand [place] (Transfers) minimum assist (75% patient effort);verbal cues  -CB     Assistive Device (Sit-Stand Transfers) walker, front-wheeled  -CB     Comment, (Sit-Stand Transfer) VC for UE placement  -CB       Row Name 11/06/24 1541          Gait/Stairs (Locomotion)    Patient was able to Ambulate no, other medical factors prevent ambulation  -CB     Reason Patient was unable to Ambulate Limited Motor Function related to Block  -CB       Row Name 11/06/24 1541          Mobility    Extremity Weight-bearing Status right lower extremity  -CB     Right Lower Extremity (Weight-bearing Status) weight-bearing as tolerated (WBAT)  -CB               User Key  (r) = Recorded By, (t) = Taken By, (c) = Cosigned By      Initials Name Provider Type    Roxana Potts, ANEL Physical Therapist                   Obj/Interventions       Row Name 11/06/24 1551          Range of Motion Comprehensive    Comment, General Range of Motion Limited DF on RLE 2/2 block  -CB        Row Name 11/06/24 1551          Strength Comprehensive (MMT)    Comment, General Manual Muscle Testing (MMT) Assessment post op weakness  -CB       Row Name 11/06/24 1551          Motor Skills    Therapeutic Exercise --  LILLI protocol x10 reps  -CB       Row Name 11/06/24 1551          Balance    Balance Assessment sitting static balance;standing static balance  -CB     Static Sitting Balance standby assist  -CB     Position, Sitting Balance sitting edge of bed  -CB     Static Standing Balance minimal assist;moderate assist  -CB     Position/Device Used, Standing Balance supported;walker, front-wheeled  -CB     Balance Interventions sitting;standing;sit to stand;supported;static;dynamic;minimal challenge  -CB       Row Name 11/06/24 1551          Sensory Assessment (Somatosensory)    Sensory Assessment (Somatosensory) --  block still present with limited DF on RLE  -CB               User Key  (r) = Recorded By, (t) = Taken By, (c) = Cosigned By      Initials Name Provider Type    CB Roxana Loco, PT Physical Therapist                   Goals/Plan       Row Name 11/06/24 1553          Bed Mobility Goal 1 (PT)    Activity/Assistive Device (Bed Mobility Goal 1, PT) bed mobility activities, all  -CB     [place] Level/Cues Needed (Bed Mobility Goal 1, PT) modified independence  -CB     Time Frame (Bed Mobility Goal 1, PT) long term goal (LTG);1 week  -CB       Row Name 11/06/24 1553          Transfer Goal 1 (PT)    Activity/Assistive Device (Transfer Goal 1, PT) sit-to-stand/stand-to-sit;bed-to-chair/chair-to-bed;walker, rolling  -CB     [place] Level/Cues Needed (Transfer Goal 1, PT) standby assist  -CB     Time Frame (Transfer Goal 1, PT) long term goal (LTG);1 week  -CB       Row Name 11/06/24 1553          Gait Training Goal 1 (PT)    Activity/Assistive Device (Gait Training Goal 1, PT) gait (walking locomotion);assistive device use  -CB     [place] Level (Gait Training Goal 1, PT) standby assist   -CB     Distance (Gait Training Goal 1, PT) 100ft  -CB     Time Frame (Gait Training Goal 1, PT) long term goal (LTG);1 week  -CB       Row Name 11/06/24 1556          Stairs Goal 1 (PT)    Activity/Assistive Device (Stairs Goal 1, PT) ascending stairs;descending stairs;using handrail, left  -CB     [place] Level/Cues Needed (Stairs Goal 1, PT) contact guard required  -CB     Number of Stairs (Stairs Goal 1, PT) 12  -CB     Time Frame (Stairs Goal 1, PT) 1 week;long term goal (LTG)  -CB       Row Name 11/06/24 1554          Therapy Assessment/Plan (PT)    Planned Therapy Interventions (PT) balance training;bed mobility training;gait training;home exercise program;patient/family education;strengthening;transfer training;ROM (range of motion);stair training  -CB               User Key  (r) = Recorded By, (t) = Taken By, (c) = Cosigned By      Initials Name Provider Type    Roxana Potts, PT Physical Therapist                   Clinical Impression       Row Name 11/06/24 1552          Pain    Pretreatment Pain Rating 8/10  -CB     Posttreatment Pain Rating 8/10  -CB     Pre/Posttreatment Pain Comment low back and hip  -CB       Row Name 11/06/24 0342          Plan of Care Review    Plan of Care Reviewed With patient  -CB     Outcome Evaluation Patient is a 79 y.o. female POD0 R LILLI anterior with expected post op weakness and impaired functional mobility. Patient is ind at baseline without use of AD and lives with her spouse. Today, patient performed bed mobility with SBA, required Luis for STS, and tx to chair using rwx requiring min-modA. Limited mobility this date as pt block is still present and limited DF on RLE.  VC for sequencing of LE to chair. Patient will benefit from skilled PT services acutely to address functional deficits as well as improve level of independence prior to discharge.  -CB       Row Name 11/06/24 1553          Therapy Assessment/Plan (PT)    Rehab Potential (PT) good  -CB      Criteria for Skilled Interventions Met (PT) yes  -CB     Therapy Frequency (PT) daily  -CB       Row Name 11/06/24 1552          Positioning and Restraints    Pre-Treatment Position in bed  -CB     Post Treatment Position chair  -CB     In Chair notified nsg;reclined;call light within reach;encouraged to call for assist;exit alarm on  -CB               User Key  (r) = Recorded By, (t) = Taken By, (c) = Cosigned By      Initials Name Provider Type    Roxana Potts, PT Physical Therapist                   Outcome Measures       Row Name 11/06/24 1553          How much help from another person do you currently need...    Turning from your back to your side while in flat bed without using bedrails? 3  -CB     Moving from lying on back to sitting on the side of a flat bed without bedrails? 3  -CB     Moving to and from a bed to a chair (including a wheelchair)? 2  -CB     Standing up from a chair using your arms (e.g., wheelchair, bedside chair)? 3  -CB     Climbing 3-5 steps with a railing? 1  -CB     To walk in hospital room? 2  -CB     AM-PAC 6 Clicks Score (PT) 14  -CB     Highest Level of Mobility Goal 4 --> Transfer to chair/commode  -CB       Row Name 11/06/24 1553          Functional Assessment    Outcome Measure Options AM-PAC 6 Clicks Basic Mobility (PT)  -CB               User Key  (r) = Recorded By, (t) = Taken By, (c) = Cosigned By      Initials Name Provider Type    Roxana Potts, PT Physical Therapist                                 Physical Therapy Education       Title: PT OT SLP Therapies (Done)       Topic: Physical Therapy (Done)       Point: Mobility training (Done)       Learning Progress Summary            Patient Acceptance, E,TB,D, VU,NR by CB at 11/6/2024 1554                      Point: Home exercise program (Done)       Learning Progress Summary            Patient Acceptance, E,TB,D, VU,NR by CB at 11/6/2024 1554                      Point: Body mechanics (Done)       Learning Progress  Summary            Patient Acceptance, E,TB,D, VU,NR by CB at 11/6/2024 1554                      Point: Precautions (Done)       Learning Progress Summary            Patient Acceptance, E,TB,D, VU,NR by CB at 11/6/2024 1554                                      User Key       Initials Effective Dates Name Provider Type Discipline    CB 10/22/21 -  Roxana Loco, PT Physical Therapist PT                  PT Recommendation and Plan  Planned Therapy Interventions (PT): balance training, bed mobility training, gait training, home exercise program, patient/family education, strengthening, transfer training, ROM (range of motion), stair training  Outcome Evaluation: Patient is a 79 y.o. female POD0 R LILLI anterior with expected post op weakness and impaired functional mobility. Patient is ind at baseline without use of AD and lives with her spouse. Today, patient performed bed mobility with SBA, required Luis for STS, and tx to chair using rwx requiring min-modA. Limited mobility this date as pt block is still present and limited DF on RLE.  VC for sequencing of LE to chair. Patient will benefit from skilled PT services acutely to address functional deficits as well as improve level of independence prior to discharge.     Time Calculation:         PT Charges       Row Name 11/06/24 1554             Time Calculation    Start Time 1536  -CB      Stop Time 1548  -CB      Time Calculation (min) 12 min  -CB      PT Received On 11/06/24  -CB      PT - Next Appointment 11/07/24  -CB      PT Goal Re-Cert Due Date 11/13/24  -CB         Time Calculation- PT    Total Timed Code Minutes- PT 8 minute(s)  -CB         Timed Charges    10475 - PT Therapeutic Exercise Minutes 8  -CB         Total Minutes    Timed Charges Total Minutes 8  -CB       Total Minutes 8  -CB                User Key  (r) = Recorded By, (t) = Taken By, (c) = Cosigned By      Initials Name Provider Type    CB Roxana Loco, PT Physical Therapist                   Therapy Charges for Today       Code Description Service Date Service Provider Modifiers Qty    73147787997  PT THER PROC EA 15 MIN 11/6/2024 Roxana Loco, PT GP 1    38691545270 HC PT EVAL LOW COMPLEXITY 2 11/6/2024 Roxana Loco, PT GP 1            PT G-Codes  Outcome Measure Options: AM-PAC 6 Clicks Basic Mobility (PT)  AM-PAC 6 Clicks Score (PT): 14  PT Discharge Summary  Anticipated Discharge Disposition (PT): home with home health, home with assist, skilled nursing facility (pending progress with PT next service date)    Roxana Loco, PT  11/6/2024

## 2024-11-06 NOTE — ANESTHESIA POSTPROCEDURE EVALUATION
Patient: Marci Kolb    Procedure Summary       Date: 11/06/24 Room / [facility]:  [facility] /  [facility]    Anesthesia Start: 1040 Anesthesia Stop: 1258    Procedure: TOTAL HIP ARTHROPLASTY ANTERIOR WITH HANA TABLE (Right: Hip) Diagnosis:     Surgeons: Chalino Purdy MD Provider: Johan Jo MD    Anesthesia Type: spinal ASA Status: 3            Anesthesia Type: spinal    Vitals  Vitals Value Taken Time   /67 11/06/24 1445   Temp 36.4 °C (97.6 °F) 11/06/24 1345   Pulse 58 11/06/24 1446   Resp 16 11/06/24 1415   SpO2 99 % 11/06/24 1446   Vitals shown include unfiled device data.        Post Anesthesia Care and Evaluation    Patient location during evaluation: bedside  Patient participation: complete - patient participated  Level of consciousness: awake and alert  Pain management: adequate    Airway patency: patent  Anesthetic complications: No anesthetic complications    Cardiovascular status: acceptable  Respiratory status: acceptable  Hydration status: acceptable    Comments: /73   Pulse 71   Temp 36.4 °C (97.6 °F) (Temporal)   Resp 16   SpO2 100%

## 2024-11-06 NOTE — ANESTHESIA PREPROCEDURE EVALUATION
Anesthesia Evaluation     NPO Solid Status: > 8 hours             Airway   Mallampati: II  TM distance: >3 FB  Neck ROM: full  No difficulty expected  Dental - normal exam     Pulmonary    (-) wheezes  Cardiovascular     ECG reviewed  Rhythm: regular    (+) hyperlipidemia  (-) murmur      Neuro/Psych  GI/Hepatic/Renal/Endo    (+) renal disease- CRI, thyroid problem     Musculoskeletal     (+) neck pain  Abdominal    Substance History      OB/GYN          Other   arthritis,                   Anesthesia Plan    ASA 3     spinal     (Discussed with patient SAB and risk of PDPH, the possible need for blood patch in the future, discomfort during placement. Possible GA for failed block)    Anesthetic plan, risks, benefits, and alternatives have been provided, discussed and informed consent has been obtained with: patient.    CODE STATUS:          Resting comfortably. Some pain overnight, improved today. GEN:  NAD.  AOx3   ABD:  S/NT/ND   LLE:  Dressing changed at bedside, Incision C/D/I, 5/5 motor, Calf nttp (Bilat), Sensation rossly intact to light touch throughout, 1+ dp/pt pulses, foot perfused    Patient Vitals for the past 24 hrs:   Temp Pulse Resp BP SpO2   04/03/22 0144 98.3 °F (36.8 °C) 91 16 (!) 167/73 94 %   04/02/22 2031 97.4 °F (36.3 °C) 84 16 (!) 157/70 93 %   04/02/22 1425 97.2 °F (36.2 °C) 78 18 -- 99 %   04/02/22 1200 -- -- -- (!) 165/79 --   04/02/22 1040 -- 83 -- (!) 163/78 --   04/02/22 1039 -- 80 -- 109/61 --   04/02/22 1035 -- 76 -- (!) 164/81 --   04/02/22 0955 97.6 °F (36.4 °C) 72 18 (!) 169/69 95 %       Current Facility-Administered Medications   Medication Dose Route Frequency    amLODIPine (NORVASC) tablet 5 mg  5 mg Oral DAILY    sodium chloride (NS) flush 5-40 mL  5-40 mL IntraVENous Q8H    sodium chloride (NS) flush 5-40 mL  5-40 mL IntraVENous PRN    acetaminophen (TYLENOL) tablet 650 mg  650 mg Oral Q6H    oxyCODONE IR (ROXICODONE) tablet 5 mg  5 mg Oral Q3H PRN    naloxone (NARCAN) injection 0.4 mg  0.4 mg IntraVENous PRN    hydrOXYzine HCL (ATARAX) tablet 10 mg  10 mg Oral Q8H PRN    famotidine (PEPCID) tablet 20 mg  20 mg Oral BID    polyethylene glycol (MIRALAX) packet 17 g  17 g Oral DAILY    bisacodyL (DULCOLAX) suppository 10 mg  10 mg Rectal DAILY PRN    enoxaparin (LOVENOX) injection 40 mg  40 mg SubCUTAneous DAILY    traMADoL (ULTRAM) tablet 50 mg  50 mg Oral Q6H PRN    losartan (COZAAR) tablet 100 mg  100 mg Oral DAILY    hydrALAZINE (APRESOLINE) 20 mg/mL injection 10 mg  10 mg IntraVENous Q6H PRN    senna-docusate (PERICOLACE) 8.6-50 mg per tablet 1 Tablet  1 Tablet Oral BID    calcium carbonate (TUMS) chewable tablet 400 mg [elemental]  400 mg Oral PRN    levothyroxine (SYNTHROID) tablet 112 mcg  112 mcg Oral ACB    therapeutic multivitamin (THERAGRAN) tablet 1 Tablet  1 Tablet Oral DAILY  atorvastatin (LIPITOR) tablet 20 mg  20 mg Oral QHS    traMADoL (ULTRAM) tablet 50 mg  50 mg Oral Q6H PRN    HYDROmorphone (DILAUDID) injection 0.5 mg  0.5 mg IntraVENous Q6H PRN       Lab Results   Component Value Date/Time    HGB 9.9 (L) 04/03/2022 01:52 AM    INR 1.1 04/01/2022 10:19 AM       Lab Results   Component Value Date/Time    Sodium 127 (L) 04/03/2022 01:52 AM    Potassium 3.5 04/03/2022 01:52 AM    Chloride 98 04/03/2022 01:52 AM    CO2 26 04/03/2022 01:52 AM    BUN 14 04/03/2022 01:52 AM    Creatinine 0.74 04/03/2022 01:52 AM    Calcium 8.5 04/03/2022 01:52 AM    Magnesium 2.1 03/30/2022 05:50 AM    Phosphorus 1.9 (L) 03/30/2022 05:50 AM       78 y.o. female s/p left uni compartmental knee arthroplasty on 3/16/22. Had a fall Sunday evening directly onto knee. Seen in clinic on 3/28/22 and found to have a left periprosthetic tibia fracture. Taken back to OR 4/1/22 for revision left knee arthroplasty, both components. Pain overall controlled. Work with PT, okay to WBAT, No ROM x 7 days. Post op knee xray reviewed, no acute findings. DVT Prophylaxis: Lovenox 40mg SQ daily for DVT prophylaxis. Weight Bearing: WBAT LLE, No ROM x 7 days, remain in TROM brace with ambulation locked at zero. Pain Control:  Tyleno,tramadol every 6 hours, oxy 5 mg for breakthrough pain  Disposition: Plans to dispo to SNF once cleared by PT and remains medically stable. Accepted to [facility]. CM Aware - discussed with them today for possible transfer if cleared by PT. PT aware of plan.

## 2024-11-06 NOTE — PLAN OF CARE
Goal Outcome Evaluation:           Progress: improving  Outcome Evaluation: POD 0 R LILLI anterior. A&O, 2L NC. Obi cdi. Spinal anesthesia wearing off, complaints of paresthesia BLE. Assist x 1 stand and pivot chair/BRP. Due to void. Complains of back and hip pain, PRN [place] for pain. Educated on safety. Plans DC home with HH, will need walker at DC.

## 2024-11-06 NOTE — OP NOTE
RightTOTAL HIP ARTHROPLASTY ANTERIOR WITH HANA TABLE  Procedure Note    Marci Kolb  11/6/2024    Pre-op Diagnosis: Right hip osteoarthritis.   Post-op Diagnosis: Same  Procedure:    Right total hip arthroplasty, anterior approach CPT Code 18796    Intraoperative interpretation of fluoroscopic imaging of the hip, 2-3 views CPT code 23265  Approach: Surgical Approach: Hip Direct Anterior (Smith-Hutchins)      Surgeon:  Chalino Purdy MD  Assistant: AR Cody  The services of a first assist were necessary for performing the procedure safely and expeditiously.  The first assist was present for the entire duration of the case and helped with positioning, retraction and closure of the incision.    Anesthesia: Spinal, Anesthesiologist: Johan Jo MD  CRNA: Lynnette Dexter CRNA  Staff: Circulator: Eladio Spain RN  Radiology Technologist: Diana Parekh  Scrub Person: Kathy Hughes  Orderly: Una Barry  Assistant: Mauro Butt PA-C CFA  Estimated Blood Loss: 100ml  Specimens: * No orders in the log *  Drains: none  Complications: None    Components Utilized:    Implant Name Type Inv. Item Serial No.  Lot No. LRB No. Used Action   DEV CONTRL TISS STRATAFIX SPIRAL MNCRYL UD 3/0 PLS 30CM - [id number] Implant DEV CONTRL TISS STRATAFIX SPIRAL MNCRYL UD 3/0 PLS 30CM  [employer]  DIV [employer] J [employer] J [id number] Right 1 Implanted   DEV CONTRL TISS STRATAFIX SYMM PDS PLUS YSABEL CT-1 45CM - [id number] Implant DEV CONTRL TISS STRATAFIX SYMM PDS PLUS YSABEL CT-1 45CM  [employer]  DIV OF J [employer] J [id number] Right 1 Implanted   SHLL ACET MPACT DI 2HL 52MM - [id number] Implant SHLL ACET MPACT DI 2HL 52MM  [employer] [id number] Right 1 Implanted   LINER ACET MPACT HOODED HX/PE 0DEG DI 36MM - [id number] Implant LINER ACET MPACT HOODED HX/PE 0DEG DI 36MM  [employer] [id number] Right 1 Implanted   CABL CERCLG MBRACE 1P/U - [id number] Implant CABL CERCLG MBRACE 1P/U  [employer] [id number] Right 1  Implanted   STEM FEM/HIP AMISTEMC [facility] SS SZ1 - [id number] Implant STEM FEM/HIP AMISTEMC CMT SS SZ1  [employer] [facility] [id number] Right 1 Implanted   CMT BONE PALACOS R HI/VISC 1X40 - [id number] Implant CMT BONE PALACOS R HI/VISC 1X40  [facility] [id number] Right 2 Implanted   HD FEM/HIP MECTACER BIOLOX/DELTA 36MM LG - [id number] Implant HD FEM/HIP MECTACER BIOLOX/DELTA 36MM LG  [employer] [facility] [id number] Right 1 Implanted       Indication for Procedure:  This patient is a 79 y.o. female who has failed conservative treatment for management of arthritis of the operative hip.  Surgical options and non-surgical options were discussed in detail and to the patient's satisfaction.  Surgical intervention was recommended based on the patient's injury and functional status.      The risks and benefits of surgery were discussed with patient and informed consent was obtained.  Risks include but are not limited to, infection, bleeding, nerve injury, blood clots, risks associated with anesthesia, need for further surgery, persistent pain, and possibly death. The risk of leg length discrepancy was also discussed with the patient.    Protocols for intravenous antibiotics and venous thrombosis were followed for this patient.  IV antibiotics were infused prior to surgery and will be discontinued within 24 hours of completion of the surgical procedure.         DESCRIPTION OF PROCEDURE:     The patient was seen in preoperative area where their surgical site was marked. Preoperative antibiotics were received. H&P and consent updated. They were taken to the Operating Room and provided the aforementioned anesthetic on the hospital bed.  The patient was then transferred to the [place] operating table in the supine position.  Traction boots were applied.  The perineal post was placed.  All bony prominences were well padded.  At this point the extremity is prepped and draped in usual sterile fashion using alcohol followed by ChloraPrep.  Next a formal  surgical timeout was carried out.  All members of the team were in agreement.  At this point the anterior superior iliac spine was marked out and an incision extending from about to 2 cm lateral and 1 cm distal to the anterior superior iliac spine was extended in the longitudinal fashion through the skin and subcutaneous tissue with a 10 blade scalpel.  The fascia overlying the tensor fascia marie muscle belly was identified and sharply incised.  The tensor fascia was reflected laterally and an Kristynon-Sam retractor was placed.  Next the myofascia of the rectus femoris was incised using the Bovie and the rectus was reflected medially.  The reflected head of the rectus was partially released.  Next the floor of the rectus femoris fascia was incised the ascending branch lateral femoral circumflex vessels were identified and cauterized using the bipolar cautery device.  Next the pericapsular fat pad was incised and the iliocapsularis was reflected medially.  The superior and inferior extracapsular retractors were placed.  Next Bovie electrocautery was used to incise the capsule making an L-shaped flap.  The retractors were placed intracapsularly.  The intertrochanteric tubercle was identified and helped to identify the level of the femoral neck osteotomy compared with preoperative templating.  Next a sagittal saw was used to make the osteotomy in the femoral neck.  The extremity was placed on traction and gentle external rotation. The corkscrew was used to remove the femoral head.  The femoral head was measured on the back table.  Next the traction was taken off the pubofemoral ligament was identified and released off the proximal femur down to the level of the lesser trochanter. The femur was then rotated externally to 90° and the remnant of the pubofemoral  ligament was released.  Next the mini Charnley retractor was placed intracapsularly to expose the acetabulum.  10 blade scalpel was used to remove the labral  remnant.  The Bovie electrocautery was used to remove the ligament of teres and pulvinar.  Next C-arm imaging was used to make an AP pelvis as well as AP hip.  The acetabulum was sequentially reamed up to a 1 size smaller than the definitive implant.  The implant was impacted receiving excellent purchase.  No screws were utilized.  A definitive polyethylene liner was placed.  The acetabular component did have coverage anteriorly.  Overhanging inferior osteophytes were removed.  Final imaging of the acetabular component was assessed and determined to be appropriate inclination as well as version.  Next, attention was directed towards the femur.  The femoral lift was placed lateral to the vastus lateralis.  The femur was placed in 90° of external rotation.  A longitudinal incision was made in the posterior capsule and the ischiofemoral ligament.  This was incised to the level of the fat pad between the capsule and the gluteus medius and minimus.  A trochanteric retractor was placed here.  Next further release was performed reflecting the piriformis posteriorly over the trochanter.  The obturator externus was maintained as was the piriformis.  The conjoined tendon was reflected over the greater trochanter, maintaining its insertion.  The femur was delivered through the capsular rent and dropped to the floor. The femur was placed in external rotation.  The box osteotome was used to open the femur followed by a rasp.  At this point the femur was determined to be very osteopenic proximally very thick cortices distally.  Her bone felt extremely brittle.  I determined that it may be helpful to place a prophylactic polypropylene cable around the proximal femur to prevent iatrogenic calcar fracture.  Cable was passed in standard fashion from medial to lateral confirming it was appropriately on bone.  It was tensioned to 100 N and then Nice knots were tied.  Attention was then directed back towards the femur.  The femur was  then sequentially broached up to the size of the definitive implant,  achieving excellent fixation.  A trial neck and head was placed in accordance with preoperative templating and intraoperative imaging.  The hip was reduced and had excellent stability at 90° with lateral traction with the bone.  C-arm images were again obtained and interischial line technique using a metal drop mendy was utilized to compare the operative extremity to the contralateral side in regards to leg length as well as offset.  Initially I was a little short compared to the contralateral side with a 0 head ball however the construct felt very stable and lengths were equal with a +4 head ball.  The hip was dislocated again and definitive components were selected.  I elected to cement the femoral prosthesis due to the osteopenia.  A canal restrictor was placed distally.  The canal was then suction irrigated.  The canal was carefully dried cement was mixed under vacuum and then retrograde fill technique was used to pressurized the canal.  The femoral implant was then pressurized and impacted into the canal cement was allowed to harden.  There was no evidence of intraoperative fracture.  The hip was again imaged to confirm stem position. This remained stable with 90 degrees of external rotation and lateral traction with the bone hook.  Next the wound was irrigated copiously sterile saline via pulsatile saline lavage with 0.35% betadine.  Periarticular injection was placed in the iliocapsularis as well as rectus femoris and tensor fascia marie muscle bellies.  The capsule was repaired using Ethibond.  Next the fascia overlying the tensor fascia marie was closed using #1 strata fix PDS suture in running fashion.  Careful attention was paid towards avoiding the lateral femoral cutaneous nerve during the closure.  Subcutaneous layer was closed in 2-0 Vicryl suture interrupted inverted fashion followed by 3-0 Monocryl for the subcuticular layer.   Dermabond and a sterile occlusive dressing were applied.  All counts were correct at the end of the procedure.  The patient was awakened in satisfactory condition and transferred to the postoperative care unit.    Postoperative Plan:  Protocols for intravenous antibiotics and venous thrombosis were followed for this patient.  IV antibiotics were infused prior to surgery and will be discontinued within 24 hours of completion of the surgical procedure.   The patient will receive  Aspirin for DVT prophylaxis for 30 days post operatively  The patient will be weight bearing as tolerated with no hip precautions.       Chalino Purdy MD

## 2024-11-06 NOTE — H&P
ORTHOPEDIC SURGERY PRE-OP HISTORY AND PHYSICAL      Patient: Marci Kolb  Date of Admission: 11/6/2024  7:33 AM  YOB: 1945  Medical Record Number: [id number]  Attending Physician: Chalino Purdy MD  Consulting Physician: Chalino uPrdy MD    CHIEF COMPLAINT: Right Hip Pain.    HISTORY OF PRESENT ILLNESS: Patient is a 79 y.o. female presents to [facility] with above complaints.  The patient failed conservative treatment and patient requested surgical intervention.  The patient presents for a  Right total hip.    ALLERGIES:   Allergies   Allergen Reactions    Streptomycin Nausea Only    Penicillins Itching       HOME MEDICATIONS:  Medications Prior to Admission   Medication Sig Dispense Refill Last Dose/Taking    Alphagan P 0.1 % solution ophthalmic solution Administer 1 drop into the left eye 2 (Two) Times a Day.   11/5/2024    brimonidine (ALPHAGAN) 0.2 % ophthalmic solution INSTILL 1 DROP INTO LEFT EYE TWICE DAILY   11/5/2024    fluorometholone (FML) 0.1 % ophthalmic suspension Administer 1 drop to both eyes 2 (Two) Times a Day.   11/5/2024    levothyroxine (SYNTHROID, LEVOTHROID) 112 MCG tablet Take 1 tablet by mouth Daily. 90 tablet 1 11/6/2024 Morning    Multiple Vitamins-Minerals (ZINC PO) Take 30 mg by mouth Daily.   Past Month    multivitamin with minerals (MULTIVITAMIN WOMEN 50+ PO) Take 1 tablet by mouth Daily.   Past Month    Omega-3 Fatty Acids (fish oil) 1000 MG capsule capsule Take 2 capsules by mouth Daily With Breakfast. PT TO HOLD FOR SURGERY   Past Month    simvastatin (ZOCOR) 40 MG tablet Take 1 tablet by mouth Every Night. 90 tablet 3 11/5/2024    ammonium lactate (LAC-HYDRIN) 12 % lotion Apply  topically to the appropriate area as directed As Needed for Dry Skin or Irritation. (Patient taking differently: Apply 1 Application topically to the appropriate area as directed As Needed for Dry Skin or Irritation.) 225 g 2 More than a month    aspirin 81 MG EC  "tablet Take 1 tablet by mouth Daily. FOLLOW MD INSTRUCTIONS WHEN TO HOLD FOR SURGERY   11/3/2024    Cholecalciferol (Vitamin D) 50 MCG (2000 UT) tablet Take 2,000 Units by mouth Daily. 90 tablet 0 More than a month    Unable to find Take 2 each by mouth Daily. CIRCULATION SWEETS- GUMMY  HOLD PER MD INSTRUCTIONS   More than a month       Past Medical History:   Diagnosis Date    Arthritis     Cataract     Collapsed lung     HISTORY OF X 2 MANY YEARS AGO. ?LEFT. FROM BLOWN BLEBS.  40 years ago.    Constipation     CRI (chronic renal insufficiency), stage 3 (moderate)     stage 3A as of 9/21 ; 24 hour urine confirmed right at stage 3A 11/22    Degenerative disc disease, lumbar     Degenerative lumbar disc     Depression     RECENT LOSS OF DGHT    Diverticulitis     Dry eyes     Dry skin     Edentulous 1971    Female cystocele     Frequent UTI     PT STATES WAS TREATED FOR ONE RECENTLY    Hashimoto's disease 2014    found as cause for hypothyroidism    History of diverticulosis     History of migraine     History of stroke     PT STATES WAS MILD 3-4 YEARS AGO    Hypercalcemia 09/2021    Hypercholesterolemia     Hyperparathyroidism, primary 08/2022    determined to be mostly primary as of 11/22, as renal status shown to be stage 2-3A    Hypothyroidism 2014    Impaired glucose metabolism     Lumbar spine pain     Memory problem     Osteopenia 03/2021    mild noted on baseline DXA    Right hip pain     Right leg pain     \"RIGHT FOOT TINGLES\"    Ringing in right ear     Sleep disturbance     related to working night sift and maintains abnormal wake / sleep cycle    Spinal stenosis, lumbar     Urinary incontinence     Uterine prolapse     HX    Varicose vein of leg     SINDI    Visual floaters      Past Surgical History:   Procedure Laterality Date    ANTERIOR AND POSTERIOR VAGINAL REPAIR N/A 08/06/2019    Procedure: POSSIBLE ANTERIOR AND POSTERIOR VAGINAL REPAIR UTEROSACRAL SUSPENSION;  Surgeon: Kristine Baker MD;  Location: " "[facility] MAIN OR;  Service: Gynecology    COLONOSCOPY      \"WNL\"    TOTAL HIP ARTHROPLASTY Left 2018    Procedure: LT  TOTAL HIP ARTHROPLASTY;  Surgeon: Rogelio Nash MD;  Location: [facility] OR;  Service:     TOTAL LAPAROSCOPIC HYSTERECTOMY UTEROSACRAL SUSPENSION WITH TRANSVAGINAL TAPING N/A 2019    Procedure: TOTAL LAPAROSCOPIC HYSTERECTOMY BILATERAL SALPINGO OOPHORECTOMY TENSION FREE VAGINAL TAPING;  Surgeon: Kristine Baker MD;  Location: [facility] OR;  Service: Gynecology     Social History     Occupational History    Not on file   Tobacco Use    Smoking status: Former     Current packs/day: 0.00     Average packs/day: 1 pack/day for 35.0 years (35.0 ttl pk-yrs)     Types: Cigarettes     Start date:      Quit date: 2000     Years since quittin.8    Smokeless tobacco: Never   Vaping Use    Vaping status: Never Used   Substance and Sexual Activity    Alcohol use: No    Drug use: No    Sexual activity: Not Currently     Partners: Male     Birth control/protection: None      Social History     Social History Narrative    Not on file     Family History   Problem Relation Age of Onset    Cancer Father     Breast cancer Maternal Aunt     Malig Hyperthermia Neg Hx        REVIEW OF SYSTEMS:    HEENT: Patient denies any headaches, vision changes, change in hearing, or tinnitus, Patient denies epistaxis, sinus pain, hoarseness, or dysphagia   Pulmonary: Patient denies any cough, congestion, acute change in SOA or wheezing.   Cardiovascular: Patient denies any change in chest pain, dyspnea, palpitations, weakness, intolerance of exercise, varicosities, change in murmur   Gastrointestinal:  Patient denies change in appetite, melena, change in bowel habits.   Genital/Urinary: Patient denies dysuria, change in color of urine, change in frequency of urination, pain with urgency, change in incontinence, retention.   Musculoskeletal: Patient denies complaints of acute changes in symptoms of " other joints not mentioned above.   Neurological: Patient denies changes in dizziness, tremor, ataxia, or difficulty in speaking or changes in memory.   Endocrine system: Patient denies acute changes in tremors, palpitations, polyuria, polydipsia, polyphagia, diaphoresis, exophthalmos, or goiter.   Psychological: Patient denies thoughts/plans or harming self or other; denies acute changes in depression,  insomnia, night terrors, manav, disorientation.   Skin: Patient denies any bruising, rashes, discoloration, pruritus,or wounds not mentioned in history of present illness or chief complaint above.   Hematopoietic: Patient denies current bleeding, epistaxis, hematuria, or melena.    PHYSICAL EXAM:   Vitals:  Vitals:    11/06/24 0745   BP: 149/70   BP Location: Left arm   Patient Position: Sitting   Pulse: 75   Resp: 18   Temp: 97.6 °F (36.4 °C)   TempSrc: Oral   SpO2: 97%       General:  79 y.o. female who appears about stated age.    Alert, cooperative, in no acute distress                       Head:    Normocephalic, without obvious abnormality, atraumatic   Eyes:            Lids and lashes normal, conjunctivae and sclerae normal, no         icterus, no pallor, corneas clear, PERRLA   Ears:    Ears appear intact with no abnormalities noted   Throat:   No oral lesions, no thrush, oral mucosa moist   Neck:   No adenopathy, supple, trachea midline, no JVD   Back:     Limited exam shows no severe kyphosis present,no visible           erythema, no excessive  tenderness to palpation.    Lungs:     Respirations regular, even and unlabored.     Heart:    Normal rate, Pulses palpable   Chest Wall:    No abnormalities observed.   Abdomen:     Normal bowel sounds, no masses, no organomegaly, soft              non-tender, non-distended, no guarding, no rebound                      tenderness   Rectal:     Deferred   Pulses:   Pulses palpable and equal bilaterally   Skin:   No bleeding, bruising or rash   Lymph nodes:   No  palpable adenopathy   Extremities:     Right Hip: Skin intact.  Painful ROM.  NVI distally.      DIAGNOSTIC TEST:  No visits with results within 2 Day(s) from this visit.   Latest known visit with results is:   Orders Only on 10/28/2024   Component Date Value Ref Range Status    Urine Culture 10/28/2024 Final report (A)   Final    Result 1 10/28/2024 Escherichia coli (A)   Final    Comment: Cefazolin <=4 ug/mL  Cefazolin with an KATH <=16 predicts susceptibility to the oral agents  cefaclor, cefdinir, cefpodoxime, cefprozil, cefuroxime, cephalexin,  and loracarbef when used for therapy of uncomplicated urinary tract  infections due to E. coli, Klebsiella pneumoniae, and Proteus  mirabilis.  25,000-50,000 colony forming units per mL      Susceptibility Testing 10/28/2024 Comment   Final    Comment:       ** S = Susceptible; I = Intermediate; R = Resistant **                     P = Positive; N = Negative              MICS are expressed in micrograms per mL     Antibiotic                 RSLT#1    RSLT#2    RSLT#3    RSLT#4  Amoxicillin/Clavulanic Acid    S  Ampicillin                     S  Cefepime                       S  Ceftriaxone                    S  Cefuroxime                     S  Ciprofloxacin                  S  Ertapenem                      S  Gentamicin                     S  Imipenem                       S  Levofloxacin                   S  Meropenem                      S  Nitrofurantoin                 S  Piperacillin/Tazobactam        S  Tetracycline                   S  Tobramycin                     S  Trimethoprim/Sulfa             S         No results found.      ASSESSMENT:  Right Hip Osteoarthritis    * No active hospital problems. *      PLAN:    Right total hip arthroplasty, anterior approach.    Risks and benefits of surgical intervention were discussed in detail with the patient.  Risks of infection, fracture, dislocation, extremity length discrepancy, neurovascular injury, persistent  pain, medical risks, anesthetic risk, need for additional surgery, deep venous thrombosis, pulmonary embolism and death.      The above diagnosis and treatment plan was discussed with the patient and/or family.  They were educated in both non-surgical and surgical treatment options for their condition.   They were given the opportunity to ask questions and were answered to their satisfaction.  They agreed to proceed with the above treatment plan.        Chalino Purdy MD  Date: 11/6/2024

## 2024-11-07 ENCOUNTER — APPOINTMENT (OUTPATIENT)
Dept: GENERAL RADIOLOGY | Facility: HOSPITAL | Age: 79
End: 2024-11-07
Payer: MEDICARE

## 2024-11-07 PROBLEM — I95.81 POSTOPERATIVE HYPOTENSION: Status: ACTIVE | Noted: 2024-11-07

## 2024-11-07 PROBLEM — R09.02 HYPOXIA: Status: ACTIVE | Noted: 2024-11-07

## 2024-11-07 LAB
ANION GAP SERPL CALCULATED.3IONS-SCNC: 7 MMOL/L (ref 5–15)
BUN SERPL-MCNC: 12 MG/DL (ref 8–23)
BUN/CREAT SERPL: 16.2 (ref 7–25)
CALCIUM SPEC-SCNC: 8.8 MG/DL (ref 8.6–10.5)
CHLORIDE SERPL-SCNC: 111 MMOL/L (ref 98–107)
CO2 SERPL-SCNC: 24 MMOL/L (ref 22–29)
CREAT SERPL-MCNC: 0.74 MG/DL (ref 0.57–1)
EGFRCR SERPLBLD CKD-EPI 2021: 81.4 ML/MIN/1.73
GLUCOSE SERPL-MCNC: 99 MG/DL (ref 65–99)
HCT VFR BLD AUTO: 34.4 % (ref 34–46.6)
HGB BLD-MCNC: 11.3 G/DL (ref 12–15.9)
POTASSIUM SERPL-SCNC: 4.5 MMOL/L (ref 3.5–5.2)
SODIUM SERPL-SCNC: 142 MMOL/L (ref 136–145)

## 2024-11-07 PROCEDURE — 25810000003 LACTATED RINGERS SOLUTION: Performed by: ORTHOPAEDIC SURGERY

## 2024-11-07 PROCEDURE — 71045 X-RAY EXAM CHEST 1 VIEW: CPT

## 2024-11-07 PROCEDURE — 97110 THERAPEUTIC EXERCISES: CPT

## 2024-11-07 PROCEDURE — 85014 HEMATOCRIT: CPT | Performed by: ORTHOPAEDIC SURGERY

## 2024-11-07 PROCEDURE — 80048 BASIC METABOLIC PNL TOTAL CA: CPT | Performed by: ORTHOPAEDIC SURGERY

## 2024-11-07 PROCEDURE — 25010000002 KETOROLAC TROMETHAMINE PER 15 MG: Performed by: ORTHOPAEDIC SURGERY

## 2024-11-07 PROCEDURE — G0378 HOSPITAL OBSERVATION PER HR: HCPCS

## 2024-11-07 PROCEDURE — 85018 HEMOGLOBIN: CPT | Performed by: ORTHOPAEDIC SURGERY

## 2024-11-07 PROCEDURE — 25010000002 CEFAZOLIN PER 500 MG: Performed by: ORTHOPAEDIC SURGERY

## 2024-11-07 RX ORDER — MELOXICAM 7.5 MG/1
7.5 TABLET ORAL DAILY
Qty: 30 TABLET | Refills: 0 | Status: SHIPPED | OUTPATIENT
Start: 2024-11-07 | End: 2024-12-07

## 2024-11-07 RX ORDER — POLYETHYLENE GLYCOL 3350 17 G/17G
17 POWDER, FOR SOLUTION ORAL DAILY PRN
Status: DISCONTINUED | OUTPATIENT
Start: 2024-11-07 | End: 2024-11-08 | Stop reason: HOSPADM

## 2024-11-07 RX ORDER — AMOXICILLIN 250 MG
2 CAPSULE ORAL 2 TIMES DAILY PRN
Status: DISCONTINUED | OUTPATIENT
Start: 2024-11-07 | End: 2024-11-08 | Stop reason: HOSPADM

## 2024-11-07 RX ORDER — BISACODYL 5 MG/1
5 TABLET, DELAYED RELEASE ORAL DAILY PRN
Status: DISCONTINUED | OUTPATIENT
Start: 2024-11-07 | End: 2024-11-08 | Stop reason: HOSPADM

## 2024-11-07 RX ORDER — ASPIRIN 81 MG/1
81 TABLET ORAL 2 TIMES DAILY
Qty: 60 TABLET | Refills: 0 | Status: SHIPPED | OUTPATIENT
Start: 2024-11-07 | End: 2024-12-07

## 2024-11-07 RX ORDER — BISACODYL 10 MG
10 SUPPOSITORY, RECTAL RECTAL DAILY PRN
Status: DISCONTINUED | OUTPATIENT
Start: 2024-11-07 | End: 2024-11-08 | Stop reason: HOSPADM

## 2024-11-07 RX ORDER — HYDROCODONE BITARTRATE AND ACETAMINOPHEN 5; 325 MG/1; MG/1
1 TABLET ORAL EVERY 6 HOURS PRN
Qty: 30 TABLET | Refills: 0 | Status: SHIPPED | OUTPATIENT
Start: 2024-11-07

## 2024-11-07 RX ADMIN — LEVOTHYROXINE SODIUM 112 MCG: 112 TABLET ORAL at 05:59

## 2024-11-07 RX ADMIN — BRIMONIDINE TARTRATE 1 DROP: 2 SOLUTION/ DROPS OPHTHALMIC at 21:12

## 2024-11-07 RX ADMIN — FLUOROMETHOLONE 1 DROP: 1 SOLUTION/ DROPS OPHTHALMIC at 21:13

## 2024-11-07 RX ADMIN — SODIUM CHLORIDE 2000 MG: 900 INJECTION INTRAVENOUS at 02:07

## 2024-11-07 RX ADMIN — KETOROLAC TROMETHAMINE 15 MG: 15 INJECTION, SOLUTION INTRAMUSCULAR; INTRAVENOUS at 09:44

## 2024-11-07 RX ADMIN — HYDROCODONE BITARTRATE AND ACETAMINOPHEN 2 TABLET: 5; 325 TABLET ORAL at 12:05

## 2024-11-07 RX ADMIN — ASPIRIN 81 MG: 81 TABLET, COATED ORAL at 09:44

## 2024-11-07 RX ADMIN — BRIMONIDINE TARTRATE 1 DROP: 2 SOLUTION/ DROPS OPHTHALMIC at 09:45

## 2024-11-07 RX ADMIN — POLYETHYLENE GLYCOL 3350 17 G: 17 POWDER, FOR SOLUTION ORAL at 09:44

## 2024-11-07 RX ADMIN — SODIUM CHLORIDE, POTASSIUM CHLORIDE, SODIUM LACTATE AND CALCIUM CHLORIDE 500 ML: 600; 310; 30; 20 INJECTION, SOLUTION INTRAVENOUS at 12:05

## 2024-11-07 RX ADMIN — ATORVASTATIN CALCIUM 20 MG: 20 TABLET, FILM COATED ORAL at 09:43

## 2024-11-07 RX ADMIN — Medication 1 TABLET: at 09:44

## 2024-11-07 RX ADMIN — HYDROCODONE BITARTRATE AND ACETAMINOPHEN 2 TABLET: 5; 325 TABLET ORAL at 22:05

## 2024-11-07 RX ADMIN — ASPIRIN 81 MG: 81 TABLET, COATED ORAL at 21:12

## 2024-11-07 RX ADMIN — POLYETHYLENE GLYCOL 3350 17 G: 17 POWDER, FOR SOLUTION ORAL at 21:13

## 2024-11-07 RX ADMIN — SODIUM CHLORIDE, POTASSIUM CHLORIDE, SODIUM LACTATE AND CALCIUM CHLORIDE 500 ML: 600; 310; 30; 20 INJECTION, SOLUTION INTRAVENOUS at 07:57

## 2024-11-07 RX ADMIN — HYDROCODONE BITARTRATE AND ACETAMINOPHEN 2 TABLET: 5; 325 TABLET ORAL at 17:54

## 2024-11-07 RX ADMIN — KETOROLAC TROMETHAMINE 15 MG: 15 INJECTION, SOLUTION INTRAMUSCULAR; INTRAVENOUS at 04:15

## 2024-11-07 RX ADMIN — FLUOROMETHOLONE 1 DROP: 1 SOLUTION/ DROPS OPHTHALMIC at 09:45

## 2024-11-07 NOTE — PROGRESS NOTES
Chalino Purdy MD     Orthopedic Progress Note    Subjective :   Patient is awake in bed.  States that she was feeling a little bit lightheaded this morning but feeling better now.    Objective :    Vital signs in last 24 hours:  Temp:  [96.9 °F (36.1 °C)-98.6 °F (37 °C)] 98.6 °F (37 °C)  Heart Rate:  [52-75] 66  Resp:  [16-18] 16  BP: ()/(42-73) 87/44  Vitals:    11/06/24 1812 11/06/24 2110 11/07/24 0104 11/07/24 0535   BP: 114/68 92/55 94/56 (!) 87/44  Comment: rn notified   BP Location: Right arm Right arm Right arm Right arm   Patient Position: Lying Lying Lying Lying   Pulse: 68 57 65 66   Resp: 16 16 16 16   Temp: 97.6 °F (36.4 °C) 97.7 °F (36.5 °C) 97.7 °F (36.5 °C) 98.6 °F (37 °C)   TempSrc: Oral Oral Oral Oral   SpO2: 95% 96% 96% 94%   Weight:       Height:           PHYSICAL EXAM:  Patient is calm, in no acute distress, awake and oriented x 3.  Dressing clean, dry and intact.  No signs of infection.  Swelling is appropriate.  Ecchymosis is appropriate in amount.  Homans test is negative.  Patient is neurovascularly intact distally.      LABS:  Results from last 7 days   Lab Units 11/07/24  0353   HEMOGLOBIN g/dL 11.3*   HEMATOCRIT % 34.4     Results from last 7 days   Lab Units 11/07/24  0353   SODIUM mmol/L 142   POTASSIUM mmol/L 4.5   CHLORIDE mmol/L 111*   CO2 mmol/L 24.0   BUN mg/dL 12   CREATININE mg/dL 0.74   GLUCOSE mg/dL 99   CALCIUM mg/dL 8.8           Intake/Output                   11/06/24 0701 - 11/07/24 0700     [id number] [id number] Total              Intake    P.O.  440  240 680    I.V.  1300  -- 1300    Total Intake 8227 518 3507       Output    Urine  100  600 700    Total Output 100 600 700             ASSESSMENT:  Postoperative day #1 status post right total hip arthroplasty anterior approach    Plan:  Continue Physical Therapy, weightbearing as tolerated right lower extremity.  Hypotensive this a.m.  Will order 500 mL bolus  Continue SCDs, Continue aspirin for DVT  prophylaxis.  Disposition: Plan for discharge to home today as long as hypotension improves    Chalino Purdy MD    Date: 11/7/2024  Time: 07:28 EST    Chalino Purdy MD  Orthopaedic Surgeon  [facility]

## 2024-11-07 NOTE — PLAN OF CARE
Goal Outcome Evaluation:           Progress: improving  Outcome Evaluation: POD 1 R LILLI anterior. Dressing cdi. Hypotensive this AM, two 500 ml LR bolus given. Humidified NC 1L. Unable to ween. [facility] consulted. CXR ordered. Assist x 1 BSC. Voiding function intact. PRN Norco for pain. PT rec SNF. Educated on IS. Plans for DC pending.

## 2024-11-07 NOTE — PLAN OF CARE
Goal Outcome Evaluation:  Plan of Care Reviewed With: patient        Progress: improving  Outcome Evaluation: VSS. Pt amb with a walker and assistx1 and been voiding per bsc. Dressing is c/d/i. PRN meds given as per order. Educated on falls precaution, non-pharma pain management and medication management. Plan to d/c home today with HH. Will take  note of any changes..

## 2024-11-07 NOTE — DISCHARGE INSTRUCTIONS
Dr. Chalino Purdy  Anterior Total Hip Replacement Discharge Instructions:    Office Phone Number: [phone]    I. ACTIVITIES:  1. Exercises:  Complete exercise program as taught by the hospital physical therapist 2 times per day.  You may wean off the walker to a cane when directed by the physical therapist.  Exercise program will be advanced by the physical therapist  During the day be up ambulating every 2 hours (while awake) for short distances  Complete the ankle pump exercises at least 10 times per hour (while awake)  Elevate legs most of the day the first week post operatively and thereafter elevate legs when in bed and for at least 30 minutes during the day. Use cold packs 20-30 minutes approximately 5 times per day. This should be done before and after completing your exercises and at any time you are experiencing pain/ stiffness in your operative extremity.      2. Activities of Daily Living:  No tub baths, hot tubs, or swimming pools for 4 weeks  The clear dressing with thin white gauze strip dressing is waterproof.  You may shower without covering the dressing beginning 3 days after the operation.  After 7 days you may remove the dressing.  If the dressing becomes saturated prior to day 7, it may be changed.  After dressing removal, do not scrub or rub the incision. Allow skin glue to fall off over the next few weeks.  After the dressing is removed, simply let the water run over the incision and pat dry.    II. Restrictions  Follow any movement restrictions that was discussed with you by either Dr. Purdy or the physical therapist.     Dr. Purdy will discuss with you when you will be able to drive again at your first post-op appointment.  Weight bearing is as tolerated.  First week stay inside on even terrain. May go up and down stairs one stair at a time utilizing the hand rail.  Once you feel confident, you may venture outside.    III. Precautions:  Everyone that comes near you should wash  their hands  No elective dental, genital-urinary, or colon procedures or surgical procedures for 12 weeks after surgery unless absolutely necessary.   If dental work or surgical procedure is deemed absolutely necessary within 12 weeks of surgery, you will need to contact Dr. Purdy's office as you will need to take antibiotics 1 hour prior to any dental work (including teeth cleanings).  Dr. Purdy will prescribe prophylactic antibiotics for all dental procedures for one year  as a precautionary measure to minimize risk of infection.  If you are a diabetic or take immunosuppressive medication, you may have to take prophylactic antibiotics the remainder of your life before dental work.    Avoid sick people. If you must be around someone who is ill, they should wear a mask.  Avoid visits to the Emergency Room or Urgent Care unless you are having a life threatening event.   If you have leg swelling you may wear leg compression stocking.   Stockings are to be placed on in the morning and removed at night. Monitor the stockings to ensure that any swelling is not causing the stockings to become too tight. In this case, remove stockings immediately.    IV. INCISION CARE:  Dr. Purdy takes great care in closing your incision to give you the best opportunity for a healthy incision with minimal scarring. He places sutures below the skin surface that will eventually dissolve.  The incision is then covered with a skin glue which makes the incision water tight, and minimizes bleeding onto the dressing.  No staples are used.  Occasionally one of the buried stitches may come to the skin surface and may need to be removed.  Please resist the temptation of removing the stitch by yourself.   will be happy to remove it for you.  Bruising around the incision and thigh is normal and to be expected.  Please keep dressing in place at least until post-op day 7. You may remove and replace dressing before day 7 if the dressing  begins to fall off or becomes saturated. Wash your hands and under your finger nails prior to dressing changes.  After day 7 as long as incision is dry and intact, you may leave the dressing off and open to air.  You will need to find underwear that does not rub on the incision for a few weeks after the surgery.  You may find it more comfortable to place a dressing on to keep your underwear from rubbing the incision.       If dressing must be changed, utilize dry gauze and paper tape. Avoid touching the side of the gauze that goes against the incision with your hands.  No creams or ointments to the incision until permission given by Dr. Purdy.  Do not touch or pick at the incision, or try to remove any sutures or skin glue.  Check dressing every day and notify surgeon immediately if any of the following signs or symptoms are noted:  Increase in redness  Increase in swelling of the entire extremity that does not go away with elevation.  Notify office that you may have a blood clot.    Drainage oozing from the incision  Pulling apart of the edges of the incision  Increase in overall body temperature (greater than 100.5 degrees)    V. Medications:   1. Anticoagulants: You will be discharged on an anticoagulant. This is a prophylactic medication that helps prevent blood clots during your post-operative period. The type and length of dosage varies based on your individual needs, procedure performed, and Dr. Purdy's preference.  While taking the anticoagulant, you should avoid taking any additional aspirin than what is prescribed.   Notify surgeon immediately if any anabelle bleeding is noted in the urine, stool, emesis, or from the nose or the incision. Blood in the stool will often appear as black rather than red. Blood in urine may appear as pink. Blood in emesis may appear as brown/black like coffee grounds.  You will need to apply pressure for longer periods of time to any cuts or abrasions to stop bleeding  Avoid  alcohol while taking anticoagulants.    2. Stool Softeners: You will be at greater risk of constipation after surgery due to being less mobile and the pain medications.   Take stool softeners as instructed by your surgeon while on pain medications. Over the counter Colace 100 mg 1-2 capsules twice daily.   If stools become too loose or too frequent, please decreases the dosage or stop the stool softener.  If constipation occurs despite use of stool softeners, you are to continue the stool softeners and add a laxative (Milk of Magnesia 1 ounce daily as needed).  If no bowel movement occurs past 3 days, then purchase Magnesium citrate and drink 1/2 bottle every 8 hours (on ice tastes better) until success. If no bowel movement by post-operative day 5 please call Dr. Doe office for further instructions.   You may need to decrease or stop your pain medications if bowel movements to not occur.     Drink plenty of fluids, and eat fruits and vegetables during your recovery time.    3. Pain Medications utilized after surgery are narcotics and the law requires that the following information be given to all patients that are prescribed narcotics:  CLASSIFICATION: Pain medications are called Opioids and are narcotics  LEGALITIES: It is illegal to share narcotics with others and to drive within 24 hours of taking narcotics  POTENTIAL SIDE EFFECTS: Potential side effects of opioids include: nausea, vomiting, itching, dizziness, drowsiness, dry mouth, constipation, and difficulty urinating.  POTENTIAL ADVERSE EFFECTS:   Opioid tolerance can develop with use of pain medications and this simply means that it requires more and more of the medication to control pain; however, this is seen more in patients that use opioids for longer periods of time.  Opioid dependence can develop with use of Opioids and this simply means that to stop the medication can cause withdrawal symptoms; however, this is seen with patients that use  "Opioids for longer periods of time.  Opioid addiction can develop with use of Opioids and the incidence of this is very unlikely in patients who take the medications as ordered and stop the medications as instructed.  Opioid overdose can be dangerous, but is unlikely when the medication is taken as ordered and stopped when ordered. It is important not to mix opioids with alcohol or with and type of sedative such as Benadryl as this can lead to over sedation and respiratory difficulty.  DOSAGE:   Pain medications will need to be taken consistently for the first few days to decrease pain and promote adequate pain relief and participation in physical therapy.  After the initial surgical pain begins to resolve, you may begin to decrease the pain medication. By the end of 6 weeks, you should be off of pain medications except for before physical therapy or to help with pain when attempting to fall asleep.  Pain medications will be tapered to lesser dosages as you are further from your surgical day.  No pain medications will be provided after 3 months from surgery.     Refills will not be given by the office during evening hours, or weekends.  To seek refills on pain medications during the post-operative period, you must call the office 48 hours in advance to request the refill. The office will then notify you when to  the prescription. DO NOT wait until you are out of the medication to request a refill.  They can not be \"called in\" to the pharmacy.      How to Wean Off Pain Medication:   As you begin to feel better, gradually wean off the narcotic pain medication and begin to use it only for breakthrough pain.  Gradually reduce the total number of pills you take each day.  This can be done by taking fewer pills at a time or by increasing the amount of time between each pill.    For example, if you were taking 2 pills every 6 hours you would be taking a total of 8 pills per day.  Reduce this to 6 pills per day, then " 4-5 and so on.  This can be done by taking 1 pill at a time instead of 2, or by taking the pills every 8 hours instead of every 6.    As you begin to wean from the narcotic pain medication, begin substituting with over the counter tylenol when you are not taking the narcotic.  Limit total tylenol dosage to less than 4 grams per day.    V. FOLLOW-UP VISITS:  You will need to follow up in the office with Dr. Purdy at 3 weeks.  Please call [phone] if you need to confirm or reschedule your appointment time.   If you have any concerns or suspected complications prior to your follow up visit, please call your surgeons office. Do not wait until your appointment time if you suspect complications. These will need to be addressed in the office promptly.

## 2024-11-07 NOTE — SIGNIFICANT NOTE
11/07/24 1132   OTHER   Discipline physical therapist   Rehab Time/Intention   Session Not Performed   (Attempted twice this morning.  RN trying to get IV, then 2nd attempt IV nurse in room.  Will attempt again early this afternoon.)

## 2024-11-07 NOTE — PROGRESS NOTES
Continued Stay Note  [facility]     Patient Name: Marci Kolb  MRN: [id number]  Today's Date: 11/7/2024    Admit Date: 11/6/2024        Discharge Plan       Row Name 11/07/24 1540       Plan    Plan Comments Careplan received from Dr. Doe office. Patient plans to d/c home with [facility], referral sent in [facility] and [facility] is following for d/c needs. Physical therapy recommending SNU at this time, discussed options, referrals sent to [facility] SNF, awaiting to see how patient does with morning PT to determine d/c plan. CCP to f/u 11/8am.                   Discharge Codes    No documentation.                 Expected Discharge Date and Time       Expected Discharge Date Expected Discharge Time    Nov 7, 2024               Mariajose Walker RN

## 2024-11-07 NOTE — CONSULTS
Patient Name:  Marci Kolb  YOB: 1945  MRN:  [id number]  Date of Admission:  11/6/2024  Date of Consult:  11/7/2024  Patient Care Team:  Syl Haider MD as PCP - General (Internal Medicine)  Abdias Nash MD (Orthopedic Surgery)  Jatinder Dominguez MD as Consulting Physician (Otolaryngology)  Romulo Kelly Jr., MD as Consulting Physician (Cardiology)    Inpatient Hospitalist Consult  Consult performed by: Jose De Jesus Leggett MD  Consult ordered by: Chalino Purdy MD  Reason for consult: Evaluate status and make recommendations regarding treatment for hypotension and hypoxia.        Subjective   History of Present Illness  Ms. Kolb is a 79 y.o. female that has been admitted to [facility] following elective TOTAL HIP ARTHROPLASTY ANTERIOR WITH HANA TABLE.  She has been admitted to an orthopedic floor following surgery and we were asked to see and assist with her medical problems, specifically relating to her blood pressure and oxygen saturations.  At the time of my visit she denies any chest pain, SOA, nausea, vomiting or diarrhea.  She has tolerated a diet but not eating a whole lot.  She does complain of expected postoperative discomfort and numbness in her right leg.  She reports being in a normal state of health leading up to surgery.  No chest pain or palpitations today.  A little lightheadedness upon getting up out of bed earlier.      Past Medical History:   Diagnosis Date   • Arthritis    • Cataract    • Collapsed lung     HISTORY OF X 2 MANY YEARS AGO. ?LEFT. FROM BLOWN BLEBS.  40 years ago.   • Constipation    • CRI (chronic renal insufficiency), stage 3 (moderate)     stage 3A as of 9/21 ; 24 hour urine confirmed right at stage 3A 11/22   • Degenerative disc disease, lumbar    • Degenerative lumbar disc    • Depression     RECENT LOSS OF DGHT   • Diverticulitis    • Dry eyes    • Dry skin    • Edentulous 1971   • Female cystocele    • Frequent UTI     PT  "STATES WAS TREATED FOR ONE RECENTLY   • Hashimoto's disease 2014    found as cause for hypothyroidism   • History of diverticulosis    • History of migraine    • History of stroke     PT STATES WAS MILD 3-4 YEARS AGO   • Hypercalcemia 09/2021   • Hypercholesterolemia    • Hyperparathyroidism, primary 08/2022    determined to be mostly primary as of 11/22, as renal status shown to be stage 2-3A   • Hypothyroidism 2014   • Impaired glucose metabolism    • Lumbar spine pain    • Memory problem    • Osteopenia 03/2021    mild noted on baseline DXA   • Right hip pain    • Right leg pain     \"RIGHT FOOT TINGLES\"   • Ringing in right ear    • Sleep disturbance     related to working night sift and maintains abnormal wake / sleep cycle   • Spinal stenosis, lumbar    • Urinary incontinence    • Uterine prolapse     HX   • Varicose vein of leg     SINDI   • Visual floaters      Past Surgical History:   Procedure Laterality Date   • ANTERIOR AND POSTERIOR VAGINAL REPAIR N/A 08/06/2019    Procedure: POSSIBLE ANTERIOR AND POSTERIOR VAGINAL REPAIR UTEROSACRAL SUSPENSION;  Surgeon: Kristine Baker MD;  Location: [facility] OR;  Service: Gynecology   • COLONOSCOPY  1993    \"WNL\"   • TOTAL HIP ARTHROPLASTY Left 01/24/2018    Procedure: LT  TOTAL HIP ARTHROPLASTY;  Surgeon: Rogelio Nash MD;  Location: [facility] OR;  Service:    • TOTAL HIP ARTHROPLASTY Right 11/6/2024    Procedure: TOTAL HIP ARTHROPLASTY ANTERIOR WITH HANA TABLE;  Surgeon: Chalino Purdy MD;  Location: [facility];  Service: Orthopedics;  Laterality: Right;   • TOTAL LAPAROSCOPIC HYSTERECTOMY UTEROSACRAL SUSPENSION WITH TRANSVAGINAL TAPING N/A 08/06/2019    Procedure: TOTAL LAPAROSCOPIC HYSTERECTOMY BILATERAL SALPINGO OOPHORECTOMY TENSION FREE VAGINAL TAPING;  Surgeon: Kristine Baker MD;  Location: [facility] OR;  Service: Gynecology     Family History   Problem Relation Age of Onset   • Cancer Father    • Breast cancer Maternal Aunt    • Malig Hyperthermia " Neg Hx      Social History     Tobacco Use   • Smoking status: Former     Current packs/day: 0.00     Average packs/day: 1 pack/day for 35.0 years (35.0 ttl pk-yrs)     Types: Cigarettes     Start date:      Quit date: 2000     Years since quittin.8   • Smokeless tobacco: Never   Vaping Use   • Vaping status: Never Used   Substance Use Topics   • Alcohol use: No   • Drug use: No     Medications Prior to Admission   Medication Sig Dispense Refill Last Dose/Taking   • Alphagan P 0.1 % solution ophthalmic solution Administer 1 drop into the left eye 2 (Two) Times a Day.   2024   • ammonium lactate (LAC-HYDRIN) 12 % lotion Apply  topically to the appropriate area as directed As Needed for Dry Skin or Irritation. 225 g 2 More than a month   • brimonidine (ALPHAGAN) 0.2 % ophthalmic solution INSTILL 1 DROP INTO LEFT EYE TWICE DAILY   2024   • fluorometholone (FML) 0.1 % ophthalmic suspension Administer 1 drop to both eyes 2 (Two) Times a Day.   2024   • levothyroxine (SYNTHROID, LEVOTHROID) 112 MCG tablet Take 1 tablet by mouth Daily. 90 tablet 1 2024 Morning   • Multiple Vitamins-Minerals (ZINC PO) Take 30 mg by mouth Daily.   Past Month   • multivitamin with minerals (MULTIVITAMIN WOMEN 50+ PO) Take 1 tablet by mouth Daily.   Past Month   • Omega-3 Fatty Acids (fish oil) 1000 MG capsule capsule Take 2 capsules by mouth Daily With Breakfast. PT TO HOLD FOR SURGERY   Past Month   • simvastatin (ZOCOR) 40 MG tablet Take 1 tablet by mouth Every Night. 90 tablet 3 2024   • aspirin 81 MG EC tablet Take 1 tablet by mouth Daily. FOLLOW MD INSTRUCTIONS WHEN TO HOLD FOR SURGERY   11/3/2024   • Cholecalciferol (Vitamin D) 50 MCG (2000 UT) tablet Take 2,000 Units by mouth Daily. 90 tablet 0 More than a month   • Unable to find Take 2 each by mouth Daily. CIRCULATION SWEETS- GUMMY  HOLD PER MD INSTRUCTIONS   More than a month     Allergies:  Streptomycin and Penicillins    Review of  Systems    Objective      Vital Signs  Temp:  [97.6 °F (36.4 °C)-98.6 °F (37 °C)] 98.5 °F (36.9 °C)  Heart Rate:  [57-71] 71  Resp:  [16] 16  BP: ()/(44-68) 90/55  Body mass index is 23.49 kg/m².    Physical Exam  Vitals and nursing note reviewed.   Constitutional:       Appearance: She is ill-appearing.   Eyes:      General: No scleral icterus.  Cardiovascular:      Rate and Rhythm: Normal rate and regular rhythm.   Pulmonary:      Effort: Pulmonary effort is normal.      Breath sounds: Normal breath sounds.   Abdominal:      General: Bowel sounds are normal.      Palpations: Abdomen is soft.      Tenderness: There is no abdominal tenderness.   Musculoskeletal:         General: Tenderness present.      Comments: Surgical wound dressed, ROM not tested   Skin:     General: Skin is warm and dry.      Findings: No rash.   Neurological:      Mental Status: She is alert. Mental status is at baseline.         Results Review:   I reviewed the patient's new clinical results.  I reviewed the patient's new imaging results and agree with the interpretation.  I reviewed the patient's other test results and agree with the interpretation         Assessment/Plan     Active Hospital Problems    Diagnosis  POA   • **H/O total hip arthroplasty, right [Z96.641]  Not Applicable   • Postoperative hypotension [I95.81]  Yes   • Hypoxia [R09.02]  Unknown   • Prediabetes [R73.03]  Yes   • Stage 2 chronic kidney disease [N18.2]  Yes      Resolved Hospital Problems   No resolved problems to display.       Ms. Kolb is a 79 y.o. female who is s/p TOTAL HIP ARTHROPLASTY ANTERIOR WITH HANA TABLE.    Asked to see regarding low blood pressure and low O2 saturations.  Blood pressure has responded to a liter bolus of LR.  She does not take BP medications at home and suspect maybe she runs low all the time.  Due to new hypoxia will get chest x-ray.  Will consider additional 500 cc fluid assuming chest x-ray negative.  She was reminded to  continue using her incentive spirometer.  Will reassess labs in the morning.  Discussed with RN.  Physical therapy suggesting SNU at discharge.  Will reassess in AM.    Other medical issues seem stable.  Labs this morning unremarkable.  Prediabetic for now will just monitor with chemistry in the morning.  Her hemoglobin A1c preoperatively was 5.7%.    DVT prophylaxis per surgery.  She was encouraged to use incentive spirometer as instructed.      Thank you very much for asking A to be involved in this patient's care. We will follow along with you.      Jose De Jesus Leggett MD  [facility]  11/07/24  15:45 EST

## 2024-11-07 NOTE — PLAN OF CARE
Goal Outcome Evaluation:              Outcome Evaluation: Continued R hip pain, lmited activity tolerance, gait pattern and independence w/ mobility during PT today.  Able to sit up w/ min A.  Stood and ambulated 3' w/ min A using RW, slow antalgic gait with decreased weight through R LE.  Unable to ambulate further 2/2 fatigue and pain.  Perfomed few AAROM exercises through partial range 2/2 pain.  Recommend DC to SNU, notified RN, MD and CCP.    Anticipated Discharge Disposition (PT): skilled nursing facility

## 2024-11-08 ENCOUNTER — READMISSION MANAGEMENT (OUTPATIENT)
Dept: CALL CENTER | Facility: HOSPITAL | Age: 79
End: 2024-11-08
Payer: MEDICARE

## 2024-11-08 VITALS
HEIGHT: 64 IN | WEIGHT: 136.91 LBS | HEART RATE: 83 BPM | RESPIRATION RATE: 16 BRPM | DIASTOLIC BLOOD PRESSURE: 69 MMHG | SYSTOLIC BLOOD PRESSURE: 107 MMHG | TEMPERATURE: 98.8 F | BODY MASS INDEX: 23.37 KG/M2 | OXYGEN SATURATION: 92 %

## 2024-11-08 LAB
ANION GAP SERPL CALCULATED.3IONS-SCNC: 7 MMOL/L (ref 5–15)
BUN SERPL-MCNC: 10 MG/DL (ref 8–23)
BUN/CREAT SERPL: 14.5 (ref 7–25)
CALCIUM SPEC-SCNC: 8.7 MG/DL (ref 8.6–10.5)
CHLORIDE SERPL-SCNC: 108 MMOL/L (ref 98–107)
CO2 SERPL-SCNC: 24 MMOL/L (ref 22–29)
CREAT SERPL-MCNC: 0.69 MG/DL (ref 0.57–1)
DEPRECATED RDW RBC AUTO: 42.9 FL (ref 37–54)
EGFRCR SERPLBLD CKD-EPI 2021: 87.3 ML/MIN/1.73
ERYTHROCYTE [DISTWIDTH] IN BLOOD BY AUTOMATED COUNT: 12.4 % (ref 12.3–15.4)
GLUCOSE SERPL-MCNC: 104 MG/DL (ref 65–99)
HCT VFR BLD AUTO: 29.3 % (ref 34–46.6)
HGB BLD-MCNC: 9.7 G/DL (ref 12–15.9)
MCH RBC QN AUTO: 31.6 PG (ref 26.6–33)
MCHC RBC AUTO-ENTMCNC: 33.1 G/DL (ref 31.5–35.7)
MCV RBC AUTO: 95.4 FL (ref 79–97)
NT-PROBNP SERPL-MCNC: 467 PG/ML (ref 0–1800)
PLATELET # BLD AUTO: 151 10*3/MM3 (ref 140–450)
PMV BLD AUTO: 9.5 FL (ref 6–12)
POTASSIUM SERPL-SCNC: 4.1 MMOL/L (ref 3.5–5.2)
RBC # BLD AUTO: 3.07 10*6/MM3 (ref 3.77–5.28)
SODIUM SERPL-SCNC: 139 MMOL/L (ref 136–145)
WBC NRBC COR # BLD AUTO: 7.77 10*3/MM3 (ref 3.4–10.8)

## 2024-11-08 PROCEDURE — 97530 THERAPEUTIC ACTIVITIES: CPT

## 2024-11-08 PROCEDURE — 94760 N-INVAS EAR/PLS OXIMETRY 1: CPT

## 2024-11-08 PROCEDURE — G0378 HOSPITAL OBSERVATION PER HR: HCPCS

## 2024-11-08 PROCEDURE — 94799 UNLISTED PULMONARY SVC/PX: CPT

## 2024-11-08 PROCEDURE — 94761 N-INVAS EAR/PLS OXIMETRY MLT: CPT

## 2024-11-08 PROCEDURE — 83880 ASSAY OF NATRIURETIC PEPTIDE: CPT | Performed by: HOSPITALIST

## 2024-11-08 PROCEDURE — 80048 BASIC METABOLIC PNL TOTAL CA: CPT | Performed by: HOSPITALIST

## 2024-11-08 PROCEDURE — 85027 COMPLETE CBC AUTOMATED: CPT | Performed by: HOSPITALIST

## 2024-11-08 RX ADMIN — POLYETHYLENE GLYCOL 3350 17 G: 17 POWDER, FOR SOLUTION ORAL at 08:23

## 2024-11-08 RX ADMIN — Medication 1 TABLET: at 08:23

## 2024-11-08 RX ADMIN — HYDROCODONE BITARTRATE AND ACETAMINOPHEN 1 TABLET: 5; 325 TABLET ORAL at 15:31

## 2024-11-08 RX ADMIN — HYDROCODONE BITARTRATE AND ACETAMINOPHEN 1 TABLET: 5; 325 TABLET ORAL at 05:58

## 2024-11-08 RX ADMIN — ATORVASTATIN CALCIUM 20 MG: 20 TABLET, FILM COATED ORAL at 08:23

## 2024-11-08 RX ADMIN — BRIMONIDINE TARTRATE 1 DROP: 2 SOLUTION/ DROPS OPHTHALMIC at 08:23

## 2024-11-08 RX ADMIN — HYDROCODONE BITARTRATE AND ACETAMINOPHEN 1 TABLET: 5; 325 TABLET ORAL at 10:42

## 2024-11-08 RX ADMIN — BISACODYL 5 MG: 5 TABLET, COATED ORAL at 08:23

## 2024-11-08 RX ADMIN — FLUOROMETHOLONE 1 DROP: 1 SOLUTION/ DROPS OPHTHALMIC at 08:23

## 2024-11-08 RX ADMIN — ASPIRIN 81 MG: 81 TABLET, COATED ORAL at 08:23

## 2024-11-08 RX ADMIN — LEVOTHYROXINE SODIUM 112 MCG: 112 TABLET ORAL at 05:58

## 2024-11-08 RX ADMIN — MELOXICAM 7.5 MG: 7.5 TABLET ORAL at 08:23

## 2024-11-08 NOTE — PROGRESS NOTES
Mauro Butt PA-C       Orthopedic Progress Note    Subjective :   Resting in bed comfortably.  Patient states she thinks she feels a little bit better this morning.  Has not been up with PT.  States she is hungry.    Objective :    Vital signs in last 24 hours:  Temp:  [98.2 °F (36.8 °C)-98.7 °F (37.1 °C)] 98.2 °F (36.8 °C)  Heart Rate:  [63-77] 63  Resp:  [16] 16  BP: ()/(50-71) 96/58  Vitals:    11/07/24 1520 11/07/24 1755 11/07/24 2135 11/08/24 0500   BP: 110/56 118/71 105/61 96/58   BP Location: Left arm Left arm Left arm Left arm   Patient Position: Lying Lying Lying Lying   Pulse:  77 68 63   Resp:  16 16 16   Temp:   98.7 °F (37.1 °C) 98.2 °F (36.8 °C)   TempSrc:   Oral Oral   SpO2:  92% 90% 93%   Weight:       Height:           PHYSICAL EXAM:  Patient is calm, in no acute distress, awake and oriented x 3.  Dressing clean, dry and intact.  No signs of infection.  Swelling is appropriate.  Ecchymosis is appropriate in amount.  Homans test is negative.  Patient is neurovascularly intact distally.      LABS:  Results from last 7 days   Lab Units 11/08/24  0531   WBC 10*3/mm3 7.77   HEMOGLOBIN g/dL 9.7*   HEMATOCRIT % 29.3*   PLATELETS 10*3/mm3 151     Results from last 7 days   Lab Units 11/08/24  0531   SODIUM mmol/L 139   POTASSIUM mmol/L 4.1   CHLORIDE mmol/L 108*   CO2 mmol/L 24.0   BUN mg/dL 10   CREATININE mg/dL 0.69   GLUCOSE mg/dL 104*   CALCIUM mg/dL 8.7           ASSESSMENT:  Status post right total hip arthroplasty    Plan:  Continue Physical Therapy, ambulation with assist.  Continue SCDs, Continue aspirin 81 mg for DVT prophylaxis.  Dispo planning for home with home health when medically stable.    At this time, we will proceed as of the patient is being discharged this afternoon  Will go ahead and place discharge orders and finish discharge summary  I do want to hold her discharge until this afternoon to see how she does with physical therapy  If there are any concerns at all please  contact us and we will hold discharge  Appreciate medicine input    Mauro Butt PA-C    Date: 11/8/2024  Time: 07:04 EST

## 2024-11-08 NOTE — PLAN OF CARE
Goal Outcome Evaluation:  Plan of Care Reviewed With: patient           Outcome Evaluation: Upon entering room, pt. supine in bed, awake/alert, and agreeable to work with P.T. this date despite c/o fatigue, weakness, and pain. This PM, pt. able to ambulate 12 feet, Min. assist x 1, with use of Rwx.  Pt. requires Mod. assist x 1 for bed mobility and Min. assist x 1 for sit <-> stand transfers. Right THR ther. ex. program x 10 reps completed with assist.  Pt. has difficulty weight bearing on her Right L.E. and therefore walks on her RLE toes during ambulation.  Pt. requires verbal cueing to try and correct this gait pattern.  Pt. also requires verbal/tactile cueing for posture correction and Rwx guidance.  Overall upright mobility limited this date, including any attempts at stair training, due to weakness, fatigue, and pain.  Will continue to progress functional mobility as tolerated.    Anticipated Discharge Disposition (PT): skilled nursing facility

## 2024-11-08 NOTE — PROGRESS NOTES
Continued Stay Note  [facility]     Patient Name: Marci Kolb  MRN: [id number]  Today's Date: 11/8/2024    Admit Date: 11/6/2024        Discharge Plan       Row Name 11/08/24 1615       Plan    Plan Comments Precert was initiated and approved to d/c to [facility]. Avi busby to transport at 1630.    Final Discharge Disposition Code 03 - skilled nursing facility (SNF)    Final Note [facility]                   Discharge Codes    No documentation.                 Expected Discharge Date and Time       Expected Discharge Date Expected Discharge Time    Nov 8, 2024               Mariajose Walker RN

## 2024-11-08 NOTE — THERAPY TREATMENT NOTE
Patient Name: Marci Kolb  : 1945    MRN: [id number]                              Today's Date: 2024       Admit Date: 2024    Visit Dx:     ICD-10-CM ICD-9-CM   1. H/O total hip arthroplasty, right  Z96.641 V43.64     Patient Active Problem List   Diagnosis    Diverticulosis of intestine    Acquired hypothyroidism    Low back pain    Cervical pain    Sciatica    Acute cystitis without hematuria    Rheumatoid arthritis, involving unspecified site, unspecified whether rheumatoid factor present    Chronic pain disorder    Lumbar spinal stenosis    Stage 2 chronic kidney disease    Osteopenia    Hypercalcemia    Recurrent UTI    Bilateral carotid artery stenosis    Constipation    Hyperparathyroidism, unspecified    Hypercholesterolemia    Prediabetes    Sleep disturbance    History of pneumothorax    History of CVA (cerebrovascular accident)    DDD (degenerative disc disease), lumbar    DDD (degenerative disc disease), cervical    Arthritis of right hip    Dry skin dermatitis    H/O total hip arthroplasty, right    Postoperative hypotension    Hypoxia     Past Medical History:   Diagnosis Date    Arthritis     Cataract     Collapsed lung     HISTORY OF X 2 MANY YEARS AGO. ?LEFT. FROM BLOWN BLEBS.  40 years ago.    Constipation     CRI (chronic renal insufficiency), stage 3 (moderate)     stage 3A as of  ; 24 hour urine confirmed right at stage 3A     Degenerative disc disease, lumbar     Degenerative lumbar disc     Depression     RECENT LOSS OF DGHT    Diverticulitis     Dry eyes     Dry skin     Edentulous     Female cystocele     Frequent UTI     PT STATES WAS TREATED FOR ONE RECENTLY    Hashimoto's disease     found as cause for hypothyroidism    History of diverticulosis     History of migraine     History of stroke     PT STATES WAS MILD 3-4 YEARS AGO    Hypercalcemia 2021    Hypercholesterolemia     Hyperparathyroidism, primary 2022    determined to be mostly primary  "as of 11/22, as renal status shown to be stage 2-3A    Hypothyroidism 2014    Impaired glucose metabolism     Lumbar spine pain     Memory problem     Osteopenia 03/2021    mild noted on baseline DXA    Right hip pain     Right leg pain     \"RIGHT FOOT TINGLES\"    Ringing in right ear     Sleep disturbance     related to working night sift and maintains abnormal wake / sleep cycle    Spinal stenosis, lumbar     Urinary incontinence     Uterine prolapse     HX    Varicose vein of leg     SINDI    Visual floaters      Past Surgical History:   Procedure Laterality Date    ANTERIOR AND POSTERIOR VAGINAL REPAIR N/A 08/06/2019    Procedure: POSSIBLE ANTERIOR AND POSTERIOR VAGINAL REPAIR UTEROSACRAL SUSPENSION;  Surgeon: Kristine Baker MD;  Location: [facility];  Service: Gynecology    COLONOSCOPY  1993    \"WNL\"    TOTAL HIP ARTHROPLASTY Left 01/24/2018    Procedure: LT  TOTAL HIP ARTHROPLASTY;  Surgeon: Rogelio Nash MD;  Location: [facility];  Service:     TOTAL HIP ARTHROPLASTY Right 11/6/2024    Procedure: TOTAL HIP ARTHROPLASTY ANTERIOR WITH HANA TABLE;  Surgeon: Chalino Purdy MD;  Location: [facility];  Service: Orthopedics;  Laterality: Right;    TOTAL LAPAROSCOPIC HYSTERECTOMY UTEROSACRAL SUSPENSION WITH TRANSVAGINAL TAPING N/A 08/06/2019    Procedure: TOTAL LAPAROSCOPIC HYSTERECTOMY BILATERAL SALPINGO OOPHORECTOMY TENSION FREE VAGINAL TAPING;  Surgeon: Kristine Baker MD;  Location: [facility] OR;  Service: Gynecology      General Information       Row Name 11/08/24 1124          Physical Therapy Time and Intention    Document Type therapy note (daily note)  -MS     Mode of Treatment physical therapy;individual therapy  -MS       Row Name 11/08/24 0940          General Information    Patient Profile Reviewed yes  -MS     Existing Precautions/Restrictions hip, anterior;fall   Exit alarm  -MS     Barriers to Rehab none identified  -MS       Row Name 11/08/24 0932          Cognition    Orientation " Status (Cognition) oriented to;person;place  -MS       Row Name 11/08/24 0938          Safety Issues/Impairments Affecting Functional Mobility    Comment, Safety Issues/Impairments (Mobility) Gait belt used for safety.  -MS               User Key  (r) = Recorded By, (t) = Taken By, (c) = Cosigned By      Initials Name Provider Type    MS SalgueroerLang, PT Physical Therapist                   Mobility       Row Name 11/08/24 0939          Bed Mobility    Supine-Sit Tennyson (Bed Mobility) moderate assist (50% patient effort)  -MS       Row Name 11/08/24 0939          Sit-Stand Transfer    Sit-Stand Tennyson (Transfers) minimum assist (75% patient effort)  -MS     Assistive Device (Sit-Stand Transfers) walker, front-wheeled  -MS       Row Name 11/08/24 0939          Gait/Stairs (Locomotion)    Tennyson Level (Gait) minimum assist (75% patient effort)  -MS     Assistive Device (Gait) walker, front-wheeled  -MS     Distance in Feet (Gait) 12  -MS     Deviations/Abnormal Patterns (Gait) ariane decreased;antalgic;stride length decreased  -MS     Bilateral Gait Deviations forward flexed posture  -MS     Comment, (Gait/Stairs) Pt. has difficulty bearing weight on her RLE and therefore ambulates on her toes (RLE);  Verbal cues to try and correct as well as verbal/tactile cues for posture correction and Rwx guidance.  Pt. unable to attempt stairs due to overall fatigue, pain, and weakness.  -MS       Row Name 11/08/24 0939          Mobility    Right Lower Extremity (Weight-bearing Status) weight-bearing as tolerated (WBAT)  -MS               User Key  (r) = Recorded By, (t) = Taken By, (c) = Cosigned By      Initials Name Provider Type    Lang Claros PT Physical Therapist                   Obj/Interventions       Row Name 11/08/24 0941          Motor Skills    Therapeutic Exercise --  Right THR ther. ex. program x 10 reps completed  -MS               User Key  (r) = Recorded By, (t) = Taken By, (c)  = Cosigned By      Initials Name Provider Type    Lang Claros, PT Physical Therapist                   Goals/Plan    No documentation.                  Clinical Impression       Row Name 11/08/24 0942          Pain    Pretreatment Pain Rating 8/10  -MS     Posttreatment Pain Rating 3/10  -MS     Pain Location hip  -MS     Pain Side/Orientation right  -MS     Pain Management Interventions nursing notified;premedicated for activity;positioning techniques utilized  -MS       Row Name 11/08/24 0942          Positioning and Restraints    Pre-Treatment Position in bed  -MS     Post Treatment Position chair  -MS     In Chair notified nsg;reclined;sitting;call light within reach;encouraged to call for assist;exit alarm on  All lines intact.  -MS               User Key  (r) = Recorded By, (t) = Taken By, (c) = Cosigned By      Initials Name Provider Type    Lang Claros, PT Physical Therapist                   Outcome Measures       Row Name 11/08/24 0942 11/08/24 0819       How much help from another person do you currently need...    Turning from your back to your side while in flat bed without using bedrails? 2  -MS 3  -OD    Moving from lying on back to sitting on the side of a flat bed without bedrails? 2  -MS 2  -OD    Moving to and from a bed to a chair (including a wheelchair)? 3  -MS 3  -OD    Standing up from a chair using your arms (e.g., wheelchair, bedside chair)? 3  -MS 3  -OD    Climbing 3-5 steps with a railing? 2  -MS 1  -OD    To walk in hospital room? 3  -MS 2  -OD    AM-PAC 6 Clicks Score (PT) 15  -MS 14  -OD    Highest Level of Mobility Goal 4 --> Transfer to chair/commode  -MS 4 --> Transfer to chair/commode  -OD      Row Name 11/08/24 0942          Functional Assessment    Outcome Measure Options AM-PAC 6 Clicks Basic Mobility (PT)  -MS               User Key  (r) = Recorded By, (t) = Taken By, (c) = Cosigned By      Initials Name Provider Type    Lang Claros, PT Physical  Therapist    Adriana Mcneal, RN Registered Nurse                                 Physical Therapy Education       Title: PT OT SLP Therapies (Done)       Topic: Physical Therapy (Done)       Point: Mobility training (Done)       Learning Progress Summary            Patient Acceptance, E,D, VU,NR by MS at 11/8/2024 0942    Acceptance, E, NR by AR at 11/7/2024 1408    Acceptance, E,TB,D, VU,NR by CB at 11/6/2024 1554                      Point: Home exercise program (Done)       Learning Progress Summary            Patient Acceptance, E,D, VU,NR by MS at 11/8/2024 0942    Acceptance, E, NR by AR at 11/7/2024 1408    Acceptance, E,TB,D, VU,NR by CB at 11/6/2024 1554                      Point: Body mechanics (Done)       Learning Progress Summary            Patient Acceptance, E,D, VU,NR by MS at 11/8/2024 0942    Acceptance, E, NR by AR at 11/7/2024 1408    Acceptance, E,TB,D, VU,NR by CB at 11/6/2024 1554                      Point: Precautions (Done)       Learning Progress Summary            Patient Acceptance, E,D, VU,NR by MS at 11/8/2024 0942    Acceptance, E, NR by AR at 11/7/2024 1408    Acceptance, E,TB,D, VU,NR by CB at 11/6/2024 1554                                      User Key       Initials Effective Dates Name Provider Type Discipline    MS 06/16/21 -  Lang Gasca, PT Physical Therapist PT    AR 06/16/21 -  Alta Burns, PT Physical Therapist PT    CB 10/22/21 -  Roxana Loco, PT Physical Therapist PT                  PT Recommendation and Plan     Outcome Evaluation: Upon entering room, pt. supine in bed, awake/alert, and agreeable to work with P.T. this date despite c/o fatigue, weakness, and pain. This PM, pt. able to ambulate 12 feet, Min. assist x 1, with use of Rwx.  Pt. requires Mod. assist x 1 for bed mobility and Min. assist x 1 for sit <-> stand transfers. Right THR ther. ex. program x 10 reps completed with assist.  Pt. has difficulty weight bearing on her Right L.E. and  therefore walks on her RLE toes during ambulation.  Pt. requires verbal cueing to try and correct this gait pattern.  Pt. also requires verbal/tactile cueing for posture correction and Rwx guidance.  Overall upright mobility limited this date, including any attempts at stair training, due to weakness, fatigue, and pain.  Will continue to progress functional mobility as tolerated.     Time Calculation:         PT Charges       Row Name 11/08/24 0946             Time Calculation    Start Time 0900  -MS      Stop Time 0926  -MS      Time Calculation (min) 26 min  -MS      PT Received On 11/08/24  -MS      PT - Next Appointment 11/09/24  -MS         Time Calculation- PT    Total Timed Code Minutes- PT 25 minute(s)  -MS                User Key  (r) = Recorded By, (t) = Taken By, (c) = Cosigned By      Initials Name Provider Type    Lang Claros, PT Physical Therapist                  Therapy Charges for Today       Code Description Service Date Service Provider Modifiers Qty    [id number] HC PT THERAPEUTIC ACT EA 15 MIN 11/8/2024 Lang Gasca, PT GP 2            PT G-Codes  Outcome Measure Options: AM-PAC 6 Clicks Basic Mobility (PT)  AM-PAC 6 Clicks Score (PT): 15  PT Discharge Summary  Anticipated Discharge Disposition (PT): skilled nursing facility    Lang Gasca PT  11/8/2024

## 2024-11-08 NOTE — OUTREACH NOTE
Prep Survey      Flowsheet Row Responses   Mu-ism facility patient discharged from? [place]   Is LACE score < 7 ? No   Eligibility Not Eligible   What are the reasons patient is not eligible? [facility]   Does the patient have one of the following disease processes/diagnoses(primary or secondary)? Other   Prep survey completed? Yes            Adrienne MILLER - Registered Nurse

## 2024-11-08 NOTE — PLAN OF CARE
Goal Outcome Evaluation:  Plan of Care Reviewed With: patient           Outcome Evaluation: Patient remains stable. Alert and oriented. Ambulating with assist x1. Voiding per BSC. 2L O2 when sleeping. Pain managed with prn pain meds. Encouraged to use IS when awake. Plans to d/c pending.

## 2024-11-08 NOTE — DISCHARGE SUMMARY
"    Discharge Summary    Date of Admission: 11/6/2024  7:33 AM    Date of Discharge:  11/8/2024    Discharge Diagnosis:   H/O total hip arthroplasty, right [Z96.641]  Hypotension    PMHX:   Past Medical History:   Diagnosis Date    Arthritis     Cataract     Collapsed lung     HISTORY OF X 2 MANY YEARS AGO. ?LEFT. FROM BLOWN BLEBS.  40 years ago.    Constipation     CRI (chronic renal insufficiency), stage 3 (moderate)     stage 3A as of 9/21 ; 24 hour urine confirmed right at stage 3A 11/22    Degenerative disc disease, lumbar     Degenerative lumbar disc     Depression     RECENT LOSS OF DGHT    Diverticulitis     Dry eyes     Dry skin     Edentulous 1971    Female cystocele     Frequent UTI     PT STATES WAS TREATED FOR ONE RECENTLY    Hashimoto's disease 2014    found as cause for hypothyroidism    History of diverticulosis     History of migraine     History of stroke     PT STATES WAS MILD 3-4 YEARS AGO    Hypercalcemia 09/2021    Hypercholesterolemia     Hyperparathyroidism, primary 08/2022    determined to be mostly primary as of 11/22, as renal status shown to be stage 2-3A    Hypothyroidism 2014    Impaired glucose metabolism     Lumbar spine pain     Memory problem     Osteopenia 03/2021    mild noted on baseline DXA    Right hip pain     Right leg pain     \"RIGHT FOOT TINGLES\"    Ringing in right ear     Sleep disturbance     related to working night sift and maintains abnormal wake / sleep cycle    Spinal stenosis, lumbar     Urinary incontinence     Uterine prolapse     HX    Varicose vein of leg     SINDI    Visual floaters        Discharge Disposition  Home or Self Care    Procedures Performed  Procedure(s):  TOTAL HIP ARTHROPLASTY ANTERIOR WITH HANA TABLE       Indication for Admission  Patient is a 79 y.o. female admitted after undergoing the above surgical procedure. They were admitted for post-operative pain control, medical management and physical therapy.  They progressed with physical therapy.  " Had some symptomatic hypotension for which internal medicine was consulted and boluses of IV fluids were given.  They were deemed stable for discharge.      Consults:   Consults       Date and Time Order Name Status Description    11/7/2024  2:12 PM Inpatient Hospitalist Consult Completed             Discharge Instructions:  Patient is weight bearing as tolerated on the operative leg.  Patient has no hip dislocation precautions.  Patient is to progress ambulation as tolerated.  Use walker as needed for stability and gait.  May progress to cane as tolerated.  The dressing is waterproof, and the patient may shower starting 3 days after the operation.  Keep dressing in place 7 days. May change dressing before saturated or starts to fall off.  Patient will follow-up in the office at 3 weeks. Home health physical therapy will follow patient once patient is discharged home.   Call the office at [phone] for any questions or concerns.      Discharge Medications     Discharge Medications        New Medications        Instructions Start Date   HYDROcodone-acetaminophen 5-325 MG per tablet  Commonly known as: Norco   1 tablet, Oral, Every 6 Hours PRN      meloxicam 7.5 MG tablet  Commonly known as: Mobic   7.5 mg, Oral, Daily             Changes to Medications        Instructions Start Date   ammonium lactate 12 % lotion  Commonly known as: LAC-HYDRIN  What changed: how much to take   Topical, As Needed      aspirin 81 MG EC tablet  What changed:   when to take this  additional instructions   81 mg, Oral, 2 Times Daily             Continue These Medications        Instructions Start Date   Alphagan P 0.1 % solution ophthalmic solution  Generic drug: brimonidine   1 drop, 2 Times Daily      brimonidine 0.2 % ophthalmic solution  Commonly known as: ALPHAGAN   INSTILL 1 DROP INTO LEFT EYE TWICE DAILY      fish oil 1000 MG capsule capsule   2,000 mg, Daily With Breakfast      fluorometholone 0.1 % ophthalmic  suspension  Commonly known as: FML   1 drop, 2 Times Daily      levothyroxine 112 MCG tablet  Commonly known as: SYNTHROID, LEVOTHROID   112 mcg, Oral, Daily      multivitamin with minerals tablet tablet   1 tablet, Daily      simvastatin 40 MG tablet  Commonly known as: ZOCOR   40 mg, Oral, Nightly      Unable to find   2 each, Daily      Vitamin D 50 MCG (2000 UT) tablet   2,000 Units, Oral, Daily      ZINC PO   30 mg, Daily               Discharge Diet: Regular diet    Activity at Discharge: Weight bearing as tolerated, physical therapy    Follow-up Appointments  Future Appointments   Date Time Provider Department Center   1/6/2025  9:30 AM Syl Haider MD [facility]             11/08/24,  07:42 EST    Mauro Butt PA-C    [facility]  [phone]

## 2024-11-08 NOTE — CASE MANAGEMENT/SOCIAL WORK
Post-Acute Authorization Submission      Post Acute Pre-Cert Documentation  Request Submitted by Facility - Type:: Hospital  Post-Acute Authorization Type Submitted:: SNF  Date Post Acute Pre-Cert Inititated per Facility: 11/08/24  Date Post Acute Pre-Cert Completed: 11/08/24  Accepting Facility: [facility] Discharge Date Requested: 11/08/24  All Clinicals Submitted?: Yes  Had Accepting Facility at Time of Submission: Yes  Response Received from Insurance?: Approval  Response Communicated to:: , Accepting Facility Liaison, Accepting Facility Auth Department  Authorization Number:: APPROVED [id number]  Post Acute Pre-Cert Initiated Comment: VALID TO ADMIT [facility] 11/14/24.              Bradley Ruano, PCT

## 2024-11-08 NOTE — PROGRESS NOTES
Name: Marci Kolb ADMIT: 2024   : 1945  PCP: Syl Haider MD    MRN: [id number] LOS: 0 days   AGE/SEX: 79 y.o. female  ROOM: [facility]/     Subjective   Subjective   Still feels very weak.  Does not particularly feel short of breath.  No chest pain.     Objective   Objective   Vital Signs  Temp:  [98.1 °F (36.7 °C)-98.7 °F (37.1 °C)] 98.1 °F (36.7 °C)  Heart Rate:  [63-77] 70  Resp:  [16] 16  BP: ()/(55-71) 108/63  SpO2:  [85 %-93 %] 85 %  on  Flow (L/min) (Oxygen Therapy):  [1-2] 1;   Device (Oxygen Therapy): room air;other (see comments)  Body mass index is 23.49 kg/m².  Physical Exam  Vitals and nursing note reviewed.   Constitutional:       General: She is not in acute distress.     Appearance: She is ill-appearing.   Cardiovascular:      Rate and Rhythm: Normal rate and regular rhythm.   Pulmonary:      Effort: Pulmonary effort is normal.      Breath sounds: Rales (Bases) present.   Abdominal:      General: Bowel sounds are normal.      Palpations: Abdomen is soft.      Tenderness: There is no abdominal tenderness.   Musculoskeletal:         General: No swelling.   Skin:     General: Skin is warm and dry.   Neurological:      Mental Status: She is alert. Mental status is at baseline.         Results Review:       I reviewed the patient's new clinical results.  Results from last 7 days   Lab Units 24  0531 24  0353   WBC 10*3/mm3 7.77  --    HEMOGLOBIN g/dL 9.7* 11.3*   PLATELETS 10*3/mm3 151  --      Results from last 7 days   Lab Units 24  0531 24  0353   SODIUM mmol/L 139 142   POTASSIUM mmol/L 4.1 4.5   CHLORIDE mmol/L 108* 111*   CO2 mmol/L 24.0 24.0   BUN mg/dL 10 12   CREATININE mg/dL 0.69 0.74   GLUCOSE mg/dL 104* 99   EGFR mL/min/1.73 87.3 81.4       Results from last 7 days   Lab Units 24  0531 24  0353   CALCIUM mg/dL 8.7 8.8           I have personally reviewed all medications:  Scheduled Medications  aspirin, 81 mg, Oral,  Q12H  atorvastatin, 20 mg, Oral, Daily  brimonidine, 1 drop, Left Eye, BID  fluorometholone, 1 drop, Both Eyes, BID  levothyroxine, 112 mcg, Oral, Q AM  meloxicam, 7.5 mg, Oral, Daily  multivitamin with minerals, 1 tablet, Oral, Daily  polyethylene glycol, 17 g, Oral, BID    Infusions   Diet  Diet: Regular/House; Fluid Consistency: Thin (IDDSI 0)    I have personally reviewed:  [x]  Laboratory   [x]  Microbiology   [x]  Radiology   []  EKG/Telemetry  []  Cardiology/Vascular   []  Pathology    []  Records       Assessment/Plan     Active Hospital Problems    Diagnosis  POA    **H/O total hip arthroplasty, right [Z96.641]  Not Applicable    Postoperative hypotension [I95.81]  Yes    Hypoxia [R09.02]  Unknown    Prediabetes [R73.03]  Yes    Stage 2 chronic kidney disease [N18.2]  Yes      Resolved Hospital Problems   No resolved problems to display.       79 y.o. female who is s/p TOTAL HIP ARTHROPLASTY ANTERIOR WITH HANA TABLE.    Labs are stable.  BP a little better.  No additional fluids.  BNP normal.  Faint crackles in her lungs likely atelectasis.  She seems very weak and physical therapy is recommending she go to a rehab facility.  She does not feel she and her  can manage to this at home.  Will discuss with CCP.    I turned her oxygen off while I was in the room and after about 5 minutes she remained 93%.  No wheezing.  Low suspicion for pneumonia or heart failure.  Normal echo 2 years ago.       Jose De Jesus Leggett MD  [facility]  11/08/24  10:04 EST

## 2024-11-13 ENCOUNTER — TELEPHONE (OUTPATIENT)
Dept: ORTHOPEDIC SURGERY | Facility: HOSPITAL | Age: 79
End: 2024-11-13
Payer: MEDICARE

## 2024-11-13 NOTE — TELEPHONE ENCOUNTER
Attempted to reach Ms. Kolb to see how she is doing as she is 1 week SP LILLI. Message left at this time.

## 2024-12-26 ENCOUNTER — OFFICE VISIT (OUTPATIENT)
Dept: INTERNAL MEDICINE | Facility: CLINIC | Age: 79
End: 2024-12-26
Payer: MEDICARE

## 2024-12-26 VITALS
SYSTOLIC BLOOD PRESSURE: 138 MMHG | HEART RATE: 70 BPM | HEIGHT: 64 IN | OXYGEN SATURATION: 98 % | BODY MASS INDEX: 23.73 KG/M2 | WEIGHT: 139 LBS | TEMPERATURE: 96.6 F | DIASTOLIC BLOOD PRESSURE: 82 MMHG

## 2024-12-26 DIAGNOSIS — R41.89 COGNITIVE CHANGES: ICD-10-CM

## 2024-12-26 DIAGNOSIS — R41.3 MEMORY CHANGES: Primary | ICD-10-CM

## 2024-12-26 PROCEDURE — 1125F AMNT PAIN NOTED PAIN PRSNT: CPT

## 2024-12-26 PROCEDURE — 1160F RVW MEDS BY RX/DR IN RCRD: CPT

## 2024-12-26 PROCEDURE — 99213 OFFICE O/P EST LOW 20 MIN: CPT

## 2024-12-26 PROCEDURE — 1159F MED LIST DOCD IN RCRD: CPT
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
## 2024-12-26 NOTE — PATIENT INSTRUCTIONS
Continue medications as prescribed. Labs today. MRI ordered. Scheduling center to call with appointment. Follow-up in 3 months with Dr. Haider for medicare wellness and fasting labs.

## 2024-12-31 ENCOUNTER — TELEPHONE (OUTPATIENT)
Dept: INTERNAL MEDICINE | Facility: CLINIC | Age: 79
End: 2024-12-31
Payer: MEDICARE

## 2024-12-31 DIAGNOSIS — R41.89 COGNITIVE CHANGES: ICD-10-CM

## 2024-12-31 DIAGNOSIS — R41.89 COGNITIVE CHANGES: Primary | ICD-10-CM

## 2024-12-31 LAB
A -- BETA-AMYLOID 42/40 RATIO: ABNORMAL
AL BETA40 PLAS-MCNC: ABNORMAL PG/ML
AL BETA42 PLAS-MCNC: ABNORMAL PG/ML
ATN SUMMARY1: ABNORMAL
INFORMATION:: ABNORMAL
N -- NFL, PLASMA: 3.71 PG/ML (ref 0–11.55)
REQUEST PROBLEM: NORMAL
T -- P-TAU181: 1.19 PG/ML (ref 0–0.97)

## 2024-12-31 NOTE — TELEPHONE ENCOUNTER
----- Message from Andrew Cummings sent at 12/31/2024  7:28 AM EST -----  Unable to determine the results of ATN profile due to insufficient specimen collected.  Unable to determine if Alzheimer's pathology is present and blood.  Recommend having blood retested.   New lab order placed.  Schedule time with lab to have blood collected and retested.  Recommend having MRI completed as ordered.

## 2024-12-31 NOTE — TELEPHONE ENCOUNTER
Called pt [phone] and informed pt of insufficient specimen and lab needing to be redrawn, pt to do a walk in on 1st floor. Pt also provided contact for sched MRI as pt had two missed calls and was unaware.

## 2025-01-07 LAB
A -- BETA-AMYLOID 42/40 RATIO: 0.11
AL BETA40 PLAS-MCNC: 154.87 PG/ML
AL BETA42 PLAS-MCNC: 17.37 PG/ML
ATN SUMMARY1: ABNORMAL
INFORMATION:: ABNORMAL
N -- NFL, PLASMA: 3.15 PG/ML (ref 0–11.55)
T -- P-TAU181: 1.15 PG/ML (ref 0–0.97)

## 2025-01-27 ENCOUNTER — OFFICE VISIT (OUTPATIENT)
Dept: INTERNAL MEDICINE | Facility: CLINIC | Age: 80
End: 2025-01-27
Payer: MEDICARE

## 2025-01-27 ENCOUNTER — HOSPITAL ENCOUNTER (OUTPATIENT)
Dept: MRI IMAGING | Facility: HOSPITAL | Age: 80
Discharge: HOME | End: 2025-01-27
Payer: MEDICARE

## 2025-01-27 ENCOUNTER — TELEPHONE (OUTPATIENT)
Dept: INTERNAL MEDICINE | Facility: CLINIC | Age: 80
End: 2025-01-27

## 2025-01-27 VITALS
TEMPERATURE: 97.6 F | HEIGHT: 64 IN | WEIGHT: 137.6 LBS | OXYGEN SATURATION: 98 % | DIASTOLIC BLOOD PRESSURE: 80 MMHG | SYSTOLIC BLOOD PRESSURE: 124 MMHG | HEART RATE: 68 BPM | BODY MASS INDEX: 23.49 KG/M2 | RESPIRATION RATE: 18 BRPM

## 2025-01-27 DIAGNOSIS — R30.0 DYSURIA: Primary | ICD-10-CM

## 2025-01-27 LAB
BILIRUB BLD-MCNC: NEGATIVE MG/DL
CLARITY, POC: ABNORMAL
COLOR UR: YELLOW
EXPIRATION DATE: ABNORMAL
GLUCOSE UR STRIP-MCNC: NEGATIVE MG/DL
KETONES UR QL: NEGATIVE
LEUKOCYTE EST, POC: ABNORMAL
Lab: ABNORMAL
NITRITE UR-MCNC: NEGATIVE MG/ML
PH UR: 7 [PH] (ref 5–8)
PROT UR STRIP-MCNC: ABNORMAL MG/DL
RBC # UR STRIP: ABNORMAL /UL
SP GR UR: 1.02 (ref 1–1.03)
UROBILINOGEN UR QL: ABNORMAL

## 2025-01-27 PROCEDURE — 1125F AMNT PAIN NOTED PAIN PRSNT: CPT | Performed by: STUDENT IN AN ORGANIZED HEALTH CARE EDUCATION/TRAINING PROGRAM

## 2025-01-27 PROCEDURE — 81003 URINALYSIS AUTO W/O SCOPE: CPT | Performed by: STUDENT IN AN ORGANIZED HEALTH CARE EDUCATION/TRAINING PROGRAM

## 2025-01-27 PROCEDURE — 70551 MRI BRAIN STEM W/O DYE: CPT

## 2025-01-27 PROCEDURE — 99213 OFFICE O/P EST LOW 20 MIN: CPT | Performed by: STUDENT IN AN ORGANIZED HEALTH CARE EDUCATION/TRAINING PROGRAM

## 2025-01-27 RX ORDER — SULFAMETHOXAZOLE AND TRIMETHOPRIM 800; 160 MG/1; MG/1
1 TABLET ORAL 2 TIMES DAILY
Qty: 6 TABLET | Refills: 0 | Status: CANCELLED | OUTPATIENT
Start: 2025-01-27 | End: 2025-01-30

## 2025-01-27 RX ORDER — NITROFURANTOIN 25; 75 MG/1; MG/1
100 CAPSULE ORAL 2 TIMES DAILY
Qty: 14 CAPSULE | Refills: 0 | Status: SHIPPED | OUTPATIENT
Start: 2025-01-27 | End: 2025-02-03

## 2025-01-27 NOTE — PROGRESS NOTES
"Chief Complaint  Urinary Tract Infection (possible)    Subjective        Marci Kolb presents to [facility]  History of Present Illness  This is a 79-year-old female who presents for symptoms of dysuria and she has a history of recurrent UTIs and follows with Dr. Martínez of [facility].  Last urine culture in October 2024 showed sensitivity to multiple antibiotics.  She is allergic to streptomycin and penicillin.  She denies fever, back pain.  She is having dysuria for several days typical of her UTIs.    Objective   Vital Signs:  /80 (BP Location: Left arm, Patient Position: Sitting, Cuff Size: Pediatric)   Pulse 68   Temp 97.6 °F (36.4 °C) (Oral)   Resp 18   Ht 162.6 cm (64.02\")   Wt 62.4 kg (137 lb 9.6 oz)   SpO2 98%   BMI 23.61 kg/m²   Estimated body mass index is 23.61 kg/m² as calculated from the following:    Height as of this encounter: 162.6 cm (64.02\").    Weight as of this encounter: 62.4 kg (137 lb 9.6 oz).    BMI is within normal parameters. No other follow-up for BMI required.      Physical Exam  Vitals reviewed.   Constitutional:       Appearance: Normal appearance. She is not ill-appearing or toxic-appearing.   Cardiovascular:      Rate and Rhythm: Normal rate.   Pulmonary:      Effort: No respiratory distress.   Abdominal:      Tenderness: There is no right CVA tenderness or left CVA tenderness.   Neurological:      Mental Status: She is alert and oriented to person, place, and time.   Psychiatric:         Judgment: Judgment normal.        Result Review :                Assessment and Plan   Diagnoses and all orders for this visit:    1. Dysuria (Primary)  -     POCT urinalysis dipstick, automated  -     nitrofurantoin, macrocrystal-monohydrate, (Macrobid) 100 MG capsule; Take 1 capsule by mouth 2 (Two) Times a Day for 7 days.  Dispense: 14 capsule; Refill: 0    Advised the patient to seek medical care should she develop fever back pain or any new " or worsening symptoms.  She felt comfortable with plan of care to treat with antibiotics as noted above         Follow Up   Return in about 7 weeks (around 3/17/2025) for Next scheduled follow up, Medicare Wellness.  Patient was given instructions and counseling regarding her condition or for health maintenance advice. Please see specific information pulled into the AVS if appropriate.

## 2025-01-27 NOTE — TELEPHONE ENCOUNTER
"Caller: Marci Kolb \"Zeeshan\"    Relationship: Self    Best call back number: [phone]     What orders are you requesting (i.e. lab or imaging): URINE SAMPLE    In what timeframe would the patient need to come in: ASAP    Where will you receive your lab/imaging services: IN OFFICE    Additional notes: PATIENT CALLING STATING THAT SHE IS HAVING PAINFUL URINATION SHE BELIEVES THAT THE UTI HAS RETURNED AND WOULD LIKE TO LEAVE A URINE SAMPLE        "

## 2025-01-30 LAB
BACTERIA #/AREA URNS HPF: ABNORMAL /HPF
BACTERIA UR CULT: ABNORMAL
BACTERIA UR CULT: ABNORMAL
CASTS URNS MICRO: ABNORMAL
EPI CELLS #/AREA URNS HPF: ABNORMAL /HPF
OTHER ANTIBIOTIC SUSC ISLT: ABNORMAL
RBC #/AREA URNS HPF: ABNORMAL /HPF
WBC #/AREA URNS HPF: ABNORMAL /HPF

## 2025-01-31 ENCOUNTER — TELEPHONE (OUTPATIENT)
Dept: INTERNAL MEDICINE | Facility: CLINIC | Age: 80
End: 2025-01-31
Payer: MEDICARE

## 2025-01-31 NOTE — TELEPHONE ENCOUNTER
"Hub To Relay     \"MRI of brain shows no masses or tumors present. MRI shows moderate diffuse parenchymal atrophy (decreased brain size). Change in brain size may be age-related. This is most likely root cause for memory changes.  \"          "

## 2025-03-18 ENCOUNTER — PATIENT ROUNDING (BHMG ONLY) (OUTPATIENT)
Dept: NEUROLOGY | Facility: CLINIC | Age: 80
End: 2025-03-18
Payer: MEDICARE

## 2025-03-18 ENCOUNTER — OFFICE VISIT (OUTPATIENT)
Dept: INTERNAL MEDICINE | Facility: CLINIC | Age: 80
End: 2025-03-18
Payer: MEDICARE

## 2025-03-18 ENCOUNTER — OFFICE VISIT (OUTPATIENT)
Dept: NEUROLOGY | Facility: CLINIC | Age: 80
End: 2025-03-18
Payer: MEDICARE

## 2025-03-18 VITALS
HEART RATE: 72 BPM | OXYGEN SATURATION: 94 % | SYSTOLIC BLOOD PRESSURE: 118 MMHG | DIASTOLIC BLOOD PRESSURE: 70 MMHG | RESPIRATION RATE: 18 BRPM | WEIGHT: 139.6 LBS | BODY MASS INDEX: 23.83 KG/M2 | HEIGHT: 64 IN

## 2025-03-18 VITALS
WEIGHT: 139.5 LBS | DIASTOLIC BLOOD PRESSURE: 74 MMHG | SYSTOLIC BLOOD PRESSURE: 114 MMHG | HEIGHT: 64 IN | BODY MASS INDEX: 23.82 KG/M2

## 2025-03-18 DIAGNOSIS — R73.03 PREDIABETES: Chronic | ICD-10-CM

## 2025-03-18 DIAGNOSIS — R41.89 SUBJECTIVE COGNITIVE IMPAIRMENT: Primary | ICD-10-CM

## 2025-03-18 DIAGNOSIS — E78.00 HYPERCHOLESTEROLEMIA: Chronic | ICD-10-CM

## 2025-03-18 DIAGNOSIS — E03.9 ACQUIRED HYPOTHYROIDISM: Chronic | ICD-10-CM

## 2025-03-18 DIAGNOSIS — Z00.00 MEDICARE ANNUAL WELLNESS VISIT, SUBSEQUENT: Primary | ICD-10-CM

## 2025-03-18 PROCEDURE — 1126F AMNT PAIN NOTED NONE PRSNT: CPT | Performed by: STUDENT IN AN ORGANIZED HEALTH CARE EDUCATION/TRAINING PROGRAM

## 2025-03-18 PROCEDURE — 99214 OFFICE O/P EST MOD 30 MIN: CPT | Performed by: STUDENT IN AN ORGANIZED HEALTH CARE EDUCATION/TRAINING PROGRAM

## 2025-03-18 PROCEDURE — 1159F MED LIST DOCD IN RCRD: CPT | Performed by: STUDENT IN AN ORGANIZED HEALTH CARE EDUCATION/TRAINING PROGRAM

## 2025-03-18 PROCEDURE — G0439 PPPS, SUBSEQ VISIT: HCPCS | Performed by: STUDENT IN AN ORGANIZED HEALTH CARE EDUCATION/TRAINING PROGRAM

## 2025-03-18 PROCEDURE — 99205 OFFICE O/P NEW HI 60 MIN: CPT | Performed by: STUDENT IN AN ORGANIZED HEALTH CARE EDUCATION/TRAINING PROGRAM

## 2025-03-18 PROCEDURE — 1160F RVW MEDS BY RX/DR IN RCRD: CPT | Performed by: STUDENT IN AN ORGANIZED HEALTH CARE EDUCATION/TRAINING PROGRAM

## 2025-03-18 RX ORDER — ASPIRIN 81 MG/1
81 TABLET ORAL DAILY
COMMUNITY

## 2025-03-18 RX ORDER — HYDROCORTISONE 25 MG/G
1 OINTMENT TOPICAL 2 TIMES DAILY
COMMUNITY
Start: 2025-03-13
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
## 2025-03-18 NOTE — PROGRESS NOTES
Subjective   The ABCs of the Annual Wellness Visit  Medicare Wellness Visit      Marci Kolb is a 80 y.o. patient who presents for a Medicare Wellness Visit.  She is accompanied by her .    The following portions of the patient's history were reviewed and   updated as appropriate: current medications, past medical history, past surgical history, and problem list.    Compared to one year ago, the patient's physical   health is the same.  Compared to one year ago, the patient's mental   health is the same.    Recent Hospitalizations:  She was admitted within the past 365 days at [facility].     Current Medical Providers:  Patient Care Team:  Syl Haider MD as PCP - General (Internal Medicine)  Abdias Nash MD (Orthopedic Surgery)  Jatinder Dominguez MD as Consulting Physician (Otolaryngology)  Romulo Kelly Jr., MD as Consulting Physician (Cardiology)  Navi Martínez MD as Consulting Physician (Urology)  Alfred and Zoila  She had a  mechanical fall at home a few days ago and is following up with orthopedics regarding this.  Did not seek medical care at that time.  Outpatient Medications Prior to Visit   Medication Sig Dispense Refill    Alphagan P 0.1 % solution ophthalmic solution Administer 1 drop into the left eye 2 (Two) Times a Day.      ammonium lactate (LAC-HYDRIN) 12 % lotion Apply  topically to the appropriate area as directed As Needed for Dry Skin or Irritation. 225 g 2    aspirin 81 MG EC tablet Take 1 tablet by mouth Daily.      brimonidine (ALPHAGAN) 0.2 % ophthalmic solution INSTILL 1 DROP INTO LEFT EYE TWICE DAILY      hydrocortisone 2.5 % ointment Apply 1 Application topically to the appropriate area as directed 2 (Two) Times a Day.      levothyroxine (SYNTHROID, LEVOTHROID) 112 MCG tablet Take 1 tablet by mouth Daily. 90 tablet 1    multivitamin with minerals (MULTIVITAMIN WOMEN 50+ PO) Take 1 tablet by mouth Daily.      simvastatin (ZOCOR) 40 MG  "tablet Take 1 tablet by mouth Every Night. 90 tablet 3     No facility-administered medications prior to visit.     No opioid medication identified on active medication list. I have reviewed chart for other potential  high risk medication/s and harmful drug interactions in the elderly.      Aspirin is not on active medication list.  Aspirin use is indicated based on review of current medical condition/s. Pros and cons of this therapy have been discussed with this patient. Benefits of this medication outweigh potential harm.  Patient has been instructed to start taking this medication..    Patient Active Problem List   Diagnosis    Diverticulosis of intestine    Acquired hypothyroidism    Low back pain    Cervical pain    Sciatica    Acute cystitis without hematuria    Rheumatoid arthritis, involving unspecified site, unspecified whether rheumatoid factor present    Chronic pain disorder    Lumbar spinal stenosis    Stage 2 chronic kidney disease    Osteopenia    Hypercalcemia    Recurrent UTI    Bilateral carotid artery stenosis    Constipation    Hyperparathyroidism, unspecified    Hypercholesterolemia    Prediabetes    Sleep disturbance    History of pneumothorax    History of CVA (cerebrovascular accident)    DDD (degenerative disc disease), lumbar    DDD (degenerative disc disease), cervical    Arthritis of right hip    Dry skin dermatitis    H/O total hip arthroplasty, right    Postoperative hypotension    Hypoxia     Advance Care Planning Advance Directive is not on file.  ACP discussion was held with the patient during this visit. Patient does not have an advance directive, declines further assistance.            Objective   Vitals:    03/18/25 1106   BP: 118/70   BP Location: Right arm   Patient Position: Sitting   Cuff Size: Adult   Pulse: 72   Resp: 18   SpO2: 94%   Weight: 63.3 kg (139 lb 9.6 oz)   Height: 162.6 cm (64.02\")   PainSc: 0-No pain       Estimated body mass index is 23.95 kg/m² as calculated " "from the following:    Height as of this encounter: 162.6 cm (64.02\").    Weight as of this encounter: 63.3 kg (139 lb 9.6 oz).    BMI is within normal parameters. No other follow-up for BMI required.           Does the patient have evidence of cognitive impairment? No  Lab Results   Component Value Date    CHLPL 127 2025    TRIG 109 2025    HDL 43 2025    LDL 64 2025    VLDL 20 2025                                                                                                Health  Risk Assessment    Smoking Status:  Social History     Tobacco Use   Smoking Status Former    Current packs/day: 0.00    Average packs/day: 1 pack/day for 35.0 years (35.0 ttl pk-yrs)    Types: Cigarettes    Start date:     Quit date: 2000    Years since quittin.2   Smokeless Tobacco Never     Alcohol Consumption:  Social History     Substance and Sexual Activity   Alcohol Use No       Fall Risk Screen  STEADI Fall Risk Assessment was completed, and patient is at HIGH risk for falls. Assessment completed on:3/18/2025    Depression Screening   Little interest or pleasure in doing things? Not at all   Feeling down, depressed, or hopeless? Several days   PHQ-2 Total Score 1      Health Habits and Functional and Cognitive Screening:      3/18/2025    11:14 AM   Functional & Cognitive Status   Do you have difficulty preparing food and eating? No   Do you have difficulty bathing yourself, getting dressed or grooming yourself? No   Do you have difficulty using the toilet? No   Do you have difficulty moving around from place to place? No   Do you have trouble with steps or getting out of a bed or a chair? No   Current Diet Unhealthy Diet   Dental Exam Up to date   Eye Exam Up to date   Exercise (times per week) 7 times per week   Current Exercises Include Home Exercise Program (TV, Computer, Etc.)   Do you need help using the phone?  No   Are you deaf or do you have serious difficulty hearing?  No "   Do you need help to go to places out of walking distance? Yes   Do you need help shopping? No   Do you need help preparing meals?  No   Do you need help with housework?  No   Do you need help with laundry? No   Do you need help taking your medications? No   Do you need help managing money? No   Do you ever drive or ride in a car without wearing a seat belt? No   Have you felt unusual stress, anger or loneliness in the last month? No   Who do you live with? Spouse   If you need help, do you have trouble finding someone available to you? No   Have you been bothered in the last four weeks by sexual problems? No   Do you have difficulty concentrating, remembering or making decisions? Yes           Age-appropriate Screening Schedule:  Refer to the list below for future screening recommendations based on patient's age, sex and/or medical conditions. Orders for these recommended tests are listed in the plan section. The patient has been provided with a written plan.    Health Maintenance List  Health Maintenance   Topic Date Due    Pneumococcal Vaccine 50+ (1 of 1 - PCV) Never done    RSV Vaccine - Adults (1 - 1-dose 75+ series) Never done    DXA SCAN  03/05/2023    INFLUENZA VACCINE  07/01/2024    COVID-19 Vaccine (4 - 2024-25 season) 09/01/2024    ANNUAL WELLNESS VISIT  03/18/2026    LIPID PANEL  03/18/2026    COLORECTAL CANCER SCREENING  12/30/2029    TDAP/TD VACCINES (2 - Td or Tdap) 07/29/2030    ZOSTER VACCINE  Completed    MAMMOGRAM  Discontinued                                                                                                                                                CMS Preventative Services Quick Reference  Risk Factors Identified During Encounter  Fall Risk-High or Moderate: Information on Fall Prevention Shared in After Visit Summary    The above risks/problems have been discussed with the patient.  Pertinent information has been shared with the patient in the After Visit Summary.  An After  "Visit Summary and PPPS were made available to the patient.    Follow Up:   Next Medicare Wellness visit to be scheduled in 1 year.         Additional E&M Note during same encounter follows:  Patient has additional, significant, and separately identifiable condition(s)/problem(s) that require work above and beyond the Medicare Wellness Visit     Chief Complaint  Medicare Wellness-subsequent    Subjective   HPI    She is also following up regarding hypothyroidism, hyperlipidemia and prediabetes.  Hypothyroidism: Due for repeat labs.  Taking 112 mcg levothyroxine daily.  Hyperlipidemia: Due for repeat labs.  Taking 40 mg simvastatin daily.  Saw neurologist earlier today and had labs including B1, folate B12.              Objective   Vital Signs:  /70 (BP Location: Right arm, Patient Position: Sitting, Cuff Size: Adult)   Pulse 72   Resp 18   Ht 162.6 cm (64.02\")   Wt 63.3 kg (139 lb 9.6 oz)   SpO2 94%   BMI 23.95 kg/m²   Physical Exam  Vitals reviewed.   Constitutional:       General: She is not in acute distress.     Appearance: Normal appearance.   Cardiovascular:      Rate and Rhythm: Normal rate and regular rhythm.   Pulmonary:      Effort: Pulmonary effort is normal.      Breath sounds: Normal breath sounds.   Skin:     General: Skin is warm and dry.   Neurological:      Mental Status: She is alert.   Psychiatric:         Mood and Affect: Mood normal.         Thought Content: Thought content normal.         Judgment: Judgment normal.                    Assessment and Plan      Acquired hypothyroidism    Orders:    TSH Rfx On Abnormal To Free T4; Future    Prediabetes    Orders:    Comprehensive Metabolic Panel; Future    Hypercholesterolemia       Orders:    Lipid Panel With / Chol / HDL Ratio; Future    Medicare annual wellness visit, subsequent                 Follow Up   Return in about 6 months (around 9/18/2025).  Patient was given instructions and counseling regarding her condition or for health " maintenance advice. Please see specific information pulled into the AVS if appropriate.

## 2025-03-20 LAB
ALBUMIN SERPL-MCNC: 4.1 G/DL (ref 3.7–4.7)
ALP SERPL-CCNC: 141 IU/L (ref 44–121)
ALT SERPL-CCNC: 14 IU/L (ref 0–32)
AST SERPL-CCNC: 20 IU/L (ref 0–40)
BILIRUB SERPL-MCNC: <0.2 MG/DL (ref 0–1.2)
BUN SERPL-MCNC: 10 MG/DL (ref 8–27)
BUN/CREAT SERPL: 14 (ref 12–28)
CALCIUM SERPL-MCNC: 10.4 MG/DL (ref 8.7–10.3)
CHLORIDE SERPL-SCNC: 107 MMOL/L (ref 96–106)
CHOLEST SERPL-MCNC: 127 MG/DL (ref 100–199)
CHOLEST/HDLC SERPL: 3 RATIO (ref 0–4.4)
CO2 SERPL-SCNC: 20 MMOL/L (ref 20–29)
CREAT SERPL-MCNC: 0.73 MG/DL (ref 0.57–1)
EGFRCR SERPLBLD CKD-EPI 2021: 82 ML/MIN/1.73
GLOBULIN SER CALC-MCNC: 2.9 G/DL (ref 1.5–4.5)
GLUCOSE SERPL-MCNC: 91 MG/DL (ref 70–99)
HDLC SERPL-MCNC: 43 MG/DL
LDLC SERPL CALC-MCNC: 64 MG/DL (ref 0–99)
POTASSIUM SERPL-SCNC: 4.2 MMOL/L (ref 3.5–5.2)
PROT SERPL-MCNC: 7 G/DL (ref 6–8.5)
SODIUM SERPL-SCNC: 141 MMOL/L (ref 134–144)
T4 FREE SERPL-MCNC: 1.73 NG/DL (ref 0.82–1.77)
TRIGL SERPL-MCNC: 109 MG/DL (ref 0–149)
TSH SERPL DL<=0.005 MIU/L-ACNC: 0.01 UIU/ML (ref 0.45–4.5)
VLDLC SERPL CALC-MCNC: 20 MG/DL (ref 5–40)
WRITTEN AUTHORIZATION: NORMAL

## 2025-03-22 LAB
FOLATE SERPL-MCNC: >20 NG/ML
VIT B1 BLD-SCNC: 136.2 NMOL/L (ref 66.5–200)
VIT B12 SERPL-MCNC: 522 PG/ML (ref 232–1245)

## 2025-04-01 DIAGNOSIS — E03.9 ACQUIRED HYPOTHYROIDISM: Chronic | ICD-10-CM

## 2025-04-01 RX ORDER — LEVOTHYROXINE SODIUM 112 UG/1
112 TABLET ORAL DAILY
Qty: 90 TABLET | Refills: 0 | Status: SHIPPED | OUTPATIENT
Start: 2025-04-01

## 2025-04-17 ENCOUNTER — TELEPHONE (OUTPATIENT)
Dept: INTERNAL MEDICINE | Facility: CLINIC | Age: 80
End: 2025-04-17
Payer: MEDICARE

## 2025-04-17 NOTE — TELEPHONE ENCOUNTER
LVM, stating Dr SANTO's remarks and recommendations regarding lab results.  Reminding Pt to read the MyCSo1t message from Dr Haider.    HUB TO SHARE    Please schedule Pt a lab appt for the last week of April or 1st week of May    Thanks, Adan FERNANDES

## 2025-05-19 NOTE — PROGRESS NOTES
"Chief Complaint  Urinary Urgency (pain)    Subjective        Marci Kolb presents to [facility]  History of Present Illness  This is an 80-year-old female with recurrent UTIs here for dysuria.  UA with trace leukocytes but no evident infection.  Will send for culture.  She denies any fever, new back pain or blood in urine.  She has recurrent UTIs and can feel when 1 is coming on and this feels similar to her prior instances of this.    Objective   Vital Signs:  /70 (BP Location: Left arm, Patient Position: Sitting, Cuff Size: Small Adult)   Pulse 71   Resp 16   Ht 162.6 cm (64.02\")   Wt 62.4 kg (137 lb 9.6 oz)   SpO2 95%   BMI 23.61 kg/m²   Estimated body mass index is 23.61 kg/m² as calculated from the following:    Height as of this encounter: 162.6 cm (64.02\").    Weight as of this encounter: 62.4 kg (137 lb 9.6 oz).    BMI is within normal parameters. No other follow-up for BMI required.      Physical Exam  Vitals reviewed.   Constitutional:       Appearance: Normal appearance. She is not toxic-appearing.   Cardiovascular:      Rate and Rhythm: Normal rate.   Pulmonary:      Effort: No respiratory distress.   Neurological:      Mental Status: She is alert.        Result Review :                Assessment and Plan   Diagnoses and all orders for this visit:    1. Dysuria (Primary)  -     POC Urinalysis Dipstick, Automated  -     Cancel: Urinalysis With Culture If Indicated -  -     Cancel: Urinalysis With Culture If Indicated -  -     Urinalysis, Microscopic Only - Urine, Clean Catch  -     Urine Culture - Urine, Urine, Clean Catch    Other orders  -     nitrofurantoin, macrocrystal-monohydrate, (Macrobid) 100 MG capsule; Take 1 capsule by mouth 2 (Two) Times a Day for 5 days.  Dispense: 10 capsule; Refill: 0    Encouraged her to stay hydrated and will follow-up with urine culture results.  Sending Macrobid empirically and she will advise with new or worsening " symptoms.       Follow Up   Return in about 4 months (around 9/20/2025), or if symptoms worsen or fail to improve, for Next scheduled follow up.  Patient was given instructions and counseling regarding her condition or for health maintenance advice. Please see specific information pulled into the AVS if appropriate.

## 2025-05-20 ENCOUNTER — OFFICE VISIT (OUTPATIENT)
Dept: INTERNAL MEDICINE | Facility: CLINIC | Age: 80
End: 2025-05-20
Payer: MEDICARE

## 2025-05-20 VITALS
OXYGEN SATURATION: 95 % | WEIGHT: 137.6 LBS | BODY MASS INDEX: 23.49 KG/M2 | HEIGHT: 64 IN | DIASTOLIC BLOOD PRESSURE: 70 MMHG | RESPIRATION RATE: 16 BRPM | SYSTOLIC BLOOD PRESSURE: 124 MMHG | HEART RATE: 71 BPM

## 2025-05-20 DIAGNOSIS — R30.0 DYSURIA: Primary | ICD-10-CM

## 2025-05-20 LAB
EXPIRATION DATE: ABNORMAL
Lab: ABNORMAL

## 2025-05-20 PROCEDURE — 99213 OFFICE O/P EST LOW 20 MIN: CPT | Performed by: STUDENT IN AN ORGANIZED HEALTH CARE EDUCATION/TRAINING PROGRAM

## 2025-05-20 PROCEDURE — 81003 URINALYSIS AUTO W/O SCOPE: CPT | Performed by: STUDENT IN AN ORGANIZED HEALTH CARE EDUCATION/TRAINING PROGRAM

## 2025-05-20 PROCEDURE — 1160F RVW MEDS BY RX/DR IN RCRD: CPT | Performed by: STUDENT IN AN ORGANIZED HEALTH CARE EDUCATION/TRAINING PROGRAM

## 2025-05-20 PROCEDURE — 1159F MED LIST DOCD IN RCRD: CPT | Performed by: STUDENT IN AN ORGANIZED HEALTH CARE EDUCATION/TRAINING PROGRAM

## 2025-05-20 PROCEDURE — 1126F AMNT PAIN NOTED NONE PRSNT: CPT | Performed by: STUDENT IN AN ORGANIZED HEALTH CARE EDUCATION/TRAINING PROGRAM

## 2025-05-20 RX ORDER — NITROFURANTOIN 25; 75 MG/1; MG/1
100 CAPSULE ORAL 2 TIMES DAILY
Qty: 10 CAPSULE | Refills: 0 | Status: CANCELLED | OUTPATIENT
Start: 2025-05-20 | End: 2025-05-25

## 2025-05-20 RX ORDER — NITROFURANTOIN 25; 75 MG/1; MG/1
100 CAPSULE ORAL 2 TIMES DAILY
Qty: 10 CAPSULE | Refills: 0 | Status: SHIPPED | OUTPATIENT
Start: 2025-05-20 | End: 2025-05-25

## 2025-05-22 LAB
BACTERIA #/AREA URNS HPF: ABNORMAL /HPF
BACTERIA UR CULT: NORMAL
BACTERIA UR CULT: NORMAL
CASTS URNS MICRO: ABNORMAL
EPI CELLS #/AREA URNS HPF: ABNORMAL /HPF
RBC #/AREA URNS HPF: ABNORMAL /HPF
WBC #/AREA URNS HPF: ABNORMAL /HPF

## 2025-06-02 DIAGNOSIS — E78.00 HYPERCHOLESTEROLEMIA: Chronic | ICD-10-CM

## 2025-06-02 RX ORDER — SIMVASTATIN 40 MG
40 TABLET ORAL NIGHTLY
Qty: 90 TABLET | Refills: 3 | Status: SHIPPED | OUTPATIENT
Start: 2025-06-02 | End: 2025-06-03 | Stop reason: SDUPTHER

## 2025-06-02 NOTE — TELEPHONE ENCOUNTER
Caller: [id number] - [facility] [address] - [phone]  - [phone] FX    Relationship: Pharmacy    Best call back number:      Requested Prescriptions:   Requested Prescriptions     Pending Prescriptions Disp Refills    simvastatin (ZOCOR) 40 MG tablet 90 tablet 3     Sig: Take 1 tablet by mouth Every Night.        Pharmacy where request should be sent: [id number] - [facility] [address] - [phone]  - [phone] FX     Last office visit with prescribing clinician: 5/20/2025   Last telemedicine visit with prescribing clinician: Visit date not found   Next office visit with prescribing clinician: 9/19/2025     Additional details provided by patient: PATIENT IS OUT OF MEDICATION NEEDS REFILL       Would you like a call back once the refill request has been completed: [] Yes [x] No    If the office needs to give you a call back, can they leave a voicemail: [] Yes [x] No    Carl Limon Rep   06/02/25 15:57 EDT         DELETE AFTER READING TO PATIENT: “Thank you for sharing this information with me. I will send a message to the clinical team. Please allow 48 hours for the clinical staff to follow up on this request.”   
37 weeks

## 2025-06-03 DIAGNOSIS — E78.00 HYPERCHOLESTEROLEMIA: Chronic | ICD-10-CM

## 2025-06-03 RX ORDER — SIMVASTATIN 40 MG
40 TABLET ORAL NIGHTLY
Qty: 90 TABLET | Refills: 3 | Status: SHIPPED | OUTPATIENT
Start: 2025-06-03

## 2025-06-10 ENCOUNTER — TELEPHONE (OUTPATIENT)
Dept: INTERNAL MEDICINE | Facility: CLINIC | Age: 80
End: 2025-06-10

## 2025-06-10 NOTE — TELEPHONE ENCOUNTER
Request to cancel outstanding order:     [x] Lab-urine  [] Imaging   [] Referral     Date order placed: May 20, 2025    Reason:   [x] Duplicate  [] Patient declined and it is noted  [] Patient has not scheduled (see below)   [] Other:

## 2025-06-24 DIAGNOSIS — E03.9 ACQUIRED HYPOTHYROIDISM: Chronic | ICD-10-CM

## 2025-06-24 RX ORDER — LEVOTHYROXINE SODIUM 112 UG/1
112 TABLET ORAL DAILY
Qty: 90 TABLET | Refills: 0 | OUTPATIENT
Start: 2025-06-24

## 2025-06-24 NOTE — TELEPHONE ENCOUNTER
Rx Refill Note  Requested Prescriptions     Pending Prescriptions Disp Refills    levothyroxine (SYNTHROID, LEVOTHROID) 112 MCG tablet [Pharmacy Med Name: Levothyroxine Sodium Oral Tablet 112 MCG] 90 tablet 0     Sig: TAKE 1 TABLET BY MOUTH EVERY DAY      Last office visit with prescribing clinician: 5/20/2025   Last telemedicine visit with prescribing clinician: Visit date not found   Next office visit with prescribing clinician: 9/19/2025                         Would you like a call back once the refill request has been completed: [] Yes [] No    If the office needs to give you a call back, can they leave a voicemail: [] Yes [] No    Adan Salcedo MA  06/24/25, 10:46 EDT

## 2025-06-24 NOTE — TELEPHONE ENCOUNTER
LVM w/ Pt to call our office to schedule for a lab appt.    HUB TO SHARE    Your TSH, with last blood work results was abnormal  An order was placed in April to have your TSH rechecked in case there needed to be a dosage change.  The request for the Levothyroxine refill has been denied.    Please schedule a lab appt ASAP so we can get you back on track with your medication

## 2025-06-27 ENCOUNTER — OFFICE VISIT (OUTPATIENT)
Dept: INTERNAL MEDICINE | Facility: CLINIC | Age: 80
End: 2025-06-27
Payer: MEDICARE

## 2025-06-27 VITALS
WEIGHT: 135.3 LBS | HEART RATE: 73 BPM | HEIGHT: 64 IN | TEMPERATURE: 97.9 F | RESPIRATION RATE: 18 BRPM | DIASTOLIC BLOOD PRESSURE: 78 MMHG | BODY MASS INDEX: 23.1 KG/M2 | SYSTOLIC BLOOD PRESSURE: 140 MMHG | OXYGEN SATURATION: 90 %

## 2025-06-27 DIAGNOSIS — R30.0 DYSURIA: Primary | ICD-10-CM

## 2025-06-27 DIAGNOSIS — E03.9 ACQUIRED HYPOTHYROIDISM: ICD-10-CM

## 2025-06-27 LAB
BILIRUB BLD-MCNC: NEGATIVE MG/DL
CLARITY, POC: ABNORMAL
COLOR UR: YELLOW
EXPIRATION DATE: ABNORMAL
GLUCOSE UR STRIP-MCNC: NEGATIVE MG/DL
KETONES UR QL: ABNORMAL
LEUKOCYTE EST, POC: ABNORMAL
Lab: ABNORMAL
NITRITE UR-MCNC: NEGATIVE MG/ML
PH UR: 7 [PH] (ref 5–8)
PROT UR STRIP-MCNC: ABNORMAL MG/DL
RBC # UR STRIP: ABNORMAL /UL
SP GR UR: 1.02 (ref 1–1.03)
UROBILINOGEN UR QL: ABNORMAL

## 2025-06-27 PROCEDURE — 99213 OFFICE O/P EST LOW 20 MIN: CPT | Performed by: STUDENT IN AN ORGANIZED HEALTH CARE EDUCATION/TRAINING PROGRAM

## 2025-06-27 PROCEDURE — 81003 URINALYSIS AUTO W/O SCOPE: CPT | Performed by: STUDENT IN AN ORGANIZED HEALTH CARE EDUCATION/TRAINING PROGRAM

## 2025-06-27 PROCEDURE — 1126F AMNT PAIN NOTED NONE PRSNT: CPT | Performed by: STUDENT IN AN ORGANIZED HEALTH CARE EDUCATION/TRAINING PROGRAM

## 2025-06-27 RX ORDER — PHENAZOPYRIDINE HYDROCHLORIDE 100 MG/1
100 TABLET, FILM COATED ORAL 3 TIMES DAILY PRN
Qty: 6 TABLET | Refills: 0 | Status: SHIPPED | OUTPATIENT
Start: 2025-06-27 | End: 2025-06-29

## 2025-06-27 RX ORDER — LEVOTHYROXINE SODIUM 112 UG/1
112 TABLET ORAL DAILY
Qty: 90 TABLET | Refills: 0 | Status: SHIPPED | OUTPATIENT
Start: 2025-06-27

## 2025-06-27 RX ORDER — NITROFURANTOIN 25; 75 MG/1; MG/1
100 CAPSULE ORAL 2 TIMES DAILY
Qty: 14 CAPSULE | Refills: 0 | Status: SHIPPED | OUTPATIENT
Start: 2025-06-27 | End: 2025-07-04

## 2025-06-27 NOTE — PROGRESS NOTES
"Chief Complaint  Urinary Tract Infection (Common to have UTI's/X4 days)    Subjective        Marci Kolb presents to [facility]  History of Present Illness  80-year-old female here for several days of dysuria.  She does report some associated suprapubic pain but denies any new back pain or fever.  In addition she is due for repeat thyroid labs as these were abnormal in March.    Objective   Vital Signs:  /78 (BP Location: Left arm, Patient Position: Sitting, Cuff Size: Small Adult)   Pulse 73   Temp 97.9 °F (36.6 °C) (Oral)   Resp 18   Ht 162.6 cm (64.02\")   Wt 61.4 kg (135 lb 4.8 oz)   SpO2 90%   BMI 23.21 kg/m²   Estimated body mass index is 23.21 kg/m² as calculated from the following:    Height as of this encounter: 162.6 cm (64.02\").    Weight as of this encounter: 61.4 kg (135 lb 4.8 oz).    BMI is within normal parameters. No other follow-up for BMI required.      Physical Exam  Vitals reviewed.   Constitutional:       Appearance: She is not ill-appearing or toxic-appearing.   Cardiovascular:      Rate and Rhythm: Normal rate.   Pulmonary:      Effort: No respiratory distress.   Neurological:      Mental Status: She is alert.   Psychiatric:         Behavior: Behavior normal.        Result Review :                Assessment and Plan   Diagnoses and all orders for this visit:    1. Dysuria (Primary)  -     POC Urinalysis Dipstick, Automated  -     nitrofurantoin, macrocrystal-monohydrate, (Macrobid) 100 MG capsule; Take 1 capsule by mouth 2 (Two) Times a Day for 7 days.  Dispense: 14 capsule; Refill: 0  -     phenazopyridine (Pyridium) 100 MG tablet; Take 1 tablet by mouth 3 (Three) Times a Day As Needed for Bladder Spasms for up to 2 days.  Dispense: 6 tablet; Refill: 0    2. Acquired hypothyroidism  -     TSH Rfx On Abnormal To Free T4  -     levothyroxine (SYNTHROID, LEVOTHROID) 112 MCG tablet; Take 1 tablet by mouth Daily.  Dispense: 90 tablet; Refill: " Patient oriented to room and ED throughput process.  Safety measures with ED bed locked in lowest position and call light in reach.  Patient educated on all orders, including any medications.  Patient educated on chief complaint/symptoms. Patient encouraged to ask questions regarding care, medications or treatment plan.  Patient aware of how to reach staff with questions/concerns.    0    Discussed with patient the need for repeat blood work for thyroid labs while in office, as this may require medication adjustment.  Will fill thyroid education and then adjust if needed based on labs today.  Follow urine culture and sent Macrobid based on prior culture data.  She had no other questions or concerns at this time.         Follow Up   Return in about 12 weeks (around 9/19/2025).  Patient was given instructions and counseling regarding her condition or for health maintenance advice. Please see specific information pulled into the AVS if appropriate.

## 2025-07-17 ENCOUNTER — HOSPITAL ENCOUNTER (EMERGENCY)
Facility: HOSPITAL | Age: 80
Discharge: HOME | End: 2025-07-17
Attending: EMERGENCY MEDICINE
Payer: MEDICARE

## 2025-07-17 ENCOUNTER — APPOINTMENT (OUTPATIENT)
Dept: GENERAL RADIOLOGY | Facility: HOSPITAL | Age: 80
End: 2025-07-17
Payer: MEDICARE

## 2025-07-17 ENCOUNTER — APPOINTMENT (OUTPATIENT)
Dept: CT IMAGING | Facility: HOSPITAL | Age: 80
End: 2025-07-17
Payer: MEDICARE

## 2025-07-17 VITALS
WEIGHT: 143 LBS | BODY MASS INDEX: 23.82 KG/M2 | HEART RATE: 61 BPM | DIASTOLIC BLOOD PRESSURE: 63 MMHG | OXYGEN SATURATION: 94 % | TEMPERATURE: 97.9 F | RESPIRATION RATE: 16 BRPM | SYSTOLIC BLOOD PRESSURE: 116 MMHG | HEIGHT: 65 IN

## 2025-07-17 DIAGNOSIS — M54.50 ACUTE BILATERAL LOW BACK PAIN WITHOUT SCIATICA: ICD-10-CM

## 2025-07-17 DIAGNOSIS — M54.2 NECK PAIN: ICD-10-CM

## 2025-07-17 DIAGNOSIS — M79.672 FOOT PAIN, LEFT: ICD-10-CM
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
## 2025-07-17 DIAGNOSIS — M25.511 ACUTE PAIN OF RIGHT SHOULDER: ICD-10-CM

## 2025-07-17 PROCEDURE — 73030 X-RAY EXAM OF SHOULDER: CPT

## 2025-07-17 PROCEDURE — 72131 CT LUMBAR SPINE W/O DYE: CPT

## 2025-07-17 PROCEDURE — 72125 CT NECK SPINE W/O DYE: CPT

## 2025-07-17 PROCEDURE — 73521 X-RAY EXAM HIPS BI 2 VIEWS: CPT

## 2025-07-17 PROCEDURE — 73630 X-RAY EXAM OF FOOT: CPT

## 2025-07-17 PROCEDURE — 73610 X-RAY EXAM OF ANKLE: CPT

## 2025-07-17 PROCEDURE — 99284 EMERGENCY DEPT VISIT MOD MDM: CPT

## 2025-07-17 PROCEDURE — 73110 X-RAY EXAM OF WRIST: CPT

## 2025-07-17 PROCEDURE — 73130 X-RAY EXAM OF HAND: CPT

## 2025-07-17 RX ORDER — HYDROCODONE BITARTRATE AND ACETAMINOPHEN 7.5; 325 MG/1; MG/1
1 TABLET ORAL ONCE
Refills: 0 | Status: COMPLETED | OUTPATIENT
Start: 2025-07-17 | End: 2025-07-17

## 2025-07-17 RX ORDER — LIDOCAINE 50 MG/G
1 PATCH TOPICAL EVERY 24 HOURS
Qty: 9 PATCH | Refills: 0 | Status: SHIPPED | OUTPATIENT
Start: 2025-07-17

## 2025-07-17 RX ADMIN — HYDROCODONE BITARTRATE AND ACETAMINOPHEN 1 TABLET: 7.5; 325 TABLET ORAL at 14:26

## 2025-07-17 NOTE — DISCHARGE INSTRUCTIONS
Please follow-up with your PCP.    Rest.  Increase fluid intake.  Ice as needed.  Apply warm compress several times per day.  Elevate to reduce swelling.      Although you are being discharged in the ED today, I encourage you to return for worsening symptoms.  Things can, and do, change such a treatment at home with medication may not be adequate.  Specifically I recommend returning for chest pain or discomfort, difficulty breathing, persistent vomiting or difficulty holding down liquids or medications, fever > 102.0 F,  or any other worsening or alarming symptoms.     Take meds as prescribed.     You have been evaluated in the emergency department for your presenting symptoms and deemed appropriate for discharge home.  Understand that your health care does not end here today.  It is important that your primary care physician be made aware of your visit.  I recommend calling your primary care provider in the next 48 hours to arrange follow-up care.  Your primary care provider needs to review your treatment and recovery to ensure that no further treatment is necessary.  Additional testing or procedures may be necessary as an outpatient at the discretion of your primary care provider.    I also recommend following up with your PCP for recheck of your blood pressure and treatment as needed.
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
## 2025-07-17 NOTE — ED PROVIDER NOTES
EMERGENCY DEPARTMENT MD ATTESTATION NOTE    Room Number:  S01/01  PCP: Syl Haider MD  Independent Historians: Patient    HPI:  A complete HPI/ROS/PMH/PSH/SH/FH are unobtainable due to: None    Context: Marci Kolb is a 80 y.o. female with a medical history of rheumatoid arthritis and degenerative disc disease who presents to the ED c/o acute pain in her right shoulder, neck, upper back, lower back as well as the right lower extremity and left lower extremity after an accidental fall down some stairs recently.  During the fall she did not strike her head or have any loss of consciousness.  She says the pain is primarily in her lower back and also in her right shoulder and upper back area but she has had increasing tenderness because of these various pains and has had decreased activity ever since the fall.  So she came here for further evaluation.      Review of prior external notes (non-ED) -and- Review of prior external test results outside of this encounter: I independently reviewed the MRI brain without contrast study from January 27, 2025.  Impression indicated moderate diffuse parenchymal atrophy otherwise no evidence for acute intracranial pathology.    Prescription drug monitoring program review: ALEX reviewed by Finn Rodriguez MD         PHYSICAL EXAM    I have reviewed the triage vital signs and nursing notes.    ED Triage Vitals   Temp Heart Rate Resp BP SpO2   07/17/25 1252 07/17/25 1249 07/17/25 1249 07/17/25 1252 07/17/25 1249   98.1 °F (36.7 °C) 101 20 115/68 96 %      Temp src Heart Rate Source Patient Position BP Location FiO2 (%)   -- -- 07/17/25 1252 -- --     Sitting         Physical Exam  GENERAL: alert, appears uncomfortable but no acute distress  SKIN: Warm, dry  HENT: Normocephalic, atraumatic  EYES: no scleral icterus  CV: regular rhythm, regular rate  RESPIRATORY: normal effort, lungs clear  ABDOMEN: soft, nondistended, nontender  MUSCULOSKELETAL: no deformity.  Multiple areas  of point tenderness are notable including the right shoulder, right thoracic back soft tissues in the lumbosacral back soft tissues.  NEURO: alert, moves all extremities, follows commands      MEDICATIONS GIVEN IN ER  Medications   HYDROcodone-acetaminophen (NORCO) 7.5-325 MG per tablet 1 tablet (1 tablet Oral Given 7/17/25 1426)         ORDERS PLACED DURING THIS VISIT:  Orders Placed This Encounter   Procedures    [facility] 12. Post Op Shoe (); Left    XR Foot 3+ View Left    XR Shoulder 2+ View Right    XR Hand 3+ View Right    CT Cervical Spine Without Contrast    CT Lumbar Spine Without Contrast    XR Ankle 3+ View Left    XR Wrist 3+ View Right    XR Hips Bilateral With or Without Pelvis 2 View    Apply ice to affected area    Apply ice to affected area         PROCEDURES  Procedures        PROGRESS, DATA ANALYSIS, CONSULTS, AND MEDICAL DECISION MAKING  All labs have been independently interpreted by me.  All radiology studies have been reviewed by me. All EKG's have been independently viewed and interpreted by me.  Discussion below represents my analysis of pertinent findings related to patient's condition, differential diagnosis, treatment plan and final disposition.    Differential diagnosis includes but is not limited to cervical strain, cervical spine fracture, shoulder fracture, shoulder dislocation, shoulder sprain, lumbar strain, lumbar spine fracture, hip fracture, hip dislocation.    Clinical Scores:                                   ED Course as of 07/17/25 2018   Thu Jul 17, 2025   1450 Updated by neuroradiology that no evidence of acute fracture in the lumbar or cervical spine [CV]   1527 X-rays negative for any acute fracture or dislocation [CV]   1545 Discussed findings with patient.  At this time her pain is under control.  After her sling and walking boot due to her continuous pain, patient declined but does want a surgical walking shoe.  Plan is for discharge with Ortho referral to  "use as needed [CV]      ED Course User Index  [CV] Abdias Sow, MASHA       MDM:   I independently interpreted the x-ray of the bilateral hips and my findings are: No acute fracture or dislocation.    I independently interpreted the x-ray of the right shoulder and my findings are: No fracture or dislocation.    We reviewed all of the radiology reports with the patient in the examination room.  Thankfully it does not appear to be any sign of acute osseous injury.  Her work of breathing is normal and oxygenation is normal on room air.  Neurologic exam is normal at baseline.  I think she is okay for discharge home with continued outpatient management of these musculoskeletal pains.  Will encourage prompt follow-up with her PCP and also with orthopedics specialist as needed for ongoing care needs.  Will review with her usual \"return to ER \"instructions prior to discharge.      COMPLEXITY OF CARE  Admission was considered but after careful review of the patient's presentation, physical examination, diagnostic results, and response to treatment the patient may be safely discharged with outpatient follow-up.    Please note that portions of this document were completed with a voice recognition program.    Note Disclaimer: At [facility], we believe that sharing information builds trust and better relationships. You are receiving this note because you recently visited [facility]. It is possible you will see health information before a provider has talked with you about it. This kind of information can be easy to misunderstand. To help you fully understand what it means for your health, we urge you to discuss this note with your provider.         Finn Rodriguez MD  07/17/25 2021    "

## 2025-07-22 ENCOUNTER — TELEPHONE (OUTPATIENT)
Dept: CASE MANAGEMENT | Facility: OTHER | Age: 80
End: 2025-07-22
Payer: MEDICARE

## 2025-07-23 ENCOUNTER — TELEPHONE (OUTPATIENT)
Dept: CASE MANAGEMENT | Facility: OTHER | Age: 80
End: 2025-07-23
Payer: MEDICARE

## 2025-07-23 NOTE — TELEPHONE ENCOUNTER
Second attempt to call patient, no answer. Left message to call back. Will also send her a message via [employer]. Unable to reach patient, will close program.

## 2025-07-28 ENCOUNTER — OFFICE VISIT (OUTPATIENT)
Dept: INTERNAL MEDICINE | Facility: CLINIC | Age: 80
End: 2025-07-28
Payer: MEDICARE

## 2025-07-28 VITALS
WEIGHT: 143 LBS | OXYGEN SATURATION: 94 % | TEMPERATURE: 98.7 F | SYSTOLIC BLOOD PRESSURE: 120 MMHG | BODY MASS INDEX: 23.82 KG/M2 | HEART RATE: 76 BPM | HEIGHT: 65 IN | DIASTOLIC BLOOD PRESSURE: 72 MMHG | RESPIRATION RATE: 18 BRPM
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
## 2025-07-28 DIAGNOSIS — R30.0 BURNING WITH URINATION: Primary | ICD-10-CM

## 2025-07-28 DIAGNOSIS — M79.672 LEFT FOOT PAIN: ICD-10-CM

## 2025-07-28 DIAGNOSIS — N39.0 RECURRENT UTI: ICD-10-CM

## 2025-07-28 DIAGNOSIS — R30.0 BURNING WITH URINATION: ICD-10-CM

## 2025-07-28 LAB
BILIRUB BLD-MCNC: ABNORMAL MG/DL
CLARITY, POC: ABNORMAL
COLOR UR: ABNORMAL
EXPIRATION DATE: ABNORMAL
GLUCOSE UR STRIP-MCNC: NEGATIVE MG/DL
KETONES UR QL: ABNORMAL
LEUKOCYTE EST, POC: ABNORMAL
Lab: ABNORMAL
NITRITE UR-MCNC: POSITIVE MG/ML
PH UR: 6 [PH] (ref 5–8)
PROT UR STRIP-MCNC: ABNORMAL MG/DL
RBC # UR STRIP: ABNORMAL /UL
SP GR UR: 1.02 (ref 1–1.03)
UROBILINOGEN UR QL: ABNORMAL

## 2025-07-28 PROCEDURE — 81003 URINALYSIS AUTO W/O SCOPE: CPT | Performed by: STUDENT IN AN ORGANIZED HEALTH CARE EDUCATION/TRAINING PROGRAM

## 2025-07-28 PROCEDURE — 1159F MED LIST DOCD IN RCRD: CPT | Performed by: STUDENT IN AN ORGANIZED HEALTH CARE EDUCATION/TRAINING PROGRAM

## 2025-07-28 PROCEDURE — 99214 OFFICE O/P EST MOD 30 MIN: CPT | Performed by: STUDENT IN AN ORGANIZED HEALTH CARE EDUCATION/TRAINING PROGRAM

## 2025-07-28 PROCEDURE — 1160F RVW MEDS BY RX/DR IN RCRD: CPT | Performed by: STUDENT IN AN ORGANIZED HEALTH CARE EDUCATION/TRAINING PROGRAM

## 2025-07-28 PROCEDURE — 1125F AMNT PAIN NOTED PAIN PRSNT: CPT | Performed by: STUDENT IN AN ORGANIZED HEALTH CARE EDUCATION/TRAINING PROGRAM

## 2025-07-28 RX ORDER — PHENAZOPYRIDINE HYDROCHLORIDE 100 MG/1
100 TABLET, FILM COATED ORAL 3 TIMES DAILY PRN
Qty: 6 TABLET | Refills: 0 | Status: SHIPPED | OUTPATIENT
Start: 2025-07-28 | End: 2025-07-30

## 2025-07-28 RX ORDER — LOTILANER OPHTHALMIC SOLUTION 2.5 MG/ML
SOLUTION/ DROPS OPHTHALMIC
COMMUNITY
Start: 2025-04-29

## 2025-07-28 RX ORDER — SULFAMETHOXAZOLE AND TRIMETHOPRIM 800; 160 MG/1; MG/1
1 TABLET ORAL 2 TIMES DAILY
Qty: 6 TABLET | Refills: 0 | Status: SHIPPED | OUTPATIENT
Start: 2025-07-28 | End: 2025-07-31

## 2025-07-28 NOTE — PROGRESS NOTES
"Chief Complaint  Back Pain and BURNING URINE (Abdominal pressure x1 week/OTC 0, drunk plenty of water and cranberry juice)    Subjective        Marci Kolb presents to [facility]  History of Present Illness  This is an 80-year-old female here for recurrent UTI.  Has had abdominal pressure and pain with urination for the past week.  Has tried to stay hydrated and taking cranberry juice but symptoms have persisted. No fever.    She was also seen in the emergency department on July 17 for a mechanical fall down the stairs and has had persistent left ankle pain and swelling.  Went to orthopedic office earlier this morning was unsure of who she was able supposed to see and left without being seen.    Objective   Vital Signs:  /72 (BP Location: Left arm, Patient Position: Sitting, Cuff Size: Small Adult)   Pulse 76   Temp 98.7 °F (37.1 °C) (Oral)   Resp 18   Ht 165.1 cm (65\")   Wt 64.9 kg (143 lb)   SpO2 94%   BMI 23.80 kg/m²   Estimated body mass index is 23.8 kg/m² as calculated from the following:    Height as of this encounter: 165.1 cm (65\").    Weight as of this encounter: 64.9 kg (143 lb).    BMI is within normal parameters. No other follow-up for BMI required.      Physical Exam  Vitals reviewed.   Constitutional:       Appearance: She is not toxic-appearing.   Pulmonary:      Effort: No respiratory distress.   Skin:     Comments: Redness over left anterior ankle without any evidence of drainage from this area with slowed gait preference to weight-bear on the right lower extremity   Neurological:      Mental Status: She is alert.        Result Review :                Assessment and Plan   Diagnoses and all orders for this visit:    1. Burning with urination (Primary)  -     POC Urinalysis Dipstick, Automated  -     sulfamethoxazole-trimethoprim (Bactrim DS) 800-160 MG per tablet; Take 1 tablet by mouth 2 (Two) Times a Day for 3 days.  Dispense: 6 tablet; Refill: 0  -  "    phenazopyridine (Pyridium) 100 MG tablet; Take 1 tablet by mouth 3 (Three) Times a Day As Needed for Bladder Spasms for up to 2 days.  Dispense: 6 tablet; Refill: 0  -     Ambulatory Referral to Urology  -     Urinalysis, Microscopic Only - Urine, Clean Catch; Future  -     Urine Culture - Urine, Urine, Clean Catch; Future    2. Recurrent UTI  -     Ambulatory Referral to Urology  -     Urinalysis, Microscopic Only - Urine, Clean Catch; Future  -     Urine Culture - Urine, Urine, Clean Catch; Future    3. Falls, subsequent encounter  -     Ambulatory Referral to Orthopedic Surgery    4. Left foot pain  -     Ambulatory Referral to Orthopedic Surgery      We discussed topical estrogen to help with vaginal dryness that might be helpful in the setting of recurrent UTIs.  I referred her to urology but they are in their guidance regarding this recurrent issue.  In addition referred to orthopedic surgery regarding recent fall for which she was evaluated in the emergency department.  She declined needing a note for work at this time.         Follow Up   Return in about 8 weeks (around 9/19/2025), or if symptoms worsen or fail to improve.  Patient was given instructions and counseling regarding her condition or for health maintenance advice. Please see specific information pulled into the AVS if appropriate.

## 2025-08-01 ENCOUNTER — TELEPHONE (OUTPATIENT)
Dept: INTERNAL MEDICINE | Facility: CLINIC | Age: 80
End: 2025-08-01
Payer: MEDICARE

## 2025-08-01 LAB
BACTERIA #/AREA URNS HPF: ABNORMAL /HPF
BACTERIA UR CULT: ABNORMAL
CASTS URNS MICRO: ABNORMAL
EPI CELLS #/AREA URNS HPF: ABNORMAL /HPF
OTHER ANTIBIOTIC SUSC ISLT: ABNORMAL
RBC #/AREA URNS HPF: ABNORMAL /HPF
WBC #/AREA URNS HPF: ABNORMAL /HPF

## 2025-08-01 RX ORDER — NITROFURANTOIN 25; 75 MG/1; MG/1
100 CAPSULE ORAL 2 TIMES DAILY
Qty: 10 CAPSULE | Refills: 0 | Status: SHIPPED | OUTPATIENT
Start: 2025-08-01 | End: 2025-08-06

## 2025-08-01 NOTE — TELEPHONE ENCOUNTER
"    Caller: Marci Kolb \"Zeeshan\"    Relationship: Self    Best call back number: [phone]     What medication are you requesting: ANTIBIOTIC     What are your current symptoms: ACHE WHEN URINATE     How long have you been experiencing symptoms: 10-11 DAYS     Have you had these symptoms before:    [x] Yes  [] No    Have you been treated for these symptoms before:   [x] Yes  [] No    If a prescription is needed, what is your preferred pharmacy and phone number:  [id number] - [address] - [phone]  - [phone]      Additional notes: PILLS THAT WAS PRESCRIBED FOR UTI HAS NOT TAKEN IT AWAY     PATIENT STATES THAT SHE WOULD LIKE A CALLBACK           "

## 2025-08-04 ENCOUNTER — TELEPHONE (OUTPATIENT)
Dept: INTERNAL MEDICINE | Facility: CLINIC | Age: 80
End: 2025-08-04
Payer: MEDICARE

## 2025-08-06 ENCOUNTER — OFFICE VISIT (OUTPATIENT)
Dept: ORTHOPEDIC SURGERY | Facility: CLINIC | Age: 80
End: 2025-08-06
Payer: MEDICARE

## 2025-08-06 VITALS — BODY MASS INDEX: 24.16 KG/M2 | WEIGHT: 145 LBS | HEIGHT: 65 IN | TEMPERATURE: 98.2 F

## 2025-08-06 DIAGNOSIS — R52 PAIN: ICD-10-CM
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
## 2025-08-06 DIAGNOSIS — S90.32XA HEMATOMA OF LEFT FOOT: ICD-10-CM

## 2025-08-06 DIAGNOSIS — S93.492A SPRAIN OF ANTERIOR TALOFIBULAR LIGAMENT OF LEFT ANKLE, INITIAL ENCOUNTER: ICD-10-CM

## 2025-08-06 DIAGNOSIS — M19.072 OSTEOARTHRITIS OF LEFT ANKLE AND FOOT: ICD-10-CM

## 2025-08-13 ENCOUNTER — TELEPHONE (OUTPATIENT)
Dept: INTERNAL MEDICINE | Facility: CLINIC | Age: 80
End: 2025-08-13
Payer: MEDICARE

## 2025-08-21 ENCOUNTER — EXTERNAL PBMM DATA (OUTPATIENT)
Dept: PHARMACY | Facility: OTHER | Age: 80
End: 2025-08-21
Payer: MEDICARE

## (undated) DEVICE — ANTIBACTERIAL UNDYED BRAIDED (POLYGLACTIN 910), SYNTHETIC ABSORBABLE SUTURE: Brand: COATED VICRYL

## (undated) DEVICE — Device

## (undated) DEVICE — ENCORE® LATEX ORTHO SIZE 8.5, STERILE LATEX POWDER-FREE SURGICAL GLOVE: Brand: ENCORE

## (undated) DEVICE — SHEET, DRAPE, SPLIT, STERILE: Brand: MEDLINE

## (undated) DEVICE — TUBING, SUCTION, 1/4" X 20', STRAIGHT: Brand: MEDLINE INDUSTRIES, INC.

## (undated) DEVICE — SUT VICYL 2/0 CR8 18IN DYED J726D

## (undated) DEVICE — 40585 XL ADVANCED TRENDELENBURG POSITIONING KIT: Brand: 40585 XL ADVANCED TRENDELENBURG POSITIONING KIT

## (undated) DEVICE — ENDOPATH XCEL BLADELESS TROCARS WITH STABILITY SLEEVES: Brand: ENDOPATH XCEL

## (undated) DEVICE — PREP SOL POVIDONE/IODINE BT 4OZ

## (undated) DEVICE — TRAP FLD MINIVAC MEGADYNE 100ML

## (undated) DEVICE — T4 ZIPPER TOGA, (L/XL)

## (undated) DEVICE — THE STERILE LIGHT HANDLE COVER IS USED WITH STERIS SURGICAL LIGHTING AND VISUALIZATION SYSTEMS.

## (undated) DEVICE — PATIENT RETURN ELECTRODE, SINGLE-USE, CONTACT QUALITY MONITORING, ADULT, WITH 9FT CORD, FOR PATIENTS WEIGING OVER 33LBS. (15KG): Brand: MEGADYNE

## (undated) DEVICE — CONTAINER,SPECIMEN,OR STERILE,4OZ: Brand: MEDLINE

## (undated) DEVICE — MAT FLR ABSORBENT LG 4FT 10 2.5FT

## (undated) DEVICE — SOL IRR NACL 0.9PCT 3000ML

## (undated) DEVICE — ENDOPATH XCEL WITH OPTIVIEW TECHNOLOGY BLADELESS TROCARS WITH STABILITY SLEEVES: Brand: ENDOPATH XCEL OPTIVIEW

## (undated) DEVICE — APPL CHLORAPREP W/TINT 26ML ORNG

## (undated) DEVICE — DUAL CUT SAGITTAL BLADE

## (undated) DEVICE — GLV SURG PREMIERPRO ORTHO LTX PF SZ8 BRN

## (undated) DEVICE — COVER,LIGHT HANDLE,FLX,2/PK: Brand: MEDLINE INDUSTRIES, INC.

## (undated) DEVICE — SUT VIC 0 CT1 CR8 18IN J840D

## (undated) DEVICE — COVER,MAYO STAND,STERILE: Brand: MEDLINE

## (undated) DEVICE — YANKAUER,BULB TIP,W/O VENT,RIGID,STERILE: Brand: MEDLINE

## (undated) DEVICE — PILLW ABD SM

## (undated) DEVICE — DECANTER BAG 9": Brand: MEDLINE INDUSTRIES, INC.

## (undated) DEVICE — IRRIGATOR BULB ASEPTO 60CC STRL

## (undated) DEVICE — NEEDLE, QUINCKE, 20GX3.5": Brand: MEDLINE

## (undated) DEVICE — PK ANT HIP 40

## (undated) DEVICE — VAGINAL PACKING: Brand: DEROYAL

## (undated) DEVICE — LAPAROSCOPIC SMOKE ELIMINATION DEVICE: Brand: PNEUVIEW XE

## (undated) DEVICE — DRSNG TELFA PAD NONADH STR 1S 3X4IN

## (undated) DEVICE — SOL NACL 0.9PCT 1000ML

## (undated) DEVICE — ADHS SKIN DERMABOND TOP ADVANCED

## (undated) DEVICE — 1016 S-DRAPE IRRIG POUCH 10/BOX: Brand: STERI-DRAPE™

## (undated) DEVICE — INTENDED FOR TISSUE SEPARATION, AND OTHER PROCEDURES THAT REQUIRE A SHARP SURGICAL BLADE TO PUNCTURE OR CUT.: Brand: BARD-PARKER ® CARBON RIB-BACK BLADES

## (undated) DEVICE — DRAPE,HIP,W/POUCHES,STERILE: Brand: MEDLINE

## (undated) DEVICE — ST IRR CYSTO W/SPK 77IN LF

## (undated) DEVICE — CLIP LIGAT VASC HORIZON TI MD/LG GRN 6CT
Type: IMPLANTABLE DEVICE | Site: VAGINA | Status: NON-FUNCTIONAL
Removed: 2019-08-06

## (undated) DEVICE — SPNG GZ WOVN 4X4IN 12PLY 10/BX STRL

## (undated) DEVICE — GLV SURG SIGNATURE ESSENTIAL PF LTX SZ8

## (undated) DEVICE — CATH FOL SIMPLYLATEX SILELAST 16F 17IN

## (undated) DEVICE — SYS IRR SURGIPHOR A/MIC RTU BO PVPI 450ML STRL

## (undated) DEVICE — DRAPE,U/ SHT,SPLIT,PLAS,STERIL: Brand: MEDLINE

## (undated) DEVICE — GLV SURG BIOGEL LTX PF 6 1/2

## (undated) DEVICE — SOL ISO/ALC 70PCT 4OZ

## (undated) DEVICE — ENCORE® LATEX ORTHO SIZE 6.5, STERILE LATEX POWDER-FREE SURGICAL GLOVE: Brand: ENCORE

## (undated) DEVICE — SUT PROLN 3/0 8832H

## (undated) DEVICE — DECANT BG O JET

## (undated) DEVICE — GLV SURG BIOGEL LTX PF 8 1/2

## (undated) DEVICE — ADHS SKIN SURG TISS VISC PREMIERPRO EXOFIN HI/VISC FAST/DRY

## (undated) DEVICE — 1842 FOAM BLOCK NEEDLE COUNTER: Brand: DEVON

## (undated) DEVICE — 3M™ STERI-DRAPE™ INSTRUMENT POUCH 1018: Brand: STERI-DRAPE™

## (undated) DEVICE — PK HIP TOTL 40

## (undated) DEVICE — GLV SURG TRIUMPH CLASSIC PF LTX 8.5 STRL

## (undated) DEVICE — 3M™ IOBAN™ 2 ANTIMICROBIAL INCISE DRAPE 6640EZ: Brand: IOBAN™ 2

## (undated) DEVICE — SOL NACL 0.9PCT 100ML SGL

## (undated) DEVICE — GLV SURG TRIUMPH CLASSIC PF LTX 8 STRL

## (undated) DEVICE — PAD,ABDOMINAL,8"X10",ST,LF: Brand: MEDLINE

## (undated) DEVICE — 3M™ STERI-DRAPE™ INSTRUMENT POUCH 1018L: Brand: STERI-DRAPE™

## (undated) DEVICE — TOWEL,OR,DSP,ST,BLUE,STD,4/PK,20PK/CS: Brand: MEDLINE

## (undated) DEVICE — RECIPROCATING BLADE HEAVY DUTY LONG, OFFSET  (77.6 X 0.77 X 11.2MM)

## (undated) DEVICE — DRSNG WND GZ CURAD OIL EMULSION 3X8IN LF STRL 1PK

## (undated) DEVICE — UNDYED BRAIDED (POLYGLACTIN 910), SYNTHETIC ABSORBABLE SUTURE: Brand: COATED VICRYL

## (undated) DEVICE — COVER,TABLE,HEAVY DUTY,79"X110",STRL: Brand: MEDLINE

## (undated) DEVICE — WEREWOLF FASTSEAL 6.0 HEMOSTASIS WAND: Brand: FASTSEAL 6.0 HEMOSTASIS WAND

## (undated) DEVICE — APPL CHLORAPREP HI/LITE 26ML ORNG

## (undated) DEVICE — DEV SUT GRSPR CLOSUR 15CM 14G

## (undated) DEVICE — CATH FOL SIMPLYLATEX SILELAST 18F 17IN

## (undated) DEVICE — HARMONIC ACE +7 LAPAROSCOPIC SHEARS ADVANCED HEMOSTASIS 5MM DIAMETER 36CM SHAFT LENGTH  FOR USE WITH GRAY HAND PIECE ONLY: Brand: HARMONIC ACE

## (undated) DEVICE — SUT VIC 5/0 PS2 18IN J495H

## (undated) DEVICE — SUT ETHIB 2 CV V37 MS/4 30IN MX69G

## (undated) DEVICE — SKIN PREP TRAY W/CHG: Brand: MEDLINE INDUSTRIES, INC.

## (undated) DEVICE — PENCL SMOKE/EVAC MEGADYNE TELESCP 10FT

## (undated) DEVICE — PCH INST SURG INVISISHIELD 2PCKT

## (undated) DEVICE — GLV SURG TRIUMPH CLASSIC PF LTX 7 STRL

## (undated) DEVICE — GLV SURG BIOGEL LTX PF 8

## (undated) DEVICE — DRAPE,REIN 53X77,STERILE: Brand: MEDLINE

## (undated) DEVICE — JELLY,LUBE,STERILE,FLIP TOP,TUBE,4-OZ: Brand: MEDLINE

## (undated) DEVICE — LOU LAVH: Brand: MEDLINE INDUSTRIES, INC.

## (undated) DEVICE — ENDOPATH PNEUMONEEDLE INSUFFLATION NEEDLES WITH LUER LOCK CONNECTORS 120MM: Brand: ENDOPATH

## (undated) DEVICE — 3M™ STERI-STRIP™ REINFORCED ADHESIVE SKIN CLOSURES, R1547, 1/2 IN X 4 IN (12 MM X 100 MM), 6 STRIPS/ENVELOPE: Brand: 3M™ STERI-STRIP™

## (undated) DEVICE — DEV COND GAS LAP INSUFLOW W/LUER CONN

## (undated) DEVICE — 3M™ STERI-STRIP™ REINFORCED ADHESIVE SKIN CLOSURES, R1546, 1/4 IN X 4 IN (6 MM X 100 MM), 10 STRIPS/ENVELOPE: Brand: 3M™ STERI-STRIP™

## (undated) DEVICE — CONN TBG Y 5 IN 1 LF STRL

## (undated) DEVICE — SUT VIC 3/0 CTI 36IN J944H

## (undated) DEVICE — KTTNER ENDO BLNT DISSCT

## (undated) DEVICE — SYR CONTRL LUERLOK 10CC

## (undated) DEVICE — SUT VIC 0 CT1 36IN J946H

## (undated) DEVICE — GLV SURG TRIUMPH CLASSIC PF LTX 6.5 STRL